# Patient Record
Sex: FEMALE | Race: WHITE | Employment: OTHER | URBAN - METROPOLITAN AREA
[De-identification: names, ages, dates, MRNs, and addresses within clinical notes are randomized per-mention and may not be internally consistent; named-entity substitution may affect disease eponyms.]

---

## 2017-01-13 ENCOUNTER — HOSPITAL ENCOUNTER (OUTPATIENT)
Dept: OTHER | Age: 57
Discharge: HOME | End: 2017-01-13
Attending: INTERNAL MEDICINE | Admitting: INTERNAL MEDICINE

## 2017-01-13 DIAGNOSIS — Z85.3 PERSONAL HISTORY OF MALIGNANT NEOPLASM OF BREAST: ICD-10-CM

## 2017-01-13 RX ORDER — FLUDEOXYGLUCOSE F 18 200 MCI/ML
14.85 INJECTION, SOLUTION INTRAVENOUS
Status: COMPLETED | OUTPATIENT
Start: 2017-01-13 | End: 2017-01-13

## 2017-01-13 RX ADMIN — FLUDEOXYGLUCOSE F 18 14.85 MILLICURIE: 200 INJECTION, SOLUTION INTRAVENOUS at 10:35

## 2017-01-16 DIAGNOSIS — E11.42 DIABETIC POLYNEUROPATHY ASSOCIATED WITH TYPE 2 DIABETES MELLITUS (HCC): ICD-10-CM

## 2017-01-16 RX ORDER — LISINOPRIL 20 MG/1
TABLET ORAL
Qty: 30 TABLET | Refills: 0 | Status: SHIPPED | OUTPATIENT
Start: 2017-01-16 | End: 2017-02-06

## 2017-01-16 RX ORDER — VENLAFAXINE HYDROCHLORIDE 75 MG/1
CAPSULE, EXTENDED RELEASE ORAL
Qty: 90 CAPSULE | Refills: 2 | Status: SHIPPED | OUTPATIENT
Start: 2017-01-16 | End: 2017-07-09 | Stop reason: SDUPTHER

## 2017-01-16 RX ORDER — GABAPENTIN 300 MG/1
CAPSULE ORAL
Qty: 90 CAPSULE | Refills: 1 | Status: SHIPPED | OUTPATIENT
Start: 2017-01-16 | End: 2017-02-13

## 2017-01-27 ENCOUNTER — TELEPHONE (OUTPATIENT)
Dept: ENDOCRINOLOGY | Age: 57
End: 2017-01-27

## 2017-02-06 RX ORDER — LISINOPRIL 20 MG/1
TABLET ORAL
Qty: 30 TABLET | Refills: 0 | Status: SHIPPED | OUTPATIENT
Start: 2017-02-06 | End: 2017-02-13 | Stop reason: SDUPTHER

## 2017-02-13 ENCOUNTER — OFFICE VISIT (OUTPATIENT)
Dept: FAMILY MEDICINE CLINIC | Age: 57
End: 2017-02-13

## 2017-02-13 VITALS
DIASTOLIC BLOOD PRESSURE: 90 MMHG | TEMPERATURE: 98.3 F | WEIGHT: 209 LBS | RESPIRATION RATE: 20 BRPM | HEART RATE: 90 BPM | BODY MASS INDEX: 38.46 KG/M2 | SYSTOLIC BLOOD PRESSURE: 142 MMHG | HEIGHT: 62 IN

## 2017-02-13 DIAGNOSIS — M48.061 LUMBAR SPINAL STENOSIS: Primary | ICD-10-CM

## 2017-02-13 DIAGNOSIS — M25.50 POLYARTHRALGIA: ICD-10-CM

## 2017-02-13 DIAGNOSIS — E11.42 DIABETIC POLYNEUROPATHY ASSOCIATED WITH TYPE 2 DIABETES MELLITUS (HCC): ICD-10-CM

## 2017-02-13 DIAGNOSIS — I10 ESSENTIAL HYPERTENSION: ICD-10-CM

## 2017-02-13 PROCEDURE — 99214 OFFICE O/P EST MOD 30 MIN: CPT | Performed by: FAMILY MEDICINE

## 2017-02-13 RX ORDER — LISINOPRIL 40 MG/1
40 TABLET ORAL DAILY
Qty: 90 TABLET | Refills: 0 | Status: SHIPPED | OUTPATIENT
Start: 2017-02-13 | End: 2017-05-27 | Stop reason: SDUPTHER

## 2017-02-13 RX ORDER — GABAPENTIN 600 MG/1
TABLET ORAL
Qty: 120 TABLET | Refills: 3 | Status: SHIPPED | OUTPATIENT
Start: 2017-02-13 | End: 2017-06-08 | Stop reason: SDUPTHER

## 2017-03-03 ENCOUNTER — TELEPHONE (OUTPATIENT)
Dept: BARIATRICS/WEIGHT MGMT | Age: 57
End: 2017-03-03

## 2017-03-15 ENCOUNTER — TELEPHONE (OUTPATIENT)
Dept: BARIATRICS/WEIGHT MGMT | Age: 57
End: 2017-03-15

## 2017-04-04 RX ORDER — NYSTATIN 100000 U/G
OINTMENT TOPICAL
Qty: 30 G | Refills: 2 | Status: SHIPPED | OUTPATIENT
Start: 2017-04-04 | End: 2017-04-06

## 2017-04-05 DIAGNOSIS — L02.92 FURUNCLE: ICD-10-CM

## 2017-04-06 RX ORDER — NYSTATIN 100000 U/G
OINTMENT TOPICAL
Qty: 30 G | Refills: 2 | Status: SHIPPED | OUTPATIENT
Start: 2017-04-06 | End: 2018-05-18 | Stop reason: SDUPTHER

## 2017-04-06 RX ORDER — FLUTICASONE PROPIONATE 50 MCG
SPRAY, SUSPENSION (ML) NASAL
Qty: 16 G | Refills: 11 | Status: SHIPPED | OUTPATIENT
Start: 2017-04-06 | End: 2018-07-16 | Stop reason: SDUPTHER

## 2017-04-10 RX ORDER — CYCLOBENZAPRINE HCL 10 MG
10 TABLET ORAL 3 TIMES DAILY PRN
Qty: 30 TABLET | Refills: 0 | Status: SHIPPED | OUTPATIENT
Start: 2017-04-10 | End: 2017-05-15 | Stop reason: SDUPTHER

## 2017-04-19 ENCOUNTER — TELEPHONE (OUTPATIENT)
Dept: BARIATRICS/WEIGHT MGMT | Age: 57
End: 2017-04-19

## 2017-05-01 DIAGNOSIS — E78.5 HYPERLIPIDEMIA LDL GOAL <100: ICD-10-CM

## 2017-05-01 RX ORDER — METOPROLOL SUCCINATE 50 MG/1
TABLET, EXTENDED RELEASE ORAL
Qty: 30 TABLET | Refills: 1 | Status: SHIPPED | OUTPATIENT
Start: 2017-05-01 | End: 2017-08-15 | Stop reason: SDUPTHER

## 2017-05-01 RX ORDER — PRAVASTATIN SODIUM 40 MG
TABLET ORAL
Qty: 90 TABLET | Refills: 2 | Status: SHIPPED | OUTPATIENT
Start: 2017-05-01 | End: 2018-03-10 | Stop reason: SDUPTHER

## 2017-05-15 ENCOUNTER — OFFICE VISIT (OUTPATIENT)
Dept: FAMILY MEDICINE CLINIC | Age: 57
End: 2017-05-15

## 2017-05-15 VITALS
TEMPERATURE: 98.4 F | WEIGHT: 206 LBS | BODY MASS INDEX: 36.5 KG/M2 | RESPIRATION RATE: 18 BRPM | DIASTOLIC BLOOD PRESSURE: 74 MMHG | HEIGHT: 63 IN | HEART RATE: 80 BPM | SYSTOLIC BLOOD PRESSURE: 122 MMHG | OXYGEN SATURATION: 97 %

## 2017-05-15 DIAGNOSIS — E11.43 DIABETIC GASTROPARESIS (HCC): Primary | ICD-10-CM

## 2017-05-15 DIAGNOSIS — K31.84 DIABETIC GASTROPARESIS (HCC): Primary | ICD-10-CM

## 2017-05-15 DIAGNOSIS — L71.9 ROSACEA, ACNE: ICD-10-CM

## 2017-05-15 DIAGNOSIS — M25.50 POLYARTHRALGIA: ICD-10-CM

## 2017-05-15 DIAGNOSIS — F51.01 PRIMARY INSOMNIA: ICD-10-CM

## 2017-05-15 DIAGNOSIS — M48.061 LUMBAR SPINAL STENOSIS: ICD-10-CM

## 2017-05-15 PROCEDURE — 99214 OFFICE O/P EST MOD 30 MIN: CPT | Performed by: FAMILY MEDICINE

## 2017-05-15 RX ORDER — METRONIDAZOLE 7.5 MG/G
GEL TOPICAL
Qty: 1 TUBE | Refills: 3 | Status: SHIPPED | OUTPATIENT
Start: 2017-05-15 | End: 2021-05-18

## 2017-05-15 RX ORDER — DICYCLOMINE HCL 20 MG
20 TABLET ORAL 3 TIMES DAILY PRN
Qty: 90 TABLET | Refills: 3 | Status: SHIPPED | OUTPATIENT
Start: 2017-05-15 | End: 2017-06-28

## 2017-05-17 RX ORDER — CYCLOBENZAPRINE HCL 10 MG
TABLET ORAL
Qty: 30 TABLET | Refills: 0 | Status: SHIPPED | OUTPATIENT
Start: 2017-05-17 | End: 2017-06-28

## 2017-05-18 ENCOUNTER — OFFICE VISIT (OUTPATIENT)
Dept: ENDOCRINOLOGY | Age: 57
End: 2017-05-18

## 2017-05-18 VITALS
RESPIRATION RATE: 16 BRPM | WEIGHT: 206.6 LBS | HEIGHT: 63 IN | OXYGEN SATURATION: 95 % | BODY MASS INDEX: 36.61 KG/M2 | HEART RATE: 80 BPM | DIASTOLIC BLOOD PRESSURE: 99 MMHG | SYSTOLIC BLOOD PRESSURE: 155 MMHG

## 2017-05-18 DIAGNOSIS — I10 ESSENTIAL HYPERTENSION: ICD-10-CM

## 2017-05-18 LAB — HBA1C MFR BLD: 11.3 %

## 2017-05-18 PROCEDURE — 99214 OFFICE O/P EST MOD 30 MIN: CPT | Performed by: INTERNAL MEDICINE

## 2017-05-18 PROCEDURE — 83036 HEMOGLOBIN GLYCOSYLATED A1C: CPT | Performed by: INTERNAL MEDICINE

## 2017-05-30 RX ORDER — LISINOPRIL 40 MG/1
TABLET ORAL
Qty: 90 TABLET | Refills: 0 | Status: SHIPPED | OUTPATIENT
Start: 2017-05-30 | End: 2017-08-25 | Stop reason: SDUPTHER

## 2017-06-08 DIAGNOSIS — M25.50 POLYARTHRALGIA: ICD-10-CM

## 2017-06-09 RX ORDER — GABAPENTIN 600 MG/1
TABLET ORAL
Qty: 120 TABLET | Refills: 2 | Status: SHIPPED | OUTPATIENT
Start: 2017-06-09 | End: 2017-09-22 | Stop reason: SDUPTHER

## 2017-06-15 RX ORDER — DIAPER,BRIEF,ADULT, DISPOSABLE
EACH MISCELLANEOUS
Qty: 50 STRIP | Refills: 4 | Status: SHIPPED | OUTPATIENT
Start: 2017-06-15 | End: 2017-11-14 | Stop reason: SDUPTHER

## 2017-06-21 ENCOUNTER — TELEPHONE (OUTPATIENT)
Dept: BARIATRICS/WEIGHT MGMT | Age: 57
End: 2017-06-21

## 2017-06-26 ENCOUNTER — TELEPHONE (OUTPATIENT)
Dept: FAMILY MEDICINE CLINIC | Age: 57
End: 2017-06-26

## 2017-06-26 DIAGNOSIS — M25.569 CHRONIC KNEE PAIN, UNSPECIFIED LATERALITY: Primary | ICD-10-CM

## 2017-06-26 DIAGNOSIS — G89.29 CHRONIC KNEE PAIN, UNSPECIFIED LATERALITY: Primary | ICD-10-CM

## 2017-06-28 ENCOUNTER — OFFICE VISIT (OUTPATIENT)
Dept: BARIATRICS/WEIGHT MGMT | Age: 57
End: 2017-06-28

## 2017-06-28 VITALS
HEART RATE: 86 BPM | BODY MASS INDEX: 38.16 KG/M2 | DIASTOLIC BLOOD PRESSURE: 82 MMHG | SYSTOLIC BLOOD PRESSURE: 125 MMHG | HEIGHT: 63 IN | WEIGHT: 215.4 LBS

## 2017-06-28 DIAGNOSIS — E66.01 SEVERE OBESITY (BMI 35.0-39.9) WITH COMORBIDITY (HCC): Primary | ICD-10-CM

## 2017-06-28 DIAGNOSIS — E78.5 HYPERLIPIDEMIA LDL GOAL <100: ICD-10-CM

## 2017-06-28 DIAGNOSIS — I10 ESSENTIAL HYPERTENSION: ICD-10-CM

## 2017-06-28 PROCEDURE — 99204 OFFICE O/P NEW MOD 45 MIN: CPT | Performed by: SURGERY

## 2017-06-28 RX ORDER — AMPICILLIN TRIHYDRATE 250 MG
1000 CAPSULE ORAL DAILY
Status: ON HOLD | COMMUNITY
End: 2017-08-22 | Stop reason: ALTCHOICE

## 2017-06-29 ENCOUNTER — OFFICE VISIT (OUTPATIENT)
Dept: ORTHOPEDIC SURGERY | Age: 57
End: 2017-06-29

## 2017-06-29 ENCOUNTER — TELEPHONE (OUTPATIENT)
Dept: FAMILY MEDICINE CLINIC | Age: 57
End: 2017-06-29

## 2017-06-29 VITALS
HEIGHT: 62 IN | SYSTOLIC BLOOD PRESSURE: 120 MMHG | BODY MASS INDEX: 39.56 KG/M2 | TEMPERATURE: 96.8 F | WEIGHT: 215 LBS | DIASTOLIC BLOOD PRESSURE: 73 MMHG | HEART RATE: 85 BPM

## 2017-06-29 DIAGNOSIS — M17.11 PRIMARY OSTEOARTHRITIS OF RIGHT KNEE: ICD-10-CM

## 2017-06-29 DIAGNOSIS — M25.561 RIGHT KNEE PAIN, UNSPECIFIED CHRONICITY: Primary | ICD-10-CM

## 2017-06-29 PROCEDURE — 20610 DRAIN/INJ JOINT/BURSA W/O US: CPT | Performed by: PHYSICIAN ASSISTANT

## 2017-06-29 PROCEDURE — 99214 OFFICE O/P EST MOD 30 MIN: CPT | Performed by: PHYSICIAN ASSISTANT

## 2017-07-03 DIAGNOSIS — K31.84 DIABETIC GASTROPARESIS (HCC): ICD-10-CM

## 2017-07-03 DIAGNOSIS — M17.11 PRIMARY OSTEOARTHRITIS OF RIGHT KNEE: ICD-10-CM

## 2017-07-03 DIAGNOSIS — M25.551 HIP PAIN, BILATERAL: ICD-10-CM

## 2017-07-03 DIAGNOSIS — I10 ESSENTIAL HYPERTENSION: ICD-10-CM

## 2017-07-03 DIAGNOSIS — E66.01 SEVERE OBESITY (BMI 35.0-39.9) WITH COMORBIDITY (HCC): ICD-10-CM

## 2017-07-03 DIAGNOSIS — E11.43 DIABETIC GASTROPARESIS (HCC): ICD-10-CM

## 2017-07-03 DIAGNOSIS — M25.552 HIP PAIN, BILATERAL: ICD-10-CM

## 2017-07-03 DIAGNOSIS — E11.21 DIABETIC NEPHROPATHY ASSOCIATED WITH TYPE 2 DIABETES MELLITUS (HCC): ICD-10-CM

## 2017-07-03 DIAGNOSIS — M54.31 RIGHT SIDED SCIATICA: ICD-10-CM

## 2017-07-03 DIAGNOSIS — E78.5 HYPERLIPIDEMIA LDL GOAL <100: ICD-10-CM

## 2017-07-10 RX ORDER — VENLAFAXINE HYDROCHLORIDE 75 MG/1
CAPSULE, EXTENDED RELEASE ORAL
Qty: 90 CAPSULE | Refills: 1 | Status: SHIPPED | OUTPATIENT
Start: 2017-07-10 | End: 2017-09-18 | Stop reason: SDUPTHER

## 2017-07-19 ENCOUNTER — HOSPITAL ENCOUNTER (OUTPATIENT)
Dept: WOMENS IMAGING | Age: 57
Discharge: HOME | End: 2017-07-19
Attending: INTERNAL MEDICINE | Admitting: INTERNAL MEDICINE

## 2017-07-19 DIAGNOSIS — Z12.31 VISIT FOR SCREENING MAMMOGRAM: ICD-10-CM

## 2017-08-03 ENCOUNTER — TELEPHONE (OUTPATIENT)
Dept: ENDOCRINOLOGY | Age: 57
End: 2017-08-03

## 2017-08-15 RX ORDER — METOPROLOL SUCCINATE 50 MG/1
TABLET, EXTENDED RELEASE ORAL
Qty: 30 TABLET | Refills: 0 | Status: SHIPPED | OUTPATIENT
Start: 2017-08-15 | End: 2017-10-08 | Stop reason: SDUPTHER

## 2017-08-22 ENCOUNTER — TELEPHONE (OUTPATIENT)
Dept: FAMILY MEDICINE CLINIC | Age: 57
End: 2017-08-22

## 2017-08-23 ENCOUNTER — TELEPHONE (OUTPATIENT)
Dept: BARIATRICS/WEIGHT MGMT | Age: 57
End: 2017-08-23

## 2017-08-25 RX ORDER — LISINOPRIL 40 MG/1
TABLET ORAL
Qty: 90 TABLET | Refills: 0 | Status: SHIPPED | OUTPATIENT
Start: 2017-08-25 | End: 2018-04-13 | Stop reason: SDUPTHER

## 2017-08-28 ENCOUNTER — OFFICE VISIT (OUTPATIENT)
Dept: FAMILY MEDICINE CLINIC | Age: 57
End: 2017-08-28

## 2017-08-28 VITALS
HEART RATE: 96 BPM | BODY MASS INDEX: 38.27 KG/M2 | HEIGHT: 63 IN | RESPIRATION RATE: 16 BRPM | WEIGHT: 216 LBS | SYSTOLIC BLOOD PRESSURE: 120 MMHG | TEMPERATURE: 99.3 F | DIASTOLIC BLOOD PRESSURE: 80 MMHG

## 2017-08-28 DIAGNOSIS — G93.41 METABOLIC ENCEPHALOPATHY: Primary | ICD-10-CM

## 2017-08-28 DIAGNOSIS — A41.9 SEPSIS, DUE TO UNSPECIFIED ORGANISM: ICD-10-CM

## 2017-08-28 PROCEDURE — 99496 TRANSJ CARE MGMT HIGH F2F 7D: CPT | Performed by: FAMILY MEDICINE

## 2017-08-29 ENCOUNTER — TELEPHONE (OUTPATIENT)
Dept: FAMILY MEDICINE CLINIC | Age: 57
End: 2017-08-29

## 2017-09-18 RX ORDER — VENLAFAXINE HYDROCHLORIDE 75 MG/1
CAPSULE, EXTENDED RELEASE ORAL
Qty: 90 CAPSULE | Refills: 0 | Status: SHIPPED | OUTPATIENT
Start: 2017-09-18 | End: 2017-10-25

## 2017-09-20 PROBLEM — M54.59 INTRACTABLE LOW BACK PAIN: Status: ACTIVE | Noted: 2017-09-20

## 2017-09-20 PROBLEM — G89.29 CHRONIC LOW BACK PAIN: Status: ACTIVE | Noted: 2017-09-20

## 2017-09-20 PROBLEM — M54.50 CHRONIC LOW BACK PAIN: Status: ACTIVE | Noted: 2017-09-20

## 2017-09-22 ENCOUNTER — TELEPHONE (OUTPATIENT)
Dept: FAMILY MEDICINE CLINIC | Age: 57
End: 2017-09-22

## 2017-09-22 DIAGNOSIS — M25.50 POLYARTHRALGIA: ICD-10-CM

## 2017-09-23 RX ORDER — GABAPENTIN 600 MG/1
TABLET ORAL
Qty: 120 TABLET | Refills: 1 | Status: SHIPPED | OUTPATIENT
Start: 2017-09-23 | End: 2017-11-14 | Stop reason: SDUPTHER

## 2017-09-27 ENCOUNTER — HOSPITAL ENCOUNTER (OUTPATIENT)
Dept: ENDOSCOPY | Age: 57
Discharge: HOME | End: 2017-10-16
Attending: INTERNAL MEDICINE | Admitting: INTERNAL MEDICINE

## 2017-09-28 ENCOUNTER — OFFICE VISIT (OUTPATIENT)
Dept: ORTHOPEDIC SURGERY | Age: 57
End: 2017-09-28

## 2017-09-28 VITALS
HEIGHT: 63 IN | HEART RATE: 98 BPM | SYSTOLIC BLOOD PRESSURE: 173 MMHG | BODY MASS INDEX: 39.16 KG/M2 | WEIGHT: 221 LBS | TEMPERATURE: 97.5 F | DIASTOLIC BLOOD PRESSURE: 94 MMHG

## 2017-09-28 DIAGNOSIS — M77.11 RIGHT TENNIS ELBOW: ICD-10-CM

## 2017-09-28 DIAGNOSIS — M79.671 RIGHT FOOT PAIN: Primary | ICD-10-CM

## 2017-09-28 DIAGNOSIS — M25.521 RIGHT ELBOW PAIN: ICD-10-CM

## 2017-09-28 DIAGNOSIS — M72.2 PLANTAR FASCIITIS OF RIGHT FOOT: ICD-10-CM

## 2017-09-28 PROCEDURE — 20605 DRAIN/INJ JOINT/BURSA W/O US: CPT | Performed by: PHYSICIAN ASSISTANT

## 2017-09-28 PROCEDURE — 99214 OFFICE O/P EST MOD 30 MIN: CPT | Performed by: PHYSICIAN ASSISTANT

## 2017-10-05 ENCOUNTER — OFFICE VISIT (OUTPATIENT)
Dept: FAMILY MEDICINE CLINIC | Age: 57
End: 2017-10-05

## 2017-10-05 VITALS
SYSTOLIC BLOOD PRESSURE: 136 MMHG | HEART RATE: 94 BPM | WEIGHT: 209 LBS | BODY MASS INDEX: 37.03 KG/M2 | HEIGHT: 63 IN | RESPIRATION RATE: 16 BRPM | TEMPERATURE: 98 F | DIASTOLIC BLOOD PRESSURE: 84 MMHG

## 2017-10-05 DIAGNOSIS — E11.21 DIABETIC NEPHROPATHY ASSOCIATED WITH TYPE 2 DIABETES MELLITUS (HCC): ICD-10-CM

## 2017-10-05 DIAGNOSIS — Z23 NEEDS FLU SHOT: ICD-10-CM

## 2017-10-05 DIAGNOSIS — I10 ESSENTIAL HYPERTENSION: ICD-10-CM

## 2017-10-05 DIAGNOSIS — F41.9 ANXIETY: ICD-10-CM

## 2017-10-05 DIAGNOSIS — S16.1XXA CERVICAL STRAIN, INITIAL ENCOUNTER: ICD-10-CM

## 2017-10-05 PROCEDURE — 90471 IMMUNIZATION ADMIN: CPT | Performed by: FAMILY MEDICINE

## 2017-10-05 PROCEDURE — 90630 INFLUENZA, QUADV, 18-64 YRS, ID, PF, MICRO INJ, 0.1ML (FLUZONE QUADV, PF): CPT | Performed by: FAMILY MEDICINE

## 2017-10-05 PROCEDURE — 99214 OFFICE O/P EST MOD 30 MIN: CPT | Performed by: FAMILY MEDICINE

## 2017-10-05 RX ORDER — BUSPIRONE HYDROCHLORIDE 15 MG/1
TABLET ORAL
Qty: 60 TABLET | Refills: 1 | Status: SHIPPED | OUTPATIENT
Start: 2017-10-05 | End: 2020-08-05

## 2017-10-05 NOTE — PATIENT INSTRUCTIONS
tilt your head toward the left. You will feel a gentle stretch on your right side. Hold for 15 to 30 seconds. Repeat 3 times on each side. Neck rotation   Right side: Rotate your neck by looking over your right shoulder. Lift your right hand and place your palm on the left side of your chin. Push your chin with your palm toward your right shoulder. Hold for a count of 10. Do this 3 times. Left side: Rotate your neck by looking over your left shoulder. Lift your left hand and place your palm on the right side of your chin. Push your chin with your palm toward your left shoulder. Hold for a count of 10. Do this 3 times. Scapular squeezes: While sitting or standing with your arms by your sides, squeeze your shoulder blades together and hold for 5 seconds. Do 3 sets of 10. Thoracic extension: While sitting in a chair, clasp both arms behind your head. Gently arch backward and look up toward the ceiling. Repeat 10 times. Do this several times per day.

## 2017-10-05 NOTE — MR AVS SNAPSHOT
After Visit Summary             Brandan Solano   10/5/2017 12:15 PM   Office Visit    Description:  Female : 1960   Provider:  Hood Reyes MD   Department:  [address] [place] [address] and Future Appointments         Below is a list of your follow-up and future appointments. This may not be a complete list as you may have made appointments directly with providers that we are not aware of or your providers may have made some for you. Please call your providers to confirm appointments. It is important to keep your appointments. Please bring your current insurance card, photo ID, co-pay, and all medication bottles to your appointment. If self-pay, payment is expected at the time of service. Your To-Do List     Future Appointments Provider Department Dept Phone    10/16/2017 11:00 AM Samir Wilcox MD; [facility] [phone]    2017 1:00 PM JONI Martinez [facility] [phone]    2017 11:50 AM Nacho Caruso, [facility] [phone]    Please arrive 15 minutes prior to appointment time, bring insurance card and photo ID.     2018 3:00 PM Paula Sosa MD; [facility] [address] On[facility] [phone]    Future Orders Complete By Expires    Comprehensive Metabolic Panel [PKC95 Custom]  10/5/2017 1/3/2018    Hemoglobin A1C [LAB90 Custom]  10/5/2017 1/3/2018    Lipid Panel [LAB18 Custom]  10/5/2017 1/3/2018    Follow-Up    Return in about 4 days (around 10/9/2017) for Diabetes.          Information from Your Visit        Department     Name Address Phone Fax    [address] [facility]) [place]  [address] [place] [address] [phone]      You Were Seen for:         Comments    Uncontrolled type 2 diabetes mellitus with complication, with long-term current use of insulin ([address]   [[id number]]         Vital Signs Blood Pressure Pulse Temperature Respirations Height Weight    136/84 (Site: Left Arm, Position: Sitting, Cuff Size: Large Adult) 94 98 °F (36.7 °C) (Oral) 16 5' 3\" (1.6 m) 209 lb (94.8 kg)    Body Mass Index Smoking Status                37.02 kg/m2 Never Smoker          Additional Information about your Body Mass Index (BMI)           Your BMI as listed above is considered obese (30 or more). BMI is an estimate of body fat, calculated from your height and weight. The higher your BMI, the greater your risk of heart disease, high blood pressure, type 2 diabetes, stroke, gallstones, arthritis, sleep apnea, and certain cancers. BMI is not perfect. It may overestimate body fat in athletes and people who are more muscular. Even a small weight loss (between 5 and 10 percent of your current weight) by decreasing your calorie intake and becoming more physically active will help lower your risk of developing or worsening diseases associated with obesity. Learn more at: [employer]          Instructions    INSTRUCTIONS  NEXT APPOINTMENT:   · PLEASE TAKE THIS FORM TO CHECK-OUT WINDOW TO SCHEDULE NEXT VISIT.   · REFILL POLICY:  If not getting refills today, then PLEASE make next appointment on way out today. Will need to see that future appointment scheudled when pharmacy contacts Dr. Leonardo Kuo for a refill. · PLEASE GET BLOODWORK DRAWN TODAY ON FIRST FLOOR in 170. Take orders with you. RESULTS- most blood tests back in couple days. We will call you if any problems. If bloodwork good, you will get letter in mail or notified thru [address] (if signed up) within 2 weeks. If you do not, please call office. · Please get flu vaccine when available in fall. Can get either at this office or at stores such as [employer]. · Check sugar before meals, before bed, 2 hour after meal.  · Take 70/30 mix 50 units before meals twice a day. NO other insulin. · Bring in log of sugars to see me early next week. · START buspar for anxiety  · Use heat 20 minutes on painful joint/muscle. Then do stretches. At least twice a day. OK to take advil. Neck Spasm Rehabilitation Exercises     You may do these exercises right away. Neck flexion   Forward: Clasp your hands behind your head and let the weight of your arms pull your chin to your chest. Relax. Hold for a count of 15. Do this 3 times. Right: Turn your head to the right and clasp your hands behind your head. Let the weight of your arms pull your chin to the right side of your chest. Relax. Hold for a count of 15. Do this 3 times. Left: Turn your head to the left and clasp your hands behind your head. Let the weight of your arms pull your chin to the left side of your chest. Relax. Hold for a count of 15. Do this 3 times. Upper trapezius stretch: The upper trapezius muscle connects your shoulder to your head. Sitting in an upright position, put your right arm behind your back and gently grasp the right side of your head with your left hand to help tilt your head toward the left. You will feel a gentle stretch on your right side. Hold for 15 to 30 seconds. Repeat 3 times on each side. Neck rotation   Right side: Rotate your neck by looking over your right shoulder. Lift your right hand and place your palm on the left side of your chin. Push your chin with your palm toward your right shoulder. Hold for a count of 10. Do this 3 times. Left side: Rotate your neck by looking over your left shoulder. Lift your left hand and place your palm on the right side of your chin. Push your chin with your palm toward your left shoulder. Hold for a count of 10. Do this 3 times. Scapular squeezes: While sitting or standing with your arms by your sides, squeeze your shoulder blades together and hold for 5 seconds. Do 3 sets of 10.    Thoracic extension: While sitting in a chair, clasp both arms behind your CAPSULES BY MOUTH DAILY    lisinopril (PRINIVIL;ZESTRIL) 40 MG tablet TAKE ONE TABLET BY MOUTH DAILY    amLODIPine (NORVASC) 10 MG tablet Take 1 tablet by mouth daily    metoprolol succinate (TOPROL XL) 50 MG extended release tablet TAKE ONE TABLET BY MOUTH DAILY    TRUETRACK TEST strip USE THREE TIMES A DAY    B-D UF III MINI PEN NEEDLES 31G X 5 MM MISC USE THREE TIMES A DAY WITH NOVOLOG    metroNIDAZOLE (METROGEL) 0.75 % gel Apply topically 2 times daily. pravastatin (PRAVACHOL) 40 MG tablet TAKE ONE TABLET BY MOUTH DAILY    anastrozole (ARIMIDEX) 1 MG tablet TAKE ONE TABLET BY MOUTH DAILY    mupirocin (BACTROBAN) 2 % ointment APPLY THREE TIMES A DAY AS NEEDED    nystatin (MYCOSTATIN) 918912 UNIT/GM ointment APPLY TO AFFECTED AREA(S) TWO TIMES A DAY AS NEEDED    fluticasone (FLONASE) 50 MCG/ACT nasal spray PLACE TWO SPRAYS IN EACH NOSTRIL ONCE DAILY    tolterodine (DETROL LA) 4 MG extended release capsule TAKE ONE CAPSULE BY MOUTH DAILY    calcium carbonate-vitamin D (CALTRATE) 600-400 MG-UNIT TABS per tab Take 1 tablet by mouth 2 times daily    B-D INS SYR HALF-UNIT .3CC/31G 31G X 5/16\" 0.3 ML MISC USE AS DIRECTED TWO TIMES A DAY    Blood Glucose Monitoring Suppl (TRUE METRIX METER) W/DEVICE KIT 1 each by Does not apply route three times daily. true track meter    MICROLET LANCETS MISC 1 each by Does not apply route 6 times daily. omeprazole (PRILOSEC) 20 MG capsule Take 20 mg by mouth every evening. lubiprostone (AMITIZA) 8 MCG CAPS capsule Take 8 mcg by mouth 2 times daily (with meals).       Allergies              Ambien [Zolpidem Tartrate] Other (See Comments)    Vivid dreams    Naproxen     Trazodone And Nefazodone Other (See Comments)    headache    Diamox [Acetazolamide] Rash    Lorazepam Other (See Comments)    confusion    Reglan [Metoclopramide] Anxiety    Sulfa Antibiotics Rash    Shaking      We Ordered/Performed the following           Ambulatory referral to Diabetic Education Scheduling Instructions:    SPECIFICALLY NEEDS INTENSE ED ON INSULIN  Classes and individual appointments available at various locations in the [facility], [place], [place], and [facility]. Scheduling Phone Number: [phone], Option 2 then option 5    One of the many advantages of the [address] is that we all work together closely to make healthcare easy for you. We have contacted the scheduling office to inform them we have referred you. For your convenience, their office should call you within a few days to schedule   an appointment, but if you would like to call them sooner their information is above. Oskar Lares, 18-64 YRS, ID, PF, MICRO INJ, 0.1ML (FLUZONE QUADV, PF)          Additional Information        Basic Information     Date Of Birth Sex Race Ethnicity Preferred Language    1960 Female White Non-/Non  English      Problem List as of 10/5/2017  Date Reviewed: 10/5/2017                Anxiety    Plantar fasciitis of right foot    Right tennis elbow    Acute midline low back pain without sciatica    Altered mental status, unspecified    Urinary tract infection, site not specified    Chronic low back pain    Intractable low back pain    Blood poisoning ([address]    Meningitis    Fever of unknown origin    SIRS (systemic inflammatory response syndrome) (HCC)    Neck pain    Severe obesity (BMI 35.0-39. 9) with comorbidity ([address]    Metallic taste    Polyarthralgia    Hyperlipidemia LDL goal <100    Personal history of breast cancer    Urge incontinence of urine    Lumbar spinal stenosis    TMJ (temporomandibular joint disorder)    Encounter for antineoplastic chemotherapy    Right knee DJD    Calcific Achilles tendonitis, right    Right sided sciatica    Migraine    Hip pain, bilateral    Depression, major    Insulin dependent type 2 diabetes mellitus, uncontrolled ([address]- per endocrinology    Nephropathy, diabetic ([address]    Allergic rhinitis    Insomnia

## 2017-10-06 ENCOUNTER — TELEPHONE (OUTPATIENT)
Dept: FAMILY MEDICINE CLINIC | Age: 57
End: 2017-10-06

## 2017-10-06 RX ORDER — METHOCARBAMOL 500 MG/1
500 TABLET, FILM COATED ORAL 4 TIMES DAILY
Qty: 40 TABLET | Refills: 0 | Status: SHIPPED | OUTPATIENT
Start: 2017-10-06 | End: 2017-10-16

## 2017-10-06 NOTE — TELEPHONE ENCOUNTER
Pt saw Dr. Kin Meckel yesterday for her neck pain  It was discussed in the appointment about a muscle relaxer  Pt would like to see if Dr. Kin Meckel would prescribe that for her today

## 2017-10-09 RX ORDER — METOPROLOL SUCCINATE 50 MG/1
TABLET, EXTENDED RELEASE ORAL
Qty: 30 TABLET | Refills: 5 | Status: SHIPPED | OUTPATIENT
Start: 2017-10-09 | End: 2018-07-01 | Stop reason: SDUPTHER

## 2017-10-15 NOTE — ANESTHESIA PRE-OP
100 UNIT/ML injection Inject 50 Units into the skin 2 times daily (with meals) 15 Pen 3    TRUETRACK TEST strip USE THREE TIMES A DAY 50 strip 4    B-D UF III MINI PEN NEEDLES 31G X 5 MM MISC USE THREE TIMES A DAY WITH NOVOLOG 100 each 5    metroNIDAZOLE (METROGEL) 0.75 % gel Apply topically 2 times daily. 1 Tube 3    pravastatin (PRAVACHOL) 40 MG tablet TAKE ONE TABLET BY MOUTH DAILY 90 tablet 2    anastrozole (ARIMIDEX) 1 MG tablet TAKE ONE TABLET BY MOUTH DAILY 30 tablet 5    mupirocin (BACTROBAN) 2 % ointment APPLY THREE TIMES A DAY AS NEEDED 22 g 2    nystatin (MYCOSTATIN) 996311 UNIT/GM ointment APPLY TO AFFECTED AREA(S) TWO TIMES A DAY AS NEEDED 30 g 2    tolterodine (DETROL LA) 4 MG extended release capsule TAKE ONE CAPSULE BY MOUTH DAILY 30 capsule 5    calcium carbonate-vitamin D (CALTRATE) 600-400 MG-UNIT TABS per tab Take 1 tablet by mouth 2 times daily (Patient taking differently: Take 1 tablet by mouth 2 times daily Indications:  states not taking ) 60 tablet 2    B-D INS SYR HALF-UNIT .3CC/31G 31G X 5/16\" 0.3 ML MISC USE AS DIRECTED TWO TIMES A  each 0    Blood Glucose Monitoring Suppl (TRUE METRIX METER) W/DEVICE KIT 1 each by Does not apply route three times daily. true track meter 1 kit 0    MICROLET LANCETS MISC 1 each by Does not apply route 6 times daily. 600 each 3    omeprazole (PRILOSEC) 20 MG capsule Take 20 mg by mouth every evening.  lubiprostone (AMITIZA) 8 MCG CAPS capsule Take 8 mcg by mouth 2 times daily (with meals). No current facility-administered medications on file prior to encounter. Current Outpatient Prescriptions   Medication Sig Dispense Refill    metoprolol succinate (TOPROL XL) 50 MG extended release tablet TAKE ONE TABLET BY MOUTH DAILY 30 tablet 5    busPIRone (BUSPAR) 15 MG tablet Take 1/3 tab BID for 5 days, then 1/2 tab BID for 5 days, then 2/3 tab BID for 5 days, then 1 po BID.  60 tablet 1    gabapentin (NEURONTIN) 600 MG kit 0    MICROLET LANCETS MISC 1 each by Does not apply route 6 times daily. 600 each 3    omeprazole (PRILOSEC) 20 MG capsule Take 20 mg by mouth every evening.  lubiprostone (AMITIZA) 8 MCG CAPS capsule Take 8 mcg by mouth 2 times daily (with meals). Current Facility-Administered Medications   Medication Dose Route Frequency Provider Last Rate Last Dose    0.9 % sodium chloride infusion   Intravenous Continuous Jayna Tobar MD        sodium chloride flush 0.9 % injection 10 mL  10 mL Intravenous 2 times per day Jayna Tobar MD        sodium chloride flush 0.9 % injection 10 mL  10 mL Intravenous PRN Jayna Tobar MD         Vital Signs (Current)   Vitals:    10/16/17 1036   BP: (!) 178/89   Pulse: 82   Resp: 20   Temp: 97 °F (36.1 °C)   SpO2: 99%     Vital Signs Statistics (for past 48 hrs)     Temp  Av °F (36.1 °C)  Min: 97 °F (36.1 °C)   Min taken time: 10/16/17 1036  Max: 97 °F (36.1 °C)   Max taken time: 10/16/17 1036  Pulse  Av  Min: 82   Min taken time: 10/16/17 1036  Max: 82   Max taken time: 10/16/17 1036  Resp  Av  Min: 20   Min taken time: 10/16/17 1036  Max: 20   Max taken time: 10/16/17 1036  BP  Min: 178/89   Min taken time: 10/16/17 1036  Max: 178/89   Max taken time: 10/16/17 1036  SpO2  Av %  Min: 99 %   Min taken time: 10/16/17 1036  Max: 99 %   Max taken time: 10/16/17 1036    BP Readings from Last 3 Encounters:   10/16/17 (!) 178/89   10/05/17 136/84   17 (!) 173/94     BMI  Body mass index is 36.67 kg/m². Estimated body mass index is 36.67 kg/m² as calculated from the following:    Height as of this encounter: 5' 3\" (1.6 m). Weight as of this encounter: 207 lb (93.9 kg).     CBC   Lab Results   Component Value Date    WBC 8.6 2017    RBC 4.42 2017    RBC 4.74 2015    HGB 13.0 2017    HCT 38.9 2017    MCV 87.9 2017    RDW 14.3 2017     2017     CMP    Lab Results   Component Value Date     09/21/2017    K 4.1 09/21/2017    CL 99 09/21/2017    CO2 24 09/21/2017    BUN 10 09/21/2017    CREATININE 0.6 09/21/2017    GFRAA >60 09/21/2017    GFRAA >60 04/10/2013    AGRATIO 0.7 09/19/2017    LABGLOM >60 09/21/2017    GLUCOSE 202 09/21/2017    GLUCOSE 380 12/04/2015    PROT 7.6 09/19/2017    PROT 6.9 12/04/2015    CALCIUM 9.4 09/21/2017    BILITOT 0.3 09/19/2017    ALKPHOS 121 09/19/2017    AST 15 09/19/2017    ALT 14 09/19/2017     BMP    Lab Results   Component Value Date     09/21/2017    K 4.1 09/21/2017    CL 99 09/21/2017    CO2 24 09/21/2017    BUN 10 09/21/2017    CREATININE 0.6 09/21/2017    CALCIUM 9.4 09/21/2017    GFRAA >60 09/21/2017    GFRAA >60 04/10/2013    LABGLOM >60 09/21/2017    GLUCOSE 202 09/21/2017    GLUCOSE 380 12/04/2015     POCGlucose  No results for input(s): GLUCOSE in the last 72 hours.    Coags    Lab Results   Component Value Date    PROTIME 13.0 08/20/2017    INR 1.15 08/20/2017    APTT 30.4 79/19/8166     HCG (If Applicable)   Lab Results   Component Value Date    PREGTESTUR Negative 04/06/2014      ABGs No results found for: PHART, PO2ART, AOM1SNS, RFV1LJS, BEART, W4KGNKEG   Type & Screen (If Applicable)  No results found for: LABABO, LABRH                         BMI: Wt Readings from Last 3 Encounters:       NPO Status: Finished prep at 0600 today                          Anesthesia Evaluation  Patient summary reviewed no history of anesthetic complications:   Airway: Mallampati: III  TM distance: >3 FB   Neck ROM: full  Mouth opening: > = 3 FB Dental:      Comment: Multiple missing, none loose    Pulmonary: breath sounds clear to auscultation      (-) COPD, asthma and sleep apnea                           Cardiovascular:    (+) hypertension:, hyperlipidemia    (-) valvular problems/murmurs, past MI, CABG/stent, dysrhythmias and  angina      Rhythm: regular  Rate: normal           Beta Blocker:  Dose within 24 Hrs         Neuro/Psych:   (+) headaches:, psychiatric history:   (-) seizures, TIA and CVA           GI/Hepatic/Renal:   (+) GERD:,      (-) liver disease       Endo/Other:    (+) Type II DM, , : arthritis:. (-) hypothyroidism               Abdominal:           Vascular:                                    Anesthesia Plan      MAC     ASA 3             Anesthetic plan and risks discussed with patient. Plan discussed with CRNA. This pre-anesthesia assessment may be used as a history and physical.    DOS STAFF ADDENDUM:    Pt seen and examined, chart reviewed (including anesthesia, drug and allergy history). No interval changes to history and physical examination. Anesthetic plan, risks, benefits, alternatives, and personnel involved discussed with patient. Patient verbalized an understanding and agrees to proceed.       Minnie Bence, MD  October 16, 2017  10:39 AM

## 2017-10-16 VITALS
TEMPERATURE: 98.8 F | SYSTOLIC BLOOD PRESSURE: 166 MMHG | BODY MASS INDEX: 36.68 KG/M2 | HEIGHT: 63 IN | DIASTOLIC BLOOD PRESSURE: 80 MMHG | WEIGHT: 207 LBS | RESPIRATION RATE: 16 BRPM | OXYGEN SATURATION: 98 % | HEART RATE: 75 BPM

## 2017-10-16 LAB
GLUCOSE BLD-MCNC: 168 MG/DL (ref 70–99)
PERFORMED ON: ABNORMAL

## 2017-10-16 RX ORDER — SODIUM CHLORIDE 9 MG/ML
INJECTION, SOLUTION INTRAVENOUS CONTINUOUS
Status: DISCONTINUED | OUTPATIENT
Start: 2017-10-16 | End: 2017-10-17 | Stop reason: HOSPADM

## 2017-10-16 RX ORDER — SODIUM CHLORIDE 0.9 % (FLUSH) 0.9 %
10 SYRINGE (ML) INJECTION EVERY 12 HOURS SCHEDULED
Status: DISCONTINUED | OUTPATIENT
Start: 2017-10-16 | End: 2017-10-17 | Stop reason: HOSPADM

## 2017-10-16 RX ORDER — SODIUM CHLORIDE 0.9 % (FLUSH) 0.9 %
10 SYRINGE (ML) INJECTION PRN
Status: DISCONTINUED | OUTPATIENT
Start: 2017-10-16 | End: 2017-10-17 | Stop reason: HOSPADM

## 2017-10-16 RX ADMIN — SODIUM CHLORIDE: 9 INJECTION, SOLUTION INTRAVENOUS at 10:40

## 2017-10-16 ASSESSMENT — PAIN SCALES - GENERAL
PAINLEVEL_OUTOF10: 0

## 2017-10-16 ASSESSMENT — PAIN - FUNCTIONAL ASSESSMENT: PAIN_FUNCTIONAL_ASSESSMENT: 0-10

## 2017-10-16 NOTE — PROCEDURES
[facility]  Endoscopy Note    Patient: Lisette Brush  : 1960  Acct#: [de-identified]    Procedure: Colonoscopy with polypectomy (snare cautery)    Date:  10/16/2017    Surgeon:  Olvin Trejo MD    Referring Physician:  Sujit Cueto    Previous Colonoscopy: Yes  Date: unknown  Greater than 3 years? Yes    Preoperative Diagnosis:  surveillance    Postoperative Diagnosis:  Colon polyps    Anesthesia:  See anesthesia note    Indications: This is a 61y.o. year old female who presents today with previous adenomatous polyp. Procedure: An informed consent was obtained from the patient after explanation of indications, benefits, possible risks and complications of the procedure. The patient was then taken to the endoscopy suite, placed in the left lateral decubitus position, and the above IV anesthesia was administered. A digital rectal examination was performed and revealed negative without mass, lesions or tenderness. The Olympus CFQ-180-AL video colonoscope was placed in the patient's rectum under digital direction and advanced to the cecum. The cecum was identified by characteristic anatomy and ballottment. The prep was good. The ileocecal valve was identified. The scope was then withdrawn back through the cecum, ascending, transverse, descending and sigmoid colons. Carefull circumferential examination of the mucosa in these areas demonstrated a 4 mm polyp in the cecum that was snared and removed. There was a 6 mm polyp in the ascending colon that was snared and removed. The scope was then withdrawn into the rectum and retroflexed. The retroflexed view of the anal verge and rectum demonstrates no abnormalities. The scope was straightened, the colon was decompressed and the scope was withdrawn from the patient. The patient tolerated the procedure well and was taken to the PACU in good condition.     Estimated Blood Loss:  none    Impression:  Colon polyps    Recommendations:  Await pathology. Repeat colonoscopy in 5 years.     Bob Aguilera MD   [facility]  10/16/2017

## 2017-10-16 NOTE — H&P
[metoclopramide]; and Sulfa antibiotics  Social History     Social History    Marital status:      Spouse name: N/A    Number of children: N/A    Years of education: N/A     Occupational History    Not on file. Social History Main Topics    Smoking status: Never Smoker    Smokeless tobacco: Never Used      Comment: congrats    Alcohol use No    Drug use: No    Sexual activity: Yes     Partners: Male     Other Topics Concern    Not on file     Social History Narrative    Self-breast exams: yes    Lives with spouse and son. Exercise:  rarely    Seatbelt use: Always    Living will: no,   additional information provided     Family History   Problem Relation Age of Onset    Cancer Mother      forehead soft tissue    Diabetes Mother     Arthritis Father     High Blood Pressure Father     High Cholesterol Father     Alzheimer's Disease Father     Diabetes Brother     Depression Sister          PHYSICAL EXAM:      There were no vitals taken for this visit. I        Heart:normal    Lungs: normal    Abdomen: normal      ASA Grade:  See anesthesia note      ASSESSMENT AND PLAN:    1. Procedure options, risks and benefits reviewed with patient and expresses understanding.

## 2017-10-20 ENCOUNTER — OFFICE VISIT (OUTPATIENT)
Dept: FAMILY MEDICINE CLINIC | Age: 57
End: 2017-10-20

## 2017-10-20 VITALS
SYSTOLIC BLOOD PRESSURE: 128 MMHG | RESPIRATION RATE: 18 BRPM | BODY MASS INDEX: 37.42 KG/M2 | HEIGHT: 63 IN | HEART RATE: 92 BPM | WEIGHT: 211.2 LBS | TEMPERATURE: 98 F | DIASTOLIC BLOOD PRESSURE: 80 MMHG

## 2017-10-20 DIAGNOSIS — K31.84 DIABETIC GASTROPARESIS (HCC): Primary | ICD-10-CM

## 2017-10-20 DIAGNOSIS — E11.43 DIABETIC GASTROPARESIS (HCC): Primary | ICD-10-CM

## 2017-10-20 PROCEDURE — G8484 FLU IMMUNIZE NO ADMIN: HCPCS | Performed by: FAMILY MEDICINE

## 2017-10-20 PROCEDURE — 3014F SCREEN MAMMO DOC REV: CPT | Performed by: FAMILY MEDICINE

## 2017-10-20 PROCEDURE — 1111F DSCHRG MED/CURRENT MED MERGE: CPT | Performed by: FAMILY MEDICINE

## 2017-10-20 PROCEDURE — 3046F HEMOGLOBIN A1C LEVEL >9.0%: CPT | Performed by: FAMILY MEDICINE

## 2017-10-20 PROCEDURE — 1036F TOBACCO NON-USER: CPT | Performed by: FAMILY MEDICINE

## 2017-10-20 PROCEDURE — 3017F COLORECTAL CA SCREEN DOC REV: CPT | Performed by: FAMILY MEDICINE

## 2017-10-20 PROCEDURE — G8417 CALC BMI ABV UP PARAM F/U: HCPCS | Performed by: FAMILY MEDICINE

## 2017-10-20 PROCEDURE — G8427 DOCREV CUR MEDS BY ELIG CLIN: HCPCS | Performed by: FAMILY MEDICINE

## 2017-10-20 PROCEDURE — 99213 OFFICE O/P EST LOW 20 MIN: CPT | Performed by: FAMILY MEDICINE

## 2017-10-20 RX ORDER — DIAZEPAM 10 MG/1
TABLET ORAL
Qty: 1 TABLET | Refills: 0 | Status: SHIPPED | OUTPATIENT
Start: 2017-10-20 | End: 2017-10-23

## 2017-10-20 NOTE — PROGRESS NOTES
CHART REVIEW  Health Maintenance   Topic Date Due    Hepatitis C screen  1960    HIV screen  01/10/1974    DTaP/Tdap/Td vaccine (1 - Tdap) 01/10/1978    Colon cancer screen colonoscopy  01/10/2009    Cervical cancer screen  05/01/2016    Lipid screen  01/21/2017    Diabetic retinal exam  02/04/2017    Diabetic hemoglobin A1C test  08/18/2017    Diabetic foot exam  05/18/2018    Breast cancer screen  07/19/2018    Flu vaccine  Completed    Pneumococcal med risk  Completed      Patient Active Problem List   Diagnosis    DM (diabetes mellitus), type 2, uncontrolled with complications ([address]    Diabetic gastroparesis ([address]    Allergic rhinitis    Insomnia    HTN (hypertension)    Chronic knee pain    Headaches due to old head injury    Nephropathy, diabetic ([address]    Insulin dependent type 2 diabetes mellitus, uncontrolled ([address]- per endocrinology    Migraine    Hip pain, bilateral    Depression, major    Right knee DJD    Calcific Achilles tendonitis, right    Right sided sciatica    Encounter for antineoplastic chemotherapy    Lumbar spinal stenosis    TMJ (temporomandibular joint disorder)    Urge incontinence of urine    Personal history of breast cancer    Hyperlipidemia LDL goal <663    Metallic taste    Polyarthralgia    Severe obesity (BMI 35.0-39. 9) with comorbidity ([address]    Blood poisoning ([address]    Meningitis    Fever of unknown origin    SIRS (systemic inflammatory response syndrome) (HCC)    Neck pain    Chronic low back pain    Intractable low back pain    Acute midline low back pain without sciatica    Altered mental status, unspecified    Urinary tract infection    Plantar fasciitis of right foot    Right tennis elbow    Anxiety     Cholesterol, Total   Date Value Ref Range Status   01/21/2016 245 (H) 0 - 199 mg/dL Final     LDL Calculated   Date Value Ref Range Status   01/21/2016 see below <100 mg/dL Final     Comment:     When the triglyceride is <30 - blood clots or weight loss  Endocrine ROS: negative for - temperature intolerance or unexpected weight changes  Respiratory ROS: no cough, shortness of breath, or wheezing  Cardiovascular ROS: no chest pain or dyspnea on exertion  Gastrointestinal ROS: no abdominal pain, change in bowel habits, or black or bloody stools  Genito-Urinary ROS: no dysuria, trouble voiding, or hematuria  Neurological ROS: no TIA or stroke symptoms    HISTORY:  Patient's medications, allergies, past medical, surgical, social and family histories were reviewed and updated as appropriate (See above). Objective:   PHYSICAL EXAM  /80 (Site: Left Arm, Position: Sitting, Cuff Size: Large Adult)   Pulse 92   Temp 98 °F (36.7 °C) (Oral)   Resp 18   Ht 5' 3\" (1.6 m)   Wt 211 lb 3.2 oz (95.8 kg)   BMI 37.41 kg/m²   Blood pressure is Good. BP Readings from Last 5 Encounters:   10/20/17 128/80   10/16/17 (!) 166/80   10/05/17 136/84   09/28/17 (!) 173/94   09/21/17 (!) 165/92     Weight is increased. Wt Readings from Last 5 Encounters:   10/20/17 211 lb 3.2 oz (95.8 kg)   10/16/17 207 lb (93.9 kg)   10/09/17 207 lb (93.9 kg)   10/05/17 209 lb (94.8 kg)   09/28/17 221 lb (100.2 kg)      GENERAL:   · well-developed, well-nourished, alert, no distress. LUNGS:    · Breathing unlabored  · clear to auscultation bilaterally and good air movement  · Palpation: Normal  CARDIOVASC:   · Regular rate and rhythm, S1, S2 normal. No murmur, click, rub or gallop  · Apical impulse normal  · LEGS:  Lower extremity edema: none    SKIN: warm and dry  PSYCH:    · Alert and oriented  · Normal reasoning, insight good  · Facial expressions full, mood appropriate      Assessment and Plan:     1. Uncontrolled type 2 diabetes mellitus with complication, with long-term current use of insulin ([facility]       INSTRUCTIONS  NEXT APPOINTMENT: Please schedule check-up in 3 months. · PLEASE TAKE THIS FORM TO CHECK-OUT WINDOW TO SCHEDULE NEXT VISIT. · CHANGE insulin to 60 in AM and 40 in PM  · REFILL POLICY:  If not getting refills today, then PLEASE make next appointment on way out today. Will need to see that future appointment scheudled when pharmacy contacts Dr. Dana Garces for a refill. · PLEASE GET BLOODWORK DRAWN TODAY ON FIRST FLOOR in 170. Take orders with you. RESULTS- most blood tests back in couple days. We will call you if any problems. If bloodwork good, you will get letter in mail or notified thru [address] (if signed up) within 2 weeks. If you do not, please call office.

## 2017-10-23 ENCOUNTER — TELEPHONE (OUTPATIENT)
Dept: FAMILY MEDICINE CLINIC | Age: 57
End: 2017-10-23

## 2017-10-23 DIAGNOSIS — B37.31 YEAST VAGINITIS: ICD-10-CM

## 2017-10-23 NOTE — TELEPHONE ENCOUNTER
Pt was given diazepam (VALIUM) 10 MG tablet to get her thru her MRI today. this medication is in the same category as Ativan. Which she is allergic to. Can you call something else in.

## 2017-10-25 RX ORDER — VENLAFAXINE HYDROCHLORIDE 75 MG/1
CAPSULE, EXTENDED RELEASE ORAL
Qty: 90 CAPSULE | Refills: 0 | Status: SHIPPED | OUTPATIENT
Start: 2017-10-25 | End: 2017-11-06 | Stop reason: SDUPTHER

## 2017-10-25 RX ORDER — FLUCONAZOLE 150 MG/1
TABLET ORAL
Qty: 2 TABLET | Refills: 2 | Status: SHIPPED | OUTPATIENT
Start: 2017-10-25 | End: 2018-09-12 | Stop reason: SDUPTHER

## 2017-11-06 RX ORDER — VENLAFAXINE HYDROCHLORIDE 75 MG/1
CAPSULE, EXTENDED RELEASE ORAL
Qty: 90 CAPSULE | Refills: 0 | Status: SHIPPED | OUTPATIENT
Start: 2017-11-06 | End: 2018-01-19 | Stop reason: SDUPTHER

## 2017-11-14 DIAGNOSIS — M25.50 POLYARTHRALGIA: ICD-10-CM

## 2017-11-15 RX ORDER — DIAPER,BRIEF,ADULT, DISPOSABLE
EACH MISCELLANEOUS
Qty: 50 STRIP | Refills: 11 | Status: SHIPPED | OUTPATIENT
Start: 2017-11-15 | End: 2018-03-06 | Stop reason: SDUPTHER

## 2017-11-15 RX ORDER — GABAPENTIN 600 MG/1
TABLET ORAL
Qty: 120 TABLET | Refills: 3 | Status: SHIPPED | OUTPATIENT
Start: 2017-11-15 | End: 2018-03-01 | Stop reason: SDUPTHER

## 2017-11-20 PROBLEM — Z86.010 HISTORY OF COLON POLYPS: Status: ACTIVE | Noted: 2017-11-20

## 2017-11-20 PROBLEM — Z86.0100 HISTORY OF COLON POLYPS: Status: ACTIVE | Noted: 2017-11-20

## 2017-12-11 RX ORDER — TOLTERODINE 4 MG/1
CAPSULE, EXTENDED RELEASE ORAL
Qty: 30 CAPSULE | Refills: 11 | Status: SHIPPED | OUTPATIENT
Start: 2017-12-11 | End: 2018-12-24 | Stop reason: SDUPTHER

## 2017-12-13 NOTE — PROGRESS NOTES
Overdue lab reminder with orders mailed out to pt on 12/13/17
Vaccine Information Sheet, \"Influenza - Inactivated\"  given to Kassidy Bran, or parent/legal guardian of  Kassidy Bran and verbalized understanding. Patient responses:    Have you ever had a reaction to a flu vaccine? No  Are you able to eat eggs without adverse effects? No  Do you have any current illness? No  Have you ever had Guillian Melrose Syndrome? No    Flu vaccine given per order. Please see immunization tab.
the triglyceride is <30 mg/dL or >300 mg/dL the  calculated LDL and  VLDL are not valid and a measured  LDL is performed. HDL   Date Value Ref Range Status   01/21/2016 27 (L) 40 - 60 mg/dL Final     Triglycerides   Date Value Ref Range Status   01/21/2016 429 (H) 0 - 150 mg/dL Final     Lab Results   Component Value Date    GLUCOSE 202 (H) 09/21/2017     Lab Results   Component Value Date     09/21/2017    K 4.1 09/21/2017    CREATININE 0.6 09/21/2017     Lab Results   Component Value Date    WBC 8.6 09/21/2017    HGB 13.0 09/21/2017    HCT 38.9 09/21/2017    MCV 87.9 09/21/2017     09/21/2017     Lab Results   Component Value Date    ALT 14 09/19/2017    AST 15 09/19/2017    ALKPHOS 121 09/19/2017    BILITOT 0.3 09/19/2017     TSH (uIU/mL)   Date Value   05/15/2014 1.81     Lab Results   Component Value Date    LABA1C 11.3 05/18/2017     The 10-year ASCVD risk score (Vito Whyte et al., 2013) is: 15.6%    Values used to calculate the score:      Age: 61 years      Sex: Female      Is Non- : No      Diabetic: Yes      Tobacco smoker: No      Systolic Blood Pressure: 829 mmHg      Is BP treated: Yes      HDL Cholesterol: 27 mg/dL      Total Cholesterol: 245 mg/dL  VISIT NOTE   Subjective:   HPI CHRONIC:   Chief Complaint   Patient presents with    Follow-Up from [facility] Admitted 9/20/2017 D/C 9/21/2017 Dx: Low back pain DOC: 9/22/2017      Patient here for follow-up of multiple chronic conditions including:  Uncontrolled type 2 diabetes mellitus with complication, with long-term current use of insulin ([address]    Essential hypertension    Diabetic nephropathy associated with type 2 diabetes mellitus ([address]    Anxiety       BS running 200. Taking 50-60 units before twice a day meals  Sugar to 40 about every other week.     Pain post neck into trap bilat, fairly constant, ache, x 2 weeks  advil and ice no help    Review of Systems  General ROS: negative for -

## 2018-01-22 RX ORDER — VENLAFAXINE HYDROCHLORIDE 75 MG/1
CAPSULE, EXTENDED RELEASE ORAL
Qty: 90 CAPSULE | Refills: 0 | Status: SHIPPED | OUTPATIENT
Start: 2018-01-22 | End: 2018-03-20 | Stop reason: SDUPTHER

## 2018-01-31 ENCOUNTER — TELEPHONE (OUTPATIENT)
Dept: FAMILY MEDICINE CLINIC | Age: 58
End: 2018-01-31

## 2018-02-02 ENCOUNTER — TELEPHONE (OUTPATIENT)
Dept: FAMILY MEDICINE CLINIC | Age: 58
End: 2018-02-02

## 2018-02-02 PROBLEM — A41.9 SEPSIS (HCC): Status: ACTIVE | Noted: 2018-02-02

## 2018-02-03 PROBLEM — N17.9 AKI (ACUTE KIDNEY INJURY) (HCC): Status: ACTIVE | Noted: 2018-02-03

## 2018-02-06 ENCOUNTER — TELEPHONE (OUTPATIENT)
Dept: FAMILY MEDICINE CLINIC | Age: 58
End: 2018-02-06

## 2018-02-26 RX ORDER — AMLODIPINE BESYLATE 10 MG/1
10 TABLET ORAL DAILY
Qty: 30 TABLET | Refills: 3 | Status: SHIPPED | OUTPATIENT
Start: 2018-02-26 | End: 2018-07-29 | Stop reason: SDUPTHER

## 2018-03-01 ENCOUNTER — OFFICE VISIT (OUTPATIENT)
Dept: FAMILY MEDICINE CLINIC | Age: 58
End: 2018-03-01

## 2018-03-01 VITALS
TEMPERATURE: 98.3 F | HEIGHT: 63 IN | SYSTOLIC BLOOD PRESSURE: 128 MMHG | WEIGHT: 211 LBS | HEART RATE: 92 BPM | DIASTOLIC BLOOD PRESSURE: 80 MMHG | BODY MASS INDEX: 37.39 KG/M2 | RESPIRATION RATE: 16 BRPM

## 2018-03-01 DIAGNOSIS — N30.00 ACUTE CYSTITIS WITHOUT HEMATURIA: Primary | ICD-10-CM

## 2018-03-01 DIAGNOSIS — F33.1 MODERATE EPISODE OF RECURRENT MAJOR DEPRESSIVE DISORDER (HCC): ICD-10-CM

## 2018-03-01 DIAGNOSIS — M25.50 POLYARTHRALGIA: ICD-10-CM

## 2018-03-01 DIAGNOSIS — F43.21 GRIEVING: ICD-10-CM

## 2018-03-01 LAB
BILIRUBIN, POC: ABNORMAL
BLOOD URINE, POC: ABNORMAL
CLARITY, POC: ABNORMAL
COLOR, POC: ABNORMAL
GLUCOSE URINE, POC: NEGATIVE
KETONES, POC: ABNORMAL
LEUKOCYTE EST, POC: NEGATIVE
NITRITE, POC: NEGATIVE
PH, POC: 5.5
PROTEIN, POC: ABNORMAL
SPECIFIC GRAVITY, POC: >=1.03
UROBILINOGEN, POC: ABNORMAL

## 2018-03-01 PROCEDURE — 99213 OFFICE O/P EST LOW 20 MIN: CPT | Performed by: FAMILY MEDICINE

## 2018-03-01 PROCEDURE — 81002 URINALYSIS NONAUTO W/O SCOPE: CPT | Performed by: FAMILY MEDICINE

## 2018-03-01 PROCEDURE — G0444 DEPRESSION SCREEN ANNUAL: HCPCS | Performed by: FAMILY MEDICINE

## 2018-03-01 RX ORDER — QUETIAPINE FUMARATE 50 MG/1
50 TABLET, EXTENDED RELEASE ORAL NIGHTLY
Qty: 30 TABLET | Refills: 0 | Status: SHIPPED | OUTPATIENT
Start: 2018-03-01 | End: 2018-04-03 | Stop reason: SDUPTHER

## 2018-03-01 RX ORDER — GABAPENTIN 600 MG/1
TABLET ORAL
Qty: 120 TABLET | Refills: 5 | Status: SHIPPED | OUTPATIENT
Start: 2018-03-01 | End: 2018-03-05

## 2018-03-01 ASSESSMENT — PATIENT HEALTH QUESTIONNAIRE - PHQ9
2. FEELING DOWN, DEPRESSED OR HOPELESS: 3
4. FEELING TIRED OR HAVING LITTLE ENERGY: 3
7. TROUBLE CONCENTRATING ON THINGS, SUCH AS READING THE NEWSPAPER OR WATCHING TELEVISION: 3
SUM OF ALL RESPONSES TO PHQ9 QUESTIONS 1 & 2: 6
6. FEELING BAD ABOUT YOURSELF - OR THAT YOU ARE A FAILURE OR HAVE LET YOURSELF OR YOUR FAMILY DOWN: 1
1. LITTLE INTEREST OR PLEASURE IN DOING THINGS: 3
8. MOVING OR SPEAKING SO SLOWLY THAT OTHER PEOPLE COULD HAVE NOTICED. OR THE OPPOSITE, BEING SO FIGETY OR RESTLESS THAT YOU HAVE BEEN MOVING AROUND A LOT MORE THAN USUAL: 3
SUM OF ALL RESPONSES TO PHQ QUESTIONS 1-9: 22
9. THOUGHTS THAT YOU WOULD BE BETTER OFF DEAD, OR OF HURTING YOURSELF: 0
5. POOR APPETITE OR OVEREATING: 3
3. TROUBLE FALLING OR STAYING ASLEEP: 3
10. IF YOU CHECKED OFF ANY PROBLEMS, HOW DIFFICULT HAVE THESE PROBLEMS MADE IT FOR YOU TO DO YOUR WORK, TAKE CARE OF THINGS AT HOME, OR GET ALONG WITH OTHER PEOPLE: 1

## 2018-03-01 NOTE — PROGRESS NOTES
CHART REVIEW  Health Maintenance   Topic Date Due    Hepatitis C screen  1960    HIV screen  01/10/1974    DTaP/Tdap/Td vaccine (1 - Tdap) 01/10/1978    Shingles Vaccine (1 of 2 - 2 Dose Series) 01/10/2009    Cervical cancer screen  05/01/2016    Lipid screen  01/21/2017    Diabetic foot exam  05/18/2018    Breast cancer screen  07/19/2018    Diabetic retinal exam  01/23/2019    A1C test (Diabetic or Prediabetic)  02/03/2019    Potassium monitoring  02/05/2019    Creatinine monitoring  02/05/2019    Colon cancer screen colonoscopy  10/16/2022    Flu vaccine  Completed    Pneumococcal med risk  Completed     Social History     Social History    Marital status:      Spouse name: N/A    Number of children: N/A    Years of education: N/A     Social History Main Topics    Smoking status: Never Smoker    Smokeless tobacco: Never Used      Comment: congrats    Alcohol use No    Drug use: No    Sexual activity: Yes     Partners: Male     Other Topics Concern    None     Social History Narrative    Self-breast exams: yes    Lives with spouse and son. Exercise:  rarely    Seatbelt use: Always    Living will: no,   additional information provided     Prior to Visit Medications    Medication Sig Taking? Authorizing Provider   gabapentin (NEURONTIN) 600 MG tablet TAKE ONE TABLET BY MOUTH EVERY MORNING AND TAKE ONE TABLET BY MOUTH EVERY EVENING AND TAKE TWO TABLETS BY MOUTH EVERY NIGHT AT BEDTIME.  Yes Krystle Negron MD   QUEtiapine (SEROQUEL XR) 50 MG extended release tablet Take 1 tablet by mouth nightly Yes Krystle Negron MD   amLODIPine (NORVASC) 10 MG tablet Take 1 tablet by mouth daily Yes Krystle Negron MD   venlafaxine (EFFEXOR XR) 75 MG extended release capsule TAKE THREE CAPSULES BY MOUTH DAILY Yes Krystle Negron MD   tolterodine (DETROL LA) 4 MG extended release capsule TAKE ONE CAPSULE BY MOUTH DAILY Yes Krystle Negron MD   TRUETRACK TEST strip USE ONE STRIP TO TEST THREE TIMES A DAY Yes Heather Townsend MD   fluconazole (DIFLUCAN) 150 MG tablet TAKE ONE TABLET BY MOUTH IN A SINGLE DOSE, THEN REPEAT IN 3 DAYS Yes Heather Townsend MD   insulin aspart protamine-insulin aspart (NOVOLOG MIX 70/30 FLEXPEN) (70-30) 100 UNIT/ML injection 60 units in AM and 40 units in PM Yes Heather Townsend MD   metoprolol succinate (TOPROL XL) 50 MG extended release tablet TAKE ONE TABLET BY MOUTH DAILY Yes Heather Townsend MD   busPIRone (BUSPAR) 15 MG tablet Take 1/3 tab BID for 5 days, then 1/2 tab BID for 5 days, then 2/3 tab BID for 5 days, then 1 po BID. Yes Heather Townsend MD   lisinopril (PRINIVIL;ZESTRIL) 40 MG tablet TAKE ONE TABLET BY MOUTH DAILY Yes MD MANNY Lopez UF III MINI PEN NEEDLES 31G X 5 MM MISC USE THREE TIMES A DAY WITH Cherelle Cabrera Yes To Neal MD   metroNIDAZOLE (METROGEL) 0.75 % gel Apply topically 2 times daily. Yes Heather Townsend MD   pravastatin (PRAVACHOL) 40 MG tablet TAKE ONE TABLET BY MOUTH DAILY Yes Heather Townsend MD   anastrozole (ARIMIDEX) 1 MG tablet TAKE ONE TABLET BY MOUTH DAILY Yes Racquel Messina MD   mupirocin (BACTROBAN) 2 % ointment APPLY THREE TIMES A DAY AS NEEDED Yes Heather Townsend MD   nystatin (MYCOSTATIN) 358539 UNIT/GM ointment APPLY TO AFFECTED AREA(S) TWO TIMES A DAY AS NEEDED Yes Heather Townsend MD   fluticasone (FLONASE) 50 MCG/ACT nasal spray PLACE TWO SPRAYS IN EACH NOSTRIL ONCE DAILY Yes Heather Townsend MD   calcium carbonate-vitamin D (CALTRATE) 600-400 MG-UNIT TABS per tab Take 1 tablet by mouth 2 times daily  Patient taking differently: Take 1 tablet by mouth 2 times daily Indications:  states not taking  Yes GLADIS Garcia INS SYR HALF-UNIT .3CC/31G 31G X 5/16\" 0.3 ML MISC USE AS DIRECTED TWO Payton Ashford Yes Joe Beltran MD   Blood Glucose Monitoring Suppl (TRUE METRIX METER) W/DEVICE KIT 1 each by Does not apply route three times daily.  true track meter Yes Heather Townsend MD   [address] 1 each by Does not apply

## 2018-03-01 NOTE — PATIENT INSTRUCTIONS
INSTRUCTIONS  · If dad goes into hospice, can get counselling thru them. · See psych NP here for further evaluation. Schedule at  today. · Add seroquel 50 at bedtime for now.

## 2018-03-03 LAB — URINE CULTURE, ROUTINE: NORMAL

## 2018-03-04 DIAGNOSIS — M25.50 POLYARTHRALGIA: ICD-10-CM

## 2018-03-05 RX ORDER — GABAPENTIN 600 MG/1
TABLET ORAL
Qty: 120 TABLET | Refills: 2 | Status: SHIPPED | OUTPATIENT
Start: 2018-03-05 | End: 2018-06-12 | Stop reason: SDUPTHER

## 2018-03-06 ENCOUNTER — OFFICE VISIT (OUTPATIENT)
Dept: FAMILY MEDICINE CLINIC | Age: 58
End: 2018-03-06

## 2018-03-06 ENCOUNTER — TELEPHONE (OUTPATIENT)
Dept: FAMILY MEDICINE CLINIC | Age: 58
End: 2018-03-06

## 2018-03-06 VITALS
HEART RATE: 78 BPM | RESPIRATION RATE: 16 BRPM | HEIGHT: 63 IN | TEMPERATURE: 98.3 F | SYSTOLIC BLOOD PRESSURE: 128 MMHG | BODY MASS INDEX: 37.74 KG/M2 | DIASTOLIC BLOOD PRESSURE: 70 MMHG | WEIGHT: 213 LBS

## 2018-03-06 DIAGNOSIS — Z23 NEED FOR PROPHYLACTIC VACCINATION AGAINST DIPHTHERIA-TETANUS-PERTUSSIS (DTP): ICD-10-CM

## 2018-03-06 DIAGNOSIS — Z11.59 NEED FOR HEPATITIS C SCREENING TEST: ICD-10-CM

## 2018-03-06 DIAGNOSIS — Z11.4 SCREENING FOR HIV (HUMAN IMMUNODEFICIENCY VIRUS): ICD-10-CM

## 2018-03-06 DIAGNOSIS — I10 ESSENTIAL HYPERTENSION: ICD-10-CM

## 2018-03-06 DIAGNOSIS — Z13.220 SCREENING FOR HYPERLIPIDEMIA: ICD-10-CM

## 2018-03-06 LAB
A/G RATIO: 1.3 (ref 1.1–2.2)
ALBUMIN SERPL-MCNC: 4.2 G/DL (ref 3.4–5)
ALP BLD-CCNC: 114 U/L (ref 40–129)
ALT SERPL-CCNC: 21 U/L (ref 10–40)
ANION GAP SERPL CALCULATED.3IONS-SCNC: 14 MMOL/L (ref 3–16)
AST SERPL-CCNC: 27 U/L (ref 15–37)
BILIRUB SERPL-MCNC: <0.2 MG/DL (ref 0–1)
BUN BLDV-MCNC: 15 MG/DL (ref 7–20)
CALCIUM SERPL-MCNC: 9.4 MG/DL (ref 8.3–10.6)
CHLORIDE BLD-SCNC: 101 MMOL/L (ref 99–110)
CHOLESTEROL, FASTING: 270 MG/DL (ref 0–199)
CO2: 27 MMOL/L (ref 21–32)
CREAT SERPL-MCNC: 0.9 MG/DL (ref 0.6–1.1)
ESTIMATED AVERAGE GLUCOSE: 203 MG/DL
GFR AFRICAN AMERICAN: >60
GFR NON-AFRICAN AMERICAN: >60
GLOBULIN: 3.3 G/DL
GLUCOSE BLD-MCNC: 83 MG/DL (ref 70–99)
HBA1C MFR BLD: 8.7 %
HDLC SERPL-MCNC: 43 MG/DL (ref 40–60)
HEPATITIS C ANTIBODY INTERPRETATION: NORMAL
LDL CHOLESTEROL CALCULATED: 182 MG/DL
POTASSIUM SERPL-SCNC: 4.9 MMOL/L (ref 3.5–5.1)
SODIUM BLD-SCNC: 142 MMOL/L (ref 136–145)
TOTAL PROTEIN: 7.5 G/DL (ref 6.4–8.2)
TRIGLYCERIDE, FASTING: 225 MG/DL (ref 0–150)
VLDLC SERPL CALC-MCNC: 45 MG/DL

## 2018-03-06 PROCEDURE — 99214 OFFICE O/P EST MOD 30 MIN: CPT | Performed by: FAMILY MEDICINE

## 2018-03-06 NOTE — PROGRESS NOTES
[facility]  Scribe documentation   Sendy MEREDITH am scribing for Kit Ojeda MD. Electronically signed by Sendy Thapa  on 3/6/2018 at 9:39 AM  I, Srikanth Rosado,  personally performed the services described in this documentation, as scribed by the user listed above, and it is both accurate and complete. I agree with the Chief Complaint, ROS, and Past Histories independently gathered by the clinical support staff. CHART REVIEW  Health Maintenance   Topic Date Due    Hepatitis C screen  1960    HIV screen  01/10/1974    DTaP/Tdap/Td vaccine (1 - Tdap) 01/10/1978    Shingles Vaccine (1 of 2 - 2 Dose Series) 01/10/2009    Cervical cancer screen  05/01/2016    Lipid screen  01/21/2017    Diabetic foot exam  05/18/2018    Breast cancer screen  07/19/2018    Diabetic retinal exam  01/23/2019    A1C test (Diabetic or Prediabetic)  02/03/2019    Potassium monitoring  02/05/2019    Creatinine monitoring  02/05/2019    Colon cancer screen colonoscopy  10/16/2022    Flu vaccine  Completed    Pneumococcal med risk  Completed      Patient Active Problem List   Diagnosis    DM (diabetes mellitus), type 2, uncontrolled with complications ([address]    Diabetic gastroparesis ([address]    Allergic rhinitis    Insomnia    HTN (hypertension)    Chronic knee pain    Headaches due to old head injury    Nephropathy, diabetic ([address]    Migraine    Hip pain, bilateral    Depression, major    Right knee DJD    Calcific Achilles tendonitis, right    Right sided sciatica    Encounter for antineoplastic chemotherapy    Lumbar spinal stenosis    TMJ (temporomandibular joint disorder)    Urge incontinence of urine    Personal history of breast cancer    Hyperlipidemia LDL goal <186    Metallic taste    Polyarthralgia    Severe obesity (BMI 35.0-39. 9) with comorbidity ([address]    Blood poisoning ([address]    Meningitis    Fever of unknown origin    SIRS (systemic inflammatory response BID. 60 tablet 1    lisinopril (PRINIVIL;ZESTRIL) 40 MG tablet TAKE ONE TABLET BY MOUTH DAILY 90 tablet 0    B-D UF III MINI PEN NEEDLES 31G X 5 MM MISC USE THREE TIMES A DAY WITH NOVOLOG 100 each 5    metroNIDAZOLE (METROGEL) 0.75 % gel Apply topically 2 times daily. 1 Tube 3    pravastatin (PRAVACHOL) 40 MG tablet TAKE ONE TABLET BY MOUTH DAILY 90 tablet 2    anastrozole (ARIMIDEX) 1 MG tablet TAKE ONE TABLET BY MOUTH DAILY 30 tablet 5    mupirocin (BACTROBAN) 2 % ointment APPLY THREE TIMES A DAY AS NEEDED 22 g 2    nystatin (MYCOSTATIN) 483356 UNIT/GM ointment APPLY TO AFFECTED AREA(S) TWO TIMES A DAY AS NEEDED 30 g 2    fluticasone (FLONASE) 50 MCG/ACT nasal spray PLACE TWO SPRAYS IN EACH NOSTRIL ONCE DAILY 16 g 11    calcium carbonate-vitamin D (CALTRATE) 600-400 MG-UNIT TABS per tab Take 1 tablet by mouth 2 times daily (Patient taking differently: Take 1 tablet by mouth 2 times daily Indications:  states not taking ) 60 tablet 2    B-D INS SYR HALF-UNIT .3CC/31G 31G X 5/16\" 0.3 ML MISC USE AS DIRECTED TWO TIMES A  each 0    Blood Glucose Monitoring Suppl (TRUE METRIX METER) W/DEVICE KIT 1 each by Does not apply route three times daily. true track meter 1 kit 0    MICROLET LANCETS MISC 1 each by Does not apply route 6 times daily. 600 each 3    omeprazole (PRILOSEC) 20 MG capsule Take 20 mg by mouth every evening.  lubiprostone (AMITIZA) 8 MCG CAPS capsule Take 8 mcg by mouth 2 times daily (with meals). No current facility-administered medications for this visit. LAB REVIEW  Cholesterol, Total   Date Value Ref Range Status   01/21/2016 245 (H) 0 - 199 mg/dL Final     LDL Calculated   Date Value Ref Range Status   01/21/2016 see below <100 mg/dL Final     Comment:     When the triglyceride is <30 mg/dL or >300 mg/dL the  calculated LDL and  VLDL are not valid and a measured  LDL is performed.        HDL   Date Value Ref Range Status   01/21/2016 27 (L) 40 - 60 mg/dL Final     Triglycerides   Date Value Ref Range Status   01/21/2016 429 (H) 0 - 150 mg/dL Final     Lab Results   Component Value Date    GLUCOSE 203 (H) 02/05/2018     Lab Results   Component Value Date     02/05/2018    K 3.7 02/05/2018    CREATININE 0.7 02/05/2018     Lab Results   Component Value Date    WBC 12.1 (H) 02/03/2018    HGB 12.1 02/03/2018    HCT 37.0 02/03/2018    MCV 87.5 02/03/2018     02/03/2018     Lab Results   Component Value Date    ALT 24 02/02/2018    AST 22 02/02/2018    ALKPHOS 142 (H) 02/02/2018    BILITOT 0.6 02/02/2018     TSH (uIU/mL)   Date Value   05/15/2014 1.81     Lab Results   Component Value Date    LABA1C 8.1 02/03/2018    LABA1C 11.3 05/18/2017    LABA1C 9.8 09/12/2016       Subjective:    No chief complaint on file. Phil Leary is an 60 y.o. female who presents for follow up of following chronic problems:  1. Uncontrolled type 2 diabetes mellitus with complication, with long-term current use of insulin ([facility]    2. Essential hypertension    3. Screening for hyperlipidemia    4. Need for prophylactic vaccination against diphtheria-tetanus-pertussis (DTP)      · Patient checks sugars 1  time(s) daily. Average: 200. Range: 100-300. · Patent follows diabetic diet? No  · Exercise: walking intermittently  · Taking medicines daily as directed? Yes  · Is the patient reporting any side effects of medications? No  · Patient checks feet daily? Yes  · Is aspirin on med list? No  · Tobacco history updated:  reports that she has never smoked. She has never used smokeless tobacco.  · Blood pressure taken correctly and will be repeated if > 130/80  · See Health maintenance list above for last retinal exam and microalbumin. ROS:  General ROS: fever? No   night sweats? No  Ophthalmic ROS:blurry vision or decreased vision? Yes  Endocrine ROS:malaise/lethargy? Yes   unexpected weight changes? No  Respiratory ROS: cough? No   shortness of breath?  No  Cardiovascular ROS:chest pain? No   shortness of breath with exertion? Yes  Gastrointestinal ROS: abdominal pain? Yes   change in stools? No  Genito-Urinary ROS: painful urination? No   trouble voiding? No  Neurological ROS: TIA or stroke symptoms? No   numbness/tingling in feet? No  Dermatological ROS: rash or sores on feet? No     HISTORY:  Patient's medications, allergies, past medical, surgical, social and family histories were reviewed and updated as appropriate (See above). Objective:   PHYSICAL EXAM   Resp 16   Ht 5' 3\" (1.6 m)   Wt 213 lb (96.6 kg)   BMI 37.73 kg/m²   Blood pressure is good. BP Readings from Last 5 Encounters:   03/01/18 128/80   02/05/18 (!) 150/69   11/03/17 138/84   10/20/17 128/80   10/16/17 (!) 166/80     Weight is increased. Wt Readings from Last 5 Encounters:   03/06/18 213 lb (96.6 kg)   03/01/18 211 lb (95.7 kg)   02/05/18 225 lb 8.5 oz (102.3 kg)   10/20/17 211 lb 3.2 oz (95.8 kg)   10/16/17 207 lb (93.9 kg)      GENERAL:   · well-developed, well-nourished, alert, no distress. EYES:   · External findings: lids and lashes normal and conjunctivae and sclerae normal  LUNGS:    · Breathing unlabored  · clear to auscultation bilaterally and good air movement  · Palpation: Normal  CARDIOVASC:   · regular rate and rhythm, S1, S2 normal. No murmur, click, rub or gallop  · Apical impulse normal  · LEGS:  Lower extremity edema: none    SKIN: warm and dry  PSYCH:    · Alert and oriented  · Normal reasoning, insight good  · Facial expressions full, mood appropriate  · No memory disturbance noted  MUSCULOSKEL:    · No significant finger or nail findings  NEURO:   · CN 2-12 intact  Diabetic Foot Exam  · Foot exam reveals normal cap refill  · Feet normal skin color, no callouses, no ulcers, no venous stasis  · Feet- no deformities  · sensation grossly normal to light touch in feet. FEET:Monofilament Sensation:  normal      Assessment and Plan:     1.  Uncontrolled type 2 diabetes mellitus with

## 2018-03-06 NOTE — TELEPHONE ENCOUNTER
glucose blood VI test strips (TRUETRACK TEST) strip     They need a dx code.   Please call them back     Stevenosn Feliz showed me where this is and I called them back and gave them E11.8

## 2018-03-07 LAB
HIV AG/AB: NORMAL
HIV ANTIGEN: NORMAL
HIV-1 ANTIBODY: NORMAL
HIV-2 AB: NORMAL

## 2018-03-10 DIAGNOSIS — E78.5 HYPERLIPIDEMIA LDL GOAL <100: ICD-10-CM

## 2018-03-10 RX ORDER — PRAVASTATIN SODIUM 80 MG/1
80 TABLET ORAL DAILY
Qty: 30 TABLET | Refills: 5 | Status: SHIPPED | OUTPATIENT
Start: 2018-03-10 | End: 2018-04-13 | Stop reason: SDUPTHER

## 2018-03-12 ENCOUNTER — TELEPHONE (OUTPATIENT)
Dept: FAMILY MEDICINE CLINIC | Age: 58
End: 2018-03-12

## 2018-03-12 DIAGNOSIS — M72.2 PLANTAR FASCIITIS OF RIGHT FOOT: Primary | ICD-10-CM

## 2018-03-12 NOTE — TELEPHONE ENCOUNTER
Order in EMR.    Dr. Camargo Mention  [address]  [facility]    Sonya Garcia     [address]      [phone]      Fax  [phone]    [facility]   [address], [address] [place] [address], [phone]      Fax:  [address]       [address], [address]  [address], [phone]    Dr. Norma Cedeno [address] [address] [facility]  [address], [phone]

## 2018-03-20 RX ORDER — VENLAFAXINE HYDROCHLORIDE 75 MG/1
CAPSULE, EXTENDED RELEASE ORAL
Qty: 90 CAPSULE | Refills: 2 | Status: SHIPPED | OUTPATIENT
Start: 2018-03-20 | End: 2018-07-01 | Stop reason: SDUPTHER

## 2018-04-03 DIAGNOSIS — F33.1 MODERATE EPISODE OF RECURRENT MAJOR DEPRESSIVE DISORDER (HCC): ICD-10-CM

## 2018-04-04 RX ORDER — QUETIAPINE FUMARATE 50 MG/1
TABLET, EXTENDED RELEASE ORAL
Qty: 30 TABLET | Refills: 2 | Status: SHIPPED | OUTPATIENT
Start: 2018-04-04 | End: 2018-07-29 | Stop reason: SDUPTHER

## 2018-04-05 ENCOUNTER — TELEPHONE (OUTPATIENT)
Dept: FAMILY MEDICINE CLINIC | Age: 58
End: 2018-04-05

## 2018-04-07 ENCOUNTER — OFFICE VISIT (OUTPATIENT)
Dept: FAMILY MEDICINE CLINIC | Age: 58
End: 2018-04-07

## 2018-04-07 VITALS
RESPIRATION RATE: 18 BRPM | HEIGHT: 63 IN | SYSTOLIC BLOOD PRESSURE: 116 MMHG | WEIGHT: 226 LBS | DIASTOLIC BLOOD PRESSURE: 70 MMHG | TEMPERATURE: 98.2 F | HEART RATE: 108 BPM | OXYGEN SATURATION: 95 % | BODY MASS INDEX: 40.04 KG/M2

## 2018-04-07 DIAGNOSIS — R41.82 ALTERED MENTAL STATUS, UNSPECIFIED ALTERED MENTAL STATUS TYPE: Primary | ICD-10-CM

## 2018-04-07 DIAGNOSIS — R82.90 MALODOROUS URINE: ICD-10-CM

## 2018-04-07 DIAGNOSIS — M17.0 PRIMARY OSTEOARTHRITIS OF BOTH KNEES: ICD-10-CM

## 2018-04-07 DIAGNOSIS — M54.50 CHRONIC LOW BACK PAIN WITHOUT SCIATICA, UNSPECIFIED BACK PAIN LATERALITY: ICD-10-CM

## 2018-04-07 DIAGNOSIS — R50.9 FEVER, UNSPECIFIED FEVER CAUSE: ICD-10-CM

## 2018-04-07 DIAGNOSIS — G89.29 CHRONIC LOW BACK PAIN WITHOUT SCIATICA, UNSPECIFIED BACK PAIN LATERALITY: ICD-10-CM

## 2018-04-07 PROBLEM — A41.9 SEPSIS (HCC): Status: RESOLVED | Noted: 2018-02-02 | Resolved: 2018-04-07

## 2018-04-07 PROBLEM — N17.9 AKI (ACUTE KIDNEY INJURY) (HCC): Status: RESOLVED | Noted: 2018-02-03 | Resolved: 2018-04-07

## 2018-04-07 LAB
A/G RATIO: 1.2 (ref 1.1–2.2)
ALBUMIN SERPL-MCNC: 3.5 G/DL (ref 3.4–5)
ALP BLD-CCNC: 129 U/L (ref 40–129)
ALT SERPL-CCNC: 27 U/L (ref 10–40)
ANION GAP SERPL CALCULATED.3IONS-SCNC: 16 MMOL/L (ref 3–16)
AST SERPL-CCNC: 27 U/L (ref 15–37)
BASOPHILS ABSOLUTE: 0.1 K/UL (ref 0–0.2)
BASOPHILS RELATIVE PERCENT: 0.7 %
BILIRUB SERPL-MCNC: 0.3 MG/DL (ref 0–1)
BILIRUBIN, POC: ABNORMAL
BLOOD URINE, POC: ABNORMAL
BUN BLDV-MCNC: 28 MG/DL (ref 7–20)
CALCIUM SERPL-MCNC: 9.2 MG/DL (ref 8.3–10.6)
CHLORIDE BLD-SCNC: 100 MMOL/L (ref 99–110)
CLARITY, POC: ABNORMAL
CO2: 25 MMOL/L (ref 21–32)
COLOR, POC: ABNORMAL
CREAT SERPL-MCNC: 1.6 MG/DL (ref 0.6–1.1)
EOSINOPHILS ABSOLUTE: 0.2 K/UL (ref 0–0.6)
EOSINOPHILS RELATIVE PERCENT: 1.6 %
GFR AFRICAN AMERICAN: 40
GFR NON-AFRICAN AMERICAN: 33
GLOBULIN: 3 G/DL
GLUCOSE BLD-MCNC: 91 MG/DL (ref 70–99)
GLUCOSE URINE, POC: NEGATIVE
HCT VFR BLD CALC: 37.7 % (ref 36–48)
HEMOGLOBIN: 12.5 G/DL (ref 12–16)
KETONES, POC: ABNORMAL
LEUKOCYTE EST, POC: ABNORMAL
LYMPHOCYTES ABSOLUTE: 2.5 K/UL (ref 1–5.1)
LYMPHOCYTES RELATIVE PERCENT: 17.4 %
MCH RBC QN AUTO: 29 PG (ref 26–34)
MCHC RBC AUTO-ENTMCNC: 33.2 G/DL (ref 31–36)
MCV RBC AUTO: 87.3 FL (ref 80–100)
MONOCYTES ABSOLUTE: 1.3 K/UL (ref 0–1.3)
MONOCYTES RELATIVE PERCENT: 9.4 %
NEUTROPHILS ABSOLUTE: 10.1 K/UL (ref 1.7–7.7)
NEUTROPHILS RELATIVE PERCENT: 70.9 %
NITRITE, POC: NEGATIVE
PDW BLD-RTO: 15.1 % (ref 12.4–15.4)
PH, POC: 5.5
PLATELET # BLD: 355 K/UL (ref 135–450)
PMV BLD AUTO: 9.4 FL (ref 5–10.5)
POTASSIUM SERPL-SCNC: 4.4 MMOL/L (ref 3.5–5.1)
PROTEIN, POC: ABNORMAL
RBC # BLD: 4.32 M/UL (ref 4–5.2)
SODIUM BLD-SCNC: 141 MMOL/L (ref 136–145)
SPECIFIC GRAVITY, POC: >=1.03
TOTAL PROTEIN: 6.5 G/DL (ref 6.4–8.2)
TSH SERPL DL<=0.05 MIU/L-ACNC: 1.3 UIU/ML (ref 0.27–4.2)
UROBILINOGEN, POC: ABNORMAL
WBC # BLD: 14.3 K/UL (ref 4–11)

## 2018-04-07 PROCEDURE — 81002 URINALYSIS NONAUTO W/O SCOPE: CPT | Performed by: FAMILY MEDICINE

## 2018-04-07 PROCEDURE — 99214 OFFICE O/P EST MOD 30 MIN: CPT | Performed by: FAMILY MEDICINE

## 2018-04-07 PROCEDURE — 36415 COLL VENOUS BLD VENIPUNCTURE: CPT | Performed by: FAMILY MEDICINE

## 2018-04-07 RX ORDER — LEVOFLOXACIN 500 MG/1
500 TABLET, FILM COATED ORAL DAILY
Qty: 10 TABLET | Refills: 0 | Status: SHIPPED | OUTPATIENT
Start: 2018-04-07 | End: 2018-04-17

## 2018-04-09 LAB
ORGANISM: ABNORMAL
URINE CULTURE, ROUTINE: ABNORMAL
URINE CULTURE, ROUTINE: ABNORMAL

## 2018-04-10 ENCOUNTER — TELEPHONE (OUTPATIENT)
Dept: FAMILY MEDICINE CLINIC | Age: 58
End: 2018-04-10

## 2018-04-11 ENCOUNTER — TELEPHONE (OUTPATIENT)
Dept: FAMILY MEDICINE CLINIC | Age: 58
End: 2018-04-11

## 2018-04-12 ENCOUNTER — TELEPHONE (OUTPATIENT)
Dept: FAMILY MEDICINE CLINIC | Age: 58
End: 2018-04-12

## 2018-04-13 DIAGNOSIS — E78.5 HYPERLIPIDEMIA LDL GOAL <100: ICD-10-CM

## 2018-04-13 RX ORDER — PRAVASTATIN SODIUM 40 MG
TABLET ORAL
Qty: 90 TABLET | Refills: 1 | OUTPATIENT
Start: 2018-04-13

## 2018-04-13 RX ORDER — PRAVASTATIN SODIUM 80 MG/1
80 TABLET ORAL DAILY
Qty: 30 TABLET | Refills: 5 | Status: SHIPPED | OUTPATIENT
Start: 2018-04-13 | End: 2018-10-29 | Stop reason: SDUPTHER

## 2018-04-13 RX ORDER — LISINOPRIL 40 MG/1
TABLET ORAL
Qty: 90 TABLET | Refills: 0 | Status: SHIPPED | OUTPATIENT
Start: 2018-04-13 | End: 2018-07-12 | Stop reason: SDUPTHER

## 2018-04-23 ENCOUNTER — TELEPHONE (OUTPATIENT)
Dept: BARIATRICS/WEIGHT MGMT | Age: 58
End: 2018-04-23

## 2018-04-27 ENCOUNTER — TELEPHONE (OUTPATIENT)
Dept: FAMILY MEDICINE CLINIC | Age: 58
End: 2018-04-27

## 2018-05-18 ENCOUNTER — OFFICE VISIT (OUTPATIENT)
Dept: FAMILY MEDICINE CLINIC | Age: 58
End: 2018-05-18

## 2018-05-18 VITALS
SYSTOLIC BLOOD PRESSURE: 124 MMHG | BODY MASS INDEX: 38.8 KG/M2 | TEMPERATURE: 98.1 F | HEIGHT: 63 IN | WEIGHT: 219 LBS | HEART RATE: 78 BPM | RESPIRATION RATE: 16 BRPM | DIASTOLIC BLOOD PRESSURE: 80 MMHG

## 2018-05-18 DIAGNOSIS — F51.01 PRIMARY INSOMNIA: ICD-10-CM

## 2018-05-18 DIAGNOSIS — E78.5 HYPERLIPIDEMIA LDL GOAL <100: ICD-10-CM

## 2018-05-18 DIAGNOSIS — I10 ESSENTIAL HYPERTENSION: ICD-10-CM

## 2018-05-18 DIAGNOSIS — N39.41 URGE INCONTINENCE OF URINE: ICD-10-CM

## 2018-05-18 DIAGNOSIS — B37.2 INTERTRIGINOUS CANDIDIASIS: ICD-10-CM

## 2018-05-18 LAB
BILIRUBIN, POC: ABNORMAL
BLOOD URINE, POC: ABNORMAL
CLARITY, POC: CLEAR
COLOR, POC: ABNORMAL
GLUCOSE URINE, POC: NEGATIVE
HBA1C MFR BLD: 7.7 %
KETONES, POC: ABNORMAL
LEUKOCYTE EST, POC: NEGATIVE
NITRITE, POC: NEGATIVE
PH, POC: 6
PROTEIN, POC: ABNORMAL
SPECIFIC GRAVITY, POC: >=1.03
UROBILINOGEN, POC: ABNORMAL

## 2018-05-18 PROCEDURE — 81002 URINALYSIS NONAUTO W/O SCOPE: CPT | Performed by: FAMILY MEDICINE

## 2018-05-18 PROCEDURE — 83036 HEMOGLOBIN GLYCOSYLATED A1C: CPT | Performed by: FAMILY MEDICINE

## 2018-05-18 PROCEDURE — 99214 OFFICE O/P EST MOD 30 MIN: CPT | Performed by: FAMILY MEDICINE

## 2018-05-18 RX ORDER — NYSTATIN 100000 U/G
OINTMENT TOPICAL 2 TIMES DAILY
Qty: 30 G | Refills: 2 | Status: SHIPPED | OUTPATIENT
Start: 2018-05-18 | End: 2019-06-30 | Stop reason: SDUPTHER

## 2018-06-12 ENCOUNTER — TELEPHONE (OUTPATIENT)
Dept: FAMILY MEDICINE CLINIC | Age: 58
End: 2018-06-12

## 2018-06-12 DIAGNOSIS — F51.01 PRIMARY INSOMNIA: ICD-10-CM

## 2018-06-12 DIAGNOSIS — M25.50 POLYARTHRALGIA: ICD-10-CM

## 2018-06-13 RX ORDER — GABAPENTIN 600 MG/1
TABLET ORAL
Qty: 120 TABLET | Refills: 1 | Status: SHIPPED | OUTPATIENT
Start: 2018-06-13 | End: 2018-07-10 | Stop reason: SDUPTHER

## 2018-07-02 RX ORDER — METOPROLOL SUCCINATE 50 MG/1
TABLET, EXTENDED RELEASE ORAL
Qty: 30 TABLET | Refills: 4 | Status: SHIPPED | OUTPATIENT
Start: 2018-07-02 | End: 2018-09-27 | Stop reason: SDUPTHER

## 2018-07-02 RX ORDER — VENLAFAXINE HYDROCHLORIDE 75 MG/1
CAPSULE, EXTENDED RELEASE ORAL
Qty: 90 CAPSULE | Refills: 1 | Status: SHIPPED | OUTPATIENT
Start: 2018-07-02 | End: 2018-08-29 | Stop reason: SDUPTHER

## 2018-07-06 ENCOUNTER — OFFICE VISIT (OUTPATIENT)
Dept: FAMILY MEDICINE CLINIC | Age: 58
End: 2018-07-06

## 2018-07-06 ENCOUNTER — TELEPHONE (OUTPATIENT)
Dept: FAMILY MEDICINE CLINIC | Age: 58
End: 2018-07-06

## 2018-07-06 VITALS
HEART RATE: 110 BPM | OXYGEN SATURATION: 95 % | WEIGHT: 229 LBS | SYSTOLIC BLOOD PRESSURE: 118 MMHG | HEIGHT: 63 IN | RESPIRATION RATE: 18 BRPM | DIASTOLIC BLOOD PRESSURE: 70 MMHG | TEMPERATURE: 98.9 F | BODY MASS INDEX: 40.57 KG/M2

## 2018-07-06 DIAGNOSIS — R30.0 DYSURIA: Primary | ICD-10-CM

## 2018-07-06 DIAGNOSIS — N30.00 ACUTE CYSTITIS WITHOUT HEMATURIA: ICD-10-CM

## 2018-07-06 LAB
BILIRUBIN, POC: ABNORMAL
BLOOD URINE, POC: ABNORMAL
CLARITY, POC: CLEAR
COLOR, POC: YELLOW
GLUCOSE URINE, POC: ABNORMAL
KETONES, POC: ABNORMAL
LEUKOCYTE EST, POC: ABNORMAL
NITRITE, POC: ABNORMAL
PH, POC: 5.5
PROTEIN, POC: >=300
SPECIFIC GRAVITY, POC: >=1.03
UROBILINOGEN, POC: ABNORMAL

## 2018-07-06 PROCEDURE — 81002 URINALYSIS NONAUTO W/O SCOPE: CPT | Performed by: FAMILY MEDICINE

## 2018-07-06 PROCEDURE — 99213 OFFICE O/P EST LOW 20 MIN: CPT | Performed by: FAMILY MEDICINE

## 2018-07-06 RX ORDER — CIPROFLOXACIN 500 MG/1
500 TABLET, FILM COATED ORAL 2 TIMES DAILY
Qty: 20 TABLET | Refills: 0 | Status: SHIPPED | OUTPATIENT
Start: 2018-07-06 | End: 2018-07-16

## 2018-07-06 RX ORDER — PHENAZOPYRIDINE HYDROCHLORIDE 200 MG/1
200 TABLET, FILM COATED ORAL 3 TIMES DAILY PRN
Qty: 6 TABLET | Refills: 0 | Status: SHIPPED | OUTPATIENT
Start: 2018-07-06 | End: 2018-07-09

## 2018-07-06 NOTE — PATIENT INSTRUCTIONS
Please finish taking ALL of the antibiotics. Take pyridium Rx or Azo OTC for discomfort. Our office will call with urine culture results. Call If not better in  3 days. May take ibuprofen (Motrin, Advil) 200 mg tabs up to 4 tabs every 8 hours. May also take acetaminophen (Tylenol) as instructed on packaging.

## 2018-07-06 NOTE — TELEPHONE ENCOUNTER
ciprofloxacin (CIPRO) 500 MG tablet       Will interact with Suvorexant 20 MG TABS       QUEtiapine (SEROQUEL XR) 50 MG extended release tablet         Please advise, thank you

## 2018-07-06 NOTE — PROGRESS NOTES
Scribe documentation   Demetrius MEREDITH , am scribing for Eli Koo MD. Electronically signed by Demetrius Calabrese  on 7/6/2018 at 3:46 PM  I, Becky Chavira,  personally performed the services described in this documentation, as scribed by the user listed above, and it is both accurate and complete. I agree with the Chief Complaint, ROS, and Past Histories independently gathered by the clinical support staff. CHART REVIEW  Health Maintenance   Topic Date Due    DTaP/Tdap/Td vaccine (1 - Tdap) 01/10/1978    Cervical cancer screen  05/01/2016    Shingles Vaccine (1 of 2 - 2 Dose Series) 11/07/2016    Breast cancer screen  07/19/2018    Flu vaccine (1) 09/01/2018    Diabetic retinal exam  01/23/2019    Lipid screen  03/06/2019    Potassium monitoring  04/07/2019    Creatinine monitoring  04/07/2019    Diabetic foot exam  05/18/2019    A1C test (Diabetic or Prediabetic)  05/18/2019    Colon cancer screen colonoscopy  10/16/2022    Pneumococcal med risk  Completed    Hepatitis C screen  Completed    HIV screen  Completed     Social History     Social History    Marital status:      Spouse name: N/A    Number of children: N/A    Years of education: N/A     Social History Main Topics    Smoking status: Never Smoker    Smokeless tobacco: Never Used      Comment: congrats    Alcohol use No    Drug use: No    Sexual activity: Yes     Partners: Male     Other Topics Concern    None     Social History Narrative    Self-breast exams: yes    Lives with spouse and son. Exercise:  rarely    Seatbelt use: Always    Living will: no,   additional information provided     Prior to Visit Medications    Medication Sig Taking?  Authorizing Provider   venlafaxine (EFFEXOR XR) 75 MG extended release capsule TAKE THREE CAPSULES BY MOUTH DAILY Yes Sharon Schreiber MD   metoprolol succinate (TOPROL XL) 50 MG extended release tablet TAKE ONE TABLET BY MOUTH DAILY Yes Sharon Schreiber MD   gabapentin (NEURONTIN) 600 MG tablet TAKE ONE TABLET BY MOUTH EVERY MORNING AND TAKE ONE TABLET BY MOUTH EVERY EVENING AND TAKE TWO TABLETS BY MOUTH AT BEDTIME Yes Sam Oakley MD   Suvorexant 20 MG TABS Take 1 tablet by mouth nightly for 90 days. Rockwell Krabbe, MD   insulin aspart protamine-insulin aspart (NOVOLOG MIX 70/30 FLEXPEN) (70-30) 100 UNIT/ML injection 70 units in AM and 45 units in PM Yes Sam Oakley MD   nystatin (MYCOSTATIN) 272015 UNIT/GM ointment Apply topically 2 times daily Yes Sam Oakley MD   lisinopril (PRINIVIL;ZESTRIL) 40 MG tablet TAKE ONE TABLET BY MOUTH DAILY Yes aSm Oakley MD   pravastatin (PRAVACHOL) 80 MG tablet Take 1 tablet by mouth daily Yes Sam Oakley MD   QUEtiapine (SEROQUEL XR) 50 MG extended release tablet TAKE ONE TABLET BY MOUTH ONCE NIGHTLY Yes Sam Oakley MD   Insulin Pen Needle (B-D UF III MINI PEN NEEDLES) 31G X 5 MM MISC Apply 1 each topically 2 times daily Yes Sam Oakley MD   glucose blood VI test strips (TRUETRACK TEST) strip Apply 1 each topically 3 times daily As needed. Yes Sam Oakley MD   amLODIPine (NORVASC) 10 MG tablet Take 1 tablet by mouth daily Yes Sam Oakley MD   tolterodine (DETROL LA) 4 MG extended release capsule TAKE ONE CAPSULE BY MOUTH DAILY Yes Sam Oakley MD   fluconazole (DIFLUCAN) 150 MG tablet TAKE ONE TABLET BY MOUTH IN A SINGLE DOSE, THEN REPEAT IN 3 DAYS Yes Sam Oakley MD   busPIRone (BUSPAR) 15 MG tablet Take 1/3 tab BID for 5 days, then 1/2 tab BID for 5 days, then 2/3 tab BID for 5 days, then 1 po BID. Yes Sam Oakley MD   metroNIDAZOLE (METROGEL) 0.75 % gel Apply topically 2 times daily.  Yes Sam Oakley MD   anastrozole (ARIMIDEX) 1 MG tablet TAKE ONE TABLET BY MOUTH DAILY Yes Michelle Ji MD   mupirocin (BACTROBAN) 2 % ointment APPLY THREE TIMES A DAY AS NEEDED Yes Sam Oakley MD   fluticasone (FLONASE) 50 MCG/ACT nasal spray PLACE TWO SPRAYS IN EACH NOSTRIL ONCE DAILY Yes Sam Oakley MD   calcium carbonate-vitamin D kg)   04/07/18 226 lb (102.5 kg)     BP Readings from Last 3 Encounters:   07/06/18 118/70   05/18/18 124/80   04/07/18 116/70     GENERAL: alert, appears stated age and no distress  BACK: symmetric, no CVA tenderness. ABDOMEN: soft, non-tender     Assessment/Plan:      Diagnosis Orders   1. Dysuria  POCT Urinalysis no Micro     RISK: moderate- possible pyelonephritis and sepsis untreated    Please finish taking ALL of the antibiotics. Take pyridium Rx or Azo OTC for discomfort. Our office will call with urine culture results. Call If not better in  3 days. May take ibuprofen (Motrin, Advil) 200 mg tabs up to 4 tabs every 8 hours. May also take acetaminophen (Tylenol) as instructed on packaging.

## 2018-07-09 LAB
ORGANISM: ABNORMAL
URINE CULTURE, ROUTINE: ABNORMAL
URINE CULTURE, ROUTINE: ABNORMAL

## 2018-07-10 ENCOUNTER — TELEPHONE (OUTPATIENT)
Dept: FAMILY MEDICINE CLINIC | Age: 58
End: 2018-07-10

## 2018-07-10 DIAGNOSIS — M25.50 POLYARTHRALGIA: ICD-10-CM

## 2018-07-10 RX ORDER — GABAPENTIN 600 MG/1
TABLET ORAL
Qty: 120 TABLET | Refills: 1 | Status: SHIPPED | OUTPATIENT
Start: 2018-07-10 | End: 2018-08-23 | Stop reason: SDUPTHER

## 2018-07-12 RX ORDER — LISINOPRIL 40 MG/1
TABLET ORAL
Qty: 90 TABLET | Refills: 0 | Status: SHIPPED | OUTPATIENT
Start: 2018-07-12 | End: 2018-10-06 | Stop reason: SDUPTHER

## 2018-07-17 RX ORDER — FLUTICASONE PROPIONATE 50 MCG
SPRAY, SUSPENSION (ML) NASAL
Qty: 16 G | Refills: 10 | Status: SHIPPED | OUTPATIENT
Start: 2018-07-17 | End: 2019-10-16 | Stop reason: SDUPTHER

## 2018-07-24 RX ORDER — INSULIN ASPART 100 [IU]/ML
INJECTION, SUSPENSION SUBCUTANEOUS
Qty: 30 PEN | Refills: 2 | Status: SHIPPED | OUTPATIENT
Start: 2018-07-24 | End: 2018-10-08 | Stop reason: SDUPTHER

## 2018-07-25 ENCOUNTER — TELEPHONE (OUTPATIENT)
Dept: INTERNAL MEDICINE CLINIC | Age: 58
End: 2018-07-25

## 2018-07-29 DIAGNOSIS — F33.1 MODERATE EPISODE OF RECURRENT MAJOR DEPRESSIVE DISORDER (HCC): ICD-10-CM

## 2018-07-30 RX ORDER — QUETIAPINE FUMARATE 50 MG/1
TABLET, EXTENDED RELEASE ORAL
Qty: 30 TABLET | Refills: 1 | Status: SHIPPED | OUTPATIENT
Start: 2018-07-30 | End: 2018-09-28 | Stop reason: SDUPTHER

## 2018-07-30 RX ORDER — AMLODIPINE BESYLATE 10 MG/1
TABLET ORAL
Qty: 30 TABLET | Refills: 2 | Status: SHIPPED | OUTPATIENT
Start: 2018-07-30 | End: 2018-10-28 | Stop reason: SDUPTHER

## 2018-08-06 ENCOUNTER — HOSPITAL ENCOUNTER (OUTPATIENT)
Dept: WOMENS IMAGING | Age: 58
Discharge: HOME | End: 2018-08-06
Attending: FAMILY MEDICINE | Admitting: FAMILY MEDICINE

## 2018-08-06 DIAGNOSIS — Z12.31 VISIT FOR SCREENING MAMMOGRAM: ICD-10-CM

## 2018-08-21 ENCOUNTER — TELEPHONE (OUTPATIENT)
Dept: SLEEP MEDICINE | Age: 58
End: 2018-08-21

## 2018-08-23 ENCOUNTER — OFFICE VISIT (OUTPATIENT)
Dept: FAMILY MEDICINE CLINIC | Age: 58
End: 2018-08-23

## 2018-08-23 VITALS
DIASTOLIC BLOOD PRESSURE: 78 MMHG | RESPIRATION RATE: 18 BRPM | TEMPERATURE: 99.7 F | HEIGHT: 63 IN | SYSTOLIC BLOOD PRESSURE: 124 MMHG | OXYGEN SATURATION: 97 % | BODY MASS INDEX: 39.51 KG/M2 | HEART RATE: 98 BPM | WEIGHT: 223 LBS

## 2018-08-23 DIAGNOSIS — I10 ESSENTIAL HYPERTENSION: ICD-10-CM

## 2018-08-23 DIAGNOSIS — K31.84 DIABETIC GASTROPARESIS (HCC): ICD-10-CM

## 2018-08-23 DIAGNOSIS — M54.50 ACUTE BILATERAL LOW BACK PAIN WITHOUT SCIATICA: ICD-10-CM

## 2018-08-23 DIAGNOSIS — R10.13 EPIGASTRIC PAIN: ICD-10-CM

## 2018-08-23 DIAGNOSIS — E11.43 DIABETIC GASTROPARESIS (HCC): ICD-10-CM

## 2018-08-23 PROBLEM — M77.11 RIGHT TENNIS ELBOW: Status: RESOLVED | Noted: 2017-09-28 | Resolved: 2018-08-23

## 2018-08-23 LAB
A/G RATIO: 1.1 (ref 1.1–2.2)
ALBUMIN SERPL-MCNC: 4 G/DL (ref 3.4–5)
ALP BLD-CCNC: 117 U/L (ref 40–129)
ALT SERPL-CCNC: 11 U/L (ref 10–40)
ANION GAP SERPL CALCULATED.3IONS-SCNC: 22 MMOL/L (ref 3–16)
AST SERPL-CCNC: 17 U/L (ref 15–37)
BASOPHILS ABSOLUTE: 0.1 K/UL (ref 0–0.2)
BASOPHILS RELATIVE PERCENT: 0.8 %
BILIRUB SERPL-MCNC: <0.2 MG/DL (ref 0–1)
BILIRUBIN, POC: NEGATIVE
BLOOD URINE, POC: ABNORMAL
BUN BLDV-MCNC: 14 MG/DL (ref 7–20)
CALCIUM SERPL-MCNC: 10 MG/DL (ref 8.3–10.6)
CHLORIDE BLD-SCNC: 96 MMOL/L (ref 99–110)
CLARITY, POC: CLEAR
CO2: 24 MMOL/L (ref 21–32)
COLOR, POC: ABNORMAL
CREAT SERPL-MCNC: 1 MG/DL (ref 0.6–1.1)
EOSINOPHILS ABSOLUTE: 0.2 K/UL (ref 0–0.6)
EOSINOPHILS RELATIVE PERCENT: 0.8 %
GFR AFRICAN AMERICAN: >60
GFR NON-AFRICAN AMERICAN: 57
GLOBULIN: 3.5 G/DL
GLUCOSE BLD-MCNC: 103 MG/DL (ref 70–99)
GLUCOSE URINE, POC: NEGATIVE
HBA1C MFR BLD: 7.2 %
HCT VFR BLD CALC: 46.1 % (ref 36–48)
HEMOGLOBIN: 14.9 G/DL (ref 12–16)
KETONES, POC: NEGATIVE
LEUKOCYTE EST, POC: NEGATIVE
LIPASE: 31 U/L (ref 13–60)
LYMPHOCYTES ABSOLUTE: 2.2 K/UL (ref 1–5.1)
LYMPHOCYTES RELATIVE PERCENT: 11.1 %
MCH RBC QN AUTO: 28.1 PG (ref 26–34)
MCHC RBC AUTO-ENTMCNC: 32.3 G/DL (ref 31–36)
MCV RBC AUTO: 87.1 FL (ref 80–100)
MONOCYTES ABSOLUTE: 0.9 K/UL (ref 0–1.3)
MONOCYTES RELATIVE PERCENT: 4.3 %
NEUTROPHILS ABSOLUTE: 16.4 K/UL (ref 1.7–7.7)
NEUTROPHILS RELATIVE PERCENT: 83 %
NITRITE, POC: NEGATIVE
PDW BLD-RTO: 15.4 % (ref 12.4–15.4)
PH, POC: 5.5
PLATELET # BLD: 313 K/UL (ref 135–450)
PMV BLD AUTO: 9.4 FL (ref 5–10.5)
POTASSIUM SERPL-SCNC: 4 MMOL/L (ref 3.5–5.1)
PROTEIN, POC: ABNORMAL
RBC # BLD: 5.29 M/UL (ref 4–5.2)
SODIUM BLD-SCNC: 142 MMOL/L (ref 136–145)
SPECIFIC GRAVITY, POC: >=1.03
TOTAL PROTEIN: 7.5 G/DL (ref 6.4–8.2)
UROBILINOGEN, POC: ABNORMAL
WBC # BLD: 19.8 K/UL (ref 4–11)

## 2018-08-23 PROCEDURE — 99214 OFFICE O/P EST MOD 30 MIN: CPT | Performed by: FAMILY MEDICINE

## 2018-08-23 PROCEDURE — 83036 HEMOGLOBIN GLYCOSYLATED A1C: CPT | Performed by: FAMILY MEDICINE

## 2018-08-23 PROCEDURE — 81002 URINALYSIS NONAUTO W/O SCOPE: CPT | Performed by: FAMILY MEDICINE

## 2018-08-23 RX ORDER — GABAPENTIN 600 MG/1
TABLET ORAL
Qty: 120 TABLET | Refills: 1 | Status: SHIPPED | OUTPATIENT
Start: 2018-08-23 | End: 2018-09-11 | Stop reason: SDUPTHER

## 2018-08-23 ASSESSMENT — PATIENT HEALTH QUESTIONNAIRE - PHQ9
1. LITTLE INTEREST OR PLEASURE IN DOING THINGS: 1
2. FEELING DOWN, DEPRESSED OR HOPELESS: 1
SUM OF ALL RESPONSES TO PHQ9 QUESTIONS 1 & 2: 2
SUM OF ALL RESPONSES TO PHQ QUESTIONS 1-9: 2
SUM OF ALL RESPONSES TO PHQ QUESTIONS 1-9: 2

## 2018-08-23 NOTE — PATIENT INSTRUCTIONS
INSTRUCTIONS  NEXT APPOINTMENT: Please schedule fasting annual physical (30 minutes) in 1 months. OK to have water and medications (except for diabetes medicines). · PLEASE TAKE THIS FORM TO CHECK-OUT WINDOW TO SCHEDULE NEXT VISIT. · PLEASE GET BLOODWORK DRAWN TODAY ON FIRST FLOOR in 170. Take orders with you. RESULTS- most blood tests back in couple days. We will call you if any problems. If bloodwork good, you will get letter in mail or notified thru [address] (if signed up) within 2 weeks. If you do not, please call office. · Please get flu vaccine when available in fall. Can get either at this office or at stores such as [employer] and [employer]. · OK to take tylenol for headache. Stop Advil for now. · Continue prilisec 40 mg daily. · Avoid fatty fpoods for now. · Keep well hydrated and eat bananas, rice, applesauce and toast (BRAT diet).

## 2018-08-23 NOTE — PROGRESS NOTES
05/18/2019    A1C test (Diabetic or Prediabetic)  05/18/2019    Potassium monitoring  07/12/2019    Creatinine monitoring  07/12/2019    Breast cancer screen  08/06/2019    Colon cancer screen colonoscopy  10/16/2022    Pneumococcal med risk  Completed    Hepatitis C screen  Completed    HIV screen  Completed      Patient Active Problem List   Diagnosis    DM (diabetes mellitus), type 2, uncontrolled with complications ([address]    Diabetic gastroparesis ([address]    Allergic rhinitis    Insomnia    HTN (hypertension)    Chronic knee pain    Headaches due to old head injury    Nephropathy, diabetic ([address]    Migraine    Hip pain, bilateral    Depression, major    Right knee DJD    Calcific Achilles tendonitis, right    Right sided sciatica    Well adult health check    Lumbar spinal stenosis    TMJ (temporomandibular joint disorder)    Urge incontinence of urine    Personal history of breast cancer    Hyperlipidemia LDL goal <215    Metallic taste    Polyarthralgia    Severe obesity (BMI 35.0-39. 9) with comorbidity ([address]    Blood poisoning    Meningitis    Neck pain    Chronic low back pain    Intractable low back pain    Plantar fasciitis of right foot    Anxiety    History of colon polyps     Cholesterol, Total   Date Value Ref Range Status   01/21/2016 245 (H) 0 - 199 mg/dL Final     LDL Calculated   Date Value Ref Range Status   03/06/2018 182 (H) <100 mg/dL Final     HDL   Date Value Ref Range Status   03/06/2018 43 40 - 60 mg/dL Final     Triglycerides   Date Value Ref Range Status   01/21/2016 429 (H) 0 - 150 mg/dL Final     Lab Results   Component Value Date    GLUCOSE 192 (H) 07/12/2018     Lab Results   Component Value Date     07/12/2018    K 3.9 07/12/2018    CREATININE 0.9 07/12/2018     Lab Results   Component Value Date    WBC 12.2 (H) 07/12/2018    HGB 12.4 07/12/2018    HCT 37.7 07/12/2018    MCV 85.6 07/12/2018     07/12/2018     Lab Results   Component Value Date    ALT 17 07/12/2018    AST 25 07/12/2018    ALKPHOS 114 07/12/2018    BILITOT 0.3 07/12/2018     TSH (uIU/mL)   Date Value   04/07/2018 1.30     Lab Results   Component Value Date    LABA1C 7.7 05/18/2018     Current Outpatient Prescriptions   Medication Sig Dispense Refill    amLODIPine (NORVASC) 10 MG tablet TAKE ONE TABLET BY MOUTH DAILY 30 tablet 2    QUEtiapine (SEROQUEL XR) 50 MG extended release tablet TAKE ONE TABLET BY MOUTH ONCE NIGHTLY 30 tablet 1    NOVOLOG MIX 70/30 FLEXPEN (70-30) 100 UNIT/ML injection INJECT 70 UNITS UNDER THE SKIN IN THE MORNING AND THEN INJECT 45 UNITS UNDER THE SKIN IN THE EVENING 30 pen 2    fluticasone (FLONASE) 50 MCG/ACT nasal spray PLACE 2 SPRAYS IN EACH NOSTRIL DAILY 16 g 10    lisinopril (PRINIVIL;ZESTRIL) 40 MG tablet TAKE ONE TABLET BY MOUTH DAILY 90 tablet 0    gabapentin (NEURONTIN) 600 MG tablet TAKE ONE TABLET BY MOUTH EVERY MORNING AND TAKE ONE TABLET BY MOUTH EVERY EVENING AND TAKE TWO TABLETS BY MOUTH AT BEDTIME. 120 tablet 1    venlafaxine (EFFEXOR XR) 75 MG extended release capsule TAKE THREE CAPSULES BY MOUTH DAILY 90 capsule 1    metoprolol succinate (TOPROL XL) 50 MG extended release tablet TAKE ONE TABLET BY MOUTH DAILY 30 tablet 4    Suvorexant 20 MG TABS Take 1 tablet by mouth nightly for 90 days. . 30 tablet 2    nystatin (MYCOSTATIN) 146067 UNIT/GM ointment Apply topically 2 times daily 30 g 2    pravastatin (PRAVACHOL) 80 MG tablet Take 1 tablet by mouth daily 30 tablet 5    Insulin Pen Needle (B-D UF III MINI PEN NEEDLES) 31G X 5 MM MISC Apply 1 each topically 2 times daily 200 each 3    glucose blood VI test strips (TRUETRACK TEST) strip Apply 1 each topically 3 times daily As needed.  300 strip 3    tolterodine (DETROL LA) 4 MG extended release capsule TAKE ONE CAPSULE BY MOUTH DAILY 30 capsule 11    fluconazole (DIFLUCAN) 150 MG tablet TAKE ONE TABLET BY MOUTH IN A SINGLE DOSE, THEN REPEAT IN 3 DAYS 2 tablet 2    busPIRone (BUSPAR) 15 MG tablet Take 1/3 tab BID for 5 days, then 1/2 tab BID for 5 days, then 2/3 tab BID for 5 days, then 1 po BID. 60 tablet 1    metroNIDAZOLE (METROGEL) 0.75 % gel Apply topically 2 times daily. 1 Tube 3    anastrozole (ARIMIDEX) 1 MG tablet TAKE ONE TABLET BY MOUTH DAILY 30 tablet 5    mupirocin (BACTROBAN) 2 % ointment APPLY THREE TIMES A DAY AS NEEDED 22 g 2    calcium carbonate-vitamin D (CALTRATE) 600-400 MG-UNIT TABS per tab Take 1 tablet by mouth 2 times daily (Patient taking differently: Take 1 tablet by mouth 2 times daily Indications:  states not taking ) 60 tablet 2    B-D INS SYR HALF-UNIT .3CC/31G 31G X 5/16\" 0.3 ML MISC USE AS DIRECTED TWO TIMES A  each 0    Blood Glucose Monitoring Suppl (TRUE METRIX METER) W/DEVICE KIT 1 each by Does not apply route three times daily. true track meter 1 kit 0    MICROLET LANCETS MISC 1 each by Does not apply route 6 times daily. 600 each 3    omeprazole (PRILOSEC) 20 MG capsule Take 20 mg by mouth every evening.  lubiprostone (AMITIZA) 8 MCG CAPS capsule Take 8 mcg by mouth 2 times daily (with meals). No current facility-administered medications for this visit. Objective:    PHYSICAL EXAM   /78 (Site: Right Arm, Position: Sitting, Cuff Size: Large Adult)   Pulse 98   Temp 99.7 °F (37.6 °C) (Oral)   Resp 18   Ht 5' 3\" (1.6 m)   Wt 223 lb (101.2 kg)   SpO2 97%   BMI 39.50 kg/m²   Blood pressure is Excellent. BP Readings from Last 5 Encounters:   08/23/18 124/78   07/12/18 120/67   07/06/18 118/70   05/18/18 124/80   04/07/18 116/70     Weight is decreased. Wt Readings from Last 5 Encounters:   08/23/18 223 lb (101.2 kg)   07/12/18 238 lb 5.1 oz (108.1 kg)   07/06/18 229 lb (103.9 kg)   05/18/18 219 lb (99.3 kg)   04/07/18 226 lb (102.5 kg)      GENERAL:   · well-developed, well-nourished, mild distress, alert.      ENT:   · External nose and ears appear normal  · normal TM's and external ear canals both

## 2018-08-24 ENCOUNTER — HOSPITAL ENCOUNTER (OUTPATIENT)
Dept: CT IMAGING | Age: 58
Discharge: HOME | End: 2018-08-24
Attending: FAMILY MEDICINE | Admitting: FAMILY MEDICINE

## 2018-08-24 DIAGNOSIS — D72.9 NEUTROPHILIC LEUKOCYTOSIS: ICD-10-CM

## 2018-08-24 DIAGNOSIS — E87.29 METABOLIC ACIDOSIS, INCREASED ANION GAP (IAG): ICD-10-CM

## 2018-08-24 DIAGNOSIS — R10.13 ACUTE EPIGASTRIC PAIN: Primary | ICD-10-CM

## 2018-08-24 DIAGNOSIS — R10.13 ACUTE EPIGASTRIC PAIN: ICD-10-CM

## 2018-08-24 LAB
ACETAMINOPHEN LEVEL: <5 UG/ML (ref 10–30)
AMYLASE: 43 U/L (ref 25–115)
BASOPHILS ABSOLUTE: 0.1 K/UL (ref 0–0.2)
BASOPHILS RELATIVE PERCENT: 0.8 %
D DIMER: 827 NG/ML DDU (ref 0–229)
EOSINOPHILS ABSOLUTE: 0.2 K/UL (ref 0–0.6)
EOSINOPHILS RELATIVE PERCENT: 1.6 %
HCT VFR BLD CALC: 41.2 % (ref 36–48)
HEMOGLOBIN: 13.6 G/DL (ref 12–16)
LACTIC ACID: 3.8 MMOL/L (ref 0.4–2)
LYMPHOCYTES ABSOLUTE: 2.3 K/UL (ref 1–5.1)
LYMPHOCYTES RELATIVE PERCENT: 22.1 %
MCH RBC QN AUTO: 28.1 PG (ref 26–34)
MCHC RBC AUTO-ENTMCNC: 33 G/DL (ref 31–36)
MCV RBC AUTO: 85.1 FL (ref 80–100)
MONOCYTES ABSOLUTE: 0.5 K/UL (ref 0–1.3)
MONOCYTES RELATIVE PERCENT: 5.2 %
NEUTROPHILS ABSOLUTE: 7.3 K/UL (ref 1.7–7.7)
NEUTROPHILS RELATIVE PERCENT: 70.3 %
PDW BLD-RTO: 15 % (ref 12.4–15.4)
PLATELET # BLD: 260 K/UL (ref 135–450)
PMV BLD AUTO: 8.7 FL (ref 5–10.5)
RBC # BLD: 4.83 M/UL (ref 4–5.2)
SALICYLATE, SERUM: <0.3 MG/DL (ref 15–30)
WBC # BLD: 10.3 K/UL (ref 4–11)

## 2018-08-25 LAB — URINE CULTURE, ROUTINE: NORMAL

## 2018-08-27 ENCOUNTER — TELEPHONE (OUTPATIENT)
Dept: SLEEP MEDICINE | Age: 58
End: 2018-08-27

## 2018-08-29 RX ORDER — VENLAFAXINE HYDROCHLORIDE 75 MG/1
CAPSULE, EXTENDED RELEASE ORAL
Qty: 90 CAPSULE | Refills: 0 | Status: SHIPPED | OUTPATIENT
Start: 2018-08-29 | End: 2018-09-25 | Stop reason: SDUPTHER

## 2018-09-08 DIAGNOSIS — F51.01 PRIMARY INSOMNIA: ICD-10-CM

## 2018-09-10 RX ORDER — SUVOREXANT 20 MG/1
TABLET, FILM COATED ORAL
Qty: 30 TABLET | Refills: 1 | Status: SHIPPED | OUTPATIENT
Start: 2018-09-10 | End: 2018-09-11 | Stop reason: SDUPTHER

## 2018-09-11 ENCOUNTER — OFFICE VISIT (OUTPATIENT)
Dept: FAMILY MEDICINE CLINIC | Age: 58
End: 2018-09-11

## 2018-09-11 VITALS
HEIGHT: 63 IN | TEMPERATURE: 98.3 F | SYSTOLIC BLOOD PRESSURE: 122 MMHG | HEART RATE: 102 BPM | DIASTOLIC BLOOD PRESSURE: 60 MMHG | RESPIRATION RATE: 18 BRPM | BODY MASS INDEX: 40.4 KG/M2 | WEIGHT: 228 LBS

## 2018-09-11 DIAGNOSIS — R68.83 CHILLS: ICD-10-CM

## 2018-09-11 DIAGNOSIS — R11.2 NON-INTRACTABLE VOMITING WITH NAUSEA, UNSPECIFIED VOMITING TYPE: ICD-10-CM

## 2018-09-11 DIAGNOSIS — R50.9 HIGH FEVER: ICD-10-CM

## 2018-09-11 DIAGNOSIS — R10.13 EPIGASTRIC PAIN: ICD-10-CM

## 2018-09-11 DIAGNOSIS — F51.01 PRIMARY INSOMNIA: ICD-10-CM

## 2018-09-11 DIAGNOSIS — R50.9 HIGH FEVER: Primary | ICD-10-CM

## 2018-09-11 LAB
A/G RATIO: 1.1 (ref 1.1–2.2)
ALBUMIN SERPL-MCNC: 3.6 G/DL (ref 3.4–5)
ALP BLD-CCNC: 120 U/L (ref 40–129)
ALT SERPL-CCNC: 19 U/L (ref 10–40)
ANION GAP SERPL CALCULATED.3IONS-SCNC: 16 MMOL/L (ref 3–16)
AST SERPL-CCNC: 17 U/L (ref 15–37)
BASOPHILS ABSOLUTE: 0.1 K/UL (ref 0–0.2)
BASOPHILS RELATIVE PERCENT: 0.5 %
BILIRUB SERPL-MCNC: 0.4 MG/DL (ref 0–1)
BUN BLDV-MCNC: 27 MG/DL (ref 7–20)
CALCIUM SERPL-MCNC: 9.6 MG/DL (ref 8.3–10.6)
CHLORIDE BLD-SCNC: 95 MMOL/L (ref 99–110)
CO2: 26 MMOL/L (ref 21–32)
CREAT SERPL-MCNC: 1.3 MG/DL (ref 0.6–1.1)
EOSINOPHILS ABSOLUTE: 0.2 K/UL (ref 0–0.6)
EOSINOPHILS RELATIVE PERCENT: 1.5 %
GFR AFRICAN AMERICAN: 51
GFR NON-AFRICAN AMERICAN: 42
GLOBULIN: 3.4 G/DL
GLUCOSE BLD-MCNC: 230 MG/DL (ref 70–99)
HBA1C MFR BLD: 8.3 %
HCT VFR BLD CALC: 40.5 % (ref 36–48)
HEMOGLOBIN: 13.3 G/DL (ref 12–16)
INFLUENZA A ANTIGEN, POC: NEGATIVE
INFLUENZA B ANTIGEN, POC: NEGATIVE
LYMPHOCYTES ABSOLUTE: 2.4 K/UL (ref 1–5.1)
LYMPHOCYTES RELATIVE PERCENT: 17 %
MCH RBC QN AUTO: 28.6 PG (ref 26–34)
MCHC RBC AUTO-ENTMCNC: 32.8 G/DL (ref 31–36)
MCV RBC AUTO: 87.3 FL (ref 80–100)
MONOCYTES ABSOLUTE: 1.1 K/UL (ref 0–1.3)
MONOCYTES RELATIVE PERCENT: 7.9 %
NEUTROPHILS ABSOLUTE: 10.3 K/UL (ref 1.7–7.7)
NEUTROPHILS RELATIVE PERCENT: 73.1 %
PDW BLD-RTO: 15.3 % (ref 12.4–15.4)
PLATELET # BLD: 238 K/UL (ref 135–450)
PMV BLD AUTO: 9.7 FL (ref 5–10.5)
POTASSIUM SERPL-SCNC: 4.6 MMOL/L (ref 3.5–5.1)
RBC # BLD: 4.64 M/UL (ref 4–5.2)
SODIUM BLD-SCNC: 137 MMOL/L (ref 136–145)
TOTAL PROTEIN: 7 G/DL (ref 6.4–8.2)
WBC # BLD: 14.2 K/UL (ref 4–11)

## 2018-09-11 PROCEDURE — 83036 HEMOGLOBIN GLYCOSYLATED A1C: CPT | Performed by: FAMILY MEDICINE

## 2018-09-11 PROCEDURE — 87804 INFLUENZA ASSAY W/OPTIC: CPT | Performed by: FAMILY MEDICINE

## 2018-09-11 PROCEDURE — 99214 OFFICE O/P EST MOD 30 MIN: CPT | Performed by: FAMILY MEDICINE

## 2018-09-11 RX ORDER — GABAPENTIN 600 MG/1
TABLET ORAL
Qty: 120 TABLET | Refills: 1 | Status: SHIPPED | OUTPATIENT
Start: 2018-09-11 | End: 2018-09-12 | Stop reason: SDUPTHER

## 2018-09-11 RX ORDER — OXYCODONE HYDROCHLORIDE 15 MG/1
TABLET ORAL
COMMUNITY
Start: 2018-09-05 | End: 2019-05-22

## 2018-09-11 NOTE — PROGRESS NOTES
DIABETES MELLITUS FOLOW-UP  Scribe documentation   I, Ann Marie Malave , am scribing for Michelle Gunter MD. Electronically signed by Ann Marie Malave  on 9/11/2018 at 10:15 AM  MD Attestation: Sendy Jarvis,  personally performed the services described in this documentation, as scribed by the user listed above, and it is both accurate and complete. I agree with the Chief Complaint, ROS, and Past Histories independently gathered by the clinical support staff. CHART REVIEW  Health Maintenance   Topic Date Due    DTaP/Tdap/Td vaccine (1 - Tdap) 01/10/1978    Pneumococcal highest risk (2 of 3 - PCV13) 11/01/2014    Cervical cancer screen  05/01/2016    Shingles Vaccine (1 of 2 - 2 Dose Series) 11/07/2016    Flu vaccine (1) 09/01/2018    Lipid screen  03/06/2019    Diabetic foot exam  05/18/2019    Breast cancer screen  08/06/2019    A1C test (Diabetic or Prediabetic)  08/23/2019    Potassium monitoring  08/23/2019    Creatinine monitoring  08/23/2019    Diabetic retinal exam  01/23/2020    Colon cancer screen colonoscopy  10/16/2022    Hepatitis C screen  Completed    HIV screen  Completed      Patient Active Problem List   Diagnosis    DM (diabetes mellitus), type 2, uncontrolled with complications ([address]    Diabetic gastroparesis ([address]    Allergic rhinitis    Insomnia    HTN (hypertension)    Chronic knee pain    Headaches due to old head injury    Nephropathy, diabetic ([address]    Migraine    Hip pain, bilateral    Depression, major    Right knee DJD    Calcific Achilles tendonitis, right    Right sided sciatica    Well adult health check    Lumbar spinal stenosis    TMJ (temporomandibular joint disorder)    Urge incontinence of urine    Personal history of breast cancer    Hyperlipidemia LDL goal <739    Metallic taste    Polyarthralgia    Severe obesity (BMI 35.0-39. 9) with comorbidity ([address]    Blood poisoning    Meningitis    Neck pain    Chronic low back pain    Intractable significant finger or nail findings  NEURO:   · CN 2-12 intact  ABD- soft, non-tender     Assessment and Plan:      Diagnosis Orders   1. High fever  Comprehensive Metabolic Panel    CBC Auto Differential    Urine Culture   2. Uncontrolled type 2 diabetes mellitus with complication, with long-term current use of insulin ([facility])  POCT glycosylated hemoglobin (Hb A1C)    insulin aspart (NOVOLOG FLEXPEN) 100 UNIT/ML injection pen    Amb External Referral To Ophthalmology   3. Non-intractable vomiting with nausea, unspecified vomiting type  Comprehensive Metabolic Panel    CBC Auto Differential    Urine Culture    POCT Influenza A/B Antigen   4. Chills  Comprehensive Metabolic Panel    CBC Auto Differential    Urine Culture    POCT Influenza A/B Antigen   5. Epigastric pain  gabapentin (NEURONTIN) 600 MG tablet   6. Primary insomnia  Suvorexant (BELSOMRA) 20 MG TABS   Previous labs reviewed as above.     INSTRUCTIONS  NEXT APPOINTMENT: Please schedule check-up in 3 weeks. Bring sugar and insulin log with you. · PLEASE TAKE THIS FORM TO CHECK-OUT WINDOW TO SCHEDULE NEXT VISIT. · PLEASE GET BLOODWORK DRAWN TODAY ON FIRST FLOOR in 170. Take orders with you. RESULTS- most blood tests back in couple days. We will call you if any problems. If bloodwork good, you will get letter in mail or notified thru [address] (if signed up) within 2 weeks. If you do not, please call office. · Continue 70/30 unless sugars under 100. · Check sugars at least three times per day. · Take 70/30 twice a day scheduled. Short-acting insulin sliding scale for plain Novolog: Glucose < 200 no coverage; 201-250 take 10 units; 251-300 take 15 units; > 300 take 20 units. · Glucose < 160 no coverage;  161-200 take 3 units, 201-250 take 6 units, 251-300 take 9 units, > 300 take 12 units. · Suspect virus. Call if not better in 48 hours.

## 2018-09-11 NOTE — PATIENT INSTRUCTIONS
NSTRUCTIONS  NEXT APPOINTMENT: Please schedule check-up in 3 weeks. Bring sugar and insulin log with you. · PLEASE TAKE THIS FORM TO CHECK-OUT WINDOW TO SCHEDULE NEXT VISIT. · PLEASE GET BLOODWORK DRAWN TODAY ON FIRST FLOOR in 170. Take orders with you. RESULTS- most blood tests back in couple days. We will call you if any problems. If bloodwork good, you will get letter in mail or notified thru [address] (if signed up) within 2 weeks. If you do not, please call office. · Get lots of rest and fluids. · Continue 70/30 unless sugars under 100. · Check sugars at least three times per day. · Take 70/30 twice a day scheduled. Short-acting insulin sliding scale for plain Novolog: Glucose < 200 no coverage; 201-250 take 10 units; 251-300 take 15 units; > 300 take 20 units. · Glucose < 160 no coverage;  161-200 take 3 units, 201-250 take 6 units, 251-300 take 9 units, > 300 take 12 units. · Suspect virus. Call if not better in 48 hours.

## 2018-09-12 DIAGNOSIS — F51.01 PRIMARY INSOMNIA: ICD-10-CM

## 2018-09-12 DIAGNOSIS — B37.31 YEAST VAGINITIS: ICD-10-CM

## 2018-09-12 DIAGNOSIS — R10.13 EPIGASTRIC PAIN: ICD-10-CM

## 2018-09-12 RX ORDER — GABAPENTIN 600 MG/1
TABLET ORAL
Qty: 120 TABLET | Refills: 1 | Status: CANCELLED | OUTPATIENT
Start: 2018-09-12 | End: 2027-10-26

## 2018-09-12 RX ORDER — GABAPENTIN 600 MG/1
TABLET ORAL
Qty: 120 TABLET | Refills: 1 | Status: SHIPPED | OUTPATIENT
Start: 2018-09-12 | End: 2018-12-06

## 2018-09-12 RX ORDER — AMOXICILLIN 500 MG/1
500 CAPSULE ORAL 3 TIMES DAILY
Qty: 30 CAPSULE | Refills: 0 | Status: SHIPPED | OUTPATIENT
Start: 2018-09-12 | End: 2018-09-22

## 2018-09-13 RX ORDER — FLUCONAZOLE 150 MG/1
TABLET ORAL
Qty: 2 TABLET | Refills: 1 | Status: SHIPPED | OUTPATIENT
Start: 2018-09-13 | End: 2018-10-06 | Stop reason: ALTCHOICE

## 2018-09-14 LAB — URINE CULTURE, ROUTINE: NORMAL

## 2018-09-25 RX ORDER — VENLAFAXINE HYDROCHLORIDE 75 MG/1
CAPSULE, EXTENDED RELEASE ORAL
Qty: 90 CAPSULE | Refills: 0 | Status: SHIPPED | OUTPATIENT
Start: 2018-09-25 | End: 2018-10-27 | Stop reason: SDUPTHER

## 2018-09-27 RX ORDER — METOPROLOL SUCCINATE 50 MG/1
TABLET, EXTENDED RELEASE ORAL
Qty: 90 TABLET | Refills: 3 | Status: SHIPPED | OUTPATIENT
Start: 2018-09-27 | End: 2019-12-18

## 2018-09-28 DIAGNOSIS — F33.1 MODERATE EPISODE OF RECURRENT MAJOR DEPRESSIVE DISORDER (HCC): ICD-10-CM

## 2018-09-28 RX ORDER — QUETIAPINE FUMARATE 50 MG/1
TABLET, EXTENDED RELEASE ORAL
Qty: 30 TABLET | Refills: 0 | Status: SHIPPED | OUTPATIENT
Start: 2018-09-28 | End: 2018-10-27 | Stop reason: SDUPTHER

## 2018-10-06 ENCOUNTER — HOSPITAL ENCOUNTER (EMERGENCY)
Age: 58
Discharge: HOME | End: 2018-10-06
Attending: EMERGENCY MEDICINE
Payer: COMMERCIAL

## 2018-10-06 ENCOUNTER — APPOINTMENT (OUTPATIENT)
Dept: GENERAL RADIOLOGY | Age: 58
End: 2018-10-06
Payer: COMMERCIAL

## 2018-10-06 VITALS
SYSTOLIC BLOOD PRESSURE: 119 MMHG | BODY MASS INDEX: 40.98 KG/M2 | HEART RATE: 93 BPM | WEIGHT: 231.26 LBS | OXYGEN SATURATION: 94 % | RESPIRATION RATE: 18 BRPM | DIASTOLIC BLOOD PRESSURE: 66 MMHG | TEMPERATURE: 99.2 F | HEIGHT: 63 IN

## 2018-10-06 DIAGNOSIS — N30.00 ACUTE CYSTITIS WITHOUT HEMATURIA: Primary | ICD-10-CM

## 2018-10-06 DIAGNOSIS — M25.521 RIGHT ELBOW PAIN: ICD-10-CM

## 2018-10-06 LAB
BACTERIA: ABNORMAL /HPF
BILIRUBIN URINE: NEGATIVE
BLOOD, URINE: ABNORMAL
CASTS: ABNORMAL /LPF
CLARITY: ABNORMAL
COLOR: YELLOW
EPITHELIAL CELLS, UA: ABNORMAL /HPF
GLUCOSE URINE: NEGATIVE MG/DL
KETONES, URINE: NEGATIVE MG/DL
LEUKOCYTE ESTERASE, URINE: NEGATIVE
MICROSCOPIC EXAMINATION: YES
NITRITE, URINE: NEGATIVE
PH UA: 5.5
PROTEIN UA: >=300 MG/DL
RBC UA: ABNORMAL /HPF (ref 0–2)
SPECIFIC GRAVITY UA: >=1.03
URINE REFLEX TO CULTURE: YES
URINE TYPE: ABNORMAL
UROBILINOGEN, URINE: 0.2 E.U./DL
WBC UA: ABNORMAL /HPF (ref 0–5)

## 2018-10-06 PROCEDURE — 87077 CULTURE AEROBIC IDENTIFY: CPT

## 2018-10-06 PROCEDURE — 73080 X-RAY EXAM OF ELBOW: CPT

## 2018-10-06 PROCEDURE — 99283 EMERGENCY DEPT VISIT LOW MDM: CPT

## 2018-10-06 PROCEDURE — 96372 THER/PROPH/DIAG INJ SC/IM: CPT

## 2018-10-06 PROCEDURE — 81001 URINALYSIS AUTO W/SCOPE: CPT

## 2018-10-06 PROCEDURE — 87186 SC STD MICRODIL/AGAR DIL: CPT

## 2018-10-06 PROCEDURE — 6360000002 HC RX W HCPCS: Performed by: EMERGENCY MEDICINE

## 2018-10-06 PROCEDURE — 87086 URINE CULTURE/COLONY COUNT: CPT

## 2018-10-06 RX ORDER — IBUPROFEN 400 MG/1
400 TABLET ORAL EVERY 8 HOURS PRN
Qty: 15 TABLET | Refills: 0 | Status: SHIPPED | OUTPATIENT
Start: 2018-10-06 | End: 2020-08-05

## 2018-10-06 RX ORDER — PHENAZOPYRIDINE HYDROCHLORIDE 200 MG/1
200 TABLET, FILM COATED ORAL 3 TIMES DAILY PRN
Qty: 6 TABLET | Refills: 0 | Status: SHIPPED | OUTPATIENT
Start: 2018-10-06 | End: 2018-10-09

## 2018-10-06 RX ORDER — NITROFURANTOIN 25; 75 MG/1; MG/1
100 CAPSULE ORAL ONCE
Status: COMPLETED | OUTPATIENT
Start: 2018-10-06 | End: 2018-10-06

## 2018-10-06 RX ORDER — KETOROLAC TROMETHAMINE 30 MG/ML
30 INJECTION, SOLUTION INTRAMUSCULAR; INTRAVENOUS ONCE
Status: COMPLETED | OUTPATIENT
Start: 2018-10-06 | End: 2018-10-06

## 2018-10-06 RX ORDER — NITROFURANTOIN 25; 75 MG/1; MG/1
100 CAPSULE ORAL 2 TIMES DAILY
Qty: 14 CAPSULE | Refills: 0 | Status: SHIPPED | OUTPATIENT
Start: 2018-10-06 | End: 2018-10-13

## 2018-10-06 RX ADMIN — NITROFURANTOIN 100 MG: 25; 75 CAPSULE ORAL at 19:16

## 2018-10-06 RX ADMIN — KETOROLAC TROMETHAMINE 30 MG: 30 INJECTION, SOLUTION INTRAMUSCULAR at 18:20

## 2018-10-06 ASSESSMENT — PAIN SCALES - GENERAL
PAINLEVEL_OUTOF10: 8
PAINLEVEL_OUTOF10: 5
PAINLEVEL_OUTOF10: 8
PAINLEVEL_OUTOF10: 5

## 2018-10-06 ASSESSMENT — PAIN DESCRIPTION - ORIENTATION: ORIENTATION: RIGHT

## 2018-10-06 ASSESSMENT — PAIN DESCRIPTION - PROGRESSION: CLINICAL_PROGRESSION: GRADUALLY IMPROVING

## 2018-10-06 ASSESSMENT — PAIN DESCRIPTION - LOCATION: LOCATION: ELBOW

## 2018-10-06 ASSESSMENT — PAIN - FUNCTIONAL ASSESSMENT: PAIN_FUNCTIONAL_ASSESSMENT: 0-10

## 2018-10-07 ASSESSMENT — ENCOUNTER SYMPTOMS
VOMITING: 0
CHEST TIGHTNESS: 0
DIARRHEA: 0
ABDOMINAL PAIN: 0
NAUSEA: 0
SHORTNESS OF BREATH: 0
EYE REDNESS: 0
COLOR CHANGE: 0
VOICE CHANGE: 0
TROUBLE SWALLOWING: 0

## 2018-10-08 ENCOUNTER — TELEPHONE (OUTPATIENT)
Dept: FAMILY MEDICINE CLINIC | Age: 58
End: 2018-10-08

## 2018-10-08 RX ORDER — INSULIN ASPART 100 [IU]/ML
INJECTION, SUSPENSION SUBCUTANEOUS
Qty: 30 PEN | Refills: 1 | Status: SHIPPED | OUTPATIENT
Start: 2018-10-08 | End: 2018-11-26 | Stop reason: SDUPTHER

## 2018-10-08 RX ORDER — LISINOPRIL 40 MG/1
TABLET ORAL
Qty: 90 TABLET | Refills: 0 | Status: SHIPPED | OUTPATIENT
Start: 2018-10-08 | End: 2019-01-03 | Stop reason: SDUPTHER

## 2018-10-08 NOTE — TELEPHONE ENCOUNTER
[address] [facility] Trans[facility] Initial Follow Up Call    Outreach made within 2 business days of discharge: Yes    Patient: Ashley Friend Patient : 1960   MRN: [id number]  Reason for Admission: There are no discharge diagnoses documented for the most recent discharge. Discharge Date: 10/6/18       Spoke with: [facility]        Discharge department/facility: [facility] Interactive Patient Contact:  Was patient able to fill all prescriptions: Yes  Was patient instructed to bring all medications to the follow-up visit: Yes  Is patient taking all medications as directed in the discharge summary?  Yes  Does patient understand their discharge instructions: Yes  Does patient have questions or concerns that need addressed prior to 7-14 day follow up office visit: no    Scheduled appointment with PCP within 7-14 days    Follow Up  Future Appointments  Date Time Provider Randa Prater   10/11/2018 11:40 AM Demetrius Kirby MD [facility]   10/11/2018 2:45 PM Bella Rojo MD [facility] 761   10/19/2018 2:15 PM [facility]  [facility]

## 2018-10-09 LAB
ORGANISM: ABNORMAL
URINE CULTURE, ROUTINE: ABNORMAL
URINE CULTURE, ROUTINE: ABNORMAL

## 2018-10-11 ENCOUNTER — OFFICE VISIT (OUTPATIENT)
Dept: FAMILY MEDICINE CLINIC | Age: 58
End: 2018-10-11
Payer: COMMERCIAL

## 2018-10-11 VITALS
RESPIRATION RATE: 16 BRPM | BODY MASS INDEX: 40.93 KG/M2 | WEIGHT: 231 LBS | HEART RATE: 84 BPM | TEMPERATURE: 98.2 F | SYSTOLIC BLOOD PRESSURE: 134 MMHG | HEIGHT: 63 IN | DIASTOLIC BLOOD PRESSURE: 80 MMHG

## 2018-10-11 DIAGNOSIS — N30.00 ACUTE CYSTITIS WITHOUT HEMATURIA: ICD-10-CM

## 2018-10-11 DIAGNOSIS — M77.8 RIGHT ELBOW TENDONITIS: Primary | ICD-10-CM

## 2018-10-11 LAB — HBA1C MFR BLD: 8.2 %

## 2018-10-11 PROCEDURE — 83036 HEMOGLOBIN GLYCOSYLATED A1C: CPT | Performed by: FAMILY MEDICINE

## 2018-10-11 PROCEDURE — 99213 OFFICE O/P EST LOW 20 MIN: CPT | Performed by: FAMILY MEDICINE

## 2018-10-11 RX ORDER — OMEPRAZOLE 40 MG/1
40 CAPSULE, DELAYED RELEASE ORAL DAILY
COMMUNITY
Start: 2018-09-28

## 2018-10-11 RX ORDER — INFLUENZA A VIRUS A/MICHIGAN/45/2015 (H1N1) RECOMBINANT HEMAGGLUTININ ANTIGEN, INFLUENZA A VIRUS A/SINGAPORE/INFIMH-16-0019/2016 (H3N2) RECOMBINANT HEMAGGLUTININ ANTIGEN, INFLUENZA B VIRUS B/MARYLAND/15/2016 RECOMBINANT HEMAGGLUTININ ANTIGEN, AND INFLUENZA B VIRUS B/PHUKET/3073/2013 RECOMBINANT HEMAGGLUTININ ANTIGEN 45; 45; 45; 45 UG/.5ML; UG/.5ML; UG/.5ML; UG/.5ML
INJECTION INTRAMUSCULAR
COMMUNITY
Start: 2018-10-01 | End: 2018-10-11

## 2018-10-11 NOTE — PROGRESS NOTES
Patient's positive result has been appropriately evaluated by the provider pool. Patient was contacted and notified of the change in treatment plan.     Medication was phoned to the patient's pharmacy per protocol:    Pharmacy:   [facility] Ste[facility], [facility] [phone] - [address] [place] [id number]  Phone: [phone] Fax: [phone] [address] & [facility], [address] Maikol Hollingsworth [phone] Mahin Hart [phone]  [address] [place] [address]  Phone: [phone] Fax: [phone]    Phone number: [phone]  Pharmacist receiving the prescription:left message

## 2018-10-27 DIAGNOSIS — F33.1 MODERATE EPISODE OF RECURRENT MAJOR DEPRESSIVE DISORDER (HCC): ICD-10-CM

## 2018-10-29 DIAGNOSIS — E78.5 HYPERLIPIDEMIA LDL GOAL <100: ICD-10-CM

## 2018-10-29 RX ORDER — QUETIAPINE FUMARATE 50 MG/1
TABLET, EXTENDED RELEASE ORAL
Qty: 30 TABLET | Refills: 0 | Status: SHIPPED | OUTPATIENT
Start: 2018-10-29 | End: 2018-11-26 | Stop reason: SDUPTHER

## 2018-10-29 RX ORDER — AMLODIPINE BESYLATE 10 MG/1
TABLET ORAL
Qty: 30 TABLET | Refills: 1 | Status: SHIPPED | OUTPATIENT
Start: 2018-10-29 | End: 2018-12-26 | Stop reason: SDUPTHER

## 2018-10-29 RX ORDER — VENLAFAXINE HYDROCHLORIDE 75 MG/1
CAPSULE, EXTENDED RELEASE ORAL
Qty: 90 CAPSULE | Refills: 0 | Status: SHIPPED | OUTPATIENT
Start: 2018-10-29 | End: 2018-11-25 | Stop reason: SDUPTHER

## 2018-10-30 RX ORDER — PRAVASTATIN SODIUM 80 MG/1
80 TABLET ORAL DAILY
Qty: 30 TABLET | Refills: 5 | Status: SHIPPED | OUTPATIENT
Start: 2018-10-30 | End: 2018-11-07 | Stop reason: SDUPTHER

## 2018-11-01 ENCOUNTER — TELEPHONE (OUTPATIENT)
Dept: FAMILY MEDICINE CLINIC | Age: 58
End: 2018-11-01

## 2018-11-02 ENCOUNTER — HOSPITAL ENCOUNTER (OUTPATIENT)
Dept: WOMENS IMAGING | Age: 58
Discharge: HOME | End: 2018-11-02
Payer: COMMERCIAL

## 2018-11-02 DIAGNOSIS — Z13.820 OSTEOPOROSIS SCREENING: ICD-10-CM

## 2018-11-02 PROCEDURE — 77080 DXA BONE DENSITY AXIAL: CPT

## 2018-11-05 ENCOUNTER — OFFICE VISIT (OUTPATIENT)
Dept: SLEEP MEDICINE | Age: 58
End: 2018-11-05
Payer: COMMERCIAL

## 2018-11-05 VITALS
DIASTOLIC BLOOD PRESSURE: 70 MMHG | RESPIRATION RATE: 20 BRPM | OXYGEN SATURATION: 96 % | WEIGHT: 233.8 LBS | SYSTOLIC BLOOD PRESSURE: 100 MMHG | BODY MASS INDEX: 41.43 KG/M2 | HEIGHT: 63 IN | TEMPERATURE: 98.7 F | HEART RATE: 92 BPM

## 2018-11-05 DIAGNOSIS — G47.33 OSA (OBSTRUCTIVE SLEEP APNEA): Primary | ICD-10-CM

## 2018-11-05 DIAGNOSIS — F99 INSOMNIA DUE TO OTHER MENTAL DISORDER: ICD-10-CM

## 2018-11-05 DIAGNOSIS — F51.05 INSOMNIA DUE TO OTHER MENTAL DISORDER: ICD-10-CM

## 2018-11-05 DIAGNOSIS — G47.21 DELAYED SLEEP PHASE SYNDROME: ICD-10-CM

## 2018-11-05 PROCEDURE — 99204 OFFICE O/P NEW MOD 45 MIN: CPT | Performed by: PSYCHIATRY & NEUROLOGY

## 2018-11-05 ASSESSMENT — SLEEP AND FATIGUE QUESTIONNAIRES
NECK CIRCUMFERENCE (INCHES): 17.25
HOW LIKELY ARE YOU TO NOD OFF OR FALL ASLEEP WHILE WATCHING TV: 2
HOW LIKELY ARE YOU TO NOD OFF OR FALL ASLEEP WHEN YOU ARE A PASSENGER IN A CAR FOR AN HOUR WITHOUT A BREAK: 2
HOW LIKELY ARE YOU TO NOD OFF OR FALL ASLEEP WHILE SITTING QUIETLY AFTER LUNCH WITHOUT ALCOHOL: 0
HOW LIKELY ARE YOU TO NOD OFF OR FALL ASLEEP WHILE SITTING AND TALKING TO SOMEONE: 0
HOW LIKELY ARE YOU TO NOD OFF OR FALL ASLEEP WHILE SITTING AND READING: 2
HOW LIKELY ARE YOU TO NOD OFF OR FALL ASLEEP IN A CAR, WHILE STOPPED FOR A FEW MINUTES IN TRAFFIC: 0
HOW LIKELY ARE YOU TO NOD OFF OR FALL ASLEEP WHILE LYING DOWN TO REST IN THE AFTERNOON WHEN CIRCUMSTANCES PERMIT: 2
HOW LIKELY ARE YOU TO NOD OFF OR FALL ASLEEP WHILE SITTING INACTIVE IN A PUBLIC PLACE: 0
ESS TOTAL SCORE: 8

## 2018-11-05 ASSESSMENT — ENCOUNTER SYMPTOMS
COUGH: 1
EYES NEGATIVE: 1
ALLERGIC/IMMUNOLOGIC NEGATIVE: 1
GASTROINTESTINAL NEGATIVE: 1

## 2018-11-05 NOTE — PROGRESS NOTES
is indicativeof daytime fatigue). The patient takes no naps. Previous evaluation and treatment has included- none. Insomnia with sleep initiation and maintaining, usually takesthe patient 3-4 hours to fall in sleep, wakes up few times from few minutes eachtime to fall back in sleep, usually stays in bed trying to fall in sleep, this might happened 7 nights a week. patient can fall in sleep easily at 4 AM    DOT/CDL - N/A  FAA/'sirisha - N/A  Previous Report(s) Reviewed: historical medical records         Social History     Social History    Marital status:      Spouse name: N/A    Number of children: N/A    Years of education: N/A     Occupational History    Not on file. Social History Main Topics    Smoking status: Never Smoker    Smokeless tobacco: Never Used      Comment: congrats    Alcohol use No    Drug use: No    Sexual activity: Yes     Partners: Male     Other Topics Concern    Not on file     Social History Narrative    Self-breast exams: yes    Lives with spouse and son. Exercise:  rarely    Seatbelt use: Always    Living will: no,   additional information provided       Prior to Admission medications    Medication Sig Start Date End Date Taking?  Authorizing Provider   pravastatin (PRAVACHOL) 80 MG tablet Take 1 tablet by mouth daily 10/30/18  Yes Gracie Vogel MD   venlafaxine (EFFEXOR XR) 75 MG extended release capsule TAKE THREE CAPSULES BY MOUTH DAILY 10/29/18  Yes Gracie Vogel MD   QUEtiapine (SEROQUEL XR) 50 MG extended release tablet TAKE ONE TABLET BY MOUTH ONCE NIGHTLY 10/29/18  Yes Gracie Vogel MD   amLODIPine (NORVASC) 10 MG tablet TAKE ONE TABLET BY MOUTH DAILY 10/29/18  Yes Gracie Vogel MD   omeprazole ([address] [place] [address] Avenue) 40 MG delayed release capsule  9/28/18  Yes Historical Provider, MD   lisinopril (PRINIVIL;ZESTRIL) 40 MG tablet TAKE ONE TABLET BY MOUTH DAILY 10/8/18  Yes Gracie Vogel MD   NOVOLOG MIX 70/30 FLEXPEN (70-30) 100 UNIT/ML injection INJECT 70 anion gap (IAG)       Past Medical History:   Diagnosis Date    Allergic rhinitis 5/15/2014    Anxiety     Arthritis     [facility]     [facility]     breast    Chronic back pain     Depression     Diabetes mellitus ([facility]     DM (diabetes mellitus), type 2, uncontrolled with complications ([facility] 4458    ESBL (extended spectrum beta-lactamase) producing bacteria infection 10/06/2018    urine    GERD (gastroesophageal reflux disease)     GERD (gastroesophageal reflux disease)     History of migraine headaches     HTN (hypertension) 5/15/2014    Hyperlipidemia     Irritable bowel syndrome (IBS)     Obesity        Past Surgical History:   Procedure Laterality Date    ACHILLES TENDON SURGERY Right     BACK SURGERY  2013    lumbar    BREAST LUMPECTOMY Left 2010    CARPAL TUNNEL RELEASE Right      SECTION      x 1    CHOLECYSTECTOMY      COLONOSCOPY  10/16/2017    polypectomy- Dr Georgia Vidales         Family History   Problem Relation Age of Onset    Cancer Mother         forehead soft tissue    Diabetes Mother     Arthritis Father     High Blood Pressure Father     High Cholesterol Father     Alzheimer's Disease Father     Diabetes Brother     Depression Sister        Review of Systems   Constitutional: Positive for fatigue. HENT: Positive for congestion. Eyes: Negative. Respiratory: Positive for cough. Cardiovascular: Negative. Negative for leg swelling. Gastrointestinal: Negative. Endocrine: Positive for heat intolerance. Genitourinary: Positive for frequency (nighttime). Musculoskeletal: Positive for arthralgias. Skin: Negative. Allergic/Immunologic: Negative. Neurological: Positive for headaches (AM). Psychiatric/Behavioral: Positive for agitation, decreased concentration and dysphoric mood. The patient is nervous/anxious. All other systems reviewed and are negative.       Objective:     Vitals:  Weight BMI Neck observed apnea, daytime sleepiness, large neck circumference, Mallampati class of Patient was counseled about the pathophysiology of obstructive sleep apnea syndrome and the methods for evaluating its presence and severity. Patient was counseled to avoid driving and other potentially hazardous circumstances if the patient is experiencing excessive sleepiness. Treatment considerations include the use of nasal CPAP, oral dental appliance or a surgical intervention, which should be based on otolarygologic findings, In the meantime, the patient should be cautioned to avoid the use of alcohol or other depressant medications because of potential for increasing the duration and severity of apnea and cautioned regarding driving or operating and dangerous equipment if the patient is experiencing daytime sleepiness. .    Orders Placed This Encounter   Procedures    Baseline Diagnostic Sleep Study    Sleep Study with PAP Titration       Return in about 3 months (around 2/5/2019) for insomnia, to review the PSG and CPAP usage, Reveiwing CPAP usage and compliance report and tro.     Kristin Huynh MD  Medical Director - [facility]

## 2018-11-07 DIAGNOSIS — E78.5 HYPERLIPIDEMIA LDL GOAL <100: ICD-10-CM

## 2018-11-07 DIAGNOSIS — F51.01 PRIMARY INSOMNIA: ICD-10-CM

## 2018-11-08 RX ORDER — PRAVASTATIN SODIUM 80 MG/1
TABLET ORAL
Qty: 30 TABLET | Refills: 4 | Status: SHIPPED | OUTPATIENT
Start: 2018-11-08 | End: 2019-04-22

## 2018-11-08 RX ORDER — SUVOREXANT 20 MG/1
TABLET, FILM COATED ORAL
Qty: 30 TABLET | Refills: 0 | Status: SHIPPED | OUTPATIENT
Start: 2018-11-08 | End: 2018-12-06 | Stop reason: SDUPTHER

## 2018-11-12 ENCOUNTER — TELEPHONE (OUTPATIENT)
Dept: FAMILY MEDICINE CLINIC | Age: 58
End: 2018-11-12

## 2018-11-26 DIAGNOSIS — F33.1 MODERATE EPISODE OF RECURRENT MAJOR DEPRESSIVE DISORDER (HCC): ICD-10-CM

## 2018-11-26 RX ORDER — QUETIAPINE FUMARATE 50 MG/1
TABLET, EXTENDED RELEASE ORAL
Qty: 30 TABLET | Refills: 0 | Status: SHIPPED | OUTPATIENT
Start: 2018-11-26 | End: 2018-12-07 | Stop reason: SDUPTHER

## 2018-11-26 RX ORDER — INSULIN ASPART 100 [IU]/ML
INJECTION, SUSPENSION SUBCUTANEOUS
Qty: 30 PEN | Refills: 1 | Status: SHIPPED | OUTPATIENT
Start: 2018-11-26 | End: 2019-02-18 | Stop reason: SDUPTHER

## 2018-11-26 RX ORDER — VENLAFAXINE HYDROCHLORIDE 75 MG/1
CAPSULE, EXTENDED RELEASE ORAL
Qty: 90 CAPSULE | Refills: 0 | Status: SHIPPED | OUTPATIENT
Start: 2018-11-26 | End: 2018-12-24 | Stop reason: SDUPTHER

## 2018-12-06 DIAGNOSIS — M25.50 POLYARTHRALGIA: ICD-10-CM

## 2018-12-06 DIAGNOSIS — F51.01 PRIMARY INSOMNIA: ICD-10-CM

## 2018-12-06 RX ORDER — GABAPENTIN 600 MG/1
TABLET ORAL
Qty: 120 TABLET | Refills: 0 | Status: SHIPPED | OUTPATIENT
Start: 2018-12-06 | End: 2019-01-03 | Stop reason: SDUPTHER

## 2018-12-06 RX ORDER — SUVOREXANT 20 MG/1
TABLET, FILM COATED ORAL
Qty: 30 TABLET | Refills: 0 | Status: SHIPPED | OUTPATIENT
Start: 2018-12-06 | End: 2019-01-07

## 2018-12-07 DIAGNOSIS — F33.1 MODERATE EPISODE OF RECURRENT MAJOR DEPRESSIVE DISORDER (HCC): ICD-10-CM

## 2018-12-07 RX ORDER — QUETIAPINE FUMARATE 50 MG/1
TABLET, EXTENDED RELEASE ORAL
Qty: 30 TABLET | Refills: 0 | Status: SHIPPED | OUTPATIENT
Start: 2018-12-07 | End: 2019-01-02 | Stop reason: SDUPTHER

## 2018-12-24 RX ORDER — VENLAFAXINE HYDROCHLORIDE 75 MG/1
CAPSULE, EXTENDED RELEASE ORAL
Qty: 90 CAPSULE | Refills: 0 | Status: SHIPPED | OUTPATIENT
Start: 2018-12-24 | End: 2019-01-21 | Stop reason: SDUPTHER

## 2018-12-24 RX ORDER — TOLTERODINE 4 MG/1
CAPSULE, EXTENDED RELEASE ORAL
Qty: 30 CAPSULE | Refills: 10 | Status: SHIPPED | OUTPATIENT
Start: 2018-12-24 | End: 2020-02-03

## 2018-12-26 RX ORDER — AMLODIPINE BESYLATE 10 MG/1
TABLET ORAL
Qty: 30 TABLET | Refills: 0 | Status: SHIPPED | OUTPATIENT
Start: 2018-12-26 | End: 2019-01-22 | Stop reason: SDUPTHER

## 2019-01-02 DIAGNOSIS — F33.1 MODERATE EPISODE OF RECURRENT MAJOR DEPRESSIVE DISORDER (HCC): ICD-10-CM

## 2019-01-02 RX ORDER — QUETIAPINE FUMARATE 50 MG/1
TABLET, EXTENDED RELEASE ORAL
Qty: 30 TABLET | Refills: 0 | Status: SHIPPED | OUTPATIENT
Start: 2019-01-02 | End: 2019-07-05 | Stop reason: SDUPTHER

## 2019-01-03 DIAGNOSIS — M25.50 POLYARTHRALGIA: ICD-10-CM

## 2019-01-03 DIAGNOSIS — I10 ESSENTIAL HYPERTENSION: Primary | ICD-10-CM

## 2019-01-03 RX ORDER — GABAPENTIN 600 MG/1
TABLET ORAL
Qty: 120 TABLET | Refills: 0 | Status: SHIPPED | OUTPATIENT
Start: 2019-01-03 | End: 2019-01-14 | Stop reason: SDUPTHER

## 2019-01-03 RX ORDER — LISINOPRIL 40 MG/1
TABLET ORAL
Qty: 90 TABLET | Refills: 0 | Status: SHIPPED | OUTPATIENT
Start: 2019-01-03 | End: 2019-07-05 | Stop reason: SDUPTHER

## 2019-01-07 ENCOUNTER — TELEPHONE (OUTPATIENT)
Dept: FAMILY MEDICINE CLINIC | Age: 59
End: 2019-01-07

## 2019-01-07 DIAGNOSIS — F51.01 PRIMARY INSOMNIA: ICD-10-CM

## 2019-01-14 ENCOUNTER — OFFICE VISIT (OUTPATIENT)
Dept: FAMILY MEDICINE CLINIC | Age: 59
End: 2019-01-14
Payer: COMMERCIAL

## 2019-01-14 VITALS
BODY MASS INDEX: 40.75 KG/M2 | TEMPERATURE: 97.9 F | HEART RATE: 82 BPM | WEIGHT: 230 LBS | DIASTOLIC BLOOD PRESSURE: 70 MMHG | SYSTOLIC BLOOD PRESSURE: 132 MMHG | HEIGHT: 63 IN | RESPIRATION RATE: 16 BRPM

## 2019-01-14 DIAGNOSIS — M25.50 POLYARTHRALGIA: ICD-10-CM

## 2019-01-14 DIAGNOSIS — R35.0 URINE FREQUENCY: ICD-10-CM

## 2019-01-14 DIAGNOSIS — I10 ESSENTIAL HYPERTENSION: ICD-10-CM

## 2019-01-14 DIAGNOSIS — E78.5 HYPERLIPIDEMIA LDL GOAL <100: ICD-10-CM

## 2019-01-14 DIAGNOSIS — Z23 NEED FOR PNEUMOCOCCAL VACCINATION: ICD-10-CM

## 2019-01-14 LAB
BILIRUBIN, POC: ABNORMAL
BLOOD URINE, POC: ABNORMAL
CLARITY, POC: ABNORMAL
COLOR, POC: ABNORMAL
GLUCOSE URINE, POC: NEGATIVE
HBA1C MFR BLD: 8.9 %
KETONES, POC: ABNORMAL
LEUKOCYTE EST, POC: NEGATIVE
NITRITE, POC: NEGATIVE
PH, POC: 5
PROTEIN, POC: ABNORMAL
SPECIFIC GRAVITY, POC: >=1.03
UROBILINOGEN, POC: ABNORMAL

## 2019-01-14 PROCEDURE — 83036 HEMOGLOBIN GLYCOSYLATED A1C: CPT | Performed by: FAMILY MEDICINE

## 2019-01-14 PROCEDURE — 81002 URINALYSIS NONAUTO W/O SCOPE: CPT | Performed by: FAMILY MEDICINE

## 2019-01-14 PROCEDURE — 99214 OFFICE O/P EST MOD 30 MIN: CPT | Performed by: FAMILY MEDICINE

## 2019-01-14 PROCEDURE — G0009 ADMIN PNEUMOCOCCAL VACCINE: HCPCS | Performed by: FAMILY MEDICINE

## 2019-01-14 PROCEDURE — 90732 PPSV23 VACC 2 YRS+ SUBQ/IM: CPT | Performed by: FAMILY MEDICINE

## 2019-01-14 RX ORDER — GABAPENTIN 600 MG/1
TABLET ORAL
Qty: 120 TABLET | Refills: 2 | Status: SHIPPED | OUTPATIENT
Start: 2019-01-14 | End: 2019-04-18 | Stop reason: SDUPTHER

## 2019-01-16 ENCOUNTER — TELEPHONE (OUTPATIENT)
Dept: FAMILY MEDICINE CLINIC | Age: 59
End: 2019-01-16

## 2019-01-17 ENCOUNTER — TELEPHONE (OUTPATIENT)
Dept: FAMILY MEDICINE CLINIC | Age: 59
End: 2019-01-17

## 2019-01-17 DIAGNOSIS — N30.00 ACUTE CYSTITIS WITHOUT HEMATURIA: Primary | ICD-10-CM

## 2019-01-17 LAB
ORGANISM: ABNORMAL
URINE CULTURE, ROUTINE: ABNORMAL
URINE CULTURE, ROUTINE: ABNORMAL

## 2019-01-17 RX ORDER — CIPROFLOXACIN 500 MG/1
500 TABLET, FILM COATED ORAL 2 TIMES DAILY
Qty: 20 TABLET | Refills: 0 | Status: SHIPPED | OUTPATIENT
Start: 2019-01-17 | End: 2019-01-22 | Stop reason: SDUPTHER

## 2019-01-21 RX ORDER — VENLAFAXINE HYDROCHLORIDE 75 MG/1
CAPSULE, EXTENDED RELEASE ORAL
Qty: 90 CAPSULE | Refills: 0 | Status: SHIPPED | OUTPATIENT
Start: 2019-01-21 | End: 2019-04-25

## 2019-01-22 ENCOUNTER — TELEPHONE (OUTPATIENT)
Dept: FAMILY MEDICINE CLINIC | Age: 59
End: 2019-01-22

## 2019-01-22 DIAGNOSIS — N30.00 ACUTE CYSTITIS WITHOUT HEMATURIA: ICD-10-CM

## 2019-01-22 RX ORDER — AMLODIPINE BESYLATE 10 MG/1
TABLET ORAL
Qty: 30 TABLET | Refills: 0 | Status: SHIPPED | OUTPATIENT
Start: 2019-01-22 | End: 2019-03-24 | Stop reason: SDUPTHER

## 2019-01-22 RX ORDER — CIPROFLOXACIN 500 MG/1
500 TABLET, FILM COATED ORAL 2 TIMES DAILY
Qty: 20 TABLET | Refills: 0 | Status: SHIPPED | OUTPATIENT
Start: 2019-01-22 | End: 2019-02-01

## 2019-01-24 ENCOUNTER — TELEPHONE (OUTPATIENT)
Dept: FAMILY MEDICINE CLINIC | Age: 59
End: 2019-01-24

## 2019-02-01 DIAGNOSIS — F33.1 MODERATE EPISODE OF RECURRENT MAJOR DEPRESSIVE DISORDER (HCC): ICD-10-CM

## 2019-02-01 RX ORDER — QUETIAPINE FUMARATE 50 MG/1
TABLET, EXTENDED RELEASE ORAL
Qty: 30 TABLET | Refills: 0 | Status: SHIPPED | OUTPATIENT
Start: 2019-02-01 | End: 2019-04-16

## 2019-02-18 ENCOUNTER — OFFICE VISIT (OUTPATIENT)
Dept: FAMILY MEDICINE CLINIC | Age: 59
End: 2019-02-18
Payer: COMMERCIAL

## 2019-02-18 VITALS
BODY MASS INDEX: 40.4 KG/M2 | RESPIRATION RATE: 18 BRPM | WEIGHT: 228 LBS | TEMPERATURE: 98 F | DIASTOLIC BLOOD PRESSURE: 72 MMHG | HEART RATE: 70 BPM | HEIGHT: 63 IN | SYSTOLIC BLOOD PRESSURE: 116 MMHG

## 2019-02-18 DIAGNOSIS — Z12.4 CERVICAL CANCER SCREENING: ICD-10-CM

## 2019-02-18 DIAGNOSIS — F41.9 ANXIETY: ICD-10-CM

## 2019-02-18 DIAGNOSIS — Z01.419 WELL WOMAN EXAM WITH ROUTINE GYNECOLOGICAL EXAM: Primary | ICD-10-CM

## 2019-02-18 DIAGNOSIS — N90.89 LABIAL IRRITATION: ICD-10-CM

## 2019-02-18 PROCEDURE — 99396 PREV VISIT EST AGE 40-64: CPT | Performed by: FAMILY MEDICINE

## 2019-02-18 RX ORDER — FLUOXETINE HYDROCHLORIDE 20 MG/1
20 CAPSULE ORAL DAILY
Qty: 30 CAPSULE | Refills: 1 | Status: SHIPPED | OUTPATIENT
Start: 2019-02-18 | End: 2019-03-24 | Stop reason: SDUPTHER

## 2019-02-18 ASSESSMENT — PATIENT HEALTH QUESTIONNAIRE - PHQ9
1. LITTLE INTEREST OR PLEASURE IN DOING THINGS: 1
SUM OF ALL RESPONSES TO PHQ QUESTIONS 1-9: 2
SUM OF ALL RESPONSES TO PHQ QUESTIONS 1-9: 2
SUM OF ALL RESPONSES TO PHQ9 QUESTIONS 1 & 2: 2
2. FEELING DOWN, DEPRESSED OR HOPELESS: 1

## 2019-02-19 RX ORDER — INSULIN ASPART 100 [IU]/ML
INJECTION, SUSPENSION SUBCUTANEOUS
Qty: 30 PEN | Refills: 1 | Status: SHIPPED | OUTPATIENT
Start: 2019-02-19 | End: 2019-04-18 | Stop reason: SDUPTHER

## 2019-03-11 RX ORDER — DIAPER,BRIEF,ADULT, DISPOSABLE
EACH MISCELLANEOUS
Qty: 200 STRIP | Refills: 5 | Status: SHIPPED | OUTPATIENT
Start: 2019-03-11 | End: 2019-03-22

## 2019-03-18 ENCOUNTER — TELEPHONE (OUTPATIENT)
Dept: FAMILY MEDICINE CLINIC | Age: 59
End: 2019-03-18

## 2019-03-19 ENCOUNTER — TELEPHONE (OUTPATIENT)
Dept: FAMILY MEDICINE CLINIC | Age: 59
End: 2019-03-19

## 2019-03-22 RX ORDER — GLUCOSAM/CHON-MSM1/C/MANG/BOSW 500-416.6
TABLET ORAL
Qty: 300 EACH | Refills: 3 | Status: SHIPPED | OUTPATIENT
Start: 2019-03-22 | End: 2021-02-09

## 2019-03-24 DIAGNOSIS — F41.9 ANXIETY: ICD-10-CM

## 2019-03-26 RX ORDER — AMLODIPINE BESYLATE 10 MG/1
TABLET ORAL
Qty: 30 TABLET | Refills: 0 | Status: SHIPPED | OUTPATIENT
Start: 2019-03-26 | End: 2019-04-24 | Stop reason: SDUPTHER

## 2019-03-26 RX ORDER — FLUOXETINE HYDROCHLORIDE 20 MG/1
CAPSULE ORAL
Qty: 30 CAPSULE | Refills: 0 | Status: SHIPPED | OUTPATIENT
Start: 2019-03-26 | End: 2019-05-20 | Stop reason: SDUPTHER

## 2019-04-01 ENCOUNTER — TELEPHONE (OUTPATIENT)
Dept: PAIN MANAGEMENT | Age: 59
End: 2019-04-01

## 2019-04-01 NOTE — TELEPHONE ENCOUNTER
This patients referral has been approved for scheduling with [facility].  First message was left for the patient to call [facility] regarding their referral.

## 2019-04-08 DIAGNOSIS — F51.01 PRIMARY INSOMNIA: ICD-10-CM

## 2019-04-09 NOTE — TELEPHONE ENCOUNTER
Final message was left for the patient to call [facility] regarding their referral. If we do not receive a call back within a week, referral will be cancelled per office protocol.

## 2019-04-16 NOTE — PROGRESS NOTES
[facility] [facility] time___0900_________        Surgery time__1030__________    Take the following medications with a sip of water:    Do not eat or drink anything after 12:00 midnight prior to your surgery. This includes water chewing gum, mints and ice chips. You may brush your teeth and gargle the morning of your surgery, but do not swallow the water      You may be asked to stop blood thinners such as Coumadin, Plavix, Fragmin, Lovenox, etc., or any anti-inflammatories such as:  Aspirin, Ibuprofen, Advil, Naproxen prior to your surgery. We also ask that you stop any OTC medications such as fish oil, vitamin E, glucosamine, garlic, Multivitamins, COQ 10, etc.    We ask that you do not smoke 24 hours prior to surgery  We ask that you do not  drink any alcoholic beverages 24 hours prior to surgery     You must make arrangements for a responsible adult to take you home after your surgery. For your safety you will not be allowed to leave alone or drive yourself home. Your surgery will be cancelled if you do not have a ride home. Also for your safety, it is strongly suggested that someone stay with you the first 24 hours after your surgery. A parent or legal guardian must accompany a child scheduled for surgery and plan to stay at the hospital until the child is discharged. Please do not bring other children with you. For your comfort, please wear simple loose fitting clothing to the hospital.  Please do not bring valuables. Do not wear any make-up or nail polish on your fingers or toes      For your safety, please do not wear any jewelry or body piercing's on the day of surgery. All jewelry must be removed. If you have dentures, they will be removed before going to operating room. For your convenience, we will provide you with a container. If you wear contact lenses or glasses, they will be removed, please bring a case for them.      If you have a living will and a durable power of  for healthcare, please bring in a copy. As part of our patient safety program to minimize surgical site infections, we ask you to do the following:    · Please notify your surgeon if you develop any illness between         now and the  day of your surgery. · This includes a cough, cold, fever, sore throat, nausea,         or vomiting, and diarrhea, etc.  ·  Please notify your surgeon if you experience dizziness, shortness         of breath or blurred vision between now and the time of your surgery. You may shower the night before surgery or the morning of   your surgery with an antibacterial soap. You will need to bring a photo ID and insurance card    [facility] has an onsite pharmacy, would you like to utilize our pharmacy     If you will be staying overnight and use a C-pap machine, please bring   your C-pap to hospital     Our goal is to provide you with excellent care, therefore, visitors will be limited to two(2) in the room at a time so that we may focus on providing this care for you. Please contact pre-admission testing if you have any further questions. [facility] phone number:  [phone]  Please note these are generalized instructions for all surgical cases, you may be provided with more specific instructions according to your surgery.

## 2019-04-18 ENCOUNTER — OFFICE VISIT (OUTPATIENT)
Dept: FAMILY MEDICINE CLINIC | Age: 59
End: 2019-04-18
Payer: COMMERCIAL

## 2019-04-18 VITALS
DIASTOLIC BLOOD PRESSURE: 70 MMHG | WEIGHT: 226 LBS | HEART RATE: 71 BPM | HEIGHT: 63 IN | RESPIRATION RATE: 16 BRPM | SYSTOLIC BLOOD PRESSURE: 118 MMHG | TEMPERATURE: 98.6 F | BODY MASS INDEX: 40.04 KG/M2

## 2019-04-18 DIAGNOSIS — E78.5 HYPERLIPIDEMIA LDL GOAL <100: ICD-10-CM

## 2019-04-18 DIAGNOSIS — I10 ESSENTIAL HYPERTENSION: Primary | ICD-10-CM

## 2019-04-18 DIAGNOSIS — F32.4 MAJOR DEPRESSIVE DISORDER IN PARTIAL REMISSION, UNSPECIFIED WHETHER RECURRENT (HCC): ICD-10-CM

## 2019-04-18 DIAGNOSIS — M25.50 POLYARTHRALGIA: ICD-10-CM

## 2019-04-18 DIAGNOSIS — I10 ESSENTIAL HYPERTENSION: ICD-10-CM

## 2019-04-18 LAB
A/G RATIO: 1.1 (ref 1.1–2.2)
ALBUMIN SERPL-MCNC: 3.9 G/DL (ref 3.4–5)
ALP BLD-CCNC: 118 U/L (ref 40–129)
ALT SERPL-CCNC: 24 U/L (ref 10–40)
ANION GAP SERPL CALCULATED.3IONS-SCNC: 13 MMOL/L (ref 3–16)
AST SERPL-CCNC: 27 U/L (ref 15–37)
BILIRUB SERPL-MCNC: <0.2 MG/DL (ref 0–1)
BUN BLDV-MCNC: 14 MG/DL (ref 7–20)
CALCIUM SERPL-MCNC: 9.4 MG/DL (ref 8.3–10.6)
CHLORIDE BLD-SCNC: 102 MMOL/L (ref 99–110)
CHOLESTEROL, TOTAL: 236 MG/DL (ref 0–199)
CO2: 26 MMOL/L (ref 21–32)
CREAT SERPL-MCNC: 0.9 MG/DL (ref 0.6–1.2)
GFR AFRICAN AMERICAN: >60
GFR NON-AFRICAN AMERICAN: >60
GLOBULIN: 3.4 G/DL
GLUCOSE BLD-MCNC: 105 MG/DL (ref 70–99)
HBA1C MFR BLD: 9.5 %
HDLC SERPL-MCNC: 42 MG/DL (ref 40–60)
LDL CHOLESTEROL CALCULATED: 152 MG/DL
POTASSIUM SERPL-SCNC: 3.8 MMOL/L (ref 3.5–5.1)
SODIUM BLD-SCNC: 141 MMOL/L (ref 136–145)
TOTAL PROTEIN: 7.3 G/DL (ref 6.4–8.2)
TRIGL SERPL-MCNC: 212 MG/DL (ref 0–150)
VLDLC SERPL CALC-MCNC: 42 MG/DL

## 2019-04-18 PROCEDURE — 99214 OFFICE O/P EST MOD 30 MIN: CPT | Performed by: FAMILY MEDICINE

## 2019-04-18 PROCEDURE — 83036 HEMOGLOBIN GLYCOSYLATED A1C: CPT | Performed by: FAMILY MEDICINE

## 2019-04-18 RX ORDER — GABAPENTIN 600 MG/1
TABLET ORAL
Qty: 120 TABLET | Refills: 2 | Status: SHIPPED | OUTPATIENT
Start: 2019-04-18 | End: 2019-07-05 | Stop reason: SDUPTHER

## 2019-04-18 NOTE — PATIENT INSTRUCTIONS
INSTRUCTIONS  NEXT APPOINTMENT: Please schedule check-up in 6 weeks  · A1c higher at 9.8. · INCREASE 70/30 to 90 Am and 50 PM.  · Send me 2 h after breakfast and dinner sugars in a week for further adjustment. · PLEASE TAKE THIS FORM TO CHECK-OUT WINDOW TO SCHEDULE NEXT VISIT. · PLEASE GET BLOODWORK DRAWN TODAY ON FIRST FLOOR in 170. Take orders with you. RESULTS- most blood tests back in couple days. We will call you if any problems. If bloodwork good, you will get letter in mail or notified thru [address] (if signed up) within 2 weeks. If you do not, please call office. · STOP melatonin and belsomra for at least 1 week. May take OTC sleep aides doxylamine/Unisom 25-50 mg at bedtime. In future, alternate sleep medicines so they continue to work.

## 2019-04-18 NOTE — PROGRESS NOTES
DIABETES MELLITUS FOLOW-UP  Subjective:      Chief Complaint   Patient presents with    Diabetes     Ludmila Sandoval is an 60 y.o. female who presents for follow up of following chronic problems:  1. Essential hypertension    2. Polyarthralgia    3. Hyperlipidemia LDL goal <100    4. Major depressive disorder in partial remission, unspecified whether recurrent ([facility] [address]    5. DM (diabetes mellitus), type 2, uncontrolled with complications ([facility] [address]    6. Uncontrolled type 2 diabetes mellitus with complication, with long-term current use of insulin ([facility]CC)      Complaints: was curious if you could increase sleeping pill mg she is still having issues sleeping   Still feels down but a little better with the     Getting EGD soon    · Patient checks sugars 1  time(s) daily. Average: 150 or 160. Range: over 200 about 2x/wk; had low sugar twice when did not eat  · Patent follows diabetic diet? No  · Exercise: no never  · Taking medicines daily as directed? Yes  · Is the patient reporting any side effects of medications? No  · Patient checks bottom of feet daily? No  · Tobacco history updated:  reports that she has never smoked. She has never used smokeless tobacco.    Review of Systems   General ROS: fever? No,    Night sweats? No  Ophthalmic ROS: vision change? No  Endocrine ROS: fatigue? No   Unexpected weight changes? No  Respiratory ROS: shortness of breath? No  Cardiovascular ROS: chest pain? No  Gastrointestinal ROS: abdominal pain? Yes  Genito-Urinary ROS: painful urination? Yes   Trouble urinating? No  Neurological ROS: TIA or stroke symptoms? No   Numbness/tingling? No  Dermatological ROS: rash or sores on feet? No     * Documentation provided by medical assistant reviewed and updated by provider. HISTORY:  Patient's medications, allergies, past medical, and social histories were reviewed and updated as appropriate.      CHART REVIEW  Health Maintenance   Topic Date Due    Lipid screen  03/06/2019    DTaP/Tdap/Td vaccine (1 - Tdap) 04/18/2020 (Originally 1/10/1979)    Shingles Vaccine (2 of 3) 04/18/2020 (Originally 11/2/2016)    Diabetic foot exam  05/18/2019    Breast cancer screen  08/06/2019    Potassium monitoring  09/11/2019    Creatinine monitoring  09/11/2019    A1C test (Diabetic or Prediabetic)  01/14/2020    Pneumococcal 0-64 years Vaccine (3 of 3 - PCV13) 01/14/2020    Diabetic retinal exam  01/23/2020    Cervical cancer screen  02/18/2022    Colon cancer screen colonoscopy  10/16/2022    Flu vaccine  Completed    Hepatitis C screen  Completed    HIV screen  Completed     The 10-year ASCVD risk score (Rachna Center., et al., 2013) is: 10.6%    Values used to calculate the score:      Age: 60 years      Sex: Female      Is Non- : No      Diabetic: Yes      Tobacco smoker: No      Systolic Blood Pressure: 378 mmHg      Is BP treated: Yes      HDL Cholesterol: 43 mg/dL      Total Cholesterol: 270 mg/dL  Prior to Visit Medications    Medication Sig Taking? Authorizing Provider   gabapentin (NEURONTIN) 600 MG tablet TAKE ONE TABLET BY MOUTH EVERY MORNING AND TAKE ONE TABLET BY MOUTH EVERY EVENING AND TAKE TWO TABLETS BY MOUTH EVERY NIGHT AT BEDTIME Yes Luis Manuel Riggins MD   insulin aspart protamine-insulin aspart (NOVOLOG MIX 70/30 FLEXPEN) (70-30) 100 UNIT/ML injection 90 units in AM, 45 units in PM Yes Luis Manuel Riggins MD   Suvorexant (BELSOMRA) 20 MG TABS Take 1 tablet by mouth nightly as needed (sleep) for up to 180 days. Yes Luis Manuel Riggins MD   amLODIPine (NORVASC) 10 MG tablet TAKE ONE TABLET BY MOUTH DAILY Yes Luis Manuel Riggins MD   FLUoxetine (PROZAC) 20 MG capsule TAKE ONE CAPSULE BY MOUTH DAILY Yes Luis Manuel Riggins MD   blood glucose test strips (ASCENSIA AUTODISC VI;ONE TOUCH ULTRA TEST VI) strip True Metrix.  three times per day Yes Luis Manuel Riggins MD   Blood Glucose Monitoring Suppl (TRUE METRIX METER) w/Device KIT three times per day Yes Luis Manuel Riggins MD   TRUEPLUS LANCETS 33G MIS three times per day Yes Ellie Garcia MD   venlafaxine (EFFEXOR XR) 75 MG extended release capsule TAKE THREE CAPSULES BY MOUTH DAILY Yes Ellie Garcia MD   lisinopril (PRINIVIL;ZESTRIL) 40 MG tablet TAKE ONE TABLET BY MOUTH DAILY Yes Nicole Harper MD   QUEtiapine (SEROQUEL XR) 50 MG extended release tablet TAKE ONE TABLET BY MOUTH ONCE NIGHTLY Yes Nicole Harper MD   tolterodine (DETROL LA) 4 MG extended release capsule TAKE ONE CAPSULE BY MOUTH DAILY Yes Nicole Harper MD   pravastatin (PRAVACHOL) 80 MG tablet TAKE ONE TABLET BY MOUTH DAILY Yes Ellie Garcia MD   omeprazole (PRILOSEC) 40 MG delayed release capsule  Yes Historical Provider, MD   ibuprofen (ADVIL;MOTRIN) 400 MG tablet Take 1 tablet by mouth every 8 hours as needed for Pain Yes Erika Velasquez MD   metoprolol succinate (TOPROL XL) 50 MG extended release tablet TAKE ONE TABLET BY MOUTH DAILY Yes Ellie Garcia MD   Insulin Pen Needle (MARC-JEREL UF III MINI PEN NEEDLES) 31G X 5 MM MISC Apply 1 each topically 2 times daily Yes Ellie Garcia MD   insulin aspart (NOVOLOG FLEXPEN) 100 UNIT/ML injection pen Glucose < 200 no coverage; 201-250 take 10 units; 251-300 take 15 units; > 300 take 20 units. Yes Ellie Garcia MD   oxyCODONE (OXY-IR) 15 MG immediate release tablet  Yes Historical Provider, MD   fluticasone (FLONASE) 50 MCG/ACT nasal spray PLACE 2 SPRAYS IN EACH NOSTRIL DAILY Yes Ellie Garcia MD   nystatin (MYCOSTATIN) 468672 UNIT/GM ointment Apply topically 2 times daily Yes Ellie Garcia MD   busPIRone (BUSPAR) 15 MG tablet Take 1/3 tab BID for 5 days, then 1/2 tab BID for 5 days, then 2/3 tab BID for 5 days, then 1 po BID. Yes Ellie Garcia MD   metroNIDAZOLE (METROGEL) 0.75 % gel Apply topically 2 times daily.  Yes Ellie Garcia MD   anastrozole (ARIMIDEX) 1 MG tablet TAKE ONE TABLET BY MOUTH DAILY Yes Brayan Jean Baptiste MD   mupirocin (BACTROBAN) 2 % ointment APPLY THREE TIMES A DAY AS NEEDED Yes MD MANNY Dunlap INS SYR HALF-UNIT .3CC/31G 31G X 5/16\" with you. RESULTS- most blood tests back in couple days. We will call you if any problems. If bloodwork good, you will get letter in mail or notified thru [address] (if signed up) within 2 weeks. If you do not, please call office. · STOP melatonin and belsomra for at least 1 week. May take OTC sleep aides doxylamine/Unisom 25-50 mg at bedtime. In future, alternate sleep medicines so they continue to work.

## 2019-04-22 ENCOUNTER — ANESTHESIA EVENT (OUTPATIENT)
Dept: ENDOSCOPY | Age: 59
End: 2019-04-22
Payer: COMMERCIAL

## 2019-04-22 RX ORDER — ATORVASTATIN CALCIUM 80 MG/1
80 TABLET, FILM COATED ORAL DAILY
Qty: 90 TABLET | Refills: 1 | Status: SHIPPED | OUTPATIENT
Start: 2019-04-22 | End: 2019-07-05 | Stop reason: SDUPTHER

## 2019-04-23 ENCOUNTER — HOSPITAL ENCOUNTER (OUTPATIENT)
Age: 59
Setting detail: OUTPATIENT SURGERY
Discharge: HOME | End: 2019-04-23
Attending: INTERNAL MEDICINE | Admitting: INTERNAL MEDICINE
Payer: COMMERCIAL

## 2019-04-23 ENCOUNTER — ANESTHESIA (OUTPATIENT)
Dept: ENDOSCOPY | Age: 59
End: 2019-04-23
Payer: COMMERCIAL

## 2019-04-23 VITALS
RESPIRATION RATE: 16 BRPM | SYSTOLIC BLOOD PRESSURE: 108 MMHG | TEMPERATURE: 97.2 F | HEART RATE: 69 BPM | OXYGEN SATURATION: 95 % | WEIGHT: 223.2 LBS | HEIGHT: 63 IN | DIASTOLIC BLOOD PRESSURE: 89 MMHG | BODY MASS INDEX: 39.55 KG/M2

## 2019-04-23 VITALS — DIASTOLIC BLOOD PRESSURE: 87 MMHG | OXYGEN SATURATION: 96 % | SYSTOLIC BLOOD PRESSURE: 160 MMHG

## 2019-04-23 DIAGNOSIS — R13.10 DYSPHAGIA, UNSPECIFIED TYPE: ICD-10-CM

## 2019-04-23 DIAGNOSIS — K21.9 GASTROESOPHAGEAL REFLUX DISEASE, ESOPHAGITIS PRESENCE NOT SPECIFIED: ICD-10-CM

## 2019-04-23 LAB
GLUCOSE BLD-MCNC: 171 MG/DL (ref 70–99)
PERFORMED ON: ABNORMAL

## 2019-04-23 PROCEDURE — 2580000003 HC RX 258: Performed by: ANESTHESIOLOGY

## 2019-04-23 PROCEDURE — 2709999900 HC NON-CHARGEABLE SUPPLY: Performed by: INTERNAL MEDICINE

## 2019-04-23 PROCEDURE — 7100000010 HC PHASE II RECOVERY - FIRST 15 MIN: Performed by: INTERNAL MEDICINE

## 2019-04-23 PROCEDURE — 7100000011 HC PHASE II RECOVERY - ADDTL 15 MIN: Performed by: INTERNAL MEDICINE

## 2019-04-23 PROCEDURE — 6360000002 HC RX W HCPCS

## 2019-04-23 PROCEDURE — 88305 TISSUE EXAM BY PATHOLOGIST: CPT

## 2019-04-23 PROCEDURE — 3700000000 HC ANESTHESIA ATTENDED CARE: Performed by: INTERNAL MEDICINE

## 2019-04-23 PROCEDURE — 3609012400 HC EGD TRANSORAL BIOPSY SINGLE/MULTIPLE: Performed by: INTERNAL MEDICINE

## 2019-04-23 PROCEDURE — 2500000003 HC RX 250 WO HCPCS

## 2019-04-23 RX ORDER — SODIUM CHLORIDE 0.9 % (FLUSH) 0.9 %
10 SYRINGE (ML) INJECTION EVERY 12 HOURS SCHEDULED
Status: DISCONTINUED | OUTPATIENT
Start: 2019-04-23 | End: 2019-04-23 | Stop reason: HOSPADM

## 2019-04-23 RX ORDER — PROPOFOL 10 MG/ML
INJECTION, EMULSION INTRAVENOUS PRN
Status: DISCONTINUED | OUTPATIENT
Start: 2019-04-23 | End: 2019-04-23 | Stop reason: SDUPTHER

## 2019-04-23 RX ORDER — SODIUM CHLORIDE 9 MG/ML
INJECTION, SOLUTION INTRAVENOUS CONTINUOUS
Status: DISCONTINUED | OUTPATIENT
Start: 2019-04-23 | End: 2019-04-23 | Stop reason: HOSPADM

## 2019-04-23 RX ORDER — SODIUM CHLORIDE 0.9 % (FLUSH) 0.9 %
10 SYRINGE (ML) INJECTION PRN
Status: DISCONTINUED | OUTPATIENT
Start: 2019-04-23 | End: 2019-04-23 | Stop reason: HOSPADM

## 2019-04-23 RX ORDER — LIDOCAINE HYDROCHLORIDE 20 MG/ML
INJECTION, SOLUTION EPIDURAL; INFILTRATION; INTRACAUDAL; PERINEURAL PRN
Status: DISCONTINUED | OUTPATIENT
Start: 2019-04-23 | End: 2019-04-23 | Stop reason: SDUPTHER

## 2019-04-23 RX ORDER — ONDANSETRON 2 MG/ML
4 INJECTION INTRAMUSCULAR; INTRAVENOUS
Status: DISCONTINUED | OUTPATIENT
Start: 2019-04-23 | End: 2019-04-23 | Stop reason: HOSPADM

## 2019-04-23 RX ADMIN — SODIUM CHLORIDE: 0.9 INJECTION, SOLUTION INTRAVENOUS at 10:06

## 2019-04-23 RX ADMIN — PROPOFOL 100 MG: 10 INJECTION, EMULSION INTRAVENOUS at 10:14

## 2019-04-23 RX ADMIN — LIDOCAINE HYDROCHLORIDE 50 MG: 20 INJECTION, SOLUTION EPIDURAL; INFILTRATION; INTRACAUDAL; PERINEURAL at 10:14

## 2019-04-23 ASSESSMENT — PAIN SCALES - GENERAL
PAINLEVEL_OUTOF10: 0

## 2019-04-23 ASSESSMENT — LIFESTYLE VARIABLES: SMOKING_STATUS: 0

## 2019-04-23 ASSESSMENT — ENCOUNTER SYMPTOMS: SHORTNESS OF BREATH: 0

## 2019-04-23 ASSESSMENT — PAIN - FUNCTIONAL ASSESSMENT: PAIN_FUNCTIONAL_ASSESSMENT: 0-10

## 2019-04-23 NOTE — PROCEDURES
[facility]  Endoscopy Note    Patient: Leah Irvin  : 1960  Acct#: [de-identified]    Procedure: Esophagogastroduodenoscopy with biopsy    Date:  2019     Surgeon:  Kvng Yoon MD    Referring Physician:  Nitza Claire    Preoperative Diagnosis:  GERD    Postoperative Diagnosis:  Possible Mckeon's. Gastritis. Anesthesia: see anesthesia note. Indications: This is a 60y.o. year old female who presents today with GERD. Description of Procedure:  Informed consent was obtained from the patient after explanation of indications, benefits and possible risks and complications of the procedure. The patient was then taken to the endoscopy suite, placed in the left lateral decubitus position and the above IV sedation was administrered. The Olympus videoendoscope was placed in the patient's mouth and under direct visualization passed into the esophagus. Visualization of the esophagus demonstrated a tongue of erythema in the distal esophagus. This was biopsied. .     The scope was then advanced into the stomach. Visualization of the gastric body and antrum demonstrated gastritis. The antrum was biopsied. .  A retroflexed exam of the gastric cardia and fundus demonstrated normal..  The pylorus was patent and the scope was advanced into the duodenum. Visualization of the duodenal bulb demonstrated normal..  The second portion of the duodenum demonstrated normal..    The scope was then withdrawn back into the stomach, it was decompressed, and the scope was completely withdrawn. The patient tolerated the procedure well and was taken to the post anesthesia care unit in good condition. Estimated Blood loss:  Minimal.    Impression: Possible Mckeon's and gastritis. Recommendations:Await pathology. Continue Omeprazole.     Kvng Yoon MD  [facility]

## 2019-04-23 NOTE — ANESTHESIA PRE PROCEDURE
Department of Anesthesiology  Preprocedure Note       Name:  Felizardo Closs   Age:  60 y.o.  :  1960                                          MRN:  [id number]         Date:  2019      Surgeon: Noris Singletary):  Jamie Shin MD    Procedure: EGD ESOPHAGOGASTRODUODENOSCOPY (N/A )    Medications prior to admission:   Prior to Admission medications    Medication Sig Start Date End Date Taking? Authorizing Provider   Suvorexant (BELSOMRA) 20 MG TABS Take 1 tablet by mouth nightly as needed (sleep) for up to 180 days. 4/8/19 10/5/19 Yes Hung Black MD   amLODIPine (NORVASC) 10 MG tablet TAKE ONE TABLET BY MOUTH DAILY 3/26/19  Yes Hung Black MD   FLUoxetine (PROZAC) 20 MG capsule TAKE ONE CAPSULE BY MOUTH DAILY 3/26/19  Yes Hung Black MD   venlafaxine (EFFEXOR XR) 75 MG extended release capsule TAKE THREE CAPSULES BY MOUTH DAILY 19  Yes Hung Black MD   lisinopril (PRINIVIL;ZESTRIL) 40 MG tablet TAKE ONE TABLET BY MOUTH DAILY 1/3/19  Yes Ezra Harper MD   QUEtiapine (SEROQUEL XR) 50 MG extended release tablet TAKE ONE TABLET BY MOUTH ONCE NIGHTLY 19  Yes Ezra Harper MD   tolterodine (DETROL LA) 4 MG extended release capsule TAKE ONE CAPSULE BY MOUTH DAILY 18  Yes Ezra Harper MD   omeprazole (PRILOSEC) 40 MG delayed release capsule  18  Yes Historical Provider, MD   metoprolol succinate (TOPROL XL) 50 MG extended release tablet TAKE ONE TABLET BY MOUTH DAILY 18  Yes Hung Black MD   insulin aspart (NOVOLOG FLEXPEN) 100 UNIT/ML injection pen Glucose < 200 no coverage; 201-250 take 10 units; 251-300 take 15 units; > 300 take 20 units.  18  Yes Hung Black MD   oxyCODONE (OXY-IR) 15 MG immediate release tablet  18  Yes Historical Provider, MD   fluticasone (FLONASE) 50 MCG/ACT nasal spray PLACE 2 SPRAYS IN EACH NOSTRIL DAILY 18  Yes Hung Black MD   nystatin (MYCOSTATIN) 726946 UNIT/GM ointment Apply topically 2 times daily 18  Yes Hung Black MD   busPIRone bacteria infection 10/06/2018    urine    GERD (gastroesophageal reflux disease)     GERD (gastroesophageal reflux disease)     History of migraine headaches     HTN (hypertension) 5/15/2014    Hyperlipidemia     Irritable bowel syndrome (IBS)     Obesity        Past Surgical History:        Procedure Laterality Date    ACHILLES TENDON SURGERY Right     BACK SURGERY  2013    lumbar    BREAST LUMPECTOMY Left 2010    CARPAL TUNNEL RELEASE Right      SECTION      x 1    CHOLECYSTECTOMY      COLONOSCOPY  10/16/2017    polypectomy- Dr Lorenza Tavares History:    Social History     Tobacco Use    Smoking status: Never Smoker    Smokeless tobacco: Never Used    Tobacco comment: congrats   Substance Use Topics    Alcohol use: No                                Counseling given: Not Answered  Comment: christiano      Vital Signs (Current):   Vitals:    19 0856   Weight: 230 lb (104.3 kg)   Height: 5' 3\" (1.6 m)                                              BP Readings from Last 3 Encounters:   19 118/70   19 116/72   19 132/70       NPO Status:   >8hrs                                                                                BMI:   Wt Readings from Last 3 Encounters:   19 230 lb (104.3 kg)   19 226 lb (102.5 kg)   19 228 lb (103.4 kg)     Body mass index is 40.74 kg/m².     CBC:   Lab Results   Component Value Date    WBC 14.2 2018    RBC 4.64 2018    RBC 4.74 2015    HGB 13.3 2018    HCT 40.5 2018    MCV 87.3 2018    RDW 15.3 2018     2018       CMP:   Lab Results   Component Value Date     2019    K 3.8 2019    K 4.3 2018     2019    CO2 26 2019    BUN 14 2019    CREATININE 0.9 2019    GFRAA >60 2019    GFRAA >60 04/10/2013    AGRATIO 1.1 2019    LABGLOM >60 2019    GLUCOSE 105 2019    GLUCOSE 380 12/04/2015    PROT 7.3 04/18/2019    PROT 6.9 12/04/2015    CALCIUM 9.4 04/18/2019    BILITOT <0.2 04/18/2019    ALKPHOS 118 04/18/2019    AST 27 04/18/2019    ALT 24 04/18/2019       POC Tests: No results for input(s): POCGLU, POCNA, POCK, POCCL, POCBUN, POCHEMO, POCHCT in the last 72 hours. Coags:   Lab Results   Component Value Date    PROTIME 13.0 08/20/2017    INR 1.15 08/20/2017    APTT 30.4 04/06/2014       HCG (If Applicable):   Lab Results   Component Value Date    PREGTESTUR Negative 04/06/2014        ABGs: No results found for: PHART, PO2ART, DQS5IIS, KVM2NRI, BEART, G0NAWRGL     Type & Screen (If Applicable):  No results found for: LABABO, [address]    Anesthesia Evaluation  Patient summary reviewed no history of anesthetic complications:   Airway: Mallampati: III  TM distance: >3 FB   Neck ROM: full  Mouth opening: > = 3 FB Dental:      Comment: Missing teeth    Pulmonary: breath sounds clear to auscultation  (+) sleep apnea (likely per history, no formal testing):      (-) COPD, asthma, shortness of breath, recent URI and not a current smoker                           Cardiovascular:    (+) hypertension:, hyperlipidemia    (-) valvular problems/murmurs, past MI, CAD, CABG/stent,  angina and murmur      Rhythm: regular  Rate: normal                    Neuro/Psych:   (+) neuromuscular disease:, headaches: migraine headaches, psychiatric history:depression/anxiety    (-) TIA and CVA           GI/Hepatic/Renal:   (+) GERD:, morbid obesity     (-) PUD, hepatitis, liver disease and no renal disease       Endo/Other:    (+) DiabetesType II DM, , malignancy/cancer. (-) hypothyroidism, hyperthyroidism               Abdominal:           Vascular:                                      Anesthesia Plan      TIVA     ASA 3       Induction: intravenous. Anesthetic plan and risks discussed with patient. Plan discussed with CRNA.               This pre-anesthesia assessment may be used as a history and physical.    DOS STAFF ADDENDUM:    Pt seen and examined, chart reviewed (including anesthesia, drug and allergy history). No interval changes to history and physical examination. Anesthetic plan, risks, benefits, alternatives, and personnel involved discussed with patient. Patient verbalized an understanding and agrees to proceed.       Jono Alfred MD  April 23, 2019  9:47 AM      Jono Alfred MD   4/23/2019

## 2019-04-23 NOTE — H&P
[facility]   Pre-operative History and Physical    Patient: Kristy Gracia  : 1960  Acct#: [de-identified]    History Obtained From: electronic medical record    HISTORY OF PRESENT ILLNESS  Procedure:EGD  Indications:GERD and dysphagia  Past Medical History:        Diagnosis Date    Allergic rhinitis 5/15/2014    Anxiety     Arthritis     [facility]     Cancer ([facility]     breast    Chronic back pain     Depression     Diabetes mellitus ([facility]     DM (diabetes mellitus), type 2, uncontrolled with complications ([facility] 3/5/2701    ESBL (extended spectrum beta-lactamase) producing bacteria infection 10/06/2018    urine    GERD (gastroesophageal reflux disease)     GERD (gastroesophageal reflux disease)     History of migraine headaches     HTN (hypertension) 5/15/2014    Hyperlipidemia     Irritable bowel syndrome (IBS)     Obesity      Past Surgical History:        Procedure Laterality Date    ACHILLES TENDON SURGERY Right     BACK SURGERY  2013    lumbar    BREAST LUMPECTOMY Left 2010    CARPAL TUNNEL RELEASE Right      SECTION      x 1    CHOLECYSTECTOMY      COLONOSCOPY  10/16/2017    polypectomy- Dr Noel Doyle       Medications prior to admission:   Prior to Admission medications    Medication Sig Start Date End Date Taking? Authorizing Provider   atorvastatin (LIPITOR) 80 MG tablet Take 1 tablet by mouth daily 19  Yes Bandar Paul MD   gabapentin (NEURONTIN) 600 MG tablet TAKE ONE TABLET BY MOUTH EVERY MORNING AND TAKE ONE TABLET BY MOUTH EVERY EVENING AND TAKE TWO TABLETS BY MOUTH EVERY NIGHT AT BEDTIME 19 Yes Bandar Paul MD   insulin aspart protamine-insulin aspart (NOVOLOG MIX 70/30 FLEXPEN) (70-30) 100 UNIT/ML injection 90 units in AM, 45 units in PM 19  Yes Bandar Paul MD   Suvorexant (BELSOMRA) 20 MG TABS Take 1 tablet by mouth nightly as needed (sleep) for up to 180 days.  4/8/19 10/5/19 Yes Bandar Paul MD   amLODIPine (NORVASC) 10 MG tablet TAKE ONE TABLET BY MOUTH DAILY 3/26/19  Yes oRss Rodriguez MD   FLUoxetine (PROZAC) 20 MG capsule TAKE ONE CAPSULE BY MOUTH DAILY 3/26/19  Yes Ross Rodriguez MD   blood glucose test strips (ASCENSIA AUTODISC VI;ONE TOUCH ULTRA TEST VI) strip True Metrix. three times per day 3/22/19  Yes Ross Rodriguez MD   Blood Glucose Monitoring Suppl (TRUE METRIX METER) w/Device KIT three times per day 3/22/19  Yes Ross Rodriguez MD   TRUEPLUS LANCETS 33G MISC three times per day 3/22/19  Yes Ross Rodriguez MD   venlafaxine (EFFEXOR XR) 75 MG extended release capsule TAKE THREE CAPSULES BY MOUTH DAILY 1/21/19  Yes Ross Rodriguez MD   lisinopril (PRINIVIL;ZESTRIL) 40 MG tablet TAKE ONE TABLET BY MOUTH DAILY 1/3/19  Yes Janay Lunch Day, MD   QUEtiapine (SEROQUEL XR) 50 MG extended release tablet TAKE ONE TABLET BY MOUTH ONCE NIGHTLY 1/2/19  Yes Janay Lunch Day, MD   tolterodine (DETROL LA) 4 MG extended release capsule TAKE ONE CAPSULE BY MOUTH DAILY 12/24/18  Yes Janay Lunch Day, MD   omeprazole (PRILOSEC) 40 MG delayed release capsule  9/28/18  Yes Historical Provider, MD   ibuprofen (ADVIL;MOTRIN) 400 MG tablet Take 1 tablet by mouth every 8 hours as needed for Pain 10/6/18  Yes Wilmer Darby MD   metoprolol succinate (TOPROL XL) 50 MG extended release tablet TAKE ONE TABLET BY MOUTH DAILY 9/27/18  Yes Ross Rodriguez MD   Insulin Pen Needle (B-D UF III MINI PEN NEEDLES) 31G X 5 MM MISC Apply 1 each topically 2 times daily 9/13/18  Yes Ross Rodriguez MD   insulin aspart (NOVOLOG FLEXPEN) 100 UNIT/ML injection pen Glucose < 200 no coverage; 201-250 take 10 units; 251-300 take 15 units; > 300 take 20 units.  9/12/18  Yes Ross Rodriguez MD   oxyCODONE (OXY-IR) 15 MG immediate release tablet  9/5/18  Yes Historical Provider, MD   fluticasone (FLONASE) 50 MCG/ACT nasal spray PLACE 2 SPRAYS IN EACH NOSTRIL DAILY 7/17/18  Yes Ross Rodriguez MD   nystatin (MYCOSTATIN) 897500 UNIT/GM ointment Apply topically 2 times daily 5/18/18  Yes Enzo Everett MD   busPIRone (BUSPAR) 15 MG tablet Take 1/3 tab BID for 5 days, then 1/2 tab BID for 5 days, then 2/3 tab BID for 5 days, then 1 po BID. 10/5/17  Yes Enzo Everett MD   metroNIDAZOLE (METROGEL) 0.75 % gel Apply topically 2 times daily. 5/15/17  Yes Enzo Everett MD   anastrozole (ARIMIDEX) 1 MG tablet TAKE ONE TABLET BY MOUTH DAILY 4/24/17  Yes Kinsey Garnica MD   pirocin [facility]) 2 % ointment APPLY THREE TIMES A DAY AS NEEDED 4/6/17  Yes Enzo Everett MD   B-D INS SYR HALF-UNIT .3CC/31G 31G X 5/16\" 0.3 ML MISC USE AS DIRECTED TWO TIMES A DAY 11/30/15  Yes Ginette Hand MD   MICROLET LANCETS MISC 1 each by Does not apply route 6 times daily. 5/20/14  Yes Enzo Everett MD   lubiprostone (AMITIZA) 8 MCG CAPS capsule Take 8 mcg by mouth 2 times daily (with meals). Yes Historical Provider, MD     Allergies:   Ambien [zolpidem tartrate]; Naproxen; Trazodone and nefazodone; Diamox [acetazolamide];  Lorazepam; Reglan [metoclopramide]; and Sulfa antibiotics    Social History     Socioeconomic History    Marital status:      Spouse name: Not on file    Number of children: Not on file    Years of education: Not on file    Highest education level: Not on file   Occupational History    Not on file   Social Needs    Financial resource strain: Not on file    Food insecurity:     Worry: Not on file     Inability: Not on file    Transportation needs:     Medical: Not on file     Non-medical: Not on file   Tobacco Use    Smoking status: Never Smoker    Smokeless tobacco: Never Used    Tobacco comment: congrats   Substance and Sexual Activity    Alcohol use: No    Drug use: No    Sexual activity: Yes     Partners: Male   Lifestyle    Physical activity:     Days per week: Not on file     Minutes per session: Not on file    Stress: Not on file   Relationships    Social connections:     Talks on phone: Not on file     Gets together: Not on file     Attends [employer] service:

## 2019-04-25 RX ORDER — VENLAFAXINE HYDROCHLORIDE 75 MG/1
CAPSULE, EXTENDED RELEASE ORAL
Qty: 90 CAPSULE | Refills: 0 | Status: SHIPPED | OUTPATIENT
Start: 2019-04-25 | End: 2019-06-08 | Stop reason: SDUPTHER

## 2019-04-25 RX ORDER — AMLODIPINE BESYLATE 10 MG/1
TABLET ORAL
Qty: 30 TABLET | Refills: 0 | Status: SHIPPED | OUTPATIENT
Start: 2019-04-25 | End: 2019-06-08 | Stop reason: SDUPTHER

## 2019-05-13 NOTE — PROGRESS NOTES
[facility] time___0800_________        Surgery time___0900_________    Take the following medications with a sip of water:    Do not eat or drink anything after 12:00 midnight prior to your surgery. This includes water chewing gum, mints and ice chips. You may brush your teeth and gargle the morning of your surgery, but do not swallow the water      You may be asked to stop blood thinners such as Coumadin, Plavix, Fragmin, Lovenox, etc., or any anti-inflammatories such as:  Aspirin, Ibuprofen, Advil, Naproxen prior to your surgery. We also ask that you stop any OTC medications such as fish oil, vitamin E, glucosamine, garlic, Multivitamins, COQ 10, etc.    We ask that you do not smoke 24 hours prior to surgery  We ask that you do not  drink any alcoholic beverages 24 hours prior to surgery     You must make arrangements for a responsible adult to take you home after your surgery. For your safety you will not be allowed to leave alone or drive yourself home. Your surgery will be cancelled if you do not have a ride home. Also for your safety, it is strongly suggested that someone stay with you the first 24 hours after your surgery. A parent or legal guardian must accompany a child scheduled for surgery and plan to stay at the hospital until the child is discharged. Please do not bring other children with you. For your comfort, please wear simple loose fitting clothing to the hospital.  Please do not bring valuables. Do not wear any make-up or nail polish on your fingers or toes      For your safety, please do not wear any jewelry or body piercing's on the day of surgery. All jewelry must be removed. If you have dentures, they will be removed before going to operating room. For your convenience, we will provide you with a container. If you wear contact lenses or glasses, they will be removed, please bring a case for them.      If

## 2019-05-14 ENCOUNTER — ANESTHESIA EVENT (OUTPATIENT)
Dept: ENDOSCOPY | Age: 59
End: 2019-05-14
Payer: COMMERCIAL

## 2019-05-15 ENCOUNTER — TELEPHONE (OUTPATIENT)
Dept: FAMILY MEDICINE CLINIC | Age: 59
End: 2019-05-15

## 2019-05-15 ENCOUNTER — ANESTHESIA (OUTPATIENT)
Dept: ENDOSCOPY | Age: 59
End: 2019-05-15
Payer: COMMERCIAL

## 2019-05-15 ENCOUNTER — HOSPITAL ENCOUNTER (OUTPATIENT)
Age: 59
Setting detail: OUTPATIENT SURGERY
Discharge: HOME | End: 2019-05-15
Attending: INTERNAL MEDICINE | Admitting: INTERNAL MEDICINE
Payer: COMMERCIAL

## 2019-05-15 VITALS — DIASTOLIC BLOOD PRESSURE: 74 MMHG | OXYGEN SATURATION: 92 % | SYSTOLIC BLOOD PRESSURE: 124 MMHG

## 2019-05-15 VITALS
DIASTOLIC BLOOD PRESSURE: 66 MMHG | BODY MASS INDEX: 39.55 KG/M2 | WEIGHT: 223.25 LBS | SYSTOLIC BLOOD PRESSURE: 125 MMHG | OXYGEN SATURATION: 95 % | TEMPERATURE: 97 F | HEART RATE: 73 BPM | HEIGHT: 63 IN | RESPIRATION RATE: 18 BRPM

## 2019-05-15 DIAGNOSIS — R13.10 DYSPHAGIA, UNSPECIFIED TYPE: ICD-10-CM

## 2019-05-15 LAB
GLUCOSE BLD-MCNC: 157 MG/DL (ref 70–99)
PERFORMED ON: ABNORMAL

## 2019-05-15 PROCEDURE — 2580000003 HC RX 258: Performed by: NURSE ANESTHETIST, CERTIFIED REGISTERED

## 2019-05-15 PROCEDURE — 3700000000 HC ANESTHESIA ATTENDED CARE: Performed by: INTERNAL MEDICINE

## 2019-05-15 PROCEDURE — 3609015300 HC ESOPHAGEAL DILATION MALONEY: Performed by: INTERNAL MEDICINE

## 2019-05-15 PROCEDURE — 3609012400 HC EGD TRANSORAL BIOPSY SINGLE/MULTIPLE: Performed by: INTERNAL MEDICINE

## 2019-05-15 PROCEDURE — 2500000003 HC RX 250 WO HCPCS: Performed by: NURSE ANESTHETIST, CERTIFIED REGISTERED

## 2019-05-15 PROCEDURE — 7100000011 HC PHASE II RECOVERY - ADDTL 15 MIN: Performed by: INTERNAL MEDICINE

## 2019-05-15 PROCEDURE — 2709999900 HC NON-CHARGEABLE SUPPLY: Performed by: INTERNAL MEDICINE

## 2019-05-15 PROCEDURE — 7100000010 HC PHASE II RECOVERY - FIRST 15 MIN: Performed by: INTERNAL MEDICINE

## 2019-05-15 PROCEDURE — 6360000002 HC RX W HCPCS: Performed by: NURSE ANESTHETIST, CERTIFIED REGISTERED

## 2019-05-15 PROCEDURE — 88305 TISSUE EXAM BY PATHOLOGIST: CPT

## 2019-05-15 PROCEDURE — 2580000003 HC RX 258: Performed by: ANESTHESIOLOGY

## 2019-05-15 RX ORDER — SODIUM CHLORIDE 0.9 % (FLUSH) 0.9 %
10 SYRINGE (ML) INJECTION PRN
Status: DISCONTINUED | OUTPATIENT
Start: 2019-05-15 | End: 2019-05-15 | Stop reason: HOSPADM

## 2019-05-15 RX ORDER — SODIUM CHLORIDE 9 MG/ML
INJECTION, SOLUTION INTRAVENOUS CONTINUOUS PRN
Status: DISCONTINUED | OUTPATIENT
Start: 2019-05-15 | End: 2019-05-15 | Stop reason: SDUPTHER

## 2019-05-15 RX ORDER — PROPOFOL 10 MG/ML
INJECTION, EMULSION INTRAVENOUS PRN
Status: DISCONTINUED | OUTPATIENT
Start: 2019-05-15 | End: 2019-05-15 | Stop reason: SDUPTHER

## 2019-05-15 RX ORDER — SODIUM CHLORIDE 0.9 % (FLUSH) 0.9 %
10 SYRINGE (ML) INJECTION EVERY 12 HOURS SCHEDULED
Status: DISCONTINUED | OUTPATIENT
Start: 2019-05-15 | End: 2019-05-15 | Stop reason: HOSPADM

## 2019-05-15 RX ORDER — LIDOCAINE HYDROCHLORIDE 20 MG/ML
INJECTION, SOLUTION EPIDURAL; INFILTRATION; INTRACAUDAL; PERINEURAL PRN
Status: DISCONTINUED | OUTPATIENT
Start: 2019-05-15 | End: 2019-05-15 | Stop reason: SDUPTHER

## 2019-05-15 RX ORDER — SODIUM CHLORIDE 9 MG/ML
INJECTION, SOLUTION INTRAVENOUS CONTINUOUS
Status: DISCONTINUED | OUTPATIENT
Start: 2019-05-15 | End: 2019-05-15 | Stop reason: HOSPADM

## 2019-05-15 RX ADMIN — SODIUM CHLORIDE: 9 INJECTION, SOLUTION INTRAVENOUS at 12:59

## 2019-05-15 RX ADMIN — SODIUM CHLORIDE: 0.9 INJECTION, SOLUTION INTRAVENOUS at 12:42

## 2019-05-15 RX ADMIN — LIDOCAINE HYDROCHLORIDE 70 MG: 20 INJECTION, SOLUTION EPIDURAL; INFILTRATION; INTRACAUDAL; PERINEURAL at 13:03

## 2019-05-15 RX ADMIN — PROPOFOL 140 MG: 10 INJECTION, EMULSION INTRAVENOUS at 13:02

## 2019-05-15 ASSESSMENT — PAIN - FUNCTIONAL ASSESSMENT: PAIN_FUNCTIONAL_ASSESSMENT: 0-10

## 2019-05-15 ASSESSMENT — PAIN SCALES - GENERAL
PAINLEVEL_OUTOF10: 0

## 2019-05-15 ASSESSMENT — LIFESTYLE VARIABLES: SMOKING_STATUS: 0

## 2019-05-15 ASSESSMENT — ENCOUNTER SYMPTOMS: SHORTNESS OF BREATH: 0

## 2019-05-15 NOTE — ANESTHESIA PRE PROCEDURE
Department of Anesthesiology  Preprocedure Note       Name:  Porsche Merrill   Age:  60 y.o.  :  1960                                          MRN:  [id number]         Date:  5/15/2019      Surgeon: Dari Villeda):  Luís José MD    Procedure: EGD ESOPHAGOGASTRODUODENOSCOPY (N/A )    Medications prior to admission:   Prior to Admission medications    Medication Sig Start Date End Date Taking? Authorizing Provider   insulin aspart protamine-insulin aspart (NOVOLOG MIX 70/30 FLEXPEN) (70-30) 100 UNIT/ML injection 90 units in AM, 45 units in PM 5/15/19   Norma Saldana MD   amLODIPine (NORVASC) 10 MG tablet TAKE ONE TABLET BY MOUTH DAILY 19   Norma Saldana MD   venlafaxine (EFFEXOR XR) 75 MG extended release capsule TAKE THREE CAPSULES BY MOUTH DAILY 19   Norma Saldana MD   atorvastatin (LIPITOR) 80 MG tablet Take 1 tablet by mouth daily 19   Norma Saldana MD   gabapentin (NEURONTIN) 600 MG tablet TAKE ONE TABLET BY MOUTH EVERY MORNING AND TAKE ONE TABLET BY MOUTH EVERY EVENING AND TAKE TWO TABLETS BY MOUTH EVERY NIGHT AT BEDTIME 19  Norma Saldana MD   Suvorexant (BELSOMRA) 20 MG TABS Take 1 tablet by mouth nightly as needed (sleep) for up to 180 days. 4/8/19 10/5/19  Norma Saldana MD   FLUoxetine (PROZAC) 20 MG capsule TAKE ONE CAPSULE BY MOUTH DAILY 3/26/19   Norma Saldana MD   blood glucose test strips (ASCENSIA AUTODISC VI;ONE TOUCH ULTRA TEST VI) strip True Metrix.  three times per day 3/22/19   Norma Saldana MD   Blood Glucose Monitoring Suppl (TRUE METRIX METER) w/Device KIT three times per day 3/22/19   Norma Saldana MD   TRUEPLUS LANCETS 33G MISC three times per day 3/22/19   Noram Saldana MD   lisinopril (PRINIVIL;ZESTRIL) 40 MG tablet TAKE ONE TABLET BY MOUTH DAILY 1/3/19   Greg Harper MD   QUEtiapine (SEROQUEL XR) 50 MG extended release tablet TAKE ONE TABLET BY MOUTH ONCE NIGHTLY 19   Greg Harper MD   tolterodine (DETROL LA) 4 MG extended release capsule TAKE ONE CAPSULE BY MOUTH DAILY 12/24/18   Tamra Harper MD   omeprazole (PRILOSEC) 40 MG delayed release capsule  9/28/18   Historical Provider, MD   ibuprofen (ADVIL;MOTRIN) 400 MG tablet Take 1 tablet by mouth every 8 hours as needed for Pain 10/6/18   Dorie Brizuela MD   metoprolol succinate (TOPROL XL) 50 MG extended release tablet TAKE ONE TABLET BY MOUTH DAILY 9/27/18   Vanna Lane MD   Insulin Pen Needle (B-D UF III MINI PEN NEEDLES) 31G X 5 MM MISC Apply 1 each topically 2 times daily 9/13/18   Vanna Lane MD   insulin aspart (NOVOLOG FLEXPEN) 100 UNIT/ML injection pen Glucose < 200 no coverage; 201-250 take 10 units; 251-300 take 15 units; > 300 take 20 units. 9/12/18   Vanna Lane MD   oxyCODONE (OXY-IR) 15 MG immediate release tablet  9/5/18   Historical Provider, MD   fluticasone (FLONASE) 50 MCG/ACT nasal spray PLACE 2 SPRAYS IN EACH NOSTRIL DAILY 7/17/18   Vanna Lane MD   nystatin (MYCOSTATIN) 574657 UNIT/GM ointment Apply topically 2 times daily 5/18/18   Vanna Lane MD   busPIRone (BUSPAR) 15 MG tablet Take 1/3 tab BID for 5 days, then 1/2 tab BID for 5 days, then 2/3 tab BID for 5 days, then 1 po BID. 10/5/17   Vanna Lane MD   metroNIDAZOLE (METROGEL) 0.75 % gel Apply topically 2 times daily. 5/15/17   Vanna Lane MD   anastrozole (ARIMIDEX) 1 MG tablet TAKE ONE TABLET BY MOUTH DAILY 4/24/17   Beverly Daniels., MD   mupirocin [facility]) 2 % ointment APPLY THREE TIMES A DAY AS NEEDED 4/6/17   Vanna Lane MD   B-JEREL INS SYR HALF-UNIT .3CC/31G 31G X 5/16\" 0.3 ML MISC USE AS DIRECTED TWO TIMES A DAY 11/30/15   Doris Morelos MD   MICROLET LANCETS MISC 1 each by Does not apply route 6 times daily. 5/20/14   Vanna Lane MD   lubiprostone (AMITIZA) 8 MCG CAPS capsule Take 8 mcg by mouth 2 times daily (with meals). Historical Provider, MD       Current medications:    No current facility-administered medications for this visit. No current outpatient medications on file. Facility-Administered Medications Ordered in Other Visits   Medication Dose Route Frequency Provider Last Rate Last Dose    0.9 % sodium chloride infusion   Intravenous Continuous Lorena Cormier MD        sodium chloride flush 0.9 % injection 10 mL  10 mL Intravenous 2 times per day Lorena Cormier MD        sodium chloride flush 0.9 % injection 10 mL  10 mL Intravenous PRN Lorena Cormier MD        famotidine (PEPCID) injection 20 mg  20 mg Intravenous Once Lorena Cormier MD           Allergies: Allergies   Allergen Reactions    Ambien [Zolpidem Tartrate] Other (See Comments)     Vivid dreams    Naproxen     Trazodone And Nefazodone Other (See Comments)     headache    Diamox [Acetazolamide] Rash    Lorazepam Other (See Comments)     confusion    Reglan [Metoclopramide] Anxiety    Sulfa Antibiotics Rash     Shaking       Problem List:    Patient Active Problem List   Diagnosis Code    DM (diabetes mellitus), type 2, uncontrolled with complications ([facility] [address] M99.9, E11.65    Diabetic gastroparesis ([facility] [address] E11.43, K31.84    Allergic rhinitis J30.9    Insomnia G47.00    HTN (hypertension) I10    Chronic knee pain M25.569, G89.29    Headaches due to old head injury G44.309, S09.90XS    Nephropathy, diabetic (HCC) E11.21    Migraine G43.909    Hip pain, bilateral M25.551, M25.552    Depression, major F32.9    Right knee DJD M17.11    Calcific Achilles tendonitis, right M65.28    Right sided sciatica M54.31    Lumbar spinal stenosis M48.061    TMJ (temporomandibular joint disorder) M26.609    Urge incontinence of urine N39.41    Personal history of breast cancer Z85.3    Hyperlipidemia LDL goal <032 E44.5    Metallic taste H07.1    Polyarthralgia M25.50    Severe obesity (BMI 35.0-39. 9) with comorbidity ([facility] [address] E66.01    Blood poisoning [id number]    Meningitis G03.9    Neck pain M54.2    Chronic low back pain M54.5, G89.29    Intractable low back pain M54.5    Plantar fasciitis of right foot M72.2    Anxiety F41.9    History of colon polyps I33.355    Metabolic acidosis, increased anion gap (IAG) E87.2       Past Medical History:        Diagnosis Date    Allergic rhinitis 5/15/2014    Anxiety     Arthritis     [facility] cancer ([facility]     Cancer ([facility]     breast    Chronic back pain     Depression     Diabetes mellitus ([facility]     DM (diabetes mellitus), type 2, uncontrolled with complications ([facility] 4911    ESBL (extended spectrum beta-lactamase) producing bacteria infection 10/06/2018    urine    GERD (gastroesophageal reflux disease)     GERD (gastroesophageal reflux disease)     History of migraine headaches     HTN (hypertension) 5/15/2014    Hyperlipidemia     Irritable bowel syndrome (IBS)     Obesity        Past Surgical History:        Procedure Laterality Date    ACHILLES TENDON SURGERY Right     BACK SURGERY  2013    lumbar    BREAST LUMPECTOMY Left 2010    CARPAL TUNNEL RELEASE Right      SECTION      x 1    CHOLECYSTECTOMY      COLONOSCOPY  10/16/2017    polypectomy- Dr Aisha Baumann N/A 2019    EGD BIOPSY performed by Ewelina Rai MD at [address] [place] [address] History:    Social History     Tobacco Use    Smoking status: Never Smoker    Smokeless tobacco: Never Used    Tobacco comment: [facility]   Substance Use Topics    Alcohol use: No                                Counseling given: Not Answered  Comment: [facility]      Vital Signs (Current): There were no vitals filed for this visit.                                            BP Readings from Last 3 Encounters:   05/15/19 (!) 162/74   19 (!) 160/87   19 108/89       NPO Status:   >8hrs                                                                                BMI:   Wt Readings from Last 3 Encounters:   05/15/19 223 lb 4 oz (101.3 kg)   19 223 lb 3.2 oz (101.2 kg)   19 neuromuscular disease:, headaches: migraine headaches, psychiatric history:depression/anxiety    (-) TIA and CVA           GI/Hepatic/Renal:   (+) GERD:, morbid obesity     (-) PUD, hepatitis, liver disease and no renal disease       Endo/Other:    (+) DiabetesType II DM, , malignancy/cancer. (-) hypothyroidism, hyperthyroidism               Abdominal:           Vascular:                                          Anesthesia Plan      TIVA and MAC     ASA 3       Induction: intravenous. Anesthetic plan and risks discussed with patient. Plan discussed with CRNA. This pre-anesthesia assessment may be used as a history and physical.    DOS STAFF ADDENDUM:    Pt seen and examined, chart reviewed (including anesthesia, drug and allergy history). No interval changes to history and physical examination. Anesthetic plan, risks, benefits, alternatives, and personnel involved discussed with patient. Patient verbalized an understanding and agrees to proceed.       Carmen James MD  May 15, 2019  12:31 PM      Carmen James MD   5/15/2019

## 2019-05-15 NOTE — TELEPHONE ENCOUNTER
Called pt all she needed was to cancel and r/s appt for later and get a refill on her novalog.   All done and sent to dr Crhis Wylie

## 2019-05-15 NOTE — TELEPHONE ENCOUNTER
Pt is calling to make an apt with dr Luciana Buerger. She is going out of arelis and needs to be seen before the 20 th of this month.   She is worried because she is on insulin she will run out if she cannot be seen in time before she leaves  Please send to the [facility] on [address]    Please advise    Thanks

## 2019-05-15 NOTE — ANESTHESIA POSTPROCEDURE EVALUATION
Department of Anesthesiology  Postprocedure Note    Patient: Emmett Almazan  MRN: [id number]  YOB: 1960  Date of evaluation: 5/15/2019  Time:  1:34 PM     Procedure Summary     Date:  05/15/19 Room / Location:  [facility] ENDO 01 / [facility] ENDOSCOPY    Anesthesia Start:  1259 Anesthesia Stop:      Procedures:       EGD BIOPSY (N/A )      ESOPHAGEAL DILATION Ashley Montalvo (N/A ) Diagnosis:       Dysphagia, unspecified type      (DYSPHAGIA)    Surgeon:  Sravani Bingham MD Responsible Provider:      Anesthesia Type:  TIVA, MAC ASA Status:  3          Anesthesia Type: TIVA, MAC    Eva Phase I: Eva Score: 10    Eav Phase II: Eva Score: 10    Last vitals: Reviewed and per EMR flowsheets.        Anesthesia Post Evaluation    Patient location during evaluation: PACU  Patient participation: complete - patient participated  Level of consciousness: awake and alert  Pain score: 0  Airway patency: patent  Nausea & Vomiting: no nausea and no vomiting  Complications: no  Cardiovascular status: blood pressure returned to baseline  Respiratory status: acceptable  Hydration status: euvolemic

## 2019-05-15 NOTE — H&P
injection 90 units in AM, 45 units in PM 5/15/19   Luis Manuel Riggins MD   venlafaxine (EFFEXOR XR) 75 MG extended release capsule TAKE THREE CAPSULES BY MOUTH DAILY 4/25/19   Luis Manuel Riggins MD   atorvastatin (LIPITOR) 80 MG tablet Take 1 tablet by mouth daily 4/22/19   Luis Manuel Riggins MD   gabapentin (NEURONTIN) 600 MG tablet TAKE ONE TABLET BY MOUTH EVERY MORNING AND TAKE ONE TABLET BY MOUTH EVERY EVENING AND TAKE TWO TABLETS BY MOUTH EVERY NIGHT AT BEDTIME 4/18/19 7/27/19  Luis Manuel Riggins MD   Suvorexant (BELSOMRA) 20 MG TABS Take 1 tablet by mouth nightly as needed (sleep) for up to 180 days. 4/8/19 10/5/19  Luis Manuel Riggins MD   FLUoxetine (PROZAC) 20 MG capsule TAKE ONE CAPSULE BY MOUTH DAILY 3/26/19   Luis Manuel Riggins MD   blood glucose test strips (ASCENSIA AUTODISC VI;ONE TOUCH ULTRA TEST VI) strip True Metrix. three times per day 3/22/19   Luis Manuel Riggins MD   Blood Glucose Monitoring Suppl (TRUE METRIX METER) w/Device KIT three times per day 3/22/19   Luis Manuel Riggins MD   TRUEPLUS LANCETS 33G MISC three times per day 3/22/19   Luis Manuel Riggins MD   QUEtiapine (SEROQUEL XR) 50 MG extended release tablet TAKE ONE TABLET BY MOUTH ONCE NIGHTLY 1/2/19   Oneida Harper MD   tolterodine (DETROL LA) 4 MG extended release capsule TAKE ONE CAPSULE BY MOUTH DAILY 12/24/18   Oneida Harper MD   ibuprofen (ADVIL;MOTRIN) 400 MG tablet Take 1 tablet by mouth every 8 hours as needed for Pain 10/6/18   Soraida Johnston MD   Insulin Pen Needle (B-D UF III MINI PEN NEEDLES) 31G X 5 MM MISC Apply 1 each topically 2 times daily 9/13/18   Luis Manuel Riggins MD   insulin aspart (NOVOLOG FLEXPEN) 100 UNIT/ML injection pen Glucose < 200 no coverage; 201-250 take 10 units; 251-300 take 15 units; > 300 take 20 units.  9/12/18   Luis Manuel Riggins MD   oxyCODONE (OXY-IR) 15 MG immediate release tablet  9/5/18   Historical Provider, MD   fluticasone (FLONASE) 50 MCG/ACT nasal spray PLACE 2 SPRAYS IN EACH NOSTRIL DAILY 7/17/18   Luis Manuel Riggins MD   nystatin (MYCOSTATIN) 132620 UNIT/GM ointment Apply topically 2 times daily 5/18/18   Christina Murry MD   busPIRone (BUSPAR) 15 MG tablet Take 1/3 tab BID for 5 days, then 1/2 tab BID for 5 days, then 2/3 tab BID for 5 days, then 1 po BID. 10/5/17   Christina Murry MD   metroNIDAZOLE (METROGEL) 0.75 % gel Apply topically 2 times daily. 5/15/17   Christina Murry MD   anastrozole (ARIMIDEX) 1 MG tablet TAKE ONE TABLET BY MOUTH DAILY 4/24/17   MD demetrio Montillarocin [facility]) 2 % ointment APPLY THREE TIMES A DAY AS NEEDED 4/6/17   Christina Murry MD   B-D INS SYR HALF-UNIT .3CC/31G 31G X 5/16\" 0.3 ML MISC USE AS DIRECTED TWO TIMES A DAY 11/30/15   Josiane Figueroa MD   MICROLET LANCETS MISC 1 each by Does not apply route 6 times daily. 5/20/14   Christina Murry MD   lubiprostone (AMITIZA) 8 MCG CAPS capsule Take 8 mcg by mouth 2 times daily (with meals). Historical Provider, MD     Allergies:   Ambien [zolpidem tartrate]; Naproxen; Trazodone and nefazodone; Diamox [acetazolamide];  Lorazepam; Reglan [metoclopramide]; and Sulfa antibiotics    Social History     Socioeconomic History    Marital status:      Spouse name: Not on file    Number of children: Not on file    Years of education: Not on file    Highest education level: Not on file   Occupational History    Not on file   Social Needs    Financial resource strain: Not on file    Food insecurity:     Worry: Not on file     Inability: Not on file    Transportation needs:     Medical: Not on file     Non-medical: Not on file   Tobacco Use    Smoking status: Never Smoker    Smokeless tobacco: Never Used    Tobacco comment: congrats   Substance and Sexual Activity    Alcohol use: No    Drug use: No    Sexual activity: Yes     Partners: Male   Lifestyle    Physical activity:     Days per week: Not on file     Minutes per session: Not on file    Stress: Not on file   Relationships    Social connections:     Talks on phone: Not on file     Gets together:

## 2019-05-20 DIAGNOSIS — F41.9 ANXIETY: ICD-10-CM

## 2019-05-20 RX ORDER — FLUOXETINE HYDROCHLORIDE 20 MG/1
CAPSULE ORAL
Qty: 30 CAPSULE | Refills: 0 | Status: SHIPPED | OUTPATIENT
Start: 2019-05-20 | End: 2019-06-19 | Stop reason: SDUPTHER

## 2019-05-22 ENCOUNTER — OFFICE VISIT (OUTPATIENT)
Dept: PAIN MANAGEMENT | Age: 59
End: 2019-05-22
Payer: COMMERCIAL

## 2019-05-22 VITALS
OXYGEN SATURATION: 93 % | SYSTOLIC BLOOD PRESSURE: 123 MMHG | HEART RATE: 78 BPM | WEIGHT: 224 LBS | HEIGHT: 63 IN | DIASTOLIC BLOOD PRESSURE: 82 MMHG | BODY MASS INDEX: 39.69 KG/M2

## 2019-05-22 DIAGNOSIS — G89.29 CHRONIC BILATERAL LOW BACK PAIN WITH BILATERAL SCIATICA: ICD-10-CM

## 2019-05-22 DIAGNOSIS — M51.36 DDD (DEGENERATIVE DISC DISEASE), LUMBAR: ICD-10-CM

## 2019-05-22 DIAGNOSIS — M54.42 CHRONIC BILATERAL LOW BACK PAIN WITH BILATERAL SCIATICA: ICD-10-CM

## 2019-05-22 DIAGNOSIS — G89.4 CHRONIC PAIN SYNDROME: ICD-10-CM

## 2019-05-22 DIAGNOSIS — M79.7 FIBROMYALGIA: ICD-10-CM

## 2019-05-22 DIAGNOSIS — F51.05 INSOMNIA DUE TO OTHER MENTAL DISORDER: ICD-10-CM

## 2019-05-22 DIAGNOSIS — Z85.3 H/O MALIGNANT NEOPLASM OF BREAST: ICD-10-CM

## 2019-05-22 DIAGNOSIS — F33.1 MODERATE EPISODE OF RECURRENT MAJOR DEPRESSIVE DISORDER (HCC): ICD-10-CM

## 2019-05-22 DIAGNOSIS — G43.909 MIGRAINE WITHOUT STATUS MIGRAINOSUS, NOT INTRACTABLE, UNSPECIFIED MIGRAINE TYPE: ICD-10-CM

## 2019-05-22 DIAGNOSIS — M25.50 POLYARTHRALGIA: ICD-10-CM

## 2019-05-22 DIAGNOSIS — M48.061 SPINAL STENOSIS OF LUMBAR REGION WITHOUT NEUROGENIC CLAUDICATION: ICD-10-CM

## 2019-05-22 DIAGNOSIS — F99 INSOMNIA DUE TO OTHER MENTAL DISORDER: ICD-10-CM

## 2019-05-22 DIAGNOSIS — M48.061 LUMBAR FORAMINAL STENOSIS: ICD-10-CM

## 2019-05-22 DIAGNOSIS — E66.01 SEVERE OBESITY (BMI 35.0-39.9) WITH COMORBIDITY (HCC): ICD-10-CM

## 2019-05-22 DIAGNOSIS — M85.88 OSTEOPENIA OF LUMBAR SPINE: ICD-10-CM

## 2019-05-22 DIAGNOSIS — F41.9 ANXIETY: ICD-10-CM

## 2019-05-22 DIAGNOSIS — M47.22 OSTEOARTHRITIS OF SPINE WITH RADICULOPATHY, CERVICAL REGION: ICD-10-CM

## 2019-05-22 DIAGNOSIS — M54.41 CHRONIC BILATERAL LOW BACK PAIN WITH BILATERAL SCIATICA: ICD-10-CM

## 2019-05-22 PROCEDURE — 99204 OFFICE O/P NEW MOD 45 MIN: CPT | Performed by: NURSE PRACTITIONER

## 2019-05-22 RX ORDER — OXYCODONE HCL 10 MG/1
10 TABLET, FILM COATED, EXTENDED RELEASE ORAL EVERY 12 HOURS
Qty: 60 EACH | Refills: 0 | Status: SHIPPED | OUTPATIENT
Start: 2019-05-22 | End: 2019-06-19 | Stop reason: SDUPTHER

## 2019-05-22 RX ORDER — OXYCODONE HYDROCHLORIDE 5 MG/1
5 TABLET ORAL EVERY 12 HOURS PRN
Qty: 60 TABLET | Refills: 0 | Status: SHIPPED | OUTPATIENT
Start: 2019-05-22 | End: 2019-06-19 | Stop reason: SDUPTHER

## 2019-05-22 RX ORDER — LIDOCAINE 50 MG/G
1 PATCH TOPICAL DAILY
Qty: 30 PATCH | Refills: 0 | Status: SHIPPED | OUTPATIENT
Start: 2019-05-22 | End: 2019-05-24 | Stop reason: SDUPTHER

## 2019-05-22 NOTE — PROGRESS NOTES
arms, 8/10 in the neck. She describes it as aching, burning, numbness, tingling, pins and needles. Pain is greaterin her neck than lower back. Pain is made worse by: cooking, house chores, yardwork. Activities that have been limited by pain that she otherwise tolerated well are daily life. Alternative therapies she has previously attempted are pain medicine, muscle relaxants, heating pad. Current treatment regimen has helped relieve about 50% of the pain. Relieving factors of pain include pain medicine, heating pad. Shedenies side effects from the current pain regimen. In the last week she reports having very bothersome symptoms to the neck, most of the time. She has moderate bothersome symptoms to the lumbar spine the time with numbness tingling to the legs. Pain prevents her from dressing without discomfort, lifting heavy objects, but can lift light to medium objects off the table, prevents her from walking more than 20 minutes or sitting more than one hour, standing more than 30 minutes. Her social/recreational life is severely restricted by pain. Her sexual life is absent due to pain. Patient reports mood is depressed and that she has no suicidal/homicidal inclination. Depression is mainly due to the loss of her mother, and other family members. Reports her father also has Alzheimer's which increases stress. She was recently started on Effexor excised 25 mg by mouth daily, mood is currently stable. Sleep patterns are fair with an average of 4 hours, reports Unisom OTC helps improve sleep to 6-7 hours, Belsomra was discontinued as it was not improving her sleep. Patient denies neurological bladder issues, admitted to IBS/constipation managed by [facility]. Patient denies misusing/abusing her narcotic pain medications or using any illegal drugs. she admits to morning stiffness, fatigue, and headaches. Currently on SSI, lives with her  and son. Denies smoking cigarettes.        ROS:  The patient's social history, past medical history, family history, medications, allergies and review of systems have all been reviewed and verified from  the patient questionnaire form which has been filled by the patient. The  allergies, medication list  and past medical and surgical history and other information recorded by the MA was again reviewed on May 25, 2019. Please check page 6 of the patient questionnaire for for family history  These forms have been scanned into the \"media\" tab of patients electronic medical record.     Family History   Problem Relation Age of Onset   [facility] Cancer Mother         forehead soft tissue    Diabetes Mother     Arthritis Father     High Blood Pressure Father     High Cholesterol Father     Alzheimer's Disease Father     Diabetes Brother     Depression Sister        Past Medical History:   Diagnosis Date    Allergic rhinitis 5/15/2014    Anxiety     Arthritis     Breast cancer ([address]     Cancer ([address]     breast    Chronic back pain     Depression     Diabetes mellitus ([address]     DM (diabetes mellitus), type 2, uncontrolled with complications ([address] 8250    ESBL (extended spectrum beta-lactamase) producing bacteria infection 10/06/2018    urine    GERD (gastroesophageal reflux disease)     GERD (gastroesophageal reflux disease)     History of migraine headaches     HTN (hypertension) 5/15/2014    Hyperlipidemia     Irritable bowel syndrome (IBS)     Obesity           Past Surgical History:   Procedure Laterality Date    ACHILLES TENDON SURGERY Right     BACK SURGERY  2013    lumbar    BREAST LUMPECTOMY Left 2010    CARPAL TUNNEL RELEASE Right      SECTION      x 1    CHOLECYSTECTOMY      COLONOSCOPY  10/16/2017    polypectomy- Dr Teresa Rendon 5/15/2019    ESOPHAGEAL Elester Craw performed by Benjamin Fung MD at [place] [address] ENDOSCOPY N/A 2019    EGD BIOPSY performed by Smith Abernathy Allan Villeda MD at [address] N/A 5/15/2019    EGD BIOPSY performed by Jamie Shin MD at [facility]       Physical Exam   Constitutional: She is oriented to person, place, and time. She appears well-developed and well-nourished. No distress. HENT:   Head: Normocephalic and atraumatic. Nose: Nose normal.   Mouth/Throat: Oropharynx is clear and moist.   Eyes: Pupils are equal, round, and reactive to light. Conjunctivae and EOM are normal.   Neck: Normal range of motion. Neck supple. No thyromegaly present. Cardiovascular: Normal rate, regular rhythm, normal heart sounds and intact distal pulses. Pulmonary/Chest: Effort normal and breath sounds normal. No respiratory distress. She exhibits no tenderness. Abdominal: Soft. Bowel sounds are normal. She exhibits distension. There is tenderness (Tenderness noted with palpation of the left upper quadrant). There is no rebound. No hernia. Musculoskeletal: She exhibits edema (+1 pitting edema ble) and tenderness. Right shoulder: She exhibits tenderness (Tender spots noted with palpation of bilateral shoulders). Left shoulder: She exhibits tenderness (Tender spots noted with palpation of bilateral shoulders). Right hip: She exhibits decreased range of motion, decreased strength and tenderness (Guarded hip/lumbar pain with 30° flexion). Left hip: She exhibits decreased range of motion and tenderness (50° flexion guarded lumber pain). Cervical back: She exhibits decreased range of motion, tenderness (Guarded pain with 40° extension, 45° rotation to the L/R) and bony tenderness. Lumbar back: She exhibits decreased range of motion, tenderness (Guarded pain with palpation, 40° flexion, 5° extension) and bony tenderness. Right upper leg: She exhibits tenderness. Left upper leg: She exhibits tenderness. Right lower leg: She exhibits tenderness.         Left lower leg: She exhibits tenderness. Right foot: There is tenderness. Left foot: There is tenderness. Lymphadenopathy:     She has no cervical adenopathy. She has no axillary adenopathy. Neurological: She is alert and oriented to person, place, and time. She has normal strength and normal reflexes. No cranial nerve deficit or sensory deficit. She displays a negative Romberg sign. Reflex Scores:       Patellar reflexes are 2+ on the right side and 2+ on the left side. Achilles reflexes are 2+ on the right side and 2+ on the left side. Skin: Skin is warm, dry and intact. No rash noted. Scattered scabs to the face, patient admits to picking her skin   Psychiatric: Her speech is normal and behavior is normal. Her mood appears anxious. Her affect is blunt. She exhibits a depressed mood (tearful). MRI of the Lumbar spine 9/20/17:  1. Paravertebral enhancement at L3-L4 and L4-L5.  Advanced facet joint   degenerative changes are seen at these levels.  Findings may represent facet   joint synovitis. 2. Fluid collection in the right lower back subcutaneous tissues measuring 3   x 2.4 cm.  Findings may represent seroma if the patient had recent surgical   intervention. Gila Pac, superimposed infection is not excluded, and clinical   correlation is recommended. 3. No evidence of discitis/osteomyelitis in the lumbar spine. MRI of the Cervical spine 8/23/17:  Allowing for absence of intravenous contrast, no convincing evidence of   epidural collection/abscess.       Mild multilevel cervical spondylosis with scattered neural foraminal stenosis   described above.  No evidence of significant central spinal canal stenosis.       Nonspecific sternoclavicular joint periarticular soft tissue edema, possibly   degenerative or posttraumatic.  However, infection cannot be excluded.      /82   Pulse 78   Ht 5' 3\" (1.6 m)   Wt 224 lb (101.6 kg)   SpO2 93%   BMI 39.68 kg/m² ASSESSMENT:    1. Chronic pain syndrome    2. Fibromyalgia    3. DDD (degenerative disc disease), lumbar    4. Lumbar foraminal stenosis, severe    5. Chronic bilateral low back pain with bilateral sciatica    6. Spinal stenosis of lumbar region without neurogenic claudication    7. Osteopenia of lumbar spine    8. Polyarthralgia    9. Osteoarthritis of spine with radiculopathy, cervical region    10. Migraine without status migrainosus, not intractable, unspecified migraine type    11. H/O malignant neoplasm of breast    12. Anxiety    13. Moderate episode of recurrent major depressive disorder ([facility]    14. Insomnia due to other mental disorder    15. Severe obesity (BMI 35.0-39. 9) with comorbidity ([facility]         PLAN:   -Chronic opiate treatment protocol was discussed withthe patient, informed consent was obtained. -Treatment guidelines were discussed and established  -Risks and benefits of narcotics were addressedwith the patient  - Obtainable long term and short term goals of opioid therapy were reviewed, including pain relief, sleep, psychosocial and physical functioning   -Obtain urine Toxicology today  -Start OxyContin 10 mg ER twice a day, Roxicodone 5 mg by mouth twice a day when necessary, lidocaine patch 5% topical 12 hours off/on  -aquatic PT  -Reviewed imaging studies/blood work with the patient  -Recommended psychological counseling for the patient to help cope with the loss of her family members, mainly her mother. Reports having supportive sister who is moving to [place].  We will reevaluate on follow-up visit, she was otherwise agreeable  -TSH on f/u visit  -She was advised weight reduction, diet changes- 800-1200 marlena diet, diet diary, exercising, nutritional  consult increased physical activity as tolerated  -CBT techniques- relaxation therapies such as biofeedback, mindfulness based stress reduction, imagery, cognitive restructuring, problem solving discussed with patient  -she was advised proper sleep hygiene-told to avoid:use of caffeine or other stimulants after noon, alcohol use near bedtime, long or frequent naps during the day, erratic sleep schedule, heavy meals near bedtime, vigorous exercise near bedtime and use of electronic devices near bedtime  -SOAPP score 0  -ORT score 2 low risk  -PHQ-9 score mild-moderate depression  -Return in about 1 month (around 6/19/2019). Controlled Substances Monitoring: Attestation: The Prescription Monitoring Report for this patient was reviewed today.  GLADIS Perry - CNP)

## 2019-05-22 NOTE — PATIENT INSTRUCTIONS
Patient Education        Back Stretches: Exercises  Your Care Instructions  Here are some examples of exercises for stretching your back. Start each exercise slowly. Ease off the exercise if you start to have pain. Your doctor or physical therapist will tell you when you can start these exercises and which ones will work best for you. How to do the exercises  Overhead stretch    1. Stand comfortably with your feet shoulder-width apart. 2. Looking straight ahead, raise both arms over your head and reach toward the ceiling. Do not allow your head to tilt back. 3. Hold for 15 to 30 seconds, then lower your arms to your sides. 4. Repeat 2 to 4 times. Side stretch    1. Stand comfortably with your feet shoulder-width apart. 2. Raise one arm over your head, and then lean to the other side. 3. Slide your hand down your leg as you let the weight of your arm gently stretch your side muscles. Hold for 15 to 30 seconds. 4. Repeat 2 to 4 times on each side. Press-up    1. Lie on your stomach, supporting your body with your forearms. 2. Press your elbows down into the floor to raise your upper back. As you do this, relax your stomach muscles and allow your back to arch without using your back muscles. As your press up, do not let your hips or pelvis come off the floor. 3. Hold for 15 to 30 seconds, then relax. 4. Repeat 2 to 4 times. Relax and rest    1. Lie on your back with a rolled towel under your neck and a pillow under your knees. Extend your arms comfortably to your sides. 2. Relax and breathe normally. 3. Remain in this position for about 10 minutes. 4. If you can, do this 2 or 3 times each day. Follow-up care is a key part of your treatment and safety. Be sure to make and go to all appointments, and call your doctor if you are having problems. It's also a good idea to know your test results and keep a list of the medicines you take. Where can you learn more?   Go to [email or web address] and sign in to your [employer] account. Enter N882 in the [employer] box to learn more about \"Back Stretches: Exercises. \"     If you do not have an account, please click on the \"Sign Up Now\" link. Current as of: September 20, 2018  Content Version: 12.0  © 0232-2017 [employer]. Care instructions adapted under license by [facility] ([facility]). If you have questions about a medical condition or this instruction, always ask your healthcare professional. [address] any warranty or liability for your use of this information. Patient Education        Neck Arthritis: Exercises  Your Care Instructions  Here are some examples of typical rehabilitation exercises for your condition. Start each exercise slowly. Ease off the exercise if you start to have pain. Your doctor or physical therapist will tell you when you can start these exercises and which ones will work best for you. How to do the exercises  Neck stretches to the side    5. This stretch works best if you keep your shoulder down as you lean away from it. To help you remember to do this, start by relaxing your shoulders and lightly holding on to your thighs or your chair. 6. Tilt your head toward your shoulder and hold for 15 to 30 seconds. Let the weight of your head stretch your muscles. 7. Repeat 2 to 4 times toward each shoulder. Chin tuck    5. Lie on the floor with a rolled-up towel under your neck. Your head should be touching the floor. 6. Slowly bring your chin toward your chest.  7. Hold for a count of 6, and then relax for up to 10 seconds. 8. Repeat 8 to 12 times. Active cervical rotation    5. Sit in a firm chair, or stand up straight. 6. Keeping your chin level, turn your head to the right, and hold for 15 to 30 seconds. 7. Turn your head to the left and hold for 15 to 30 seconds. 8. Repeat 2 to 4 times to each side.     Shoulder blade squeeze    5. While standing, squeeze your shoulder blades together. 6. Do not raise your shoulders up as you are squeezing. 7. Hold for 6 seconds. 8. Repeat 8 to 12 times. Shoulder rolls    1. Sit comfortably with your feet shoulder-width apart. You can also do this exercise standing up. 2. Roll your shoulders up, then back, and then down in a smooth, circular motion. 3. Repeat 2 to 4 times. Follow-up care is a key part of your treatment and safety. Be sure to make and go to all appointments, and call your doctor if you are having problems. It's also a good idea to know your test results and keep a list of the medicines you take. Where can you learn more? Go to [email or web address] org and sign in to your [employer] account. Enter F395 in the InSpa box to learn more about \"Neck Arthritis: Exercises. \"     If you do not have an account, please click on the \"Sign Up Now\" link. Current as of: September 20, 2018  Content Version: 12.0  © 6991-4594 [employer]. Care instructions adapted under license by [facility] ([facility]). If you have questions about a medical condition or this instruction, always ask your healthcare professional. [address] any warranty or liability for your use of this information.

## 2019-05-23 ENCOUNTER — TELEPHONE (OUTPATIENT)
Dept: PAIN MANAGEMENT | Age: 59
End: 2019-05-23

## 2019-05-23 DIAGNOSIS — M54.42 CHRONIC BILATERAL LOW BACK PAIN WITH BILATERAL SCIATICA: ICD-10-CM

## 2019-05-23 DIAGNOSIS — M51.36 DDD (DEGENERATIVE DISC DISEASE), LUMBAR: ICD-10-CM

## 2019-05-23 DIAGNOSIS — M54.41 CHRONIC BILATERAL LOW BACK PAIN WITH BILATERAL SCIATICA: ICD-10-CM

## 2019-05-23 DIAGNOSIS — M48.061 SPINAL STENOSIS OF LUMBAR REGION WITHOUT NEUROGENIC CLAUDICATION: ICD-10-CM

## 2019-05-23 DIAGNOSIS — M85.88 OSTEOPENIA OF LUMBAR SPINE: ICD-10-CM

## 2019-05-23 DIAGNOSIS — G89.4 CHRONIC PAIN SYNDROME: ICD-10-CM

## 2019-05-23 DIAGNOSIS — M48.061 LUMBAR FORAMINAL STENOSIS: ICD-10-CM

## 2019-05-23 DIAGNOSIS — G89.29 CHRONIC BILATERAL LOW BACK PAIN WITH BILATERAL SCIATICA: ICD-10-CM

## 2019-05-23 NOTE — TELEPHONE ENCOUNTER
Received PA for Lidocaine 5% patches . Medication is DENIED, Eliana [address] 139 letter attached. Please advise. Thank you.

## 2019-05-24 RX ORDER — LIDOCAINE 50 MG/G
1 PATCH TOPICAL DAILY
Qty: 30 PATCH | Refills: 0 | Status: SHIPPED | OUTPATIENT
Start: 2019-05-24 | End: 2019-06-19 | Stop reason: SDUPTHER

## 2019-05-28 ENCOUNTER — TELEPHONE (OUTPATIENT)
Dept: PAIN MANAGEMENT | Age: 59
End: 2019-05-28

## 2019-05-28 NOTE — TELEPHONE ENCOUNTER
Pt calling, states the OxyContin 10 mg with Roxicodone 5 mg don't do anything for her pain. She states she was taking Oxy IR 15 mg TID previously and it helped.  She is asking if we can give something different or give a higher dose of one? pls advise

## 2019-05-29 NOTE — TELEPHONE ENCOUNTER
Per PKA ok for patient to take an extra Roxicodone 5 mg during the day( TID per day). Called patient and advised, she states she will try it for now.

## 2019-06-10 RX ORDER — AMLODIPINE BESYLATE 10 MG/1
TABLET ORAL
Qty: 30 TABLET | Refills: 0 | Status: SHIPPED | OUTPATIENT
Start: 2019-06-10 | End: 2019-07-05 | Stop reason: SDUPTHER

## 2019-06-10 RX ORDER — VENLAFAXINE HYDROCHLORIDE 75 MG/1
CAPSULE, EXTENDED RELEASE ORAL
Qty: 90 CAPSULE | Refills: 0 | Status: SHIPPED | OUTPATIENT
Start: 2019-06-10 | End: 2019-07-05 | Stop reason: SDUPTHER

## 2019-06-19 ENCOUNTER — OFFICE VISIT (OUTPATIENT)
Dept: PAIN MANAGEMENT | Age: 59
End: 2019-06-19
Payer: COMMERCIAL

## 2019-06-19 VITALS
OXYGEN SATURATION: 94 % | BODY MASS INDEX: 39.47 KG/M2 | WEIGHT: 222.8 LBS | DIASTOLIC BLOOD PRESSURE: 69 MMHG | SYSTOLIC BLOOD PRESSURE: 126 MMHG | HEART RATE: 89 BPM

## 2019-06-19 DIAGNOSIS — M48.061 SPINAL STENOSIS OF LUMBAR REGION WITHOUT NEUROGENIC CLAUDICATION: ICD-10-CM

## 2019-06-19 DIAGNOSIS — M79.7 FIBROMYALGIA: ICD-10-CM

## 2019-06-19 DIAGNOSIS — G89.29 CHRONIC BILATERAL LOW BACK PAIN WITH BILATERAL SCIATICA: ICD-10-CM

## 2019-06-19 DIAGNOSIS — M47.22 OSTEOARTHRITIS OF SPINE WITH RADICULOPATHY, CERVICAL REGION: ICD-10-CM

## 2019-06-19 DIAGNOSIS — Z85.3 H/O MALIGNANT NEOPLASM OF BREAST: ICD-10-CM

## 2019-06-19 DIAGNOSIS — M51.36 DDD (DEGENERATIVE DISC DISEASE), LUMBAR: ICD-10-CM

## 2019-06-19 DIAGNOSIS — M48.061 LUMBAR FORAMINAL STENOSIS: ICD-10-CM

## 2019-06-19 DIAGNOSIS — F41.9 ANXIETY: ICD-10-CM

## 2019-06-19 DIAGNOSIS — G89.4 CHRONIC PAIN SYNDROME: ICD-10-CM

## 2019-06-19 DIAGNOSIS — G43.909 MIGRAINE WITHOUT STATUS MIGRAINOSUS, NOT INTRACTABLE, UNSPECIFIED MIGRAINE TYPE: ICD-10-CM

## 2019-06-19 DIAGNOSIS — F99 INSOMNIA DUE TO OTHER MENTAL DISORDER: ICD-10-CM

## 2019-06-19 DIAGNOSIS — M25.50 POLYARTHRALGIA: ICD-10-CM

## 2019-06-19 DIAGNOSIS — E66.01 SEVERE OBESITY (BMI 35.0-39.9) WITH COMORBIDITY (HCC): ICD-10-CM

## 2019-06-19 DIAGNOSIS — M62.830 MUSCLE SPASM OF BACK: ICD-10-CM

## 2019-06-19 DIAGNOSIS — F51.05 INSOMNIA DUE TO OTHER MENTAL DISORDER: ICD-10-CM

## 2019-06-19 DIAGNOSIS — M54.41 CHRONIC BILATERAL LOW BACK PAIN WITH BILATERAL SCIATICA: ICD-10-CM

## 2019-06-19 DIAGNOSIS — M54.42 CHRONIC BILATERAL LOW BACK PAIN WITH BILATERAL SCIATICA: ICD-10-CM

## 2019-06-19 DIAGNOSIS — M85.88 OSTEOPENIA OF LUMBAR SPINE: ICD-10-CM

## 2019-06-19 DIAGNOSIS — F33.1 MODERATE EPISODE OF RECURRENT MAJOR DEPRESSIVE DISORDER (HCC): ICD-10-CM

## 2019-06-19 PROCEDURE — 99213 OFFICE O/P EST LOW 20 MIN: CPT | Performed by: NURSE PRACTITIONER

## 2019-06-19 RX ORDER — TIZANIDINE 4 MG/1
4 TABLET ORAL DAILY
Qty: 30 TABLET | Refills: 0 | Status: SHIPPED | OUTPATIENT
Start: 2019-06-19 | End: 2019-07-15 | Stop reason: SDUPTHER

## 2019-06-19 RX ORDER — LIDOCAINE 50 MG/G
1 PATCH TOPICAL DAILY
Qty: 30 PATCH | Refills: 0 | Status: SHIPPED | OUTPATIENT
Start: 2019-06-19 | End: 2019-07-19

## 2019-06-19 RX ORDER — FLUOXETINE HYDROCHLORIDE 20 MG/1
CAPSULE ORAL
Qty: 30 CAPSULE | Refills: 0 | Status: SHIPPED | OUTPATIENT
Start: 2019-06-19 | End: 2019-07-05 | Stop reason: SDUPTHER

## 2019-06-19 RX ORDER — OXYCODONE HCL 10 MG/1
10 TABLET, FILM COATED, EXTENDED RELEASE ORAL EVERY 12 HOURS
Qty: 60 EACH | Refills: 0 | Status: SHIPPED | OUTPATIENT
Start: 2019-06-19 | End: 2019-07-15 | Stop reason: SDUPTHER

## 2019-06-19 RX ORDER — OXYCODONE HYDROCHLORIDE 5 MG/1
5 TABLET ORAL EVERY 12 HOURS PRN
Qty: 60 TABLET | Refills: 0 | Status: SHIPPED | OUTPATIENT
Start: 2019-06-19 | End: 2019-07-15 | Stop reason: SDUPTHER

## 2019-06-19 NOTE — PROGRESS NOTES
Gustavo Flynn  1960  [id number]      HISTORY OF PRESENT ILLNESS:  Ms. Keira Connelly is a 60 y.o. female returns for a follow up visit for pain management  She has a diagnosis of   1. Chronic pain syndrome    2. Fibromyalgia    3. DDD (degenerative disc disease), lumbar    4. Lumbar foraminal stenosis, severe    5. Chronic bilateral low back pain with bilateral sciatica    6. Spinal stenosis of lumbar region without neurogenic claudication    7. Osteopenia of lumbar spine    8. Muscle spasm of back    9. Polyarthralgia    10. Osteoarthritis of spine with radiculopathy, cervical region    11. Migraine without status migrainosus, not intractable, unspecified migraine type    12. H/O malignant neoplasm of breast    13. Anxiety    14. Moderate episode of recurrent major depressive disorder ([address]    15. Insomnia due to other mental disorder    16. Severe obesity (BMI 35.0-39. 9) with comorbidity ([address]      On the Patients Pain Assessment form:  She complains of pain in the both ankle(s): entire area, both buttocks, both foot/feet: entire area, both hand(s): entire area, head, both knee(s), bilateral lower back, neck, both shoulder(s): entire area and bilateral upper back She rates the pain 9/10 and describes it as sharp, aching. Current treatment regimen has helped relieve about 50% of the pain. She denies any side effects from the current pain regimen. Patient reports that since the last follow up visit the physical functioning is worse, family/social relationships are unchanged, mood is worse sleep patterns are worse, and that the overall functioning is worse. Patient denies misusing/abusing her narcotic pain medications or using any illegal drugs. There are No indicators for possible drug abuse, addiction or diversion problems. Upon obtaining medical history from Ms. Keira Connelly states that pain is not manageable on current pain therapy.  Believes she faired better on the previous therapy, current therapy does not seem to constipation, blood in stool and abdominal distention. PHYSICAL EXAM:   Nursing note and vitals reviewed. /69   Pulse 89   Wt 222 lb 12.8 oz (101.1 kg)   SpO2 94%   BMI 39.47 kg/m²   Constitutional: She appears well-developed and well-nourished. No acute distress. Skin: Skin is warm and dry, good turgor. No rash noted. She is not diaphoretic. Cardiovascular: Normal rate, regular rhythm, normal heart sounds, and does not have murmur. Pulmonary/Chest: Effort normal. No respiratory distress. She does not have wheezes in the lung fields. She has no rales. Neurological/Psychiatric:She is alert and oriented to person, place, and time. Coordination is  normal.  Her mood isAppropriate and affect is Neutral/Euthymic(normal) . IMPRESSION:   1. Chronic pain syndrome    2. Fibromyalgia    3. DDD (degenerative disc disease), lumbar    4. Lumbar foraminal stenosis, severe    5. Chronic bilateral low back pain with bilateral sciatica    6. Spinal stenosis of lumbar region without neurogenic claudication    7. Osteopenia of lumbar spine    8. Muscle spasm of back    9. Polyarthralgia    10. Osteoarthritis of spine with radiculopathy, cervical region    11. Migraine without status migrainosus, not intractable, unspecified migraine type    12. H/O malignant neoplasm of breast    13. Anxiety    14. Moderate episode of recurrent major depressive disorder ([address]    15. Insomnia due to other mental disorder    16. Severe obesity (BMI 35.0-39. 9) with comorbidity ([address]        PLAN:  Informed verbal consent was obtained  -Continue with Oxycontin, Roxicodone  -No changes were made to her opioids today  -Zanaflex 4 mg po daily  -Dinah exercises/ recommended patient f/u with PT  -TSH pending  -CBT techniques- relaxation therapies such as biofeedback, mindfulness based stress reduction, imagery, cognitive restructuring, problem solving discussed with patient  -She was advised weight reduction, diet changes- lumbar spine, Muscle spasm of back, Polyarthralgia, Osteoarthritis of spine with radiculopathy, cervical region, Migraine without status migrainosus, not intractable, unspecified migraine type, H/O malignant neoplasm of breast, Anxiety, Moderate episode of recurrent major depressive disorder ([address] Insomnia due to other mental disorder, and Severe obesity (BMI 35.0-39. 9) with comorbidity ([address] were pertinent to this visit. Risks and benefits of the medications and other alternative treatments  including no treatment were discussed with the patient. The common side effects of these medications were also explained to the patient. Informed verbal consent was obtained. Goals of current treatment regimen include improvement in pain, restoration of functioning- with focus on improvement in physical performance, general activity, work or disability,emotional distress, health care utilization and  decreased medication consumption. Will continue to monitor progress towards achieving/maintaining therapeutic goals with special emphasis on  1. Improvement in perceived interfernce  of pain with ADL's. Ability to do home exercises independently. Ability to do household chores indoor and/or outdoor work and social and leisure activities. Improve psychosocial and physical functioning. - she is showing progression towards this treatment goal with the current regimen. She was advised against drinking alcohol with the narcotic pain medicines, advised against driving or handling machinery while adjusting the dose of medicines or if having cognitive  issues related to the current medications. Risk of overdose and death, if medicines not taken as prescribed, were also discussed. If the patient develops new symptoms or if the symptoms worsen, the patient should call the office.     While transcribing every attempt was made to maintain the accuracy of the note in terms of it's contents,there may have been some errors made inadvertently. Thank you for allowing me to participate in the care of this patient.     Mike Dutton CNP    Cc: Lima Sands MD

## 2019-06-30 DIAGNOSIS — B37.2 INTERTRIGINOUS CANDIDIASIS: ICD-10-CM

## 2019-07-01 RX ORDER — NYSTATIN 100000 U/G
OINTMENT TOPICAL
Qty: 30 G | Refills: 1 | Status: SHIPPED | OUTPATIENT
Start: 2019-07-01 | End: 2019-07-19

## 2019-07-02 ENCOUNTER — TELEPHONE (OUTPATIENT)
Dept: PAIN MANAGEMENT | Age: 59
End: 2019-07-02

## 2019-07-05 ENCOUNTER — TELEPHONE (OUTPATIENT)
Dept: FAMILY MEDICINE CLINIC | Age: 59
End: 2019-07-05

## 2019-07-05 ENCOUNTER — OFFICE VISIT (OUTPATIENT)
Dept: FAMILY MEDICINE CLINIC | Age: 59
End: 2019-07-05
Payer: COMMERCIAL

## 2019-07-05 VITALS
DIASTOLIC BLOOD PRESSURE: 74 MMHG | BODY MASS INDEX: 39.87 KG/M2 | WEIGHT: 225 LBS | HEART RATE: 76 BPM | SYSTOLIC BLOOD PRESSURE: 114 MMHG | RESPIRATION RATE: 16 BRPM | HEIGHT: 63 IN | TEMPERATURE: 98.5 F

## 2019-07-05 DIAGNOSIS — E66.01 SEVERE OBESITY (BMI 35.0-39.9) WITH COMORBIDITY (HCC): ICD-10-CM

## 2019-07-05 DIAGNOSIS — M25.50 POLYARTHRALGIA: ICD-10-CM

## 2019-07-05 DIAGNOSIS — I10 ESSENTIAL HYPERTENSION: ICD-10-CM

## 2019-07-05 DIAGNOSIS — F33.1 MODERATE EPISODE OF RECURRENT MAJOR DEPRESSIVE DISORDER (HCC): ICD-10-CM

## 2019-07-05 DIAGNOSIS — K58.0 IRRITABLE BOWEL SYNDROME WITH DIARRHEA: ICD-10-CM

## 2019-07-05 DIAGNOSIS — K31.84 DIABETIC GASTROPARESIS (HCC): ICD-10-CM

## 2019-07-05 DIAGNOSIS — E78.5 HYPERLIPIDEMIA LDL GOAL <100: ICD-10-CM

## 2019-07-05 DIAGNOSIS — F41.9 ANXIETY: ICD-10-CM

## 2019-07-05 DIAGNOSIS — E11.43 DIABETIC GASTROPARESIS (HCC): ICD-10-CM

## 2019-07-05 PROBLEM — E87.29 METABOLIC ACIDOSIS, INCREASED ANION GAP (IAG): Status: RESOLVED | Noted: 2018-08-24 | Resolved: 2019-07-05

## 2019-07-05 LAB — HBA1C MFR BLD: 8.5 %

## 2019-07-05 PROCEDURE — 83036 HEMOGLOBIN GLYCOSYLATED A1C: CPT | Performed by: FAMILY MEDICINE

## 2019-07-05 PROCEDURE — 99214 OFFICE O/P EST MOD 30 MIN: CPT | Performed by: FAMILY MEDICINE

## 2019-07-05 RX ORDER — LISINOPRIL 40 MG/1
TABLET ORAL
Qty: 90 TABLET | Refills: 1 | Status: SHIPPED | OUTPATIENT
Start: 2019-07-05 | End: 2020-02-03

## 2019-07-05 RX ORDER — FLUOXETINE HYDROCHLORIDE 20 MG/1
CAPSULE ORAL
Qty: 90 CAPSULE | Refills: 1 | Status: SHIPPED | OUTPATIENT
Start: 2019-07-05 | End: 2019-07-16 | Stop reason: SDUPTHER

## 2019-07-05 RX ORDER — DICYCLOMINE HCL 20 MG
20 TABLET ORAL EVERY 6 HOURS PRN
Qty: 60 TABLET | Refills: 3 | Status: SHIPPED | OUTPATIENT
Start: 2019-07-05 | End: 2022-04-07

## 2019-07-05 RX ORDER — PRAVASTATIN SODIUM 40 MG
1 TABLET ORAL
COMMUNITY
End: 2020-08-05

## 2019-07-05 RX ORDER — GABAPENTIN 600 MG/1
TABLET ORAL
Qty: 120 TABLET | Refills: 2 | Status: SHIPPED | OUTPATIENT
Start: 2019-07-05 | End: 2019-10-22 | Stop reason: SDUPTHER

## 2019-07-05 RX ORDER — AMLODIPINE BESYLATE 10 MG/1
TABLET ORAL
Qty: 90 TABLET | Refills: 1 | Status: SHIPPED | OUTPATIENT
Start: 2019-07-05 | End: 2019-12-26 | Stop reason: SDUPTHER

## 2019-07-05 RX ORDER — QUETIAPINE FUMARATE 50 MG/1
TABLET, EXTENDED RELEASE ORAL
Qty: 45 TABLET | Refills: 1 | Status: SHIPPED | OUTPATIENT
Start: 2019-07-05 | End: 2019-07-08

## 2019-07-05 RX ORDER — VENLAFAXINE HYDROCHLORIDE 75 MG/1
CAPSULE, EXTENDED RELEASE ORAL
Qty: 270 CAPSULE | Refills: 1 | Status: SHIPPED | OUTPATIENT
Start: 2019-07-05 | End: 2019-11-09 | Stop reason: SDUPTHER

## 2019-07-05 RX ORDER — ATORVASTATIN CALCIUM 80 MG/1
80 TABLET, FILM COATED ORAL DAILY
Qty: 90 TABLET | Refills: 1 | Status: SHIPPED | OUTPATIENT
Start: 2019-07-05 | End: 2020-06-10

## 2019-07-05 NOTE — PROGRESS NOTES
DIABETES MELLITUS FOLOW-UP  Subjective:      Chief Complaint   Patient presents with    Diabetes     Wava Dancer is an 60 y.o. female who presents for follow up of following chronic problems:  1. DM (diabetes mellitus), type 2, uncontrolled with complications ([address]    2. Essential hypertension    3. Diabetic gastroparesis ([address]    4. Severe obesity (BMI 35.0-39. 9) with comorbidity ([address]    5. Hyperlipidemia LDL goal <100    6. Irritable bowel syndrome with diarrhea    7. Anxiety    8. Uncontrolled type 2 diabetes mellitus with complication, with long-term current use of insulin ([address]    9. Moderate episode of recurrent major depressive disorder ([facility]    10. Polyarthralgia      Complaints: once in a while her stomach will hurt other than that she is doing ok. Seeing pain specialist now. · Patient checks sugars 3  time(s) daily. Average: 148. Range: 160. · Patent follows diabetic diet? Sometimes  · Exercise: walking intermittently  · Taking medicines daily as directed? Yes  · Is the patient reporting any side effects of medications? Yes dry mouth almost like thrust   · Patient checks bottom of feet daily? No  · Tobacco history updated:  reports that she has never smoked. She has never used smokeless tobacco.    A1c is 8.5 today. (improved)    Review of Systems   General ROS: fever? No,   Night sweats? No  Ophthalmic ROS: change in vision? No  Endocrine ROS: fatigue? No  Unexpected weight changes? No  Respiratory ROS: Shortness of breath? No  Cardiovascular ROS: chest pain? No  Gastrointestinal ROS: abdominal pain? Yes, LUQ cramping  Change in stools? Yes, loose stools x 3 day  Genito-Urinary ROS: painful urination? No  Trouble urinating? No  Neurological ROS: TIA or stroke symptoms? No  Numbness/tingling? No  Dermatological ROS: rash or sores on feet? No  Changes in skin spots?     No     * Documentation provided by medical assistant reviewed and updated by Yes Venkatesh Esteban MD   TRUEPLUS LANCETS 33G MISC three times per day Yes Venkatesh Esteban MD   tolterodine (DETROL LA) 4 MG extended release capsule TAKE ONE CAPSULE BY MOUTH DAILY Yes Lynette Harper MD   omeprazole (PRILOSEC) 40 MG delayed release capsule  Yes Historical Provider, MD   ibuprofen (ADVIL;MOTRIN) 400 MG tablet Take 1 tablet by mouth every 8 hours as needed for Pain Yes Silviano Yang MD   metoprolol succinate (TOPROL XL) 50 MG extended release tablet TAKE ONE TABLET BY MOUTH DAILY Yes Venkatesh Esteban MD   Insulin Pen Needle (B-D UF III MINI PEN NEEDLES) 31G X 5 MM MISC Apply 1 each topically 2 times daily Yes Venkatesh Esteban MD   fluticasone (FLONASE) 50 MCG/ACT nasal spray PLACE 2 SPRAYS IN EACH NOSTRIL DAILY Yes Venkatesh Esteban MD   busPIRone (BUSPAR) 15 MG tablet Take 1/3 tab BID for 5 days, then 1/2 tab BID for 5 days, then 2/3 tab BID for 5 days, then 1 po BID. Yes Venkatesh Esteban MD   metroNIDAZOLE (METROGEL) 0.75 % gel Apply topically 2 times daily. Yes Venkatesh Esteban MD   anastrozole (ARIMIDEX) 1 MG tablet TAKE ONE TABLET BY MOUTH DAILY Yes Magdiel Griggs MD   mupirocin (BACTROBAN) 2 % ointment APPLY THREE TIMES A DAY AS NEEDED Yes Venkatesh Esteban MD   B-D INS SYR HALF-UNIT .3CC/31G 31G X 5/16\" 0.3 ML MISC USE AS DIRECTED TWO TIMES A DAY Yes Gi Benitez MD   MICROLET LANCETS MISC 1 each by Does not apply route 6 times daily. Yes Venkatesh Esteban MD   lubiprostone (AMITIZA) 8 MCG CAPS capsule Take 8 mcg by mouth 2 times daily (with meals).  Yes Historical Provider, MD      Family History   Problem Relation Age of Onset    Cancer Mother         forehead soft tissue    Diabetes Mother     Arthritis Father     High Blood Pressure Father     High Cholesterol Father     Alzheimer's Disease Father     Diabetes Brother     Depression Sister      Objective:   PHYSICAL EXAM   /74 (Site: Right Upper Arm, Position: Sitting, Cuff Size: Large Adult)   Pulse 76   Temp 98.5 °F (36.9 °C) (Oral)

## 2019-07-08 RX ORDER — QUETIAPINE FUMARATE 25 MG/1
25 TABLET, FILM COATED ORAL NIGHTLY
Qty: 90 TABLET | Refills: 1 | Status: SHIPPED | OUTPATIENT
Start: 2019-07-08 | End: 2019-12-20

## 2019-07-15 ENCOUNTER — TELEPHONE (OUTPATIENT)
Dept: PAIN MANAGEMENT | Age: 59
End: 2019-07-15

## 2019-07-15 DIAGNOSIS — M25.50 POLYARTHRALGIA: ICD-10-CM

## 2019-07-15 DIAGNOSIS — M51.36 DDD (DEGENERATIVE DISC DISEASE), LUMBAR: ICD-10-CM

## 2019-07-15 DIAGNOSIS — M48.061 SPINAL STENOSIS OF LUMBAR REGION WITHOUT NEUROGENIC CLAUDICATION: ICD-10-CM

## 2019-07-15 DIAGNOSIS — M79.7 FIBROMYALGIA: ICD-10-CM

## 2019-07-15 DIAGNOSIS — M54.41 CHRONIC BILATERAL LOW BACK PAIN WITH BILATERAL SCIATICA: ICD-10-CM

## 2019-07-15 DIAGNOSIS — M85.88 OSTEOPENIA OF LUMBAR SPINE: ICD-10-CM

## 2019-07-15 DIAGNOSIS — M54.42 CHRONIC BILATERAL LOW BACK PAIN WITH BILATERAL SCIATICA: ICD-10-CM

## 2019-07-15 DIAGNOSIS — G89.29 CHRONIC BILATERAL LOW BACK PAIN WITH BILATERAL SCIATICA: ICD-10-CM

## 2019-07-15 DIAGNOSIS — M47.22 OSTEOARTHRITIS OF SPINE WITH RADICULOPATHY, CERVICAL REGION: ICD-10-CM

## 2019-07-15 DIAGNOSIS — M48.061 LUMBAR FORAMINAL STENOSIS: ICD-10-CM

## 2019-07-15 DIAGNOSIS — G89.4 CHRONIC PAIN SYNDROME: ICD-10-CM

## 2019-07-15 DIAGNOSIS — G43.909 MIGRAINE WITHOUT STATUS MIGRAINOSUS, NOT INTRACTABLE, UNSPECIFIED MIGRAINE TYPE: ICD-10-CM

## 2019-07-15 DIAGNOSIS — M62.830 MUSCLE SPASM OF BACK: ICD-10-CM

## 2019-07-15 RX ORDER — TIZANIDINE 4 MG/1
4 TABLET ORAL DAILY
Qty: 30 TABLET | Refills: 0 | Status: SHIPPED | OUTPATIENT
Start: 2019-07-15 | End: 2019-07-24 | Stop reason: SDUPTHER

## 2019-07-15 NOTE — TELEPHONE ENCOUNTER
Pt is calling asking for a refill on the pended meds. Pt states that she was told to call for refills she has a f/u on 7/24/19.

## 2019-07-16 DIAGNOSIS — F41.9 ANXIETY: ICD-10-CM

## 2019-07-16 RX ORDER — OXYCODONE HYDROCHLORIDE 5 MG/1
5 TABLET ORAL EVERY 12 HOURS PRN
Qty: 10 TABLET | Refills: 0 | Status: SHIPPED | OUTPATIENT
Start: 2019-07-16 | End: 2019-07-24 | Stop reason: SDUPTHER

## 2019-07-16 RX ORDER — OXYCODONE HCL 10 MG/1
10 TABLET, FILM COATED, EXTENDED RELEASE ORAL EVERY 12 HOURS
Qty: 10 EACH | Refills: 0 | Status: SHIPPED | OUTPATIENT
Start: 2019-07-16 | End: 2019-07-24 | Stop reason: SDUPTHER

## 2019-07-18 RX ORDER — FLUOXETINE HYDROCHLORIDE 20 MG/1
CAPSULE ORAL
Qty: 30 CAPSULE | Refills: 0 | Status: SHIPPED | OUTPATIENT
Start: 2019-07-18 | End: 2020-03-17

## 2019-07-19 RX ORDER — NYSTATIN 100000 U/G
OINTMENT TOPICAL
Qty: 30 G | Refills: 1 | Status: SHIPPED | OUTPATIENT
Start: 2019-07-19 | End: 2020-05-04

## 2019-07-24 ENCOUNTER — OFFICE VISIT (OUTPATIENT)
Dept: PAIN MANAGEMENT | Age: 59
End: 2019-07-24
Payer: COMMERCIAL

## 2019-07-24 VITALS
DIASTOLIC BLOOD PRESSURE: 88 MMHG | HEART RATE: 86 BPM | WEIGHT: 221.4 LBS | BODY MASS INDEX: 39.22 KG/M2 | SYSTOLIC BLOOD PRESSURE: 147 MMHG | OXYGEN SATURATION: 96 %

## 2019-07-24 DIAGNOSIS — F51.05 INSOMNIA DUE TO OTHER MENTAL DISORDER: ICD-10-CM

## 2019-07-24 DIAGNOSIS — M79.7 FIBROMYALGIA: ICD-10-CM

## 2019-07-24 DIAGNOSIS — G43.909 MIGRAINE WITHOUT STATUS MIGRAINOSUS, NOT INTRACTABLE, UNSPECIFIED MIGRAINE TYPE: ICD-10-CM

## 2019-07-24 DIAGNOSIS — F99 INSOMNIA DUE TO OTHER MENTAL DISORDER: ICD-10-CM

## 2019-07-24 DIAGNOSIS — M48.061 LUMBAR FORAMINAL STENOSIS: ICD-10-CM

## 2019-07-24 DIAGNOSIS — Z85.3 H/O MALIGNANT NEOPLASM OF BREAST: ICD-10-CM

## 2019-07-24 DIAGNOSIS — F33.1 MODERATE EPISODE OF RECURRENT MAJOR DEPRESSIVE DISORDER (HCC): ICD-10-CM

## 2019-07-24 DIAGNOSIS — M51.36 DDD (DEGENERATIVE DISC DISEASE), LUMBAR: ICD-10-CM

## 2019-07-24 DIAGNOSIS — M85.88 OSTEOPENIA OF LUMBAR SPINE: ICD-10-CM

## 2019-07-24 DIAGNOSIS — E66.01 SEVERE OBESITY (BMI 35.0-39.9) WITH COMORBIDITY (HCC): ICD-10-CM

## 2019-07-24 DIAGNOSIS — M62.830 MUSCLE SPASM OF BACK: ICD-10-CM

## 2019-07-24 DIAGNOSIS — M48.061 SPINAL STENOSIS OF LUMBAR REGION WITHOUT NEUROGENIC CLAUDICATION: ICD-10-CM

## 2019-07-24 DIAGNOSIS — M25.50 POLYARTHRALGIA: ICD-10-CM

## 2019-07-24 DIAGNOSIS — M54.41 CHRONIC BILATERAL LOW BACK PAIN WITH BILATERAL SCIATICA: ICD-10-CM

## 2019-07-24 DIAGNOSIS — M47.22 OSTEOARTHRITIS OF SPINE WITH RADICULOPATHY, CERVICAL REGION: ICD-10-CM

## 2019-07-24 DIAGNOSIS — G89.4 CHRONIC PAIN SYNDROME: ICD-10-CM

## 2019-07-24 DIAGNOSIS — M54.42 CHRONIC BILATERAL LOW BACK PAIN WITH BILATERAL SCIATICA: ICD-10-CM

## 2019-07-24 DIAGNOSIS — G89.29 CHRONIC BILATERAL LOW BACK PAIN WITH BILATERAL SCIATICA: ICD-10-CM

## 2019-07-24 DIAGNOSIS — F41.9 ANXIETY: ICD-10-CM

## 2019-07-24 PROCEDURE — 99213 OFFICE O/P EST LOW 20 MIN: CPT | Performed by: NURSE PRACTITIONER

## 2019-07-24 RX ORDER — OXYCODONE HCL 10 MG/1
10 TABLET, FILM COATED, EXTENDED RELEASE ORAL EVERY 12 HOURS
Qty: 60 EACH | Refills: 0 | Status: SHIPPED | OUTPATIENT
Start: 2019-07-24 | End: 2019-08-21 | Stop reason: SDUPTHER

## 2019-07-24 RX ORDER — OXYCODONE HYDROCHLORIDE 5 MG/1
5 TABLET ORAL EVERY 12 HOURS PRN
Qty: 60 TABLET | Refills: 0 | Status: SHIPPED | OUTPATIENT
Start: 2019-07-24 | End: 2019-08-21 | Stop reason: SDUPTHER

## 2019-07-24 RX ORDER — TIZANIDINE 4 MG/1
4 TABLET ORAL DAILY
Qty: 30 TABLET | Refills: 0 | Status: SHIPPED | OUTPATIENT
Start: 2019-07-24 | End: 2019-08-21 | Stop reason: SDUPTHER

## 2019-07-24 NOTE — PATIENT INSTRUCTIONS
[email or web address] and sign in to your [employer] account. Enter M274 in the Shmoophire box to learn more about \"Back Stretches: Exercises. \"     If you do not have an account, please click on the \"Sign Up Now\" link. Current as of: September 20, 2018  Content Version: 12.0  © 7186-7988 Healthwise, Incorporated. Care instructions adapted under license by [facility] ([facility]). If you have questions about a medical condition or this instruction, always ask your healthcare professional. [address] any warranty or liability for your use of this information.

## 2019-07-24 NOTE — PROGRESS NOTES
Jasmin Holly Hill  1960  [id number]      HISTORY OF PRESENT ILLNESS:  Ms. Anupama Wheeler is a 60 y.o. female returns for a follow up visit for pain management  She has a diagnosis of   1. Chronic pain syndrome    2. Fibromyalgia    3. DDD (degenerative disc disease), lumbar    4. Lumbar foraminal stenosis, severe    5. Chronic bilateral low back pain with bilateral sciatica    6. Spinal stenosis of lumbar region without neurogenic claudication    7. Osteopenia of lumbar spine    8. Polyarthralgia    9. Osteoarthritis of spine with radiculopathy, cervical region    10. Migraine without status migrainosus, not intractable, unspecified migraine type    11. Muscle spasm of back    12. H/O malignant neoplasm of breast    13. Anxiety    14. Moderate episode of recurrent major depressive disorder ([address]    15. Insomnia due to other mental disorder    16. Severe obesity (BMI 35.0-39. 9) with comorbidity ([address]      On the Patients Pain Assessment form:  She complains of pain in the neck and hips She rates the pain 8/10 and describes it as sharp, aching. Current treatment regimen has helped relieve about 50% of the pain. She denies any side effects from the current pain regimen. Patient reports that since the last follow up visit the physical functioning is unchanged, family/social relationships are better, mood is unchanged sleep patterns are better, and that the overall functioning is unchanged. Patient denies misusing/abusing her narcotic pain medications or using any illegal drugs. There are No indicators for possible drug abuse, addiction or diversion problems. Upon obtaining medical history from Ms. Anupama Wheeler states that pain is manageable on current pain therapy. Takes medications as prescribed. Mood is stable without anxiety. Sleep is fair with an average of 5-6 hours. Denies to having issues of constipation. Tolerating activities/house chores with moderate tenderness to the lower back.     ALLERGIES: Patients list of allergies

## 2019-08-21 ENCOUNTER — OFFICE VISIT (OUTPATIENT)
Dept: PAIN MANAGEMENT | Age: 59
End: 2019-08-21
Payer: COMMERCIAL

## 2019-08-21 VITALS
BODY MASS INDEX: 38.23 KG/M2 | SYSTOLIC BLOOD PRESSURE: 161 MMHG | WEIGHT: 215.8 LBS | OXYGEN SATURATION: 97 % | DIASTOLIC BLOOD PRESSURE: 79 MMHG | HEART RATE: 91 BPM

## 2019-08-21 DIAGNOSIS — G89.4 CHRONIC PAIN SYNDROME: ICD-10-CM

## 2019-08-21 DIAGNOSIS — F41.9 ANXIETY: ICD-10-CM

## 2019-08-21 DIAGNOSIS — M48.061 LUMBAR FORAMINAL STENOSIS: ICD-10-CM

## 2019-08-21 DIAGNOSIS — M47.22 OSTEOARTHRITIS OF SPINE WITH RADICULOPATHY, CERVICAL REGION: ICD-10-CM

## 2019-08-21 DIAGNOSIS — G89.29 CHRONIC BILATERAL LOW BACK PAIN WITH BILATERAL SCIATICA: ICD-10-CM

## 2019-08-21 DIAGNOSIS — F99 INSOMNIA DUE TO OTHER MENTAL DISORDER: ICD-10-CM

## 2019-08-21 DIAGNOSIS — M51.36 DDD (DEGENERATIVE DISC DISEASE), LUMBAR: ICD-10-CM

## 2019-08-21 DIAGNOSIS — M62.830 MUSCLE SPASM OF BACK: ICD-10-CM

## 2019-08-21 DIAGNOSIS — M25.50 POLYARTHRALGIA: ICD-10-CM

## 2019-08-21 DIAGNOSIS — Z85.3 H/O MALIGNANT NEOPLASM OF BREAST: ICD-10-CM

## 2019-08-21 DIAGNOSIS — M48.061 SPINAL STENOSIS OF LUMBAR REGION WITHOUT NEUROGENIC CLAUDICATION: ICD-10-CM

## 2019-08-21 DIAGNOSIS — M54.41 CHRONIC BILATERAL LOW BACK PAIN WITH BILATERAL SCIATICA: ICD-10-CM

## 2019-08-21 DIAGNOSIS — M79.7 FIBROMYALGIA: ICD-10-CM

## 2019-08-21 DIAGNOSIS — G43.909 MIGRAINE WITHOUT STATUS MIGRAINOSUS, NOT INTRACTABLE, UNSPECIFIED MIGRAINE TYPE: ICD-10-CM

## 2019-08-21 DIAGNOSIS — M85.88 OSTEOPENIA OF LUMBAR SPINE: ICD-10-CM

## 2019-08-21 DIAGNOSIS — F33.1 MODERATE EPISODE OF RECURRENT MAJOR DEPRESSIVE DISORDER (HCC): ICD-10-CM

## 2019-08-21 DIAGNOSIS — F51.05 INSOMNIA DUE TO OTHER MENTAL DISORDER: ICD-10-CM

## 2019-08-21 DIAGNOSIS — E66.01 SEVERE OBESITY (BMI 35.0-39.9) WITH COMORBIDITY (HCC): ICD-10-CM

## 2019-08-21 DIAGNOSIS — M54.42 CHRONIC BILATERAL LOW BACK PAIN WITH BILATERAL SCIATICA: ICD-10-CM

## 2019-08-21 PROCEDURE — 99213 OFFICE O/P EST LOW 20 MIN: CPT | Performed by: NURSE PRACTITIONER

## 2019-08-21 RX ORDER — TIZANIDINE 4 MG/1
4 TABLET ORAL DAILY
Qty: 30 TABLET | Refills: 1 | Status: SHIPPED | OUTPATIENT
Start: 2019-08-21 | End: 2019-09-25 | Stop reason: SDUPTHER

## 2019-08-21 RX ORDER — OXYCODONE HCL 10 MG/1
10 TABLET, FILM COATED, EXTENDED RELEASE ORAL EVERY 12 HOURS
Qty: 10 EACH | Refills: 0 | Status: SHIPPED | OUTPATIENT
Start: 2019-08-21 | End: 2019-09-25 | Stop reason: SDUPTHER

## 2019-08-21 RX ORDER — TIZANIDINE 4 MG/1
4 TABLET ORAL DAILY
Qty: 30 TABLET | Refills: 0 | Status: SHIPPED | OUTPATIENT
Start: 2019-08-21 | End: 2019-08-21 | Stop reason: SDUPTHER

## 2019-08-21 RX ORDER — OXYCODONE HYDROCHLORIDE 5 MG/1
5 TABLET ORAL EVERY 12 HOURS PRN
Qty: 10 TABLET | Refills: 0 | Status: SHIPPED | OUTPATIENT
Start: 2019-08-21 | End: 2019-09-25 | Stop reason: SDUPTHER

## 2019-08-21 RX ORDER — OXYCODONE HYDROCHLORIDE 5 MG/1
5 TABLET ORAL EVERY 12 HOURS PRN
Qty: 60 TABLET | Refills: 0 | Status: SHIPPED | OUTPATIENT
Start: 2019-08-21 | End: 2019-08-21 | Stop reason: SDUPTHER

## 2019-08-21 RX ORDER — OXYCODONE HCL 10 MG/1
10 TABLET, FILM COATED, EXTENDED RELEASE ORAL EVERY 12 HOURS
Qty: 60 EACH | Refills: 0 | Status: SHIPPED | OUTPATIENT
Start: 2019-08-21 | End: 2019-08-21 | Stop reason: SDUPTHER

## 2019-08-21 NOTE — PATIENT INSTRUCTIONS
Patient Education        Back Stretches: Exercises  Introduction  Here are some examples of exercises for stretching your back. Start each exercise slowly. Ease off the exercise if you start to have pain. Your doctor or physical therapist will tell you when you can start these exercises and which ones will work best for you. How to do the exercises  Overhead stretch    1. Stand comfortably with your feet shoulder-width apart. 2. Looking straight ahead, raise both arms over your head and reach toward the ceiling. Do not allow your head to tilt back. 3. Hold for 15 to 30 seconds, then lower your arms to your sides. 4. Repeat 2 to 4 times. Side stretch    1. Stand comfortably with your feet shoulder-width apart. 2. Raise one arm over your head, and then lean to the other side. 3. Slide your hand down your leg as you let the weight of your arm gently stretch your side muscles. Hold for 15 to 30 seconds. 4. Repeat 2 to 4 times on each side. Press-up    1. Lie on your stomach, supporting your body with your forearms. 2. Press your elbows down into the floor to raise your upper back. As you do this, relax your stomach muscles and allow your back to arch without using your back muscles. As your press up, do not let your hips or pelvis come off the floor. 3. Hold for 15 to 30 seconds, then relax. 4. Repeat 2 to 4 times. Relax and rest    1. Lie on your back with a rolled towel under your neck and a pillow under your knees. Extend your arms comfortably to your sides. 2. Relax and breathe normally. 3. Remain in this position for about 10 minutes. 4. If you can, do this 2 or 3 times each day. Follow-up care is a key part of your treatment and safety. Be sure to make and go to all appointments, and call your doctor if you are having problems. It's also a good idea to know your test results and keep a list of the medicines you take. Where can you learn more?   Go to [email or web address] and sign in to your [employer] account. Enter G319 in the [employer] box to learn more about \"Back Stretches: Exercises. \"     If you do not have an account, please click on the \"Sign Up Now\" link. Current as of: September 20, 2018  Content Version: 12.1  © 6455-2056 Healthwise, Incorporated. Care instructions adapted under license by [facility] ([facility]). If you have questions about a medical condition or this instruction, always ask your healthcare professional. [address] any warranty or liability for your use of this information.

## 2019-08-21 NOTE — PROGRESS NOTES
the lower back. ALLERGIES: Patients list of allergies were reviewed     MEDICATIONS: Ms. Eleanor Velez list of medications were reviewed. Her current medications are   Outpatient Medications Prior to Visit   Medication Sig Dispense Refill    nystatin (MYCOSTATIN) 077734 UNIT/GM ointment APPLY TO AFFECTED AREA(S) TWO TIMES A DAY AS NEEDED 30 g 1    FLUoxetine (PROZAC) 20 MG capsule TAKE ONE CAPSULE BY MOUTH DAILY 30 capsule 0    QUEtiapine (SEROQUEL) 25 MG tablet Take 1 tablet by mouth nightly 90 tablet 1    pravastatin (PRAVACHOL) 40 MG tablet Take 1 tablet by mouth      dicyclomine (BENTYL) 20 MG tablet Take 1 tablet by mouth every 6 hours as needed (cramping) 60 tablet 3    amLODIPine (NORVASC) 10 MG tablet TAKE ONE TABLET BY MOUTH DAILY 90 tablet 1    venlafaxine (EFFEXOR XR) 75 MG extended release capsule TAKE THREE CAPSULES BY MOUTH DAILY 270 capsule 1    insulin aspart protamine-insulin aspart (NOVOLOG MIX 70/30 FLEXPEN) (70-30) 100 UNIT/ML injection 100 units in AM, 45 units in PM 30 pen 1    atorvastatin (LIPITOR) 80 MG tablet Take 1 tablet by mouth daily 90 tablet 1    lisinopril (PRINIVIL;ZESTRIL) 40 MG tablet TAKE ONE TABLET BY MOUTH DAILY 90 tablet 1    gabapentin (NEURONTIN) 600 MG tablet TAKE ONE TABLET BY MOUTH EVERY MORNING AND TAKE ONE TABLET BY MOUTH EVERY EVENING AND TAKE TWO TABLETS BY MOUTH EVERY NIGHT AT BEDTIME 120 tablet 2    insulin aspart (NOVOLOG FLEXPEN) 100 UNIT/ML injection pen With dinner; Glucose < 180 no coverage; 181-230 take 5 units; 231-280 take 10 units; > 281 take 15 units. 5 pen 3    blood glucose test strips (ASCENSIA AUTODISC VI;ONE TOUCH ULTRA TEST VI) strip True Metrix.  three times per day 300 strip 3    Blood Glucose Monitoring Suppl (TRUE METRIX METER) w/Device KIT three times per day 1 kit 0    TRUEPLUS LANCETS 33G MISC three times per day 300 each 3    tolterodine (DETROL LA) 4 MG extended release capsule TAKE ONE CAPSULE BY MOUTH DAILY 30 capsule 10    omeprazole (PRILOSEC) 40 MG delayed release capsule       ibuprofen (ADVIL;MOTRIN) 400 MG tablet Take 1 tablet by mouth every 8 hours as needed for Pain 15 tablet 0    metoprolol succinate (TOPROL XL) 50 MG extended release tablet TAKE ONE TABLET BY MOUTH DAILY 90 tablet 3    Insulin Pen Needle (B-D UF III MINI PEN NEEDLES) 31G X 5 MM MISC Apply 1 each topically 2 times daily 200 each 3    fluticasone (FLONASE) 50 MCG/ACT nasal spray PLACE 2 SPRAYS IN EACH NOSTRIL DAILY 16 g 10    busPIRone (BUSPAR) 15 MG tablet Take 1/3 tab BID for 5 days, then 1/2 tab BID for 5 days, then 2/3 tab BID for 5 days, then 1 po BID. 60 tablet 1    metroNIDAZOLE (METROGEL) 0.75 % gel Apply topically 2 times daily. 1 Tube 3    anastrozole (ARIMIDEX) 1 MG tablet TAKE ONE TABLET BY MOUTH DAILY 30 tablet 5    mupirocin (BACTROBAN) 2 % ointment APPLY THREE TIMES A DAY AS NEEDED 22 g 2    B-D INS SYR HALF-UNIT .3CC/31G 31G X 5/16\" 0.3 ML MISC USE AS DIRECTED TWO TIMES A  each 0    MICROLET LANCETS MISC 1 each by Does not apply route 6 times daily. 600 each 3    lubiprostone (AMITIZA) 8 MCG CAPS capsule Take 8 mcg by mouth 2 times daily (with meals).  oxyCODONE (ROXICODONE) 5 MG immediate release tablet Take 1 tablet by mouth every 12 hours as needed for Pain for up to 30 days. 60 tablet 0    oxyCODONE (OXYCONTIN) 10 MG extended release tablet Take 1 tablet by mouth every 12 hours for 30 days. 60 each 0    tiZANidine (ZANAFLEX) 4 MG tablet Take 1 tablet by mouth daily 30 tablet 0     No facility-administered medications prior to visit. SOCIAL/FAMILY/PAST MEDICAL HISTORY: Ms. Richardson Rater, family and past medical history was reviewed. REVIEW OF SYSTEMS:    Respiratory: Negative for apnea, chest tightness and shortness of breath or change in baseline breathing. Gastrointestinal: Negative for nausea, vomiting, abdominal pain, diarrhea, constipation, blood in stool and abdominal distention.        PHYSICAL EXAM:

## 2019-08-26 ENCOUNTER — OFFICE VISIT (OUTPATIENT)
Dept: FAMILY MEDICINE CLINIC | Age: 59
End: 2019-08-26
Payer: COMMERCIAL

## 2019-08-26 VITALS
TEMPERATURE: 98.6 F | HEART RATE: 77 BPM | WEIGHT: 219 LBS | DIASTOLIC BLOOD PRESSURE: 70 MMHG | BODY MASS INDEX: 38.79 KG/M2 | RESPIRATION RATE: 18 BRPM | SYSTOLIC BLOOD PRESSURE: 118 MMHG

## 2019-08-26 DIAGNOSIS — J02.9 SORE THROAT: Primary | ICD-10-CM

## 2019-08-26 DIAGNOSIS — L73.9 FOLLICULITIS: ICD-10-CM

## 2019-08-26 LAB — S PYO AG THROAT QL: NORMAL

## 2019-08-26 PROCEDURE — 87880 STREP A ASSAY W/OPTIC: CPT | Performed by: NURSE PRACTITIONER

## 2019-08-26 PROCEDURE — 99214 OFFICE O/P EST MOD 30 MIN: CPT | Performed by: NURSE PRACTITIONER

## 2019-08-26 ASSESSMENT — ENCOUNTER SYMPTOMS
CONSTIPATION: 0
SINUS PAIN: 0
VOMITING: 0
SINUS PRESSURE: 0
RHINORRHEA: 0
DIARRHEA: 0
ABDOMINAL PAIN: 0
SHORTNESS OF BREATH: 0
SORE THROAT: 1
COUGH: 1
NAUSEA: 0
CHEST TIGHTNESS: 0

## 2019-08-26 NOTE — PROGRESS NOTES
supple. Cardiovascular: Normal rate, regular rhythm, normal heart sounds and normal pulses. Pulmonary/Chest: Effort normal and breath sounds normal. No stridor. She has no decreased breath sounds. She has no wheezes. She has no rhonchi. She has no rales. Abdominal: Soft. Normal appearance. Musculoskeletal: Normal range of motion. Neurological: She is alert and oriented to person, place, and time. No cranial nerve deficit or sensory deficit. Skin: Skin is warm and dry. Scattered scabbed lesions noted over patient's entire face with evidence of excoriation. Psychiatric: She has a normal mood and affect. Her speech is normal and behavior is normal. Judgment and thought content normal. Cognition and memory are normal.   Nursing note and vitals reviewed. Dedra Villaotro was seen today for pharyngitis and rash noted over her face. History/physical exam were performed showing scabbed lesions on the patient's face, indicative of folliculitis. HEENT/Pulmonary exam were within normal limits. A rapid strep test was ran in office and came back negative. She will be given bactroban ointment for her face to be used twice daily for seven days and should follow back if symptoms do not improve. Diagnoses and all orders for this visit:  Sore throat  -     POCT rapid strep A: Negative   -     THROAT CULTURE: Awaiting results, will notify the patient. - Advised to stay hydrated with water.   -     Can take OTC Biotene for dry mouth PRN        -     Lozenges/warm salt water gargles for sore throat PRN        -     Patient advised to return to office if sore throat does not improve    Folliculitis: will treat with topical antibiotic. Exam today did not warrant oral antibiotics. -     mupirocin (BACTROBAN) 2 % ointment;  Apply 2 times daily for 7 days  -     Avoid hot showers/picking the rash which may worsen her symptoms         -     Patient advised to return to the office if rash does not

## 2019-08-29 LAB — THROAT CULTURE: NORMAL

## 2019-09-25 ENCOUNTER — OFFICE VISIT (OUTPATIENT)
Dept: PAIN MANAGEMENT | Age: 59
End: 2019-09-25
Payer: COMMERCIAL

## 2019-09-25 VITALS
WEIGHT: 216.8 LBS | HEART RATE: 79 BPM | OXYGEN SATURATION: 95 % | SYSTOLIC BLOOD PRESSURE: 120 MMHG | DIASTOLIC BLOOD PRESSURE: 73 MMHG | BODY MASS INDEX: 38.4 KG/M2

## 2019-09-25 DIAGNOSIS — F41.9 ANXIETY: ICD-10-CM

## 2019-09-25 DIAGNOSIS — G89.29 CHRONIC BILATERAL LOW BACK PAIN WITH BILATERAL SCIATICA: ICD-10-CM

## 2019-09-25 DIAGNOSIS — M79.7 FIBROMYALGIA: ICD-10-CM

## 2019-09-25 DIAGNOSIS — Z85.3 H/O MALIGNANT NEOPLASM OF BREAST: ICD-10-CM

## 2019-09-25 DIAGNOSIS — E66.01 SEVERE OBESITY (BMI 35.0-39.9) WITH COMORBIDITY (HCC): ICD-10-CM

## 2019-09-25 DIAGNOSIS — M54.41 CHRONIC BILATERAL LOW BACK PAIN WITH BILATERAL SCIATICA: ICD-10-CM

## 2019-09-25 DIAGNOSIS — F51.05 INSOMNIA DUE TO OTHER MENTAL DISORDER: ICD-10-CM

## 2019-09-25 DIAGNOSIS — M48.061 LUMBAR FORAMINAL STENOSIS: ICD-10-CM

## 2019-09-25 DIAGNOSIS — M25.50 POLYARTHRALGIA: ICD-10-CM

## 2019-09-25 DIAGNOSIS — G43.909 MIGRAINE WITHOUT STATUS MIGRAINOSUS, NOT INTRACTABLE, UNSPECIFIED MIGRAINE TYPE: ICD-10-CM

## 2019-09-25 DIAGNOSIS — M62.830 MUSCLE SPASM OF BACK: ICD-10-CM

## 2019-09-25 DIAGNOSIS — M47.22 OSTEOARTHRITIS OF SPINE WITH RADICULOPATHY, CERVICAL REGION: ICD-10-CM

## 2019-09-25 DIAGNOSIS — F99 INSOMNIA DUE TO OTHER MENTAL DISORDER: ICD-10-CM

## 2019-09-25 DIAGNOSIS — F33.1 MODERATE EPISODE OF RECURRENT MAJOR DEPRESSIVE DISORDER (HCC): ICD-10-CM

## 2019-09-25 DIAGNOSIS — G89.4 CHRONIC PAIN SYNDROME: ICD-10-CM

## 2019-09-25 DIAGNOSIS — M54.42 CHRONIC BILATERAL LOW BACK PAIN WITH BILATERAL SCIATICA: ICD-10-CM

## 2019-09-25 DIAGNOSIS — M48.061 SPINAL STENOSIS OF LUMBAR REGION WITHOUT NEUROGENIC CLAUDICATION: ICD-10-CM

## 2019-09-25 DIAGNOSIS — M51.36 DDD (DEGENERATIVE DISC DISEASE), LUMBAR: ICD-10-CM

## 2019-09-25 DIAGNOSIS — M85.88 OSTEOPENIA OF LUMBAR SPINE: ICD-10-CM

## 2019-09-25 PROCEDURE — 99213 OFFICE O/P EST LOW 20 MIN: CPT | Performed by: NURSE PRACTITIONER

## 2019-09-25 RX ORDER — OXYCODONE HYDROCHLORIDE 5 MG/1
5 TABLET ORAL EVERY 12 HOURS PRN
Qty: 56 TABLET | Refills: 0 | Status: SHIPPED | OUTPATIENT
Start: 2019-09-25 | End: 2019-11-06 | Stop reason: SDUPTHER

## 2019-09-25 RX ORDER — TIZANIDINE 4 MG/1
4 TABLET ORAL DAILY
Qty: 30 TABLET | Refills: 1 | Status: SHIPPED | OUTPATIENT
Start: 2019-09-25 | End: 2019-09-25

## 2019-09-25 RX ORDER — OXYCODONE HCL 10 MG/1
10 TABLET, FILM COATED, EXTENDED RELEASE ORAL EVERY 12 HOURS
Qty: 56 EACH | Refills: 0 | Status: SHIPPED | OUTPATIENT
Start: 2019-09-25 | End: 2019-11-06 | Stop reason: SDUPTHER

## 2019-09-25 NOTE — PATIENT INSTRUCTIONS
[email or web address] and sign in to your [employer]art account. Enter W854 in the YourPlacehire box to learn more about \"Back Stretches: Exercises. \"     If you do not have an account, please click on the \"Sign Up Now\" link. Current as of: September 20, 2018  Content Version: 12.1  © 8384-3178 Healthwise, Incorporated. Care instructions adapted under license by [facility] ([facility]). If you have questions about a medical condition or this instruction, always ask your healthcare professional. [address] any warranty or liability for your use of this information.

## 2019-09-25 NOTE — PROGRESS NOTES
Renny Post  1960  [id number]      HISTORY OF PRESENT ILLNESS:  Ms. Padilla Carter is a 60 y.o. female returns for a follow up visit for pain management  She has a diagnosis of   1. Chronic pain syndrome    2. Fibromyalgia    3. DDD (degenerative disc disease), lumbar    4. Lumbar foraminal stenosis, severe    5. Chronic bilateral low back pain with bilateral sciatica    6. Spinal stenosis of lumbar region without neurogenic claudication    7. Osteopenia of lumbar spine    8. Polyarthralgia    9. Osteoarthritis of spine with radiculopathy, cervical region    10. Migraine without status migrainosus, not intractable, unspecified migraine type    11. Muscle spasm of back    12. H/O malignant neoplasm of breast    13. Anxiety    14. Moderate episode of recurrent major depressive disorder ([address]    15. Insomnia due to other mental disorder    16. Severe obesity (BMI 35.0-39. 9) with comorbidity ([address]      On the Patients Pain Assessment form:  She complains of pain in the both buttocks, both leg(s): entire limb and bilateral lower back She rates the pain 8/10 and describes it as sharp, aching. Current treatment regimen has helped relieve about 40% of the pain. She denies any side effects from the current pain regimen. Patient reports that since the last follow up visit the physical functioning is unchanged, family/social relationships are unchanged, mood is unchanged sleep patterns are unchanged, and that the overall functioning is unchanged. Patient denies misusing/abusing her narcotic pain medications or using any illegal drugs. There are No indicators for possible drug abuse, addiction or diversion problems. Upon obtaining medical history from Ms. Padilla Carter states that pain is manageable on current pain therapy. Takes medications as prescribed. Experiencing side effects of hallucinations from the muscle relaxer. Grieving the loss of her father who recently passed away after experiencing poor health.  Mood is stable without LANCETS 33G MISC three times per day 300 each 3    tolterodine (DETROL LA) 4 MG extended release capsule TAKE ONE CAPSULE BY MOUTH DAILY 30 capsule 10    omeprazole (PRILOSEC) 40 MG delayed release capsule       ibuprofen (ADVIL;MOTRIN) 400 MG tablet Take 1 tablet by mouth every 8 hours as needed for Pain 15 tablet 0    metoprolol succinate (TOPROL XL) 50 MG extended release tablet TAKE ONE TABLET BY MOUTH DAILY 90 tablet 3    Insulin Pen Needle (B-D UF III MINI PEN NEEDLES) 31G X 5 MM MISC Apply 1 each topically 2 times daily 200 each 3    fluticasone (FLONASE) 50 MCG/ACT nasal spray PLACE 2 SPRAYS IN EACH NOSTRIL DAILY 16 g 10    busPIRone (BUSPAR) 15 MG tablet Take 1/3 tab BID for 5 days, then 1/2 tab BID for 5 days, then 2/3 tab BID for 5 days, then 1 po BID. 60 tablet 1    metroNIDAZOLE (METROGEL) 0.75 % gel Apply topically 2 times daily. 1 Tube 3    anastrozole (ARIMIDEX) 1 MG tablet TAKE ONE TABLET BY MOUTH DAILY 30 tablet 5    B-D INS SYR HALF-UNIT .3CC/31G 31G X 5/16\" 0.3 ML MISC USE AS DIRECTED TWO TIMES A  each 0    MICROLET LANCETS MISC 1 each by Does not apply route 6 times daily. 600 each 3    lubiprostone (AMITIZA) 8 MCG CAPS capsule Take 8 mcg by mouth 2 times daily (with meals).  tiZANidine (ZANAFLEX) 4 MG tablet Take 1 tablet by mouth daily 30 tablet 1     No facility-administered medications prior to visit. SOCIAL/FAMILY/PAST MEDICAL HISTORY: Ms. Richardson Rater, family and past medical history was reviewed. REVIEW OF SYSTEMS:    Respiratory: Negative for apnea, chest tightness and shortness of breath or change in baseline breathing. Gastrointestinal: Negative for nausea, vomiting, abdominal pain, diarrhea, constipation, blood in stool and abdominal distention. PHYSICAL EXAM:   Nursing note and vitals reviewed. /73   Pulse 79   Wt 216 lb 12.8 oz (98.3 kg)   SpO2 95%   BMI 38.40 kg/m²   Constitutional: She appears well-developed and well-nourished. No acute distress. Skin: Skin is warm and dry, good turgor. No rash noted. She is not diaphoretic. Cardiovascular: Normal rate, regular rhythm, normal heart sounds, and does not have murmur. Pulmonary/Chest: Effort normal. No respiratory distress. She does not have wheezes in the lung fields. She has no rales. Neurological/Psychiatric:She is alert and oriented to person, place, and time. Coordination is  normal.  Her mood isAppropriate and affect is Neutral/Euthymic(normal) . IMPRESSION:   1. Chronic pain syndrome    2. Fibromyalgia    3. DDD (degenerative disc disease), lumbar    4. Lumbar foraminal stenosis, severe    5. Chronic bilateral low back pain with bilateral sciatica    6. Spinal stenosis of lumbar region without neurogenic claudication    7. Osteopenia of lumbar spine    8. Polyarthralgia    9. Osteoarthritis of spine with radiculopathy, cervical region    10. Migraine without status migrainosus, not intractable, unspecified migraine type    11. Muscle spasm of back    12. H/O malignant neoplasm of breast    13. Anxiety    14. Moderate episode of recurrent major depressive disorder ([address]    15. Insomnia due to other mental disorder    16. Severe obesity (BMI 35.0-39. 9) with comorbidity ([address]        PLAN:  Informed verbal consent was obtained  -Continue with Oxycodone, Roxicodone, narcan  -D/c Zanaflex to monitor side effects  -Dinah exercises/PT  -Maintain f/u with dermatology for rash to the face  -Counseling completed on positive copying skills related to the loss of her father  -CBT techniques- relaxation therapies such as biofeedback, mindfulness based stress reduction, imagery, cognitive restructuring, problem solving discussed with patient  -She was advised weight reduction, diet changes- 800-1200 marlena diet, diet diary, exercising, nutritional  consult increased physical activity as tolerated  -Last UDS 5/22/19 Consistent/UDS today  -Return in about 4 weeks (around 10/23/2019). (DETROL LA) 4 MG extended release capsule TAKE ONE CAPSULE BY MOUTH DAILY 30 capsule 10    omeprazole (PRILOSEC) 40 MG delayed release capsule       ibuprofen (ADVIL;MOTRIN) 400 MG tablet Take 1 tablet by mouth every 8 hours as needed for Pain 15 tablet 0    metoprolol succinate (TOPROL XL) 50 MG extended release tablet TAKE ONE TABLET BY MOUTH DAILY 90 tablet 3    Insulin Pen Needle (B-D UF III MINI PEN NEEDLES) 31G X 5 MM MISC Apply 1 each topically 2 times daily 200 each 3    fluticasone (FLONASE) 50 MCG/ACT nasal spray PLACE 2 SPRAYS IN EACH NOSTRIL DAILY 16 g 10    busPIRone (BUSPAR) 15 MG tablet Take 1/3 tab BID for 5 days, then 1/2 tab BID for 5 days, then 2/3 tab BID for 5 days, then 1 po BID. 60 tablet 1    metroNIDAZOLE (METROGEL) 0.75 % gel Apply topically 2 times daily. 1 Tube 3    anastrozole (ARIMIDEX) 1 MG tablet TAKE ONE TABLET BY MOUTH DAILY 30 tablet 5    B-D INS SYR HALF-UNIT .3CC/31G 31G X 5/16\" 0.3 ML MISC USE AS DIRECTED TWO TIMES A  each 0    MICROLET LANCETS MISC 1 each by Does not apply route 6 times daily. 600 each 3    lubiprostone (AMITIZA) 8 MCG CAPS capsule Take 8 mcg by mouth 2 times daily (with meals). No current facility-administered medications for this visit. I will continue her current medication regimen  which is part of the above treatment schedule. It has been helping with Ms. Castro's chronic  medical problems which for this visit include:   Diagnoses of Chronic pain syndrome, Fibromyalgia, DDD (degenerative disc disease), lumbar, Lumbar foraminal stenosis, severe, Chronic bilateral low back pain with bilateral sciatica, Spinal stenosis of lumbar region without neurogenic claudication, Osteopenia of lumbar spine, Polyarthralgia, Osteoarthritis of spine with radiculopathy, cervical region, Migraine without status migrainosus, not intractable, unspecified migraine type, Muscle spasm of back, H/O malignant neoplasm of breast, Anxiety, Moderate episode of recurrent major depressive disorder ([address] Insomnia due to other mental disorder, and Severe obesity (BMI 35.0-39. 9) with comorbidity ([address] were pertinent to this visit. Risks and benefits of the medications and other alternative treatments  including no treatment were discussed with the patient. The common side effects of these medications were also explained to the patient. Informed verbal consent was obtained. Goals of current treatment regimen include improvement in pain, restoration of functioning- with focus on improvement in physical performance, general activity, work or disability,emotional distress, health care utilization and  decreased medication consumption. Will continue to monitor progress towards achieving/maintaining therapeutic goals with special emphasis on  1. Improvement in perceived interfernce  of pain with ADL's. Ability to do home exercises independently. Ability to do household chores indoor and/or outdoor work and social and leisure activities. Improve psychosocial and physical functioning. - she is showing progression towards this treatment goal with the current regimen. She was advised against drinking alcohol with the narcotic pain medicines, advised against driving or handling machinery while adjusting the dose of medicines or if having cognitive  issues related to the current medications. Risk of overdose and death, if medicines not taken as prescribed, were also discussed. If the patient develops new symptoms or if the symptoms worsen, the patient should call the office. While transcribing every attempt was made to maintain the accuracy of the note in terms of it's contents,there may have been some errors made inadvertently. Thank you for allowing me to participate in the care of this patient.     Tenzin Kowalski CNP    Cc: Vahid Bazzi MD

## 2019-10-18 RX ORDER — FLUTICASONE PROPIONATE 50 MCG
SPRAY, SUSPENSION (ML) NASAL
Qty: 16 G | Refills: 9 | Status: SHIPPED | OUTPATIENT
Start: 2019-10-18 | End: 2020-08-05

## 2019-10-22 DIAGNOSIS — M25.50 POLYARTHRALGIA: ICD-10-CM

## 2019-10-24 RX ORDER — GABAPENTIN 600 MG/1
TABLET ORAL
Qty: 120 TABLET | Refills: 1 | Status: SHIPPED | OUTPATIENT
Start: 2019-10-24 | End: 2019-12-26 | Stop reason: SDUPTHER

## 2019-10-29 ENCOUNTER — TELEPHONE (OUTPATIENT)
Dept: FAMILY MEDICINE CLINIC | Age: 59
End: 2019-10-29

## 2019-11-06 ENCOUNTER — OFFICE VISIT (OUTPATIENT)
Dept: PAIN MANAGEMENT | Age: 59
End: 2019-11-06
Payer: COMMERCIAL

## 2019-11-06 VITALS — SYSTOLIC BLOOD PRESSURE: 122 MMHG | OXYGEN SATURATION: 96 % | DIASTOLIC BLOOD PRESSURE: 80 MMHG | HEART RATE: 80 BPM

## 2019-11-06 DIAGNOSIS — M47.22 OSTEOARTHRITIS OF SPINE WITH RADICULOPATHY, CERVICAL REGION: ICD-10-CM

## 2019-11-06 DIAGNOSIS — G89.29 CHRONIC BILATERAL LOW BACK PAIN WITH BILATERAL SCIATICA: ICD-10-CM

## 2019-11-06 DIAGNOSIS — E66.01 SEVERE OBESITY (BMI 35.0-39.9) WITH COMORBIDITY (HCC): ICD-10-CM

## 2019-11-06 DIAGNOSIS — F51.05 INSOMNIA DUE TO OTHER MENTAL DISORDER: ICD-10-CM

## 2019-11-06 DIAGNOSIS — M48.061 SPINAL STENOSIS OF LUMBAR REGION WITHOUT NEUROGENIC CLAUDICATION: ICD-10-CM

## 2019-11-06 DIAGNOSIS — F99 INSOMNIA DUE TO OTHER MENTAL DISORDER: ICD-10-CM

## 2019-11-06 DIAGNOSIS — M79.7 FIBROMYALGIA: ICD-10-CM

## 2019-11-06 DIAGNOSIS — G89.4 CHRONIC PAIN SYNDROME: ICD-10-CM

## 2019-11-06 DIAGNOSIS — L30.9 DERMATITIS: ICD-10-CM

## 2019-11-06 DIAGNOSIS — Z85.3 H/O MALIGNANT NEOPLASM OF BREAST: ICD-10-CM

## 2019-11-06 DIAGNOSIS — M48.061 LUMBAR FORAMINAL STENOSIS: ICD-10-CM

## 2019-11-06 DIAGNOSIS — M85.88 OSTEOPENIA OF LUMBAR SPINE: ICD-10-CM

## 2019-11-06 DIAGNOSIS — M62.830 MUSCLE SPASM OF BACK: ICD-10-CM

## 2019-11-06 DIAGNOSIS — F33.1 MODERATE EPISODE OF RECURRENT MAJOR DEPRESSIVE DISORDER (HCC): ICD-10-CM

## 2019-11-06 DIAGNOSIS — M25.50 POLYARTHRALGIA: ICD-10-CM

## 2019-11-06 DIAGNOSIS — G43.909 MIGRAINE WITHOUT STATUS MIGRAINOSUS, NOT INTRACTABLE, UNSPECIFIED MIGRAINE TYPE: ICD-10-CM

## 2019-11-06 DIAGNOSIS — M51.36 DDD (DEGENERATIVE DISC DISEASE), LUMBAR: ICD-10-CM

## 2019-11-06 DIAGNOSIS — F41.9 ANXIETY: ICD-10-CM

## 2019-11-06 DIAGNOSIS — M54.41 CHRONIC BILATERAL LOW BACK PAIN WITH BILATERAL SCIATICA: ICD-10-CM

## 2019-11-06 DIAGNOSIS — M54.42 CHRONIC BILATERAL LOW BACK PAIN WITH BILATERAL SCIATICA: ICD-10-CM

## 2019-11-06 PROCEDURE — 99213 OFFICE O/P EST LOW 20 MIN: CPT | Performed by: NURSE PRACTITIONER

## 2019-11-06 RX ORDER — OXYCODONE HYDROCHLORIDE 5 MG/1
5 TABLET ORAL DAILY
Qty: 28 TABLET | Refills: 0 | Status: SHIPPED | OUTPATIENT
Start: 2019-11-06 | End: 2019-12-03 | Stop reason: SDUPTHER

## 2019-11-06 RX ORDER — OXYCODONE HCL 10 MG/1
10 TABLET, FILM COATED, EXTENDED RELEASE ORAL EVERY 12 HOURS
Qty: 56 EACH | Refills: 0 | Status: SHIPPED | OUTPATIENT
Start: 2019-11-06 | End: 2019-12-03 | Stop reason: SDUPTHER

## 2019-11-09 DIAGNOSIS — F33.1 MODERATE EPISODE OF RECURRENT MAJOR DEPRESSIVE DISORDER (HCC): ICD-10-CM

## 2019-11-11 RX ORDER — VENLAFAXINE HYDROCHLORIDE 75 MG/1
CAPSULE, EXTENDED RELEASE ORAL
Qty: 270 CAPSULE | Refills: 0 | Status: SHIPPED | OUTPATIENT
Start: 2019-11-11 | End: 2020-04-20 | Stop reason: SDUPTHER

## 2019-11-11 RX ORDER — PRAVASTATIN SODIUM 40 MG
40 TABLET ORAL DAILY
Qty: 90 TABLET | Refills: 1 | Status: CANCELLED | OUTPATIENT
Start: 2019-11-11 | End: 2020-02-09

## 2019-12-03 ENCOUNTER — OFFICE VISIT (OUTPATIENT)
Dept: PAIN MANAGEMENT | Age: 59
End: 2019-12-03
Payer: COMMERCIAL

## 2019-12-03 VITALS — HEART RATE: 73 BPM | SYSTOLIC BLOOD PRESSURE: 137 MMHG | DIASTOLIC BLOOD PRESSURE: 79 MMHG | OXYGEN SATURATION: 98 %

## 2019-12-03 DIAGNOSIS — L30.9 DERMATITIS: ICD-10-CM

## 2019-12-03 DIAGNOSIS — F99 INSOMNIA DUE TO OTHER MENTAL DISORDER: ICD-10-CM

## 2019-12-03 DIAGNOSIS — F33.1 MODERATE EPISODE OF RECURRENT MAJOR DEPRESSIVE DISORDER (HCC): ICD-10-CM

## 2019-12-03 DIAGNOSIS — M51.36 DDD (DEGENERATIVE DISC DISEASE), LUMBAR: ICD-10-CM

## 2019-12-03 DIAGNOSIS — M54.42 CHRONIC BILATERAL LOW BACK PAIN WITH BILATERAL SCIATICA: ICD-10-CM

## 2019-12-03 DIAGNOSIS — Z85.3 H/O MALIGNANT NEOPLASM OF BREAST: ICD-10-CM

## 2019-12-03 DIAGNOSIS — G89.29 CHRONIC BILATERAL LOW BACK PAIN WITH BILATERAL SCIATICA: ICD-10-CM

## 2019-12-03 DIAGNOSIS — M85.88 OSTEOPENIA OF LUMBAR SPINE: ICD-10-CM

## 2019-12-03 DIAGNOSIS — M48.061 LUMBAR FORAMINAL STENOSIS: ICD-10-CM

## 2019-12-03 DIAGNOSIS — M54.41 CHRONIC BILATERAL LOW BACK PAIN WITH BILATERAL SCIATICA: ICD-10-CM

## 2019-12-03 DIAGNOSIS — G89.4 CHRONIC PAIN SYNDROME: ICD-10-CM

## 2019-12-03 DIAGNOSIS — M79.7 FIBROMYALGIA: ICD-10-CM

## 2019-12-03 DIAGNOSIS — F41.9 ANXIETY: ICD-10-CM

## 2019-12-03 DIAGNOSIS — M48.061 SPINAL STENOSIS OF LUMBAR REGION WITHOUT NEUROGENIC CLAUDICATION: ICD-10-CM

## 2019-12-03 DIAGNOSIS — M47.22 OSTEOARTHRITIS OF SPINE WITH RADICULOPATHY, CERVICAL REGION: ICD-10-CM

## 2019-12-03 DIAGNOSIS — F51.05 INSOMNIA DUE TO OTHER MENTAL DISORDER: ICD-10-CM

## 2019-12-03 DIAGNOSIS — E66.01 SEVERE OBESITY (BMI 35.0-39.9) WITH COMORBIDITY (HCC): ICD-10-CM

## 2019-12-03 DIAGNOSIS — M25.50 POLYARTHRALGIA: ICD-10-CM

## 2019-12-03 DIAGNOSIS — M62.830 MUSCLE SPASM OF BACK: ICD-10-CM

## 2019-12-03 DIAGNOSIS — G43.909 MIGRAINE WITHOUT STATUS MIGRAINOSUS, NOT INTRACTABLE, UNSPECIFIED MIGRAINE TYPE: ICD-10-CM

## 2019-12-03 PROCEDURE — 99213 OFFICE O/P EST LOW 20 MIN: CPT | Performed by: NURSE PRACTITIONER

## 2019-12-03 RX ORDER — OXYCODONE HCL 10 MG/1
10 TABLET, FILM COATED, EXTENDED RELEASE ORAL EVERY 12 HOURS
Qty: 60 EACH | Refills: 0 | Status: SHIPPED | OUTPATIENT
Start: 2019-12-03 | End: 2019-12-03 | Stop reason: SDUPTHER

## 2019-12-03 RX ORDER — OXYCODONE HCL 10 MG/1
10 TABLET, FILM COATED, EXTENDED RELEASE ORAL EVERY 12 HOURS
Qty: 10 EACH | Refills: 0 | Status: SHIPPED | OUTPATIENT
Start: 2019-12-03 | End: 2019-12-08

## 2019-12-03 RX ORDER — OXYCODONE HYDROCHLORIDE 5 MG/1
5 TABLET ORAL DAILY
Qty: 30 TABLET | Refills: 0 | Status: SHIPPED | OUTPATIENT
Start: 2019-12-03 | End: 2019-12-03 | Stop reason: SDUPTHER

## 2019-12-03 RX ORDER — OXYCODONE HYDROCHLORIDE 5 MG/1
5 TABLET ORAL DAILY
Qty: 5 TABLET | Refills: 0 | Status: SHIPPED | OUTPATIENT
Start: 2019-12-03 | End: 2019-12-08

## 2019-12-11 ENCOUNTER — TELEPHONE (OUTPATIENT)
Dept: PAIN MANAGEMENT | Age: 59
End: 2019-12-11

## 2019-12-17 ENCOUNTER — TELEPHONE (OUTPATIENT)
Dept: PAIN MANAGEMENT | Age: 59
End: 2019-12-17

## 2019-12-17 NOTE — TELEPHONE ENCOUNTER
Advised patient to come in today between 8:00-4:30 and bring ALL of PKA medications including all pain medications for review. If She is unable to come today to the [address] [place] [address] location She is to come tomorrow to the [address] [place] [address] location or [place] location on Thursday or friday. Called patient at home #, left detailed voice message, advised to call the office back.

## 2019-12-18 RX ORDER — METOPROLOL SUCCINATE 50 MG/1
TABLET, EXTENDED RELEASE ORAL
Qty: 30 TABLET | Refills: 0 | Status: SHIPPED | OUTPATIENT
Start: 2019-12-18 | End: 2020-02-03

## 2019-12-18 NOTE — TELEPHONE ENCOUNTER
Called patient a second time for a pill review, no answer, left detailed message to call the office back.

## 2019-12-19 ENCOUNTER — TELEPHONE (OUTPATIENT)
Dept: FAMILY MEDICINE CLINIC | Age: 59
End: 2019-12-19

## 2019-12-20 RX ORDER — QUETIAPINE FUMARATE 25 MG/1
TABLET, FILM COATED ORAL
Qty: 90 TABLET | Refills: 0 | Status: SHIPPED | OUTPATIENT
Start: 2019-12-20 | End: 2020-01-02

## 2019-12-23 NOTE — TELEPHONE ENCOUNTER
Called patients cell number again and left detailed message stating she needs to call our office back. Called Patients  cell number, gave my name and someone hung up. Still trying to get in touch with patient to come in for an pill count.

## 2019-12-26 ENCOUNTER — OFFICE VISIT (OUTPATIENT)
Dept: FAMILY MEDICINE CLINIC | Age: 59
End: 2019-12-26
Payer: COMMERCIAL

## 2019-12-26 VITALS
WEIGHT: 220 LBS | SYSTOLIC BLOOD PRESSURE: 100 MMHG | DIASTOLIC BLOOD PRESSURE: 60 MMHG | HEART RATE: 79 BPM | BODY MASS INDEX: 38.97 KG/M2

## 2019-12-26 DIAGNOSIS — Z23 NEEDS FLU SHOT: ICD-10-CM

## 2019-12-26 DIAGNOSIS — I10 ESSENTIAL HYPERTENSION: ICD-10-CM

## 2019-12-26 DIAGNOSIS — M25.50 POLYARTHRALGIA: ICD-10-CM

## 2019-12-26 DIAGNOSIS — Z12.31 OTHER SCREENING MAMMOGRAM: ICD-10-CM

## 2019-12-26 LAB — HBA1C MFR BLD: 7 %

## 2019-12-26 PROCEDURE — G8417 CALC BMI ABV UP PARAM F/U: HCPCS | Performed by: FAMILY MEDICINE

## 2019-12-26 PROCEDURE — 3045F PR MOST RECENT HEMOGLOBIN A1C LEVEL 7.0-9.0%: CPT | Performed by: FAMILY MEDICINE

## 2019-12-26 PROCEDURE — 99214 OFFICE O/P EST MOD 30 MIN: CPT | Performed by: FAMILY MEDICINE

## 2019-12-26 PROCEDURE — G8484 FLU IMMUNIZE NO ADMIN: HCPCS | Performed by: FAMILY MEDICINE

## 2019-12-26 PROCEDURE — 1036F TOBACCO NON-USER: CPT | Performed by: FAMILY MEDICINE

## 2019-12-26 PROCEDURE — 2022F DILAT RTA XM EVC RTNOPTHY: CPT | Performed by: FAMILY MEDICINE

## 2019-12-26 PROCEDURE — 83036 HEMOGLOBIN GLYCOSYLATED A1C: CPT | Performed by: FAMILY MEDICINE

## 2019-12-26 PROCEDURE — 3017F COLORECTAL CA SCREEN DOC REV: CPT | Performed by: FAMILY MEDICINE

## 2019-12-26 PROCEDURE — G8427 DOCREV CUR MEDS BY ELIG CLIN: HCPCS | Performed by: FAMILY MEDICINE

## 2019-12-26 RX ORDER — AMLODIPINE BESYLATE 5 MG/1
TABLET ORAL
Qty: 90 TABLET | Refills: 1 | Status: SHIPPED | OUTPATIENT
Start: 2019-12-26 | End: 2020-11-03 | Stop reason: SDUPTHER

## 2019-12-26 RX ORDER — FLASH GLUCOSE SENSOR
KIT MISCELLANEOUS
Qty: 2 EACH | Refills: 5 | Status: SHIPPED | OUTPATIENT
Start: 2019-12-26 | End: 2021-01-28 | Stop reason: SDUPTHER

## 2019-12-26 RX ORDER — FLASH GLUCOSE SCANNING READER
EACH MISCELLANEOUS
Qty: 1 DEVICE | Refills: 0 | Status: SHIPPED | OUTPATIENT
Start: 2019-12-26 | End: 2021-02-09

## 2019-12-26 RX ORDER — QUETIAPINE FUMARATE 25 MG/1
TABLET, FILM COATED ORAL
Qty: 90 TABLET | Refills: 0 | OUTPATIENT
Start: 2019-12-26

## 2019-12-26 RX ORDER — GABAPENTIN 600 MG/1
TABLET ORAL
Qty: 120 TABLET | Refills: 1 | Status: SHIPPED | OUTPATIENT
Start: 2019-12-26 | End: 2020-03-09

## 2020-01-02 RX ORDER — QUETIAPINE FUMARATE 25 MG/1
TABLET, FILM COATED ORAL
Qty: 90 TABLET | Refills: 0 | Status: SHIPPED | OUTPATIENT
Start: 2020-01-02 | End: 2020-01-08 | Stop reason: SDUPTHER

## 2020-01-08 ENCOUNTER — TELEPHONE (OUTPATIENT)
Dept: FAMILY MEDICINE CLINIC | Age: 60
End: 2020-01-08

## 2020-01-08 RX ORDER — QUETIAPINE FUMARATE 50 MG/1
50 TABLET, FILM COATED ORAL NIGHTLY
COMMUNITY
Start: 2020-01-08 | End: 2020-03-23

## 2020-01-08 NOTE — TELEPHONE ENCOUNTER
I called pt. Has been unable to sleep at night. States she has a lot going on ,very anxious. Takes Seroquel 25 mg at bedtime w Gabapentin and has added Unisom. Still not able to fall asleep.   States Seroquel is no longer effective  Please advise

## 2020-02-03 RX ORDER — METOPROLOL SUCCINATE 50 MG/1
TABLET, EXTENDED RELEASE ORAL
Qty: 90 TABLET | Refills: 0 | Status: SHIPPED | OUTPATIENT
Start: 2020-02-03 | End: 2020-06-02

## 2020-02-03 RX ORDER — TOLTERODINE 4 MG/1
CAPSULE, EXTENDED RELEASE ORAL
Qty: 90 CAPSULE | Refills: 1 | Status: SHIPPED | OUTPATIENT
Start: 2020-02-03 | End: 2020-06-02

## 2020-02-03 RX ORDER — LISINOPRIL 40 MG/1
TABLET ORAL
Qty: 90 TABLET | Refills: 0 | Status: SHIPPED | OUTPATIENT
Start: 2020-02-03 | End: 2020-06-22

## 2020-03-09 RX ORDER — GABAPENTIN 600 MG/1
TABLET ORAL
Qty: 120 TABLET | Refills: 1 | Status: SHIPPED | OUTPATIENT
Start: 2020-03-09 | End: 2020-07-27

## 2020-03-12 ENCOUNTER — TELEPHONE (OUTPATIENT)
Dept: FAMILY MEDICINE CLINIC | Age: 60
End: 2020-03-12

## 2020-03-12 NOTE — TELEPHONE ENCOUNTER
Patient needs a pre authorization for a new meter one touch sent to pharmacy in [facility]  Patient insurance will not cover old meter tabs, and insurance is stating patient needs a whole new meter.      Please advise

## 2020-03-17 RX ORDER — FLUOXETINE HYDROCHLORIDE 20 MG/1
CAPSULE ORAL
Qty: 90 CAPSULE | Refills: 1 | Status: SHIPPED | OUTPATIENT
Start: 2020-03-17 | End: 2020-05-01

## 2020-03-18 RX ORDER — BLOOD-GLUCOSE METER
1 EACH MISCELLANEOUS DAILY
Qty: 1 KIT | Refills: 0 | Status: SHIPPED | OUTPATIENT
Start: 2020-03-18 | End: 2021-02-09

## 2020-03-21 ENCOUNTER — TELEPHONE (OUTPATIENT)
Dept: FAMILY MEDICINE CLINIC | Age: 60
End: 2020-03-21

## 2020-03-23 RX ORDER — QUETIAPINE FUMARATE 25 MG/1
TABLET, FILM COATED ORAL
Qty: 90 TABLET | Refills: 0 | Status: SHIPPED | OUTPATIENT
Start: 2020-03-23 | End: 2020-05-01 | Stop reason: SDUPTHER

## 2020-04-06 ENCOUNTER — TELEPHONE (OUTPATIENT)
Dept: FAMILY MEDICINE CLINIC | Age: 60
End: 2020-04-06

## 2020-04-08 ENCOUNTER — VIRTUAL VISIT (OUTPATIENT)
Dept: FAMILY MEDICINE CLINIC | Age: 60
End: 2020-04-08
Payer: MEDICARE

## 2020-04-08 PROCEDURE — G2012 BRIEF CHECK IN BY MD/QHP: HCPCS | Performed by: FAMILY MEDICINE

## 2020-04-08 NOTE — PROGRESS NOTES
Job Sutton is a 63 y.o. female evaluated via telephone on 2020. Consent:  She and/or health care decision maker is aware that that she may receive a bill for this telephone service, depending on her insurance coverage, and has provided verbal consent to proceed: No - Not billable    I affirm this is a Patient Initiated Episode with an Established Patient who has not had a related appointment within my department in the past 7 days or scheduled within the next 24 hours. Total Time: minutes: 11-20 minutes    Note: not billable if this call serves to triage the patient into an appointment for the relevant concern      Rom Tejada       · Having throat pain, hurts to swallow  · R upper tooth pain  · Dad  couple weeks ago, mood down, unable to sleep despite OTC  · Test strips no longer covered. E-VISIT Assessment and Plan:      Diagnosis Orders   1. Psychophysiological insomnia  cyclobenzaprine (FLEXERIL) 10 MG tablet   2. DM (diabetes mellitus), type 2, uncontrolled with complications ([facility])  blood glucose monitor kit and supplies    blood glucose monitor strips   3. Acute pharyngitis due to other specified organisms  amoxicillin-clavulanate (AUGMENTIN) 875-125 MG per tablet   4. Grieving       · Please finish taking ALL of the antibiotics. · Try flexeril for sleep since issues with other meds in past    11-20 minutes were spent on the digital evaluation and management of this patient. Lab Results   Component Value Date    LABA1C 7.0 2019     Lab Results   Component Value Date    .0 2018     11-20 minutes were spent on the digital evaluation and management of this patient. Prior to Visit Medications    Medication Sig Taking? Authorizing Provider   blood glucose monitor kit and supplies Test 2 times a day & as needed for symptoms of irregular blood glucose.  Yes Evangelista Booker MD   blood glucose monitor strips Test 2 times a day & as needed for symptoms of irregular blood - CNP   fluticasone (FLONASE) 50 MCG/ACT nasal spray SPRAY TWO SPRAYS IN EACH NOSTRIL ONCE DAILY  Laura Garcia MD   nystatin (MYCOSTATIN) 111153 UNIT/GM ointment APPLY TO AFFECTED AREA(S) TWO TIMES A DAY AS NEEDED  Laura Garcia MD   pravastatin (PRAVACHOL) 40 MG tablet Take 1 tablet by mouth  Historical Provider, MD   dicyclomine (BENTYL) 20 MG tablet Take 1 tablet by mouth every 6 hours as needed (cramping)  Laura Garcia MD   atorvastatin (LIPITOR) 80 MG tablet Take 1 tablet by mouth daily  Laura Garcia MD   Blood Glucose Monitoring Suppl (TRUE METRIX METER) w/Device KIT three times per day  Laura Garcia MD   TRUEPLUS LANCETS 33G MISC three times per day  Laura Garcia MD   omeprazole (PRILOSEC) 40 MG delayed release capsule   Historical Provider, MD   ibuprofen (ADVIL;MOTRIN) 400 MG tablet Take 1 tablet by mouth every 8 hours as needed for Pain  Patient not taking: Reported on 12/26/2019  Mahogany Mcduffie., MD   busPIRone (BUSPAR) 15 MG tablet Take 1/3 tab BID for 5 days, then 1/2 tab BID for 5 days, then 2/3 tab BID for 5 days, then 1 po BID. Patient not taking: Reported on 12/26/2019  Laura Garcia MD   metroNIDAZOLE (METROGEL) 0.75 % gel Apply topically 2 times daily. Laura Garcia MD   anastrozole (ARIMIDEX) 1 MG tablet TAKE ONE TABLET BY MOUTH DAILY  [facility] Burnetta Cranker., MD   B-D INS SYR HALF-UNIT .3CC/31G 31G X 5/16\" 0.3 ML MISC USE AS DIRECTED TWO TIMES A DAY  Jessica Ruiz MD   MICROLET LANCETS MISC 1 each by Does not apply route 6 times daily. Laura Garcia MD   lubiprostone (AMITIZA) 8 MCG CAPS capsule Take 8 mcg by mouth 2 times daily (with meals).   Historical Provider, MD     Patient Active Problem List   Diagnosis    DM (diabetes mellitus), type 2, uncontrolled with complications ([address]    Diabetic gastroparesis ([address]    Allergic rhinitis    Insomnia    Essential hypertension    Chronic knee pain    Headaches due to old head injury    [facility]phropathy, diabetic [facility]    Migraine    Hip pain, bilateral    Depression, major    Right knee DJD    Calcific Achilles tendonitis, right    Right sided sciatica    Lumbar spinal stenosis    TMJ (temporomandibular joint disorder)    Urge incontinence of urine    Personal history of breast cancer    Hyperlipidemia LDL goal <612    Metallic taste    Polyarthralgia    Severe obesity (BMI 35.0-39. 9) with comorbidity (HCC)    Neck pain    Chronic low back pain    Intractable low back pain    Plantar fasciitis of right foot    Anxiety    History of colon polyps    Irritable bowel syndrome with diarrhea      Allergies   Allergen Reactions    Ambien [Zolpidem Tartrate] Other (See Comments)     Vivid dreams    Naproxen     Trazodone And Nefazodone Other (See Comments)     headache    Diamox [Acetazolamide] Rash    Lorazepam Other (See Comments)     confusion    Reglan [Metoclopramide] Anxiety    Sulfa Antibiotics Rash     Shaking

## 2020-04-20 RX ORDER — VENLAFAXINE HYDROCHLORIDE 75 MG/1
CAPSULE, EXTENDED RELEASE ORAL
Qty: 270 CAPSULE | Refills: 0 | Status: SHIPPED | OUTPATIENT
Start: 2020-04-20 | End: 2020-09-10

## 2020-05-01 ENCOUNTER — VIRTUAL VISIT (OUTPATIENT)
Dept: FAMILY MEDICINE CLINIC | Age: 60
End: 2020-05-01
Payer: MEDICARE

## 2020-05-01 PROCEDURE — 99441 PR PHYS/QHP TELEPHONE EVALUATION 5-10 MIN: CPT | Performed by: FAMILY MEDICINE

## 2020-05-01 RX ORDER — QUETIAPINE FUMARATE 25 MG/1
TABLET, FILM COATED ORAL
Qty: 90 TABLET | Refills: 0 | Status: SHIPPED | OUTPATIENT
Start: 2020-05-01 | End: 2020-06-17

## 2020-05-04 ENCOUNTER — HOSPITAL ENCOUNTER (EMERGENCY)
Age: 60
Discharge: HOME | End: 2020-05-05
Attending: EMERGENCY MEDICINE
Payer: OTHER MISCELLANEOUS

## 2020-05-04 ENCOUNTER — APPOINTMENT (OUTPATIENT)
Dept: CT IMAGING | Age: 60
End: 2020-05-04
Payer: OTHER MISCELLANEOUS

## 2020-05-04 VITALS
HEART RATE: 77 BPM | TEMPERATURE: 98.8 F | BODY MASS INDEX: 36.64 KG/M2 | WEIGHT: 206.79 LBS | HEIGHT: 63 IN | OXYGEN SATURATION: 98 % | RESPIRATION RATE: 16 BRPM | SYSTOLIC BLOOD PRESSURE: 156 MMHG | DIASTOLIC BLOOD PRESSURE: 84 MMHG

## 2020-05-04 LAB
A/G RATIO: 1.1 (ref 1.1–2.2)
ALBUMIN SERPL-MCNC: 4.4 G/DL (ref 3.4–5)
ALP BLD-CCNC: 132 U/L (ref 40–129)
ALT SERPL-CCNC: 20 U/L (ref 10–40)
ANION GAP SERPL CALCULATED.3IONS-SCNC: 16 MMOL/L (ref 3–16)
AST SERPL-CCNC: 26 U/L (ref 15–37)
BASOPHILS ABSOLUTE: 0 K/UL (ref 0–0.2)
BASOPHILS RELATIVE PERCENT: 0.1 %
BILIRUB SERPL-MCNC: 0.3 MG/DL (ref 0–1)
BUN BLDV-MCNC: 15 MG/DL (ref 7–20)
CALCIUM SERPL-MCNC: 9.5 MG/DL (ref 8.3–10.6)
CHLORIDE BLD-SCNC: 100 MMOL/L (ref 99–110)
CO2: 25 MMOL/L (ref 21–32)
CREAT SERPL-MCNC: 0.9 MG/DL (ref 0.6–1.2)
EOSINOPHILS ABSOLUTE: 0.3 K/UL (ref 0–0.6)
EOSINOPHILS RELATIVE PERCENT: 2.1 %
GFR AFRICAN AMERICAN: >60
GFR NON-AFRICAN AMERICAN: >60
GLOBULIN: 3.9 G/DL
GLUCOSE BLD-MCNC: 201 MG/DL (ref 70–99)
HCT VFR BLD CALC: 45.2 % (ref 36–48)
HEMOGLOBIN: 14.3 G/DL (ref 12–16)
LYMPHOCYTES ABSOLUTE: 2.6 K/UL (ref 1–5.1)
LYMPHOCYTES RELATIVE PERCENT: 21 %
MCH RBC QN AUTO: 27.2 PG (ref 26–34)
MCHC RBC AUTO-ENTMCNC: 31.6 G/DL (ref 31–36)
MCV RBC AUTO: 86.1 FL (ref 80–100)
MONOCYTES ABSOLUTE: 0.8 K/UL (ref 0–1.3)
MONOCYTES RELATIVE PERCENT: 6.5 %
NEUTROPHILS ABSOLUTE: 8.8 K/UL (ref 1.7–7.7)
NEUTROPHILS RELATIVE PERCENT: 70.3 %
PDW BLD-RTO: 14.5 % (ref 12.4–15.4)
PLATELET # BLD: 296 K/UL (ref 135–450)
PMV BLD AUTO: 8.2 FL (ref 5–10.5)
POTASSIUM REFLEX MAGNESIUM: 4.1 MMOL/L (ref 3.5–5.1)
RBC # BLD: 5.25 M/UL (ref 4–5.2)
SODIUM BLD-SCNC: 141 MMOL/L (ref 136–145)
TOTAL PROTEIN: 8.3 G/DL (ref 6.4–8.2)
WBC # BLD: 12.5 K/UL (ref 4–11)

## 2020-05-04 PROCEDURE — 72125 CT NECK SPINE W/O DYE: CPT

## 2020-05-04 PROCEDURE — 36415 COLL VENOUS BLD VENIPUNCTURE: CPT

## 2020-05-04 PROCEDURE — 80053 COMPREHEN METABOLIC PANEL: CPT

## 2020-05-04 PROCEDURE — 6370000000 HC RX 637 (ALT 250 FOR IP): Performed by: EMERGENCY MEDICINE

## 2020-05-04 PROCEDURE — 74177 CT ABD & PELVIS W/CONTRAST: CPT

## 2020-05-04 PROCEDURE — 6360000004 HC RX CONTRAST MEDICATION: Performed by: EMERGENCY MEDICINE

## 2020-05-04 PROCEDURE — 70450 CT HEAD/BRAIN W/O DYE: CPT

## 2020-05-04 PROCEDURE — 85025 COMPLETE CBC W/AUTO DIFF WBC: CPT

## 2020-05-04 PROCEDURE — 99284 EMERGENCY DEPT VISIT MOD MDM: CPT

## 2020-05-04 RX ORDER — METHOCARBAMOL 750 MG/1
750-1500 TABLET, FILM COATED ORAL 3 TIMES DAILY
Qty: 15 TABLET | Refills: 0 | Status: SHIPPED | OUTPATIENT
Start: 2020-05-04 | End: 2020-05-09

## 2020-05-04 RX ORDER — HYDROCODONE BITARTRATE AND ACETAMINOPHEN 5; 325 MG/1; MG/1
1 TABLET ORAL ONCE
Status: COMPLETED | OUTPATIENT
Start: 2020-05-04 | End: 2020-05-04

## 2020-05-04 RX ADMIN — HYDROCODONE BITARTRATE AND ACETAMINOPHEN 1 TABLET: 5; 325 TABLET ORAL at 22:22

## 2020-05-04 RX ADMIN — IOVERSOL 100 ML: 678 INJECTION INTRA-ARTERIAL; INTRAVENOUS at 23:14

## 2020-05-04 ASSESSMENT — PAIN DESCRIPTION - PAIN TYPE
TYPE: ACUTE PAIN
TYPE: ACUTE PAIN

## 2020-05-04 ASSESSMENT — PAIN DESCRIPTION - DESCRIPTORS
DESCRIPTORS: ACHING
DESCRIPTORS: ACHING

## 2020-05-04 ASSESSMENT — PAIN DESCRIPTION - ORIENTATION
ORIENTATION: RIGHT
ORIENTATION: RIGHT

## 2020-05-04 ASSESSMENT — PAIN SCALES - GENERAL
PAINLEVEL_OUTOF10: 8
PAINLEVEL_OUTOF10: 9

## 2020-05-04 ASSESSMENT — PAIN DESCRIPTION - FREQUENCY
FREQUENCY: CONTINUOUS
FREQUENCY: CONTINUOUS

## 2020-05-04 ASSESSMENT — PAIN DESCRIPTION - LOCATION
LOCATION: CHEST;BACK;NECK
LOCATION: CHEST;BACK;NECK

## 2020-05-05 ASSESSMENT — PAIN - FUNCTIONAL ASSESSMENT: PAIN_FUNCTIONAL_ASSESSMENT: 0-10

## 2020-05-05 ASSESSMENT — PAIN SCALES - GENERAL: PAINLEVEL_OUTOF10: 6

## 2020-05-05 ASSESSMENT — PAIN DESCRIPTION - PAIN TYPE: TYPE: ACUTE PAIN
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
## 2020-05-05 NOTE — ED NOTES
Gave patient discharge instructions. She states understanding.  Patient discharged to home      Mario Pacheco RN  05/05/20 2946

## 2020-05-11 ENCOUNTER — TELEPHONE (OUTPATIENT)
Dept: FAMILY MEDICINE CLINIC | Age: 60
End: 2020-05-11
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
## 2020-05-12 ENCOUNTER — VIRTUAL VISIT (OUTPATIENT)
Dept: FAMILY MEDICINE CLINIC | Age: 60
End: 2020-05-12
Payer: MEDICARE

## 2020-05-12 PROCEDURE — 99443 PR PHYS/QHP TELEPHONE EVALUATION 21-30 MIN: CPT | Performed by: NURSE PRACTITIONER

## 2020-05-12 RX ORDER — TIZANIDINE 4 MG/1
4 TABLET ORAL EVERY 8 HOURS PRN
Qty: 30 TABLET | Refills: 0 | Status: SHIPPED | OUTPATIENT
Start: 2020-05-12 | End: 2020-08-05

## 2020-05-13 RX ORDER — CYCLOBENZAPRINE HCL 10 MG
TABLET ORAL
Qty: 10 TABLET | Refills: 0 | OUTPATIENT
Start: 2020-05-13

## 2020-05-27 ENCOUNTER — TELEPHONE (OUTPATIENT)
Dept: FAMILY MEDICINE CLINIC | Age: 60
End: 2020-05-27

## 2020-06-02 RX ORDER — TOLTERODINE 4 MG/1
CAPSULE, EXTENDED RELEASE ORAL
Qty: 90 CAPSULE | Refills: 0 | Status: SHIPPED | OUTPATIENT
Start: 2020-06-02 | End: 2020-12-09

## 2020-06-02 RX ORDER — METOPROLOL SUCCINATE 50 MG/1
TABLET, EXTENDED RELEASE ORAL
Qty: 90 TABLET | Refills: 0 | Status: SHIPPED | OUTPATIENT
Start: 2020-06-02 | End: 2020-09-10

## 2020-06-05 ENCOUNTER — OFFICE VISIT (OUTPATIENT)
Dept: FAMILY MEDICINE CLINIC | Age: 60
End: 2020-06-05
Payer: MEDICARE

## 2020-06-05 VITALS
OXYGEN SATURATION: 95 % | DIASTOLIC BLOOD PRESSURE: 86 MMHG | HEART RATE: 80 BPM | SYSTOLIC BLOOD PRESSURE: 136 MMHG | BODY MASS INDEX: 37.38 KG/M2 | WEIGHT: 211 LBS

## 2020-06-05 PROCEDURE — 99214 OFFICE O/P EST MOD 30 MIN: CPT | Performed by: FAMILY MEDICINE

## 2020-06-05 NOTE — PROGRESS NOTES
conjunctivae and sclerae normal  · Eyes: no periorbital cellulitis. ENT:   External nose and ears appear normal  normal TM's and external ear canals both ears  Pharynx: normal. Exudates: None  Lips, mucosa, and tongue normal   NECK:   · No adenopathy, supple, symmetrical, trachea midline  · Thyroid not enlarged, symmetric, no tenderness/mass/nodules  LYMPH:  · no cervical nodes, no supraclavicular nodes  LUNGS:    · Breathing unlabored  · clear to auscultation bilaterally and good air movement  CARDIOVASC:   · Regular rate and rhythm, S1, S2 normal. No murmur, click, rub or gallop  · LEGS:  Lower extremity edema: none    SKIN: warm and dry  PSYCH:    · Alert and oriented  · Normal reasoning, insight good  · Facial expressions full, mood appropriate  MUSC  · Left index, decreased flexion at MCP, slight at PIP, no swelling or obvious malformation  · Neck slight decreased ROM. Spasm over post muscles  · Back mils tenderness diffusely      Assessment and Plan:      Diagnosis Orders   1. Strain of neck muscle, subsequent encounter  Ambulatory referral to Physical Therapy   2. Thoracic myofascial strain, subsequent encounter  Ambulatory referral to Physical Therapy   3. Muscle tension headache  Ambulatory referral to Physical Therapy   4. Finger pain, left  XR HAND LEFT (MIN 3 VIEWS)   5. Chronic sore throat  Ambulatory referral to ENT   Stable. Plan as above and below. INSTRUCTIONS  · See ENT for throat. · Go to PT.  · Get x-ray next week. .  [facility] to [facility] to get the x-ray. No appointment needed.

## 2020-06-10 RX ORDER — ATORVASTATIN CALCIUM 80 MG/1
TABLET, FILM COATED ORAL
Qty: 90 TABLET | Refills: 1 | Status: SHIPPED | OUTPATIENT
Start: 2020-06-10 | End: 2021-01-04

## 2020-06-17 RX ORDER — QUETIAPINE FUMARATE 25 MG/1
TABLET, FILM COATED ORAL
Qty: 90 TABLET | Refills: 0 | Status: SHIPPED | OUTPATIENT
Start: 2020-06-17 | End: 2020-08-10

## 2020-06-22 RX ORDER — VENLAFAXINE HYDROCHLORIDE 75 MG/1
CAPSULE, EXTENDED RELEASE ORAL
Qty: 270 CAPSULE | Refills: 0 | OUTPATIENT
Start: 2020-06-22

## 2020-06-22 RX ORDER — AMLODIPINE BESYLATE 10 MG/1
TABLET ORAL
Qty: 90 TABLET | Refills: 0 | OUTPATIENT
Start: 2020-06-22

## 2020-06-22 RX ORDER — LISINOPRIL 40 MG/1
TABLET ORAL
Qty: 90 TABLET | Refills: 0 | Status: SHIPPED | OUTPATIENT
Start: 2020-06-22 | End: 2020-09-10

## 2020-07-07 RX ORDER — AMLODIPINE BESYLATE 10 MG/1
TABLET ORAL
Qty: 90 TABLET | Refills: 0 | OUTPATIENT
Start: 2020-07-07

## 2020-07-08 ENCOUNTER — HOSPITAL ENCOUNTER (OUTPATIENT)
Dept: PHYSICAL THERAPY | Age: 60
Setting detail: THERAPIES SERIES
Discharge: HOME | End: 2020-07-08
Payer: MEDICARE

## 2020-07-08 NOTE — FLOWSHEET NOTE
Physical Therapy  Cancellation/No-show Note  Patient Name:  Jonathan Rogel  :  1960   Date:  2020  Cancelled visits to date: 1  No-shows to date: 0    For today's appointment patient:  [x]  Cancelled  []  Rescheduled appointment  []  No-show     Reason given by patient:  [x]  Patient ill  []  Conflicting appointment  []  No transportation    []  Conflict with work  []  No reason given  []  Other:     Comments:  Pt called to cancel her initial PT evaluation. Pt rescheduled for next week.     Electronically signed by:  Leah Chaudhary PT

## 2020-07-13 ENCOUNTER — APPOINTMENT (OUTPATIENT)
Dept: PHYSICAL THERAPY | Age: 60
End: 2020-07-13
Payer: MEDICARE

## 2020-07-15 ENCOUNTER — HOSPITAL ENCOUNTER (OUTPATIENT)
Dept: PHYSICAL THERAPY | Age: 60
Setting detail: THERAPIES SERIES
Discharge: HOME | End: 2020-07-15
Payer: MEDICARE

## 2020-07-15 PROCEDURE — 97161 PT EVAL LOW COMPLEX 20 MIN: CPT

## 2020-07-15 PROCEDURE — 97110 THERAPEUTIC EXERCISES: CPT

## 2020-07-15 NOTE — PROGRESS NOTES
Outpatient Physical Therapy  [] [facility]    Phone: [phone]   Fax: [phone]   [] [facility]  Phone: [phone]              Fax: [phone]  [x] [facility]   Phone: [phone]   Fax: [phone]     To: Referring Practitioner: Dr. Michael Eller      Patient: Steve Mcmillan   : 1960   MRN: [id number]  Evaluation Date: 7/15/2020      Diagnosis Information:  · Diagnosis: Strain of neck muscle, subsequent encounter (S16.1XXD); Thoracic myofascial strain, subsequent encounter (S29.019); Muscle tension headache (G44.209)   · Treatment Diagnosis: Decreased ADL status     Physical Therapy Certification/Re-Certification Form  Dear Dr. Jen Crow,   The following patient has been evaluated for physical therapy services and for therapy to continue, Medicare requires monthly physician review of the treatment plan. Please review the attached evaluation and/or summary of the patient's plan of care, and verify that you agree therapy should continue by signing the attached document and sending it back to our office. Plan of Care/Treatment to date:  [x] Therapeutic Exercise    [x] Modalities:  [] Therapeutic Activity     [] Ultrasound  [] Electrical Stimulation  [] Gait Training      [] Cervical Traction [] Lumbar Traction  [] Neuromuscular Re-education    [] Cold/hotpack [] Iontophoresis   [x] Instruction in HEP     Other:  [x] Manual Therapy      []             [] Aquatic Therapy      []           ? Frequency/Duration:  # Days per week: [x] 1 day # Weeks: [] 1 week [] 5 weeks     [] 2 days? [] 2 weeks [x] 6 weeks     [x] 3 days   [] 3 weeks [] 7 weeks     [] 4 days   [x] 4 weeks [] 8 weeks    Rehab Potential: [] Excellent [] Good [x] Fair  [] Poor       Electronically signed by:  Raghav Bailey PT      If you have any questions or concerns, please don't hesitate to call.   Thank you for your referral.      Physician Signature:________________________________Date:__________________  By signing above, therapists plan is approved by physician

## 2020-07-15 NOTE — FLOWSHEET NOTE
be \"all day, I try to lay down and sleep, try to get the pain away. \"  Pain affects sleep- has difficulty falling asleep. Subjective:       Objective:   Observation:    Test measurements:     7/15/20  Neurological screen:  DTR B biceps 2/3, B brachioradialis 0/3, B triceps 2/3. (-) B clonus, Hoffmans. Sensation C2-T2 intact to light touch B. (-) Rhomberg and Hautard's test.  AROM cervical spine: WFL all directions; pain all directions. Pain with flexion at posterior neck B; extension at posterior neck B; SB L at L neck behind ear; SB R at R posterior neck; pain posterior neck B with rotation. AROM B UE WFL. Strength B upper traps, biceps, wrist extension, triceps, wrist flexors, thumb ABD, finger ABD WFL. Pt had weakness of R ABD 4/5. Special Tests: (-) cranial nerve screening testing including confrontation, eye tracking, facial sensation, facial expression. Exercises:  Exercise/Equipment Resistance/Repetitions Other comments                                                                            Other Therapeutic Activities:    7/15/20 PT reviewed with pt attendance policy. Home Exercise Program:    7/15/20 HEP: shrugs, scapular pinch, AROM cervical spine flexion and Rotation B. HEP was issued through [address] [place] [address]. Access code is [email or web address]. Manual Treatments:     Modalities:     Progression Towards Functional goals:  [] Patient is progressing as expected towards functional goals listed. [] Progression is slowed due to complexities listed. [] Progression has been slowed due to co-morbidities. [x] Plan just implemented, too soon to assess goals progression  [] Other:    Charges: Therapeutic Exercise:  [] (53574) Provided verbal/tactile cueing for activities to restore or maintain strength, flexibility, endurance, ROM for improvements with self-care, mobility, lifting and ambulation.     Neuromuscular Re-Education  [] (34099) Provided verbal/tactile cueing for activities to restore or maintain balance, coordination, kinesthetic sense, posture, motor skill, proprioception for self-care, mobility, lifting, and ambulation. Therapeutic Activities:    [] (62733) Provided verbal/tactile cueing to address functional limitations related to loss of mobility, strength, balance, and coordination. Gait Training:  [] (34514) Provided training and instruction to the patient for proper postural muscle recruitment and positioning with ambulation re-education     Home Exercise Program:    [x] (83308) Reviewed/Progressed HEP activities related to strengthening, flexibility, endurance, ROM for functional self-care, mobility, lifting and ambulation   [] (35411) Reviewed/Progressed HEP activities related to improving balance, coordination, kinesthetic sense, posture, motor skill, proprioception for self-care, mobility, lifting, and ambulation      Manual Treatments:  MFR / STM / Oscillations-Mobs:  G-I, II, III, IV / Manipulation / MLD  [] (92350) Provided manual therapy to mobilize  soft tissue/joints/fluid for the purpose of modulating pain, promoting relaxation, increasing ROM, reducing/eliminating soft tissue swelling/inflammation/restriction, improving soft tissue extensibility and allowing for proper ROM for normal function with self- care, mobility, lifting and ambulation.         Timed Code Treatment Minutes: 12   Total Treatment Minutes: 32     [x] EVAL (LOW) 31088   [] EVAL (MOD) 71874   [] EVAL (HIGH) 89808   [] RE-EVAL   [x] TE (65076) x     [] Aquatic (40817) x  [] NMR (82313)   x  [] Aquatic Group (68036) x  [] Manual (85074) x    [] Ultrasound (03809) x  [] TA (26404) x  [] Mech Traction (69512)  [] Ionto (31972)           [] ES (un) (08781):   [] Vasopump (64525) [] Other:      Assessment  [] Patient tolerated treatment well [] Patient limited by fatigue  [x] Patient limited by pain  [] Patient limited by other medical complications  [] Other:     Prognosis: [] Good [] Fair  [] Poor    Goals:    Long term goals  Time Frame for Long term goals : 6 weeks  Long term goal 1: Pt will be independent with HEP. Long term goal 2: Pt will be able to perform kitchen related duties for 20 minutes without aggravation of neck pain. Long term goal 3: Neck pain will be decreased from 9-10/10 to 2-6/10. Long term goal 4: Strength R shoulder ABD will be 5/5. Long term goal 5: AROM cervical spine rotation will not increase cervical spine region pain. Patient Requires Follow-up: [] Yes  [] No    Plan:   [] Continue per plan of care [] Alter current plan (see comments)  [x] Plan of care initiated [] Hold pending MD visit [] Discharge  Note: If patient does not return for scheduled/ recommended follow up visits, this note will serve as a discharge from care along with most recent update on progress. Plan for Next Session:  Begin chin tuck progression, manual assessment of cervical spine with consideration to suboccipitals, t-band rows.     Electronically signed by:  Giancarlo Ca PT

## 2020-07-15 NOTE — PROGRESS NOTES
Neck Disability Index Questionnaire    Patient: Lance Quijano  : 1960  MRN: [id number]  Date: 7/15/2020  Electronically Signed by: Radha Best     Please Read: This questionnaire is designed to enable us to understand how much your neck pain has affected your ability to manage your everyday activities. Please answer each section by selecting ONE CHOICE that most applies to you. We realize that you may feel that more than one statement may relate to you, but PLEASE JUST SELECT ONE CHOICE THAT MOST CLOSELY DESCRIBES YOUR PROBLEM RIGHT NOW. SECTION 1 - Pain Intensity  []A. I have no pain at the moment  []B. The pain is very mild at the moment  []C. The pain is moderate at the moment  []D. The pain is fairly severe at the moment  [x]E. The pain is very severe at the moment  []F. The pain is the worst imaginable at the moment SECTION 6 - Concentration  []A. I can concentrate fully when I want to with no difficulty  []B. I can concentrate fully when I want to with slight difficulty  [x]C. I have a fair degree of difficulty in concentrating when I want to  []D. I have a lot of difficulty in concentrating when I want to  []E. I have a great deal of difficulty in concentrating when I want to  []F. I cannot concentrate at all   SECTION 2 - Personal Care [place]  []A. I can look after myself normally without causing extra pain  []B. I can look after myself normally, but it causes me extra pain  [x]C. It is painful to look after myself and I am slow and careful  []D. I need some help, but manage most of my personal care  []E. I need help every day in most aspects of personal care  []F. I do not get dressed, I wash with difficulty and stay in bed SECTION 7 - Work  []A. I can do as much work as I want to  []B. I can only do my usual work, but no more  []C. I can do most of my usual work, but no more  []D. I cannot do my usual work  [x]E. I can hardly do any work at all  []F.  I cannot do any work at all   [address]  []A. I can lift heavy weights without extra pain  []B. I can lift heavy weights, but it gives me extra pain  []C. Pain prevents me from lifting heavy weights off the floor but I can manage if they are conveniently positioned, for example, on a table  [x]D. Pain prevents me from lifting heavy weights, but I can manage light to medium weights if they are conveniently positioned  []E. I can lift very light weights  []F. I cannot lift or carry anything at all SECTION 8 - Driving  []A. I can drive my car without any neck pain  [x]B. I can drive my car as long as I want with slight pain in my neck  []C. I can drive my car as long as I want with moderate pain in my neck  []D. I cannot drive my car as long as I want because of moderate pain  []E. I can hardly drive at all because of severe pain in my neck  []F. I cannot drive my car at all   SECTION 4 - Reading  []A. I can read as much as I want to with no pain in my neck  []B. I can read as much as I want to with slight pain in my neck  []C. I can read as much as I want to with moderate pain in my neck   []D. I cannot read as much as I want because of moderate pain in my neck  [x]E. I cannot read as much as I want because of severe pain in my neck  []F. I cannot read at all SECTION 9 - Sleeping  []A. I have no trouble sleeping  []B. My sleep is slightly disturbed (< 1 hour sleepless)  []C. My sleep is mildly disturbed (1-2 hours sleepless)  [x]D. My sleep is moderately disturbed (2-3 hours sleepless)  []E. My sleep is greatly disturbed (3-5 hours sleepless)  []F. My sleep is completely disturbed (5-7 hours sleepless)   SECTION 5 - Headaches  []A. I have no headaches at all  []B. I have slight headaches which come infrequently  []C. I have moderate headaches which come infrequently  []D. I have moderate headaches which come frequently  []E. I have severe headaches which come frequently  [x]F.  I have headaches almost all the time SECTION 10 - Recreation  []A. I am able to engage in all of my recreational activities with no neck pain at all  []B. I am able to engage in all of my recreational activities with some pain in my neck  []C. I am able to engage in most, but not all of my recreational activities because of pain in my neck  []D. I am able to engage in a few of my recreational activities because of my neck  [x]E. I can hardly do any recreational activities because of pain in my neck  []F. I cannot do any recreational activities at all     COMMENTS:     Patient Score:  64% dysfunction    Scoring Method for the Neck Disability Index      1. Each of the 10 sections is scored separately (0 to 5 points each) then added up (max. Total = 50). EXAMPLE:  Section 1. Pain Intensity  Item Score Item Description Point Value   A I have no pain at the moment 0   B The pain is very mild at the moment 1   C The pain is moderate at the moment 2   D The pain is fairly severe at the moment 3   E The pain is very severe at the moment 4   F The pain is the worst imaginable 5     2. If all 10 sections are completed, simply double the patient's score. 3. If a section is omitted, divide the patient's total score by the number of sections completed times 5. FORMULA: [(Patient's score) / (# Sections Completed X 5)] X 100 = ____% Disability    EXAMPLE:  If number of sections completed = 9  If patient's score = 22  The equation = [22 / (9 X 5)] X 100 = 48.9% Disability    4. Interpretation of disability scores:    SCORE   0-20% = Minimal disability   20-40% = Moderate disability   40-60% = Severe disability   60-80% = Crippled   % = Bed-bound or exaggerating     Nik Rodriguez The Neck Disability Index: A study of reliability and validity.  J Manipulative Physiol Ther 1991;14:409-415    G-Code [place]:  NDI Total Score Disability Index CMS Modifier   0 0% []CH   1-9 1-19% []CI   10-19 20-39% []CJ   20-29 40-59% []CK   30-39 60-79% []CL   40-49 80-99% []CM 50 100% []CN
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
## 2020-07-20 ENCOUNTER — APPOINTMENT (OUTPATIENT)
Dept: PHYSICAL THERAPY | Age: 60
End: 2020-07-20
Payer: MEDICARE

## 2020-07-20 RX ORDER — INSULIN ASPART 100 [IU]/ML
INJECTION, SUSPENSION SUBCUTANEOUS
Qty: 5 PEN | Refills: 0 | OUTPATIENT
Start: 2020-07-20

## 2020-07-20 RX ORDER — INSULIN ASPART 100 [IU]/ML
INJECTION, SUSPENSION SUBCUTANEOUS
Qty: 15 PEN | Refills: 3 | Status: SHIPPED | OUTPATIENT
Start: 2020-07-20 | End: 2020-09-28

## 2020-07-20 NOTE — TELEPHONE ENCOUNTER
It will not let me escrbe. It says medication name is too long. Can it be edited or should a call in verbally?

## 2020-07-22 ENCOUNTER — APPOINTMENT (OUTPATIENT)
Dept: PHYSICAL THERAPY | Age: 60
End: 2020-07-22
Payer: MEDICARE

## 2020-07-29 ENCOUNTER — APPOINTMENT (OUTPATIENT)
Dept: PHYSICAL THERAPY | Age: 60
End: 2020-07-29
Payer: MEDICARE

## 2020-08-05 ENCOUNTER — OFFICE VISIT (OUTPATIENT)
Dept: FAMILY MEDICINE CLINIC | Age: 60
End: 2020-08-05
Payer: MEDICARE

## 2020-08-05 VITALS
TEMPERATURE: 97.4 F | HEIGHT: 63 IN | OXYGEN SATURATION: 96 % | WEIGHT: 215 LBS | BODY MASS INDEX: 38.09 KG/M2 | RESPIRATION RATE: 16 BRPM | HEART RATE: 79 BPM | SYSTOLIC BLOOD PRESSURE: 128 MMHG | DIASTOLIC BLOOD PRESSURE: 84 MMHG

## 2020-08-05 LAB
BILIRUBIN, POC: NEGATIVE
BLOOD URINE, POC: ABNORMAL
CLARITY, POC: ABNORMAL
COLOR, POC: ABNORMAL
GLUCOSE URINE, POC: NEGATIVE
KETONES, POC: ABNORMAL
LEUKOCYTE EST, POC: ABNORMAL
NITRITE, POC: POSITIVE
PH, POC: 5.5
PROTEIN, POC: ABNORMAL
SPECIFIC GRAVITY, POC: >=1.03
UROBILINOGEN, POC: ABNORMAL

## 2020-08-05 PROCEDURE — 99213 OFFICE O/P EST LOW 20 MIN: CPT | Performed by: FAMILY MEDICINE

## 2020-08-05 PROCEDURE — 81002 URINALYSIS NONAUTO W/O SCOPE: CPT | Performed by: FAMILY MEDICINE
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
## 2020-08-05 NOTE — PATIENT INSTRUCTIONS
INSTRUCTIONS  · NEXT APPOINTMENT: Please schedule check-up in 1 month. · PLEASE TAKE THIS FORM TO CHECK-OUT WINDOW TO SCHEDULE NEXT VISIT. · See neurology about post concussion issues  · See orthopedics about joints  · Please finish taking ALL of the antibiotics.

## 2020-08-06 ENCOUNTER — TELEPHONE (OUTPATIENT)
Dept: FAMILY MEDICINE CLINIC | Age: 60
End: 2020-08-06

## 2020-08-06 RX ORDER — CIPROFLOXACIN 500 MG/1
500 TABLET, FILM COATED ORAL 2 TIMES DAILY
Qty: 14 TABLET | Refills: 0 | Status: SHIPPED | OUTPATIENT
Start: 2020-08-06 | End: 2020-08-07

## 2020-08-06 RX ORDER — NORTRIPTYLINE HYDROCHLORIDE 25 MG/1
25-50 CAPSULE ORAL NIGHTLY
Qty: 60 CAPSULE | Refills: 0 | Status: SHIPPED | OUTPATIENT
Start: 2020-08-06 | End: 2020-09-02

## 2020-08-07 LAB
ORGANISM: ABNORMAL
URINE CULTURE, ROUTINE: ABNORMAL

## 2020-08-07 RX ORDER — NITROFURANTOIN 25; 75 MG/1; MG/1
100 CAPSULE ORAL 2 TIMES DAILY
Qty: 20 CAPSULE | Refills: 0 | Status: SHIPPED | OUTPATIENT
Start: 2020-08-07 | End: 2020-08-17

## 2020-08-10 RX ORDER — QUETIAPINE FUMARATE 25 MG/1
TABLET, FILM COATED ORAL
Qty: 90 TABLET | Refills: 0 | Status: SHIPPED | OUTPATIENT
Start: 2020-08-10 | End: 2020-09-21

## 2020-08-12 ENCOUNTER — OFFICE VISIT (OUTPATIENT)
Dept: ORTHOPEDIC SURGERY | Age: 60
End: 2020-08-12
Payer: MEDICARE

## 2020-08-12 VITALS — TEMPERATURE: 97.9 F

## 2020-08-12 PROCEDURE — 99203 OFFICE O/P NEW LOW 30 MIN: CPT | Performed by: PHYSICIAN ASSISTANT

## 2020-08-12 RX ORDER — METHOCARBAMOL 750 MG/1
750 TABLET, FILM COATED ORAL 3 TIMES DAILY
Qty: 90 TABLET | Refills: 0 | Status: SHIPPED | OUTPATIENT
Start: 2020-08-12 | End: 2020-09-11
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
## 2020-08-20 RX ORDER — GABAPENTIN 600 MG/1
TABLET ORAL
Qty: 360 TABLET | Refills: 0 | Status: SHIPPED | OUTPATIENT
Start: 2020-08-20 | End: 2020-12-10

## 2020-08-21 ENCOUNTER — TELEPHONE (OUTPATIENT)
Dept: FAMILY MEDICINE CLINIC | Age: 60
End: 2020-08-21

## 2020-08-21 NOTE — TELEPHONE ENCOUNTER
Pt hasn't been feeling well for the past week. She has a horrible headache, she feels achy, and nauseous. She ran a fever of 100 I asked if she can smell and taste she said she can smell but can't taste very well. Pt isn't sure what she should do. Her headache are horrible. She hasn't been doing anything but laying around. She wants to know if she should be taking the nortriptyline on top of the Seroquel. She takes 2 of the 25 mg seroquel at night. But pt states her neck and head is killing her. Ortho is going to put her through therapy she can't start till next week.

## 2020-08-21 NOTE — TELEPHONE ENCOUNTER
Should get COVID testing. Can take both meds but watch for too much sleepiness. Keep alternating ice and heat.

## 2020-08-24 ENCOUNTER — OFFICE VISIT (OUTPATIENT)
Dept: PRIMARY CARE CLINIC | Age: 60
End: 2020-08-24
Payer: MEDICARE

## 2020-08-24 PROCEDURE — 99211 OFF/OP EST MAY X REQ PHY/QHP: CPT | Performed by: NURSE PRACTITIONER

## 2020-08-24 NOTE — PROGRESS NOTES
Shantell Shahid received a viral test for COVID-19. They were educated on isolation and quarantine as appropriate. For any symptoms, they were directed to seek care from their PCP, given contact information to establish with a doctor, directed to an urgent care or the emergency room.

## 2020-08-25 ENCOUNTER — TELEPHONE (OUTPATIENT)
Dept: FAMILY MEDICINE CLINIC | Age: 60
End: 2020-08-25

## 2020-08-25 LAB — SARS-COV-2, NAA: NOT DETECTED

## 2020-09-01 ENCOUNTER — TELEPHONE (OUTPATIENT)
Dept: FAMILY MEDICINE CLINIC | Age: 60
End: 2020-09-01

## 2020-09-01 NOTE — TELEPHONE ENCOUNTER
Pt has a DM on 9/28/20 wants her labs A1C ordered ahead of time pt also wants her kidneys checked she has no concerned       Please advise

## 2020-09-21 RX ORDER — QUETIAPINE FUMARATE 25 MG/1
TABLET, FILM COATED ORAL
Qty: 180 TABLET | Refills: 0 | Status: SHIPPED | OUTPATIENT
Start: 2020-09-21 | End: 2021-01-04

## 2020-09-28 ENCOUNTER — OFFICE VISIT (OUTPATIENT)
Dept: FAMILY MEDICINE CLINIC | Age: 60
End: 2020-09-28
Payer: MEDICARE

## 2020-09-28 VITALS
TEMPERATURE: 97.3 F | HEIGHT: 63 IN | RESPIRATION RATE: 16 BRPM | HEART RATE: 73 BPM | DIASTOLIC BLOOD PRESSURE: 78 MMHG | BODY MASS INDEX: 38.98 KG/M2 | SYSTOLIC BLOOD PRESSURE: 126 MMHG | WEIGHT: 220 LBS | OXYGEN SATURATION: 98 %

## 2020-09-28 PROCEDURE — 90686 IIV4 VACC NO PRSV 0.5 ML IM: CPT | Performed by: FAMILY MEDICINE

## 2020-09-28 PROCEDURE — 99214 OFFICE O/P EST MOD 30 MIN: CPT | Performed by: FAMILY MEDICINE

## 2020-09-28 PROCEDURE — G0008 ADMIN INFLUENZA VIRUS VAC: HCPCS | Performed by: FAMILY MEDICINE

## 2020-09-28 RX ORDER — QUETIAPINE FUMARATE 25 MG/1
TABLET, FILM COATED ORAL
Qty: 90 TABLET | Refills: 0 | OUTPATIENT
Start: 2020-09-28

## 2020-09-28 RX ORDER — PEN NEEDLE, DIABETIC 31 GX5/16"
1 NEEDLE, DISPOSABLE MISCELLANEOUS 3 TIMES DAILY
Qty: 300 EACH | Refills: 3 | Status: SHIPPED | OUTPATIENT
Start: 2020-09-28 | End: 2022-03-16

## 2020-09-28 RX ORDER — INSULIN ASPART 100 [IU]/ML
INJECTION, SUSPENSION SUBCUTANEOUS
Qty: 30 PEN | Refills: 1 | Status: SHIPPED | OUTPATIENT
Start: 2020-09-28 | End: 2021-01-28

## 2020-09-28 RX ORDER — TIZANIDINE 4 MG/1
4 TABLET ORAL EVERY 8 HOURS PRN
Qty: 30 TABLET | Refills: 0 | Status: SHIPPED | OUTPATIENT
Start: 2020-09-28 | End: 2020-11-03 | Stop reason: SDUPTHER

## 2020-09-28 NOTE — PROGRESS NOTES
DIABETES MELLITUS FOLOW-UP  Subjective:      Chief Complaint   Patient presents with    Diabetes     Juan Ocasio is an 63 y.o. female who presents for follow up of following chronic problems:  1. DM (diabetes mellitus), type 2, uncontrolled with complications ([address]    2. Needs flu shot    3. Strain of neck muscle, initial encounter    4. Diabetic nephropathy associated with type 2 diabetes mellitus ([address]    5. Hyperlipidemia LDL goal <100    6. Diabetic gastroparesis ([address]    7. Anxiety    8. Uncontrolled type 2 diabetes mellitus with complication, with long-term current use of insulin (HCC)      Complaints: pt is still having bad headaches and neck is still hurting, pt is having muscle spasms in her back, but she is running out of muscle relaxer. Saw ortho. Plan PT  · she forgets to take insulin before she eats. · Patient checks sugars 2  time(s) daily. Average: 200-300. · Any low sugars? No  · Patent follows diabetic diet? No  · Exercise: walking never  · Taking medicines daily as directed? Yes  · Is the patient reporting any side effects of medications? No  · Patient checks bottom of feet daily? Yes  · Tobacco history updated:  reports that she has never smoked. She has never used smokeless tobacco.    Review of Systems   General ROS: fever? No,   Night sweats? No  Ophthalmic ROS: change in vision? No  Endocrine ROS: fatigue? No  Unexpected weight changes? No  Respiratory ROS: Shortness of breath? No  Cardiovascular ROS: chest pain? No  Gastrointestinal ROS: abdominal pain? No  Change in stools? No  Genito-Urinary ROS: painful urination? No  Trouble urinating? No  Neurological ROS: TIA or stroke symptoms? No  Numbness/tingling? No  Dermatological ROS: rash or sores on feet? No  Changes in skin spots?     No    Health Maintenance Due   Topic Date Due    DTaP/Tdap/Td vaccine (1 - Tdap) 01/10/1978    Shingles Vaccine (2 of 3) 11/02/2016    Diabetic retinal exam 01/23/2019    Breast cancer screen  08/06/2019    Lipid screen  04/18/2020    Flu vaccine (1) 09/01/2020     LAST LABS  LDL Calculated   Date Value Ref Range Status   04/18/2019 152 (H) <100 mg/dL Final     Lab Results   Component Value Date    HDL 42 04/18/2019     Lab Results   Component Value Date    TRIG 212 (H) 04/18/2019     Lab Results   Component Value Date    GLUCOSE 201 (H) 05/04/2020     Lab Results   Component Value Date     05/04/2020    K 4.1 05/04/2020    CREATININE 0.9 05/04/2020     Lab Results   Component Value Date    WBC 12.5 (H) 05/04/2020    HGB 14.3 05/04/2020     05/04/2020     Lab Results   Component Value Date    ALT 20 05/04/2020    AST 26 05/04/2020    ALKPHOS 132 (H) 05/04/2020    BILITOT 0.3 05/04/2020     TSH (uIU/mL)   Date Value   04/07/2018 1.30     Lab Results   Component Value Date    LABA1C 7.0 12/26/2019    LABA1C 8.5 07/05/2019    LABA1C 9.5 04/18/2019       *Chief complaint, HPI, History and ROS provided by the medical assistant has been reviewed and verified by provider- Sari Grady MD    LAST LABS  LDL Calculated   Date Value Ref Range Status   04/18/2019 152 (H) <100 mg/dL Final     Lab Results   Component Value Date    HDL 42 04/18/2019     Lab Results   Component Value Date    TRIG 212 (H) 04/18/2019     Lab Results   Component Value Date    GLUCOSE 201 (H) 05/04/2020     Lab Results   Component Value Date     05/04/2020    K 4.1 05/04/2020    CREATININE 0.9 05/04/2020     Lab Results   Component Value Date    WBC 12.5 (H) 05/04/2020    HGB 14.3 05/04/2020     05/04/2020     Lab Results   Component Value Date    ALT 20 05/04/2020    AST 26 05/04/2020    ALKPHOS 132 (H) 05/04/2020    BILITOT 0.3 05/04/2020     TSH (uIU/mL)   Date Value   04/07/2018 1.30     Lab Results   Component Value Date    LABA1C 7.0 12/26/2019    LABA1C 8.5 07/05/2019    LABA1C 9.5 04/18/2019     HISTORY:  Patient's medications, allergies, past medical, and social histories were reviewed and updated as appropriate. CHART REVIEW  Health Maintenance Due   Topic Date Due    DTaP/Tdap/Td vaccine (1 - Tdap) 01/10/1978    Shingles Vaccine (2 of 3) 11/02/2016    Diabetic retinal exam  01/23/2019    Breast cancer screen  08/06/2019    Lipid screen  04/18/2020    Flu vaccine (1) 09/01/2020     Social History     Tobacco Use    Smoking status: Never Smoker    Smokeless tobacco: Never Used    Tobacco comment: congrats   Substance Use Topics    Alcohol use: No    Drug use: No      The 10-year ASCVD risk score (Leonardo Post., et al., 2013) is: 11.9%    Values used to calculate the score:      Age: 63 years      Sex: Female      Is Non- : No      Diabetic: Yes      Tobacco smoker: No      Systolic Blood Pressure: 152 mmHg      Is BP treated: Yes      HDL Cholesterol: 42 mg/dL      Total Cholesterol: 236 mg/dL  Prior to Visit Medications    Medication Sig Taking?  Authorizing Provider   tiZANidine (ZANAFLEX) 4 MG tablet Take 1 tablet by mouth every 8 hours as needed (muscle spasm) Yes Yung Emerson MD   insulin aspart protamine-insulin aspart (NOVOLOG MIX 70/30 FLEXPEN) (70-30) 100 UNIT/ML injection 80 units in AM, 45 units in PM Yes Yung Emerson MD   Insulin Pen Needle (B-D UF III MINI PEN NEEDLES) 31G X 5 MM MISC 1 each by Does not apply route 3 times daily Yes Yung Emerson MD   QUEtiapine (SEROQUEL) 25 MG tablet TAKE TWO TABLETS BY MOUTH ONCE NIGHTLY Yes Yung Emerson MD   venlafaxine (EFFEXOR XR) 75 MG extended release capsule TAKE THREE CAPSULES BY MOUTH DAILY Yes Yung Emerson MD   metoprolol succinate (TOPROL XL) 50 MG extended release tablet TAKE ONE TABLET BY MOUTH DAILY Yes Yung Emerson MD   lisinopril (PRINIVIL;ZESTRIL) 40 MG tablet TAKE ONE TABLET BY MOUTH DAILY Yes Yung Emerson MD   nortriptyline (PAMELOR) 25 MG capsule TAKE ONE TO TWO CAPSULES BY MOUTH ONCE NIGHTLY Yes Yung Emerson MD   atorvastatin (LIPITOR) 80 MG tablet TAKE ONE CAPS capsule Take 8 mcg by mouth 2 times daily (with meals). Yes Historical Provider, MD   gabapentin (NEURONTIN) 600 MG tablet TAKE ONE TABLET BY MOUTH EVERY MORNING TAKE ONE TABLET BY MOUTH EVERY EVENING TAKE TWO TABLETS BY MOUTH EVERY NIGHT AT BEDTIME  Adan Claudio MD      Family History   Problem Relation Age of Onset    Cancer Mother         forehead soft tissue    Diabetes Mother     Arthritis Father     High Blood Pressure Father     High Cholesterol Father     Alzheimer's Disease Father     Diabetes Brother     Depression Sister      Objective:   PHYSICAL EXAM   /78 (Site: Right Upper Arm, Position: Sitting, Cuff Size: Large Adult)   Pulse 73   Temp 97.3 °F (36.3 °C) (Temporal)   Resp 16   Ht 5' 3\" (1.6 m)   Wt 220 lb (99.8 kg)   SpO2 98%   BMI 38.97 kg/m²   BP Readings from Last 5 Encounters:   09/28/20 126/78   08/05/20 128/84   06/05/20 136/86   05/04/20 (!) 156/84   12/26/19 100/60     Wt Readings from Last 5 Encounters:   09/28/20 220 lb (99.8 kg)   08/05/20 215 lb (97.5 kg)   06/05/20 211 lb (95.7 kg)   05/04/20 206 lb 12.7 oz (93.8 kg)   12/26/19 220 lb (99.8 kg)      GENERAL:   · well-developed, well-nourished, alert, no distress. EYES:   · External findings: lids and lashes normal and conjunctivae and sclerae normal  LUNGS:    · Breathing unlabored  · clear to auscultation bilaterally and good air movement  CARDIOVASC:   · regular rate and rhythm  · LEGS:  Lower extremity edema: none    SKIN: warm and dry  PSYCH:    · Alert and oriented  · Normal reasoning, insight good  · Facial expressions full, mood appropriate  · No memory disturbance noted  MUSCULOSKEL:    · No significant finger or nail findings  NEURO:   · CN 2-12 intact     Diagnosis Orders   1. DM (diabetes mellitus), type 2, uncontrolled with complications (HCC)   DIABETES EYE EXAM    Insulin Pen Needle (B-D UF III MINI PEN NEEDLES) 31G X 5 MM MISC   2.  Needs flu shot  INFLUENZA, QUADV, 3 YRS AND OLDER, IM PF, PREFILL SYR OR SDV, 0.5ML (AFLURIA QUADV, PF)   3. Strain of neck muscle, initial encounter  tiZANidine (ZANAFLEX) 4 MG tablet   4. Diabetic nephropathy associated with type 2 diabetes mellitus ([facility]     5. Hyperlipidemia LDL goal <100     6. Diabetic gastroparesis ([facility]     7. Anxiety     8. Uncontrolled type 2 diabetes mellitus with complication, with long-term current use of insulin ([facility])  insulin aspart protamine-insulin aspart (NOVOLOG MIX 70/30 FLEXPEN) (70-30) 100 UNIT/ML injection   Stable. Continue current Tx plan. Any changes marked below. INSTRUCTIONS  NEXT APPOINTMENT: Please schedule check-up in 3 months. · PLEASE TAKE THIS FORM TO CHECK-OUT WINDOW TO SCHEDULE NEXT VISIT. · PLEASE GET BLOODWORK DRAWN TODAY ON FIRST FLOOR in 170. Take orders with you. RESULTS- most blood tests back in couple days. We will call you if any problems. If bloodwork good, you will get letter in mail or notified thru [address] (if signed up) within 2 weeks. If you do not, please call office. · Please get annual dilated eye exam to screen for diabetic retinopathy which can lead to vision loss. Ask for report to be faxed to [phone]. · Please get mammogram soon to screen for breast cancer. To schedule at a [facility], please call [phone]. · Take 70/30 mix 80 units in AM, 45 units in PM.  · Call with sugars if still over 200 in 2 weeks.

## 2020-09-28 NOTE — PATIENT INSTRUCTIONS
INSTRUCTIONS  NEXT APPOINTMENT: Please schedule check-up in 3 months. · PLEASE TAKE THIS FORM TO CHECK-OUT WINDOW TO SCHEDULE NEXT VISIT. · PLEASE GET BLOODWORK DRAWN TODAY ON FIRST FLOOR in 170. Take orders with you. RESULTS- most blood tests back in couple days. We will call you if any problems. If bloodwork good, you will get letter in mail or notified thru [address] (if signed up) within 2 weeks. If you do not, please call office. · Please get annual dilated eye exam to screen for diabetic retinopathy which can lead to vision loss. Ask for report to be faxed to [phone]. · Please get mammogram soon to screen for breast cancer. To schedule at a [facility], please call [phone]. · Take 70/30 mix 80 units in AM, 45 units in PM.  · Call with sugars if still over 200 in 2 weeks.

## 2020-09-28 NOTE — PROGRESS NOTES
Vaccine Information Sheet, \"Influenza - Inactivated\"  given to Edwina Reynolds, or parent/legal guardian of  Edwina Reynolds and verbalized understanding. Patient responses:    Have you ever had a reaction to a flu vaccine? No  Do you have any current illness? No  Have you ever had [place] Syndrome? No  Do you have a serious allergy to any of the follow: Neomycin, Polymyxin, Thimerosal, eggs or egg products? No    Flu vaccine given per order. Please see immunization tab. Risks and benefits explained. Current VIS given.       Immunizations Administered     Name Date Dose Route    Influenza, Quadv, IM, PF (6 mo and older Fluzone, Flulaval, Fluarix, and 3 yrs and older Afluria) 9/28/2020 0.5 mL Intramuscular    Site: Deltoid- Right    Lot: [id number]    NDC: 64305-021-81

## 2020-10-02 ENCOUNTER — OFFICE VISIT (OUTPATIENT)
Dept: ORTHOPEDIC SURGERY | Age: 60
End: 2020-10-02
Payer: MEDICARE

## 2020-10-02 VITALS — BODY MASS INDEX: 38.98 KG/M2 | HEIGHT: 63 IN | WEIGHT: 220 LBS | TEMPERATURE: 97 F

## 2020-10-02 PROCEDURE — 99203 OFFICE O/P NEW LOW 30 MIN: CPT | Performed by: ORTHOPAEDIC SURGERY

## 2020-10-02 NOTE — PROGRESS NOTES
Ms. Klaus Cardona is a 63 y.o. right handed woman  who is seen today in Hand Surgical Consultation at the request of Jacqueline Rosales MD.    She presents today regarding left symptoms which have been present for approximately 2 months. A history of antecedent trauma or injury is Absent. She reports symptoms to include moderate pain and stiffness with rare popping, catching or locking of the left Index Finger. Finger symptoms are Frequently worse in the morning or overnight. She reports marked pain located at the base of the symptomatic finger(s). Symptoms are worsening over time. She also presents today regarding right sided symptoms which have been present for approximately \"quite a while\". A history of antecedent trauma or injury is Absent. She reports symptoms to include severe pain at the right global elbow(s). Symptoms are worse with certain lifting or gripping activities. Pain is worse with use. She reports severe pain exacerbation with elbow extension and flexion. Symptoms show no change over time. Previous treatment has included no prior treatments used. She does not claim relation of her symptoms to her required work activities. She has not undergone any form of testing. The patient's , past medical history, medications, allergies,  family history, social history, and review of systems have been updated, reviewed and have been scanned into the chart today. Physical Exam:  Ms. Art Castro's most recent vitals:  Vitals  Temp: 97 °F (36.1 °C)  Temp Source: Infrared  Height: 5' 3\" (160 cm)  Weight: 220 lb (99.8 kg)    She is well nourished, oriented to person, place & time. She demonstrates appropriate mood and affect as well as normal gait and station. Skin: Normal in appearance, Normal Color and Free of Lesions Bilaterally   Digital range of motion is limited by pain on the Left, greater than Right. Inducible triggering is noted upon flexion in the left Index Finger.   There is not an associated flexion contracture of the PIP joint. No other digit demonstrates evidence for stenosing tenosynovitis bilaterally. Wrist range of motion is without significant limitation bilaterally. Sensation is normal in the Whole Hand bilaterally  Vascular examination reveals normal and good capillary refill bilaterally  Swelling is mild in the symptomatic digit, absent elsewhere bilaterally  There is no evidence of gross joint instability bilaterally. Muscular strength is clinically appropriate bilaterally. Examination for Stenosing Tenosynovitis demonstrates moderate tenderness, thickening & nodularity at the A-1 pulley(s) of the Left Index Finger. There is a palpable Nota's Node. There is Inducible triggering on active flexion with pain. No other digits demonstrate evidence of Stenosing Tenosynovitis. Examination of the first dorsal extensor compartments of the wrist demonstrates no thickening or fullness. There is no tenderness to palpation over the extensor tendons. Pain is not elicited with resisted thumb extension, and Finklestein's maneuver is negative bilaterally. Examination for Epicondylar Elbow pain shows diffuse tenderness to palpation of the right circumferential elbow region(s). Pain is minimally exacerbated with digital and wrist extension against resistance. No obvious elbow instability is encountered. Radiographic Evaluation:  Radiographs were obtained today (3 views of the right elbow). They demonstrate no evidence of acute fracture, subluxation, or dislocation. There is mild degenerative change at the radiocapitellar and/or ulnotrochelar joints. Impression:  Ms. Benjy Covarrubias is showing clinical evidence of Stenosing Tenosynovitis (Trigger Finger) with some diffuse right elbow pain and presents requesting further treatment. Plan:  I have had a thorough discussion with Ms. Benjy Covarrubias regarding the treatment options available for her initially presenting Index Finger stenosing tenosynovitis, which is causing her significant  limitations. I have outlined for Ms. Amador Parisi the benefits and consequences of the various treatment modalities, including the fact that surgical treatment is the only modality which is reasonably expected to provide long lasting or permanent resolution of her symptoms. Based upon our current discussion and a reasonable understating of the options available to her, Ms. Amador Parisi has selected to proceed with surgical Index Finger Trigger Finger Release. I have discussed the details of the surgical procedure, the pre, georges and postoperative concerns and the appropriate expectations after surgery with Ms. Amador Parisi today. She was given the opportunity to ask questions, voiced an understanding of the procedure, and she did wish to proceed with Left Index Finger Trigger Finger Release (A1 Pulley Release). I had an extensive discussion with Ms. Amador Parisi (and any family members present with her today) regarding the natural history, etiology, and long term consequences of this problem. We have mutually selected a surgical treatment plan  and, in my opinion, surgical intervention is indicated at this time. I have discussed with her the potential complications, limitations, expectations, alternatives, and risks of Trigger Finger Release. She has had full opportunity to ask her questions. I have answered them all to her satisfaction. I feel that Ms. Amador Parisi (and any family members present with her today) do understand our discussion today and she has provided informed consent for Left Index Finger Trigger Finger Release (A1 Pulley Release). I have also discussed with Ms. Amador Parisi the other treatment options available to her for this condition. We have today selected to proceed with Surgical treatment.   She has voiced and  understanding that there are other less aggressive treatment options which are available in this situation, albeit possibly less efficacious or durable, and she is comfortable with the plan that she has chosen. I have explained to Ms. Núñez Fearing that despite successful treatment (surgical or otherwise) of her current presenting condition, that due to her coexistent conditions (both diagnosed and undiagnosed), that she is likely to have some permanent residual symptoms related to these conditions that do not improve long term. I have also explained that maximal recovery of function & symptom improvement may take a full year or longer to realize. She voiced a clear understanding of this. With regard to her  Right elbow:  I have had a thorough discussion with Ms. Wilton Snow regarding the treatment options available for her initially presenting right elbow pain, which is causing her significant symptoms and difficulty. I have outlined for Ms. Wilton Snow the risk, benefits and consequences of the various treatment modalities, including a reasonable expectation for the long term success of each. We have discussed the fact that further, more aggressive treatment may not be the best solution for her current presenting condition. Based upon our current discussion and a reasonable understating of the options available to her, Ms. Wilton Snow has selected to proceed with a conservative plan of treatment consisting of: the use of protective adaptation, activity modification, and the judicious use of over-the-counter anti-inflammatory medications if allowed by her primary care physician. We discussed the option of pursuing formalized hand therapy and a prescription was offered and declined. Instructions were given regarding activity modification as well as suggestions for use of the other modalities were discussed.   I have clearly explained to her that the above outlined treatment plan should not be expected to 'cure' her Epicondylar Elbow Pain, but we are rather treating the symptoms with which she presents. She has understood that in order to achieve more durable relief of her symptoms and to prevent future worsening or further damage, that appropriate healing of the damaged tissues is required. Ms. Eleazar Paniagua  voiced an appropriate understanding of our discussion, the options available to her, and of the expectations of her selected  treatment. I have explained to Ms. Eleazar Paniagua  that Recovery after an episode of common extensor origin tendinopathy may take an extended period of time to be complete. I have reminded her that her symptoms should be expected to slowly continue to improve for up to a year or more. She voiced an understanding of the expected time course necessary for improvement. Ms. Eleazar Paniagua has been given a full verbal list of instructions and precautions related to her present condition. I have asked her to followup with me in the office at the prescribed time. She is also specifically requested to call or return to the office sooner if her symptoms change or worsen prior to the next scheduled appointment.

## 2020-10-02 NOTE — LETTER
CONSENT TO OPERATION  AND/OR OTHER PROCEDURE(S)          PATIENT : Jeaneth Loyd   YOB: 1960      DATE : 10/2/20          1. I request and consent that Dr. Maria Fernanda Prakash. Alberto Larson,  and/or his associates or assistants perform an operation and/or procedures on the above patient at  [place] [address], to treat the condition(s) which appear indicated by the diagnostic studies already performed. I have been told that in general terms the nature, purpose and reasonable expectations of the operation and/or procedure(s) are:      left Index Finger Trigger Finger Release       2. It has been explained to me by the informing physician that during the course of the operation and/or procedure(s) unforeseen conditions may be revealed that necessitate an extension of the original operation and/or procedure(s) or different operation and/or procedures than those set forth in Paragraph 1. I therefore authorize and request that my physician and/or his associates or assistants perform such operations and/or procedures as are necessary and desirable in the exercise of professional judgment. The authority granted under this Paragraph 2 shall extend to all conditions that require treatment and are known to my physician at the time the operation is commenced. 3. I have been made aware by the informing physician of certain risks and consequences that are associated with the operation and/or procedure(s) described in Paragraph 1, the reasonable alternative methods or treatment, the possible consequences, the possibility that the operation and/or procedure(s) may be unsuccessful and the possibility of complications. I understand the reasonably known risks to be:      ? Bleeding  ? Infection  ? Poor Healing  ? Possible Damage to Nerve, Vessel, Tendon/Muscle or Bone  ? Need for further Treatment/Surgery  ? Stiffness  ? Pain  ? Residual or Recurrent Symptoms  ?  Anesthetic and/or Medical Risks ? We have discussed the specific limitations and risks of hospital and/or office based treatment at this time due to the COVID-19 pandemic                I have been counseled about the risks of ruben Covid-19 in the georges-operative and post-operative periods related to this procedure. I have been made aware that ruben Covid-19 around the time of a surgical procedure may worsen my prognosis for recovering from the virus and lend to a higher morbidity and or mortality risk. With this knowledge, I have requested to proceed with the procedure as scheduled. 4. I have also been informed by the informing physician that there are other risks from both known and unknown causes that are attendant to the performance of any surgical procedure. I am aware that the practice of medicine and surgery is not an exact science, and that no guarantees have been made to me concerning the results of the operation and/or procedure(s). 5. I   CONSENT / REFUSE CONSENT  (strike the phrase that does not apply) to the taking of photographs before, during and/or after the operation or procedure for scientific/educational purposes. 6. I consent to the administration of anesthesia and to the use of such anesthetics as may be deemed advisable by the anesthesiologist who has been engaged by me or my physician.     7. I certify that I have read and understand the above consent to operation and/or other procedure(s); that the explanations therein referred to were made to me by the informing physician in advance of my signing this consent; that all blanks or statements requiring insertion or completion were filled in and inapplicable paragraphs, if any, were stricken before I signed; and that all questions asked by me about the operation and/or procedure(s) which I have consented to have been fully answered in a satisfactory manner.                                 _______________________           10/2/20 Witness     Signature Of Patient         Date        Carrie Lindquist                                                 Informing Physician                                           Signature of Informing Physician                              If patient is unable to sign or is a minor, complete one of the following:    (A)  Patient is a minor   years of age. (B)  Patient is unable to sign because: The undersigned represents that he or she is duly authorized to execute this consent for and on behalf of the above named patient. Witness               o  Parent  o  Guardian   o  Spouse       o  Other (specify)                                                          Patient Name: Flores Parisi  Patient YOB: 1960  Dr. Debbie Thomas' Return To Work Policy  Regarding your ability to return to work after surgery or injury, Dr. Debbie Thomas will not state that any patient is off of work or cannot work at all. He will place you on restrictions after your surgical procedure or injury. This means that you will be allowed to return to work the day after your office visit or surgery with restrictions. Depending on the details of your particular situation, Dr. Debbie Thomas may state that you will have either light use or no use of your hand for a specific number of weeks. It is your obligation to communicate with your employer regarding your restrictions. It is your employer's decision as to whether they will accommodate your restrictions (i.e. allow you to come to work in your restricted capacity) or to not allow you to return to work under your restrictions. Dr. Debbie Thomas does not participate in making this decision and cannot influence your employer regarding their decision.   If you do not communicate your restrictions to your employer, or if you do not present to work as you are scheduled to, Dr. Debbie Thomas will not provide an 'excuse' to explain your absence. A doctors note, or official forms (BWC, FMLA, etc.) will be filled out, upon request, to indicate your date of surgery and your restrictions as stated above. Dr. Sourav Graham' Narcotic Policy  Patients will only be prescribed narcotics after surgical procedures or significant injury. Not all procedures cause pain great enough to require Narcotics and thus, not all patients will receive prescriptions after surgical procedures or injuries. Narcotics are never prescribed for chronic conditions. Narcotics are never prescribed for use longer than one week at a time. Refills are only granted in unusual circumstances and only at Dr. Mikala Mckeon discretion. Patients who are receiving narcotic medication from another physician or who are under pain management contracts will not be given a prescription for narcotics for any reason. I have read the above policies and understand that by agreeing to proceed with treatment by Dr. Garrett Escalona and his team, that I am agreeing to abide by these policies.   Patient Name:  Jen Koch    Patient Signature:  _____________________________    Darsteve [place] Date:   10/2/20

## 2020-10-02 NOTE — PATIENT INSTRUCTIONS
Information & Instructions   After Finger, Hand, Wrist, or Elbow Injection    Rach Kate MD    You have received an injection of local anesthetic (Bupivicaine without Epinephrine) for comfort & a steroid (Kenalog) for its strong anti-inflammatory effects. In order to give the medication a chance to reduce your inflammation and discomfort, it is recommended that you take it easy for a day or so. You may use your hand and arm as you feel comfortable, but you should avoid highly strenuous activity and heavy use for several days. Relief from the injection will often not begin for several days, and you may not feel full relief for up to one month. It is not uncommon to experience some local discomfort or pain at or around the injection site for a few days. To relieve these symptoms you may do the following if you feel necessary:       Apply ice to the affected area 20 minutes on and 20 minutes off. Do not apply ice directly to the skin. Use a thin layer (T-shirt, pillowcase, towel, etc.) to protect the skin. - If allowed by your other medical physicians, you may take -     2 Tylenol extra strength tablets every 4-6 hours       1-2 Aleve tablets twice a day     2-3 Advil tablets two to three times a day    If you are diabetic, the steroid medication may increase your blood sugar, so you are advised to monitor your sugar more closely so you can adjust it accordingly for a few days following your injection. If you need assistance with the control of you blood sugar, please contact you primary care physician for further advice. I will request that you please call the office one month after your injection at [phone] if you have not experienced relief of your symptoms (unless I have instructed you otherwise). If your injection has given you good relief of you symptoms as expected, then you only need to call the office if your symptoms return.

## 2020-10-02 NOTE — LETTER
[facility] SURGERY  Surgery Scheduling Form:      DEMOGRAPHICS:                                                                                                              .    Patient Name:  Aiden Greene   Patient :  1960   Patient SS#:  [id number]    Patient Phone:  [phone] (home)     Patient Address:  [address] [place] [address]    PCP:  Vitaly Naidu MD  Insurance:   Payor/Plan Subscr  Sex Relation Sub. Ins. ID Effective Group Num    NO INS    DIAGNOSIS & PROCEDURE:                                                                                            .  Diagnosis:   left Index Finger Trigger Finger (727.03)   M65.30  Operation:  left Index Finger Trigger Finger Release  [CPT: 83584]  Location:  [facility] ORTHOPEDIC [facility] SP[facility]  Surgeon:  Kofi Sheppard    SCHEDULING INFORMATION:                                                                                         .    Surgeon's Scheduling Instruction:  elective    RN Post-op Appt:  [x] Yes   [] No  Preferred Thursday:   [] Yes   [] No    Requested Date:      OR Time:   12:00 Patient Arrival Time:  10:30     OR Time Required:  10  Minutes  Anesthesia:  MAC/TIVA  Equipment:  None                                       COVID    10-30   Mini C-Arm:  No   Standard C-Arm:  No  Status:  outpatient  PAT Required:  Yes  Comments:                      Kaye Ferguson. Rock Kaleb MD  10/2/20     BILLING INFORMATION:                                                                                                    .    Procedure:       CPT Code Modifier  left Index Finger Trigger Finger Release      .  02439                    Pre Operative Physician Prophylaxis Orders - SCIP Protocols      Pre-Operative Antibiotic Order:    Allergies   Allergen Reactions    Ambien [Zolpidem Tartrate] Other (See Comments)     Vivid dreams    Naproxen     Trazodone And Nefazodone Other (See Comments)     headache    Diamox [Acetazolamide] Rash

## 2020-10-05 RX ORDER — QUETIAPINE FUMARATE 25 MG/1
TABLET, FILM COATED ORAL
Qty: 90 TABLET | Refills: 0 | OUTPATIENT
Start: 2020-10-05

## 2020-10-06 DIAGNOSIS — E78.5 HYPERLIPIDEMIA LDL GOAL <100: ICD-10-CM

## 2020-10-06 LAB
A/G RATIO: 1.5 (ref 1.1–2.2)
ALBUMIN SERPL-MCNC: 4 G/DL (ref 3.4–5)
ALP BLD-CCNC: 123 U/L (ref 40–129)
ALT SERPL-CCNC: 12 U/L (ref 10–40)
ANION GAP SERPL CALCULATED.3IONS-SCNC: 12 MMOL/L (ref 3–16)
AST SERPL-CCNC: 16 U/L (ref 15–37)
BILIRUB SERPL-MCNC: 0.3 MG/DL (ref 0–1)
BUN BLDV-MCNC: 11 MG/DL (ref 7–20)
CALCIUM SERPL-MCNC: 9.3 MG/DL (ref 8.3–10.6)
CHLORIDE BLD-SCNC: 105 MMOL/L (ref 99–110)
CHOLESTEROL, TOTAL: 186 MG/DL (ref 0–199)
CO2: 27 MMOL/L (ref 21–32)
CREAT SERPL-MCNC: 0.8 MG/DL (ref 0.6–1.2)
GFR AFRICAN AMERICAN: >60
GFR NON-AFRICAN AMERICAN: >60
GLOBULIN: 2.6 G/DL
GLUCOSE BLD-MCNC: 60 MG/DL (ref 70–99)
HDLC SERPL-MCNC: 45 MG/DL (ref 40–60)
LDL CHOLESTEROL CALCULATED: 104 MG/DL
POTASSIUM SERPL-SCNC: 3.8 MMOL/L (ref 3.5–5.1)
SODIUM BLD-SCNC: 144 MMOL/L (ref 136–145)
TOTAL PROTEIN: 6.6 G/DL (ref 6.4–8.2)
TRIGL SERPL-MCNC: 186 MG/DL (ref 0–150)
VLDLC SERPL CALC-MCNC: 37 MG/DL

## 2020-10-07 LAB
ESTIMATED AVERAGE GLUCOSE: 228.8 MG/DL
HBA1C MFR BLD: 9.6 %

## 2020-10-09 ENCOUNTER — TELEPHONE (OUTPATIENT)
Dept: FAMILY MEDICINE CLINIC | Age: 60
End: 2020-10-09

## 2020-10-09 NOTE — TELEPHONE ENCOUNTER
----- Message from Orvin Dancer sent at 10/9/2020  3:48 PM EDT -----  Subject: Appointment Request    Reason for Call: Routine Pre-Op    QUESTIONS  Type of Appointment? Established Patient  Reason for appointment request? No appointments available during search  Additional Information for Provider? surgery on finger/hand on 10/22/2020   at [facility] performed by Dr Leah Pandey; no availability before the   surgery  ---------------------------------------------------------------------------  --------------  CALL BACK INFO  What is the best way for the office to contact you? OK to leave message on   voicemail  Preferred Call Back Phone Number? [phone]  ---------------------------------------------------------------------------  --------------  SCRIPT ANSWERS  Relationship to Patient? Self  Appointment reason? Symptomatic  Select script based on patient symptoms? Adult Pre-Op  Do you have question for your provider that need to be answered prior to   scheduling your pre-op appointment? No  Have you been diagnosed with   tested for   or told that you are suspected of having COVID-19 (Coronavirus)? No  Have you had a fever or taken medication to treat a fever within the past   3 days? No  Have you had a cough   shortness of breath or flu-like symptoms within the past 3 days? No  Do you currently have flu-like symptoms including fever or chills   cough   shortness of breath   or difficulty breathing   or new loss of taste or smell? No  (Service Expert  click yes below to proceed with doggyloot As Usual   Scheduling)?  Yes

## 2020-10-09 NOTE — TELEPHONE ENCOUNTER
No availabilities on Dr. Kedar Rogel schedule. Are we able to squeeze pt in for pre op before 10/22?    Please advise

## 2020-10-12 NOTE — TELEPHONE ENCOUNTER
Pt called back in sounds hoarse her throat is dry and she has a slight cough   Can we do the pre op virtual?    Please advise

## 2020-10-13 ENCOUNTER — TELEPHONE (OUTPATIENT)
Dept: FAMILY MEDICINE CLINIC | Age: 60
End: 2020-10-13

## 2020-10-13 NOTE — TELEPHONE ENCOUNTER
Pt wanted Dr Gloria Morrell to know she will get tested for covid her throat dry and a slight cough   The pt will try and postpone surgery date FYI

## 2020-10-17 ENCOUNTER — NURSE TRIAGE (OUTPATIENT)
Dept: OTHER | Facility: CLINIC | Age: 60
End: 2020-10-17

## 2020-10-17 NOTE — TELEPHONE ENCOUNTER
Received call from [facility]. Reason for Disposition   Wheezing is present    Answer Assessment - Initial Assessment Questions  1. ONSET: \"When did the cough begin? \"       Almost a week ago    2. SEVERITY: \"How bad is the cough today? \"       Worsening, dry cough    3. RESPIRATORY DISTRESS: \"Describe your breathing. \"       Denies SOB, however she does report wheezing    4. FEVER: \"Do you have a fever? \" If so, ask: \"What is your temperature, how was it measured, and when did it start? \"      Denies     5. HEMOPTYSIS: \"Are you coughing up any blood? \" If so ask: \"How much? \" (flecks, streaks, tablespoons, etc.)      Denies     6. TREATMENT: \"What have you done so far to treat the cough? \" (e.g., meds, fluids, humidifier)      Pt has tried Nyquil    7. CARDIAC HISTORY: \"Do you have any history of heart disease? \" (e.g., heart attack, congestive heart failure)       High blood pressure    8. LUNG HISTORY: \"Do you have any history of lung disease? \"  (e.g., pulmonary embolus, asthma, emphysema)      Denies     9. PE RISK FACTORS: \"Do you have a history of blood clots? \" (or: recent major surgery, recent prolonged travel, bedridden)      Denies     10. OTHER SYMPTOMS: \"Do you have any other symptoms? (e.g., runny nose, wheezing, chest pain)        Congestion, headache     11. PREGNANCY: \"Is there any chance you are pregnant? \" \"When was your last menstrual period? \"        N/a     12. TRAVEL: \"Have you traveled out of the country in the last month? \" (e.g., travel history, exposures)        Denies    Protocols used: COUGH - ACUTE NON-PRODUCTIVE-ADULT-    Recommended that pt be seen within the next 4 hours. Provided pt with care advice. Attention Provider: Thank you for allowing me to participate in the care of your patient. The  patient was connected to triage in response to information provided to the [facility].  Please do not respond through this encounter as the response is not directed to a shared pool.

## 2020-10-28 ENCOUNTER — TELEPHONE (OUTPATIENT)
Dept: ORTHOPEDIC SURGERY | Age: 60
End: 2020-10-28

## 2020-10-28 ENCOUNTER — TELEPHONE (OUTPATIENT)
Dept: FAMILY MEDICINE CLINIC | Age: 60
End: 2020-10-28

## 2020-10-30 ENCOUNTER — OFFICE VISIT (OUTPATIENT)
Dept: PRIMARY CARE CLINIC | Age: 60
End: 2020-10-30
Payer: MEDICARE

## 2020-10-30 PROCEDURE — 99211 OFF/OP EST MAY X REQ PHY/QHP: CPT | Performed by: NURSE PRACTITIONER

## 2020-11-02 LAB — SARS-COV-2: NOT DETECTED

## 2020-11-03 ENCOUNTER — OFFICE VISIT (OUTPATIENT)
Dept: FAMILY MEDICINE CLINIC | Age: 60
End: 2020-11-03
Payer: MEDICARE

## 2020-11-03 VITALS
DIASTOLIC BLOOD PRESSURE: 74 MMHG | WEIGHT: 221.6 LBS | SYSTOLIC BLOOD PRESSURE: 124 MMHG | RESPIRATION RATE: 16 BRPM | TEMPERATURE: 97.1 F | HEART RATE: 85 BPM | BODY MASS INDEX: 39.27 KG/M2 | OXYGEN SATURATION: 96 % | HEIGHT: 63 IN

## 2020-11-03 PROCEDURE — 99214 OFFICE O/P EST MOD 30 MIN: CPT | Performed by: NURSE PRACTITIONER

## 2020-11-03 PROCEDURE — 93000 ELECTROCARDIOGRAM COMPLETE: CPT | Performed by: NURSE PRACTITIONER

## 2020-11-03 RX ORDER — TIZANIDINE 4 MG/1
4 TABLET ORAL EVERY 8 HOURS PRN
Qty: 30 TABLET | Refills: 1 | Status: SHIPPED | OUTPATIENT
Start: 2020-11-03 | End: 2021-01-05

## 2020-11-03 RX ORDER — AMLODIPINE BESYLATE 5 MG/1
TABLET ORAL
Qty: 90 TABLET | Refills: 1 | Status: SHIPPED | OUTPATIENT
Start: 2020-11-03 | End: 2021-04-29

## 2020-11-03 RX ORDER — GABAPENTIN 600 MG/1
TABLET ORAL
Qty: 360 TABLET | Refills: 0 | Status: CANCELLED | OUTPATIENT
Start: 2020-11-03 | End: 2020-12-04

## 2020-11-03 NOTE — PROGRESS NOTES

## 2020-11-03 NOTE — PROGRESS NOTES
Phone message left to call [facility] dept at [phone]  for history review and surgery instructions on 11/3/20 @ 5734

## 2020-11-03 NOTE — PROGRESS NOTES
[facility] time______0930______        Surgery time___1055_______    Take the following medications with a sip of water: Follow your MD/Surgeons pre-procedure instructions regarding your medications    Do not eat or drink anything after 12:00 midnight prior to your surgery. This includes water chewing gum, mints and ice chips. You may brush your teeth and gargle the morning of your surgery, but do not swallow the water     Please see your family doctor/pediatrician for a history and physical and/or concerning medications. Bring any test results/reports from your physicians office. If you are under the care of a heart doctor or specialist doctor, please be aware that you may be asked to them for clearance    You may be asked to stop blood thinners such as Coumadin, Plavix, Fragmin, Lovenox, etc., or any anti-inflammatories such as:  Aspirin, Ibuprofen, Advil, Naproxen prior to your surgery. We also ask that you stop any OTC medications such as fish oil, vitamin E, glucosamine, garlic, Multivitamins, COQ 10, etc.    We ask that you do not smoke 24 hours prior to surgery  We ask that you do not  drink any alcoholic beverages 24 hours prior to surgery     You must make arrangements for a responsible adult to take you home after your surgery. For your safety you will not be allowed to leave alone or drive yourself home. Your surgery will be cancelled if you do not have a ride home. Also for your safety, it is strongly suggested that someone stay with you the first 24 hours after your surgery. A parent or legal guardian must accompany a child scheduled for surgery and plan to stay at the hospital until the child is discharged. Please do not bring other children with you. For your comfort, please wear simple loose fitting clothing to the hospital.  Please do not bring valuables.     Do not wear any make-up or nail polish on your fingers or toes      For your safety, please do not wear any jewelry or body piercing's on the day of surgery. All jewelry must be removed. If you have dentures, they will be removed before going to operating room. For your convenience, we will provide you with a container. If you wear contact lenses or glasses, they will be removed, please bring a case for them. If you have a living will and a durable power of  for healthcare, please bring in a copy. As part of our patient safety program to minimize surgical site infections, we ask you to do the following:    · Please notify your surgeon if you develop any illness between         now and the  day of your surgery. · This includes a cough, cold, fever, sore throat, nausea,         or vomiting, and diarrhea, etc.  ·  Please notify your surgeon if you experience dizziness, shortness         of breath or blurred vision between now and the time of your surgery. Do not shave your operative site 96 hours prior to surgery. For face and neck surgery, men may use an electric razor 48 hours   prior to surgery. You may shower the night before surgery or the morning of   your surgery with an antibacterial soap. You will need to bring a photo ID and insurance card    [facility] has an onsite pharmacy, would you like to utilize our pharmacy     If you will be staying overnight and use a C-pap machine, please bring   your C-pap to hospital     Our goal is to provide you with excellent care, therefore, visitors will be limited to two(2) in the room at a time so that we may focus on providing this care for you. Please contact pre-admission testing if you have any further questions. [facility] phone number:  [phone] PAT fax number:  [phone]  Please note these are generalized instructions for all surgical cases, you may be provided with more specific instructions according to your surgery.

## 2020-11-03 NOTE — PROGRESS NOTES
Preoperative Consultation      Delia Love  YOB: 1960    Date of Service:  11/3/2020    Vitals:    11/03/20 1017   BP: 124/74   Site: Right Upper Arm   Position: Sitting   Cuff Size: Large Adult   Pulse: 85   Resp: 16   Temp: 97.1 °F (36.2 °C)   TempSrc: Temporal   SpO2: 96%   Weight: 221 lb 9.6 oz (100.5 kg)   Height: 5' 3\" (1.6 m)      Wt Readings from Last 2 Encounters:   11/03/20 221 lb 9.6 oz (100.5 kg)   10/02/20 220 lb (99.8 kg)     BP Readings from Last 3 Encounters:   11/03/20 124/74   09/28/20 126/78   08/05/20 128/84        Chief Complaint   Patient presents with    Pre-op Exam     lt index finger. dr Jason Marquez.  11/05/2020     Allergies   Allergen Reactions    Ambien [Zolpidem Tartrate] Other (See Comments)     Vivid dreams    Naproxen     Trazodone And Nefazodone Other (See Comments)     headache    Diamox [Acetazolamide] Rash    Lorazepam Other (See Comments)     confusion    Reglan [Metoclopramide] Anxiety    Sulfa Antibiotics Rash     Shaking     Outpatient Medications Marked as Taking for the 11/3/20 encounter (Office Visit) with GLADIS Cruz - CNP   Medication Sig Dispense Refill    tiZANidine (ZANAFLEX) 4 MG tablet Take 1 tablet by mouth every 8 hours as needed (muscle spasm) 30 tablet 0    insulin aspart protamine-insulin aspart (NOVOLOG MIX 70/30 FLEXPEN) (70-30) 100 UNIT/ML injection 80 units in AM, 45 units in PM 30 pen 1    Insulin Pen Needle (B-D UF III MINI PEN NEEDLES) 31G X 5 MM MISC 1 each by Does not apply route 3 times daily 300 each 3    QUEtiapine (SEROQUEL) 25 MG tablet TAKE TWO TABLETS BY MOUTH ONCE NIGHTLY 180 tablet 0    venlafaxine (EFFEXOR XR) 75 MG extended release capsule TAKE THREE CAPSULES BY MOUTH DAILY 270 capsule 0    metoprolol succinate (TOPROL XL) 50 MG extended release tablet TAKE ONE TABLET BY MOUTH DAILY 90 tablet 0    lisinopril (PRINIVIL;ZESTRIL) 40 MG tablet TAKE ONE TABLET BY MOUTH DAILY 90 tablet 0    nortriptyline ([facility] [address] 2014    ESBL (extended spectrum beta-lactamase) producing bacteria infection 10/06/2018    urine    GERD (gastroesophageal reflux disease)     GERD (gastroesophageal reflux disease)     History of migraine headaches     HTN (hypertension) 5/15/2014    Hyperlipidemia     Irritable bowel syndrome (IBS)     Obesity      Past Surgical History:   Procedure Laterality Date    ACHILLES TENDON SURGERY Right     BACK SURGERY  2013    lumbar    BREAST LUMPECTOMY Left 2010    CARPAL TUNNEL RELEASE Right      SECTION      x 1    CHOLECYSTECTOMY      COLONOSCOPY  10/16/2017    polypectomy- Dr Mack Yandy 5/15/2019    ESOPHAGEAL [place] performed by Rosa Elena Garner MD at [facility] ENDOSCOPY N/A 2019    EGD BIOPSY performed by Rosa Elena Garner MD at [address] [place] N/A 5/15/2019    EGD BIOPSY [facility] by Rosa Elena [facility] MD at [facility]     Family History   Problem Relation Age of Onset    Cancer Mother         forehead soft tissue    Diabetes Mother     Arthritis Father     High Blood Pressure Father     High Cholesterol Father     Alzheimer's Disease Father     Diabetes Brother     Depression Sister      Social History     Socioeconomic History    Marital status:    Tobacco Use    Smoking status: Never Smoker    Smokeless tobacco: Never Used    Tobacco comment: congrats   Substance and Sexual Activity    Alcohol use: No    Drug use: No    Sexual activity: Yes     Partners: Male       Review of Systems  A comprehensive review of systems was negative except for what was noted in the HPI. Physical Exam   Constitutional: She is oriented to person, place, and time. She appears well-developed and well-nourished. No distress. HENT:   Head: Normocephalic and atraumatic.    Mouth/Throat: Uvula is midline, oropharynx is clear and moist and mucous membranes are normal.   Eyes: Conjunctivae and EOM are normal. Pupils are equal, round, and reactive to light. Neck: Trachea normal and normal range of motion. Neck supple. No JVD present. Carotid bruit is not present. Cardiovascular: Normal rate, regular rhythm, normal heart sounds and intact distal pulses. Exam reveals no gallop and no friction rub. No murmur heard. Pulmonary/Chest: Effort normal and breath sounds normal. No respiratory distress. She has no wheezes. She has no rales. Musculoskeletal: She exhibits no edema. Left index finger with decreased ROM and tender to palpation. Neurological: She is alert and oriented to person, place, and time. She has normal strength. No cranial nerve deficit or sensory deficit. Coordination and gait normal.   Skin: Skin is warm and dry. No rash noted. No erythema. Psychiatric: She has a normal mood and affect. Her behavior is normal.     EKG Interpretation:  normal sinus rhythm, occasional PVC noted, unifocal, no significant change from previous tracing (2/2/18). Lab Review   Lab Results   Component Value Date     10/06/2020    K 3.8 10/06/2020    K 4.1 05/04/2020     10/06/2020    CO2 27 10/06/2020    BUN 11 10/06/2020    CREATININE 0.8 10/06/2020    GLUCOSE 60 10/06/2020    GLUCOSE 380 12/04/2015    CALCIUM 9.3 10/06/2020     Lab Results   Component Value Date    WBC 12.5 05/04/2020    HGB 14.3 05/04/2020    HCT 45.2 05/04/2020    MCV 86.1 05/04/2020     05/04/2020      Lab Results   Component Value Date    LABA1C 9.6 10/06/2020     Lab Results   Component Value Date    .8 10/06/2020        Assessment:       63 y.o. patient with planned surgery as above. Known risk factors for perioperative complications: Diabetes mellitus, Hypertension  Current medications which may produce withdrawal symptoms if withheld perioperatively: none      Plan:     1. Preoperative workup as follows: ECG  2.  Change in medication regimen before surgery: Hold all medications on morning of surgery- Patient takes HTN medications in the evening. 3. Prophylaxis for cardiac events with perioperative beta-blockers: Currently taking  metoprolol  ACC/AHA indications for pre-operative beta-blocker use:    · Vascular surgery with history of postitive stress test  · Intermediate or high risk surgery with history of CAD   · Intermediate or high risk surgery with multiple clinical predictors of CAD- 2 of the following: history of compensated or prior heart failure, history of cerebrovascular disease, DM, or renal insufficiency    Routine administration of higher-dose, long-acting metoprolol in beta-blocker-naïve patients on the day of surgery, and in the absence of dose titration is associated with an overall increase in mortality. Beta-blockers should be started days to weeks prior to surgery and titrated to pulse < 70.  4. Deep vein thrombosis prophylaxis: regimen to be chosen by surgical team  5. No contraindications to planned surgery of left index finger trigger finger release with Dr. Aida Duckworth on 11/5/20.   6. Uncontrolled DM- advised to hold insulin AM of the procedure. Can restart after surgery once she is able to tolerate POs. Insulin dose was increased at last visit a month ago. Advised to continue to work on low carb diet, aerobic exercise, and weight loss. 7. HTN- controlled current therapy. She takes her medications in the evening. 8. Refilled routine tizanidine that she takes for chronic muscle spasms/ pains. Tolerates medication well.

## 2020-11-04 ENCOUNTER — ANESTHESIA EVENT (OUTPATIENT)
Dept: OPERATING ROOM | Age: 60
End: 2020-11-04
Payer: MEDICARE

## 2020-11-05 ENCOUNTER — HOSPITAL ENCOUNTER (OUTPATIENT)
Age: 60
Setting detail: OUTPATIENT SURGERY
Discharge: HOME | End: 2020-11-05
Attending: ORTHOPAEDIC SURGERY | Admitting: ORTHOPAEDIC SURGERY
Payer: MEDICARE

## 2020-11-05 ENCOUNTER — ANESTHESIA (OUTPATIENT)
Dept: OPERATING ROOM | Age: 60
End: 2020-11-05
Payer: MEDICARE

## 2020-11-05 VITALS
HEART RATE: 71 BPM | OXYGEN SATURATION: 98 % | BODY MASS INDEX: 39.16 KG/M2 | HEIGHT: 63 IN | DIASTOLIC BLOOD PRESSURE: 74 MMHG | SYSTOLIC BLOOD PRESSURE: 134 MMHG | RESPIRATION RATE: 18 BRPM | TEMPERATURE: 97 F | WEIGHT: 221 LBS

## 2020-11-05 VITALS
RESPIRATION RATE: 1 BRPM | SYSTOLIC BLOOD PRESSURE: 131 MMHG | OXYGEN SATURATION: 98 % | DIASTOLIC BLOOD PRESSURE: 60 MMHG

## 2020-11-05 LAB
GLUCOSE BLD-MCNC: 106 MG/DL (ref 70–99)
GLUCOSE BLD-MCNC: 96 MG/DL (ref 70–99)
PERFORMED ON: ABNORMAL
PERFORMED ON: NORMAL

## 2020-11-05 PROCEDURE — 7100000001 HC PACU RECOVERY - ADDTL 15 MIN: Performed by: ORTHOPAEDIC SURGERY

## 2020-11-05 PROCEDURE — 2500000003 HC RX 250 WO HCPCS: Performed by: ORTHOPAEDIC SURGERY

## 2020-11-05 PROCEDURE — 3600000005 HC SURGERY LEVEL 5 BASE: Performed by: ORTHOPAEDIC SURGERY

## 2020-11-05 PROCEDURE — 7100000011 HC PHASE II RECOVERY - ADDTL 15 MIN: Performed by: ORTHOPAEDIC SURGERY

## 2020-11-05 PROCEDURE — 3600000015 HC SURGERY LEVEL 5 ADDTL 15MIN: Performed by: ORTHOPAEDIC SURGERY

## 2020-11-05 PROCEDURE — 2709999900 HC NON-CHARGEABLE SUPPLY: Performed by: ORTHOPAEDIC SURGERY

## 2020-11-05 PROCEDURE — 7100000010 HC PHASE II RECOVERY - FIRST 15 MIN: Performed by: ORTHOPAEDIC SURGERY

## 2020-11-05 PROCEDURE — 2580000003 HC RX 258: Performed by: ANESTHESIOLOGY

## 2020-11-05 PROCEDURE — 2500000003 HC RX 250 WO HCPCS: Performed by: NURSE ANESTHETIST, CERTIFIED REGISTERED

## 2020-11-05 PROCEDURE — 2580000003 HC RX 258: Performed by: ORTHOPAEDIC SURGERY

## 2020-11-05 PROCEDURE — 7100000000 HC PACU RECOVERY - FIRST 15 MIN: Performed by: ORTHOPAEDIC SURGERY

## 2020-11-05 PROCEDURE — 6360000002 HC RX W HCPCS: Performed by: NURSE ANESTHETIST, CERTIFIED REGISTERED

## 2020-11-05 PROCEDURE — 3700000000 HC ANESTHESIA ATTENDED CARE: Performed by: ORTHOPAEDIC SURGERY

## 2020-11-05 PROCEDURE — 3700000001 HC ADD 15 MINUTES (ANESTHESIA): Performed by: ORTHOPAEDIC SURGERY

## 2020-11-05 RX ORDER — LABETALOL HYDROCHLORIDE 5 MG/ML
5 INJECTION, SOLUTION INTRAVENOUS EVERY 10 MIN PRN
Status: DISCONTINUED | OUTPATIENT
Start: 2020-11-05 | End: 2020-11-05 | Stop reason: HOSPADM

## 2020-11-05 RX ORDER — FENTANYL CITRATE 50 UG/ML
25 INJECTION, SOLUTION INTRAMUSCULAR; INTRAVENOUS EVERY 5 MIN PRN
Status: DISCONTINUED | OUTPATIENT
Start: 2020-11-05 | End: 2020-11-05 | Stop reason: HOSPADM

## 2020-11-05 RX ORDER — PROMETHAZINE HYDROCHLORIDE 25 MG/ML
6.25 INJECTION, SOLUTION INTRAMUSCULAR; INTRAVENOUS
Status: DISCONTINUED | OUTPATIENT
Start: 2020-11-05 | End: 2020-11-05 | Stop reason: HOSPADM

## 2020-11-05 RX ORDER — MAGNESIUM HYDROXIDE 1200 MG/15ML
LIQUID ORAL CONTINUOUS PRN
Status: COMPLETED | OUTPATIENT
Start: 2020-11-05 | End: 2020-11-05

## 2020-11-05 RX ORDER — LIDOCAINE HYDROCHLORIDE 20 MG/ML
INJECTION, SOLUTION EPIDURAL; INFILTRATION; INTRACAUDAL; PERINEURAL PRN
Status: DISCONTINUED | OUTPATIENT
Start: 2020-11-05 | End: 2020-11-05 | Stop reason: SDUPTHER

## 2020-11-05 RX ORDER — SODIUM CHLORIDE 0.9 % (FLUSH) 0.9 %
10 SYRINGE (ML) INJECTION PRN
Status: DISCONTINUED | OUTPATIENT
Start: 2020-11-05 | End: 2020-11-05 | Stop reason: HOSPADM

## 2020-11-05 RX ORDER — PROPOFOL 10 MG/ML
INJECTION, EMULSION INTRAVENOUS CONTINUOUS PRN
Status: DISCONTINUED | OUTPATIENT
Start: 2020-11-05 | End: 2020-11-05 | Stop reason: SDUPTHER

## 2020-11-05 RX ORDER — SODIUM CHLORIDE 0.9 % (FLUSH) 0.9 %
10 SYRINGE (ML) INJECTION EVERY 12 HOURS SCHEDULED
Status: DISCONTINUED | OUTPATIENT
Start: 2020-11-05 | End: 2020-11-05 | Stop reason: HOSPADM

## 2020-11-05 RX ORDER — PROPOFOL 10 MG/ML
INJECTION, EMULSION INTRAVENOUS PRN
Status: DISCONTINUED | OUTPATIENT
Start: 2020-11-05 | End: 2020-11-05 | Stop reason: SDUPTHER

## 2020-11-05 RX ORDER — SODIUM CHLORIDE 9 MG/ML
INJECTION, SOLUTION INTRAVENOUS CONTINUOUS
Status: DISCONTINUED | OUTPATIENT
Start: 2020-11-05 | End: 2020-11-05 | Stop reason: HOSPADM

## 2020-11-05 RX ADMIN — PROPOFOL 100 MG: 10 INJECTION, EMULSION INTRAVENOUS at 10:47

## 2020-11-05 RX ADMIN — SODIUM CHLORIDE: 9 INJECTION, SOLUTION INTRAVENOUS at 10:26

## 2020-11-05 RX ADMIN — LIDOCAINE HYDROCHLORIDE 60 MG: 20 INJECTION, SOLUTION EPIDURAL; INFILTRATION; INTRACAUDAL; PERINEURAL at 10:47

## 2020-11-05 RX ADMIN — PROPOFOL 180 MCG/KG/MIN: 10 INJECTION, EMULSION INTRAVENOUS at 10:47

## 2020-11-05 ASSESSMENT — PAIN DESCRIPTION - PAIN TYPE
TYPE: SURGICAL PAIN

## 2020-11-05 ASSESSMENT — PULMONARY FUNCTION TESTS
PIF_VALUE: 0

## 2020-11-05 ASSESSMENT — PAIN DESCRIPTION - ORIENTATION
ORIENTATION: RIGHT

## 2020-11-05 ASSESSMENT — PAIN DESCRIPTION - DESCRIPTORS
DESCRIPTORS: TINGLING;NUMBNESS
DESCRIPTORS: TINGLING
DESCRIPTORS: NUMBNESS;SHARP

## 2020-11-05 ASSESSMENT — PAIN DESCRIPTION - FREQUENCY
FREQUENCY: CONTINUOUS

## 2020-11-05 ASSESSMENT — PAIN SCALES - GENERAL
PAINLEVEL_OUTOF10: 3

## 2020-11-05 ASSESSMENT — PAIN DESCRIPTION - LOCATION
LOCATION: FINGER (COMMENT WHICH ONE)

## 2020-11-05 ASSESSMENT — PAIN DESCRIPTION - PROGRESSION
CLINICAL_PROGRESSION: NOT CHANGED
CLINICAL_PROGRESSION: NOT CHANGED

## 2020-11-05 ASSESSMENT — PAIN - FUNCTIONAL ASSESSMENT
PAIN_FUNCTIONAL_ASSESSMENT: 0-10
PAIN_FUNCTIONAL_ASSESSMENT: ACTIVITIES ARE NOT PREVENTED
PAIN_FUNCTIONAL_ASSESSMENT: ACTIVITIES ARE NOT PREVENTED
PAIN_FUNCTIONAL_ASSESSMENT: 0-10
PAIN_FUNCTIONAL_ASSESSMENT: ACTIVITIES ARE NOT PREVENTED

## 2020-11-05 ASSESSMENT — PAIN DESCRIPTION - ONSET
ONSET: AWAKENED FROM SLEEP
ONSET: ON-GOING
ONSET: ON-GOING

## 2020-11-05 NOTE — H&P
Pre-operative Update of H&P:    I  have seen & examined Ms. Dylon Javier related solely to her hand and upper extremity conditions, prior to the scheduled procedure on the date of her surgery. The indications for the planned surgical procedure & and her upper-extremity condition are unchanged.

## 2020-11-05 NOTE — OP NOTE
OPERATIVE REPORT          Patient:  Delia Love    YOB: 1960  Date of Service:  11/5/2020   Location:  [facility]        Preoperative Diagnosis:  Left Index Finger trigger finger. Postoperative Diagnosis: Same. Procedure: Left Index Finger trigger finger release (A1 pulley release). Surgeon:    Gerard Ayala. Stepan Kate MD    Surgical Assistant:    Cole Morrison PA-C    Anesthesia:  Local with sedation. Blood Loss:  Minimal.     Complications:  None. Tourniquet Time:  2 minutes. Indications:  Ms. Delia Love  is a 63y.o.  year old female with a Left Index Finger trigger finger. I have discussed preoperatively with her the complications, limitations, expectations, alternatives and risks of the planned surgical care. She has voiced an understanding of our discussion. All of her questions have been fully answered to her satisfaction, and she has provided written informed consent to proceed. Procedure:  After written consent was obtained and the proper operative site was identified and marked, Ms. Delia Love  was brought to the operating room, placed in the supine position on the operating room table with the Left arm extended upon a hand table. Under an appropriate level of sedation, local anesthetic (1% Lidocaine and 1/2% Marcaine both without Epinephrine) was instilled in the planned surgical field. Her Left upper extremity was prepped and draped in the usual sterile fashion. After Eshmarch exsanguination the pneumo-tourniquet was inflated to 250 milimeters of mercury. A 1 centimeter oblique incision was fashioned over the base of the flexor tendon sheath of the Left Index Finger. Dissection was carried carefully through the subcutaneous tissues, taking great care to identify and protect the neurovascular structures. The flexor tendon sheath was carefully identified and cleared of surrounding soft tissue.  The A1 pulley was identified and incised longitudinally along its entire length under direct visualization. The flexor tendons were gently withdrawn from the sheath and inspected. They were found to be in good condition. The tendons were returned to their appropriate location and the finger was placed through a full range of motion. There was no evidence of residual stenosis or triggering. The wound was irrigated copiously with sterile saline for irrigation and the pneumo-tourniquet was deflated after a period of 2 minutes elevation. Hemostasis was easily obtained with direct pressure and electrocautery. The wound was closed with interrupted sutures in the skin. The wound was dressed with adaptic, dry sterile gauze, and a bulky, hand based dressing was applied. Ms. Steff Shane  was awakened from light sedation, having tolerated the procedure without apparent complication, and was returned to the recovery room in stable condition. At the conclusion of the procedure all needle, instrument, and sponge counts were correct. Alejandrina Chang MD   11/5/2020, 10:58 AM

## 2020-11-05 NOTE — PROGRESS NOTES
Tolerating oral intake and being up in chair. States pain is tolerable. Discharge instructions given to patient and . Verbalize understand.

## 2020-11-05 NOTE — PROGRESS NOTES
Received from PACU. Admitted to Phase 2 care. Awake and alert, respirations easy and even. Oriented to room and surroundings. States pain is tolerable.

## 2020-11-05 NOTE — ANESTHESIA PRE PROCEDURE
producing bacteria infection 10/06/2018    urine    GERD (gastroesophageal reflux disease)     History of migraine headaches     HTN (hypertension) 5/15/2014    Hyperlipidemia     Irritable bowel syndrome (IBS)     Obesity     Wears glasses     reading     Past Surgical History:   Procedure Laterality Date    ACHILLES TENDON SURGERY Right     BACK SURGERY  2013    lumbar    BREAST LUMPECTOMY Left 2010    CARPAL TUNNEL RELEASE Bilateral      SECTION      x 1    CHOLECYSTECTOMY      COLONOSCOPY  10/16/2017    polypectomy- Dr Sue Peacock 5/15/2019    ESOPHAGEAL [place] performed by Manjit Romo MD at [address] [facility] ENDOSCOPY N/A 2019    EGD BIOPSY performed by Manjit Romo MD at [address] [facility] 5/15/2019    EGD BIOPSY performed by Manjit Romo MD at [address] [facility] [address] History     Tobacco Use    Smoking status: Never Smoker    Smokeless tobacco: Never Used    Tobacco comment: congrats   Substance Use Topics    Alcohol use: No    Drug use: Never     Medications  No current facility-administered medications on file prior to encounter.       Current Outpatient Medications on File Prior to Encounter   Medication Sig Dispense Refill    insulin aspart protamine-insulin aspart (NOVOLOG MIX 70/30 FLEXPEN) (70-30) 100 UNIT/ML injection 80 units in AM, 45 units in PM 30 pen 1    Insulin Pen Needle (B-D UF III MINI PEN NEEDLES) 31G X 5 MM MISC 1 each by Does not apply route 3 times daily 300 each 3    QUEtiapine (SEROQUEL) 25 MG tablet TAKE TWO TABLETS BY MOUTH ONCE NIGHTLY 180 tablet 0    venlafaxine (EFFEXOR XR) 75 MG extended release capsule TAKE THREE CAPSULES BY MOUTH DAILY 270 capsule 0    metoprolol succinate (TOPROL XL) 50 MG extended release tablet TAKE ONE TABLET BY MOUTH DAILY 90 tablet 0    lisinopril (PRINIVIL;ZESTRIL) 40 MG tablet TAKE ONE of irregular blood glucose. 200 strip 3    dicyclomine (BENTYL) 20 MG tablet Take 1 tablet by mouth every 6 hours as needed (cramping) 60 tablet 3    metroNIDAZOLE (METROGEL) 0.75 % gel Apply topically 2 times daily. (Patient taking differently: 2 times daily as needed ) 1 Tube 3     Current Facility-Administered Medications   Medication Dose Route Frequency Provider Last Rate Last Dose    0.9 % sodium chloride infusion   Intravenous Continuous Kofi Hines  mL/hr at 20 1026      sodium chloride flush 0.9 % injection 10 mL  10 mL Intravenous 2 times per day Kofi Hines MD        sodium chloride flush 0.9 % injection 10 mL  10 mL Intravenous PRN Kofi Hines MD         Vital Signs (Current)   Vitals:    20 1018   BP: (!) 148/91   Pulse: 98   Resp: 17   Temp: 97 °F (36.1 °C)   SpO2: 97%     Vital Signs Statistics (for past 48 hrs)     Temp  Av °F (36.1 °C)  Min: 97 °F (36.1 °C)   Min taken time: 20 1018  Max: 97 °F (36.1 °C)   Max taken time: 20 1018  Pulse  Av  Min: 98   Min taken time: 20 1018  Max: 98   Max taken time: 20 1018  Resp  Av  Min: 16   Min taken time: 20 1018  Max: 16   Max taken time: 20 1018  BP  Min: 148/91   Min taken time: 20 1018  Max: 148/91   Max taken time: 20 1018  SpO2  Av %  Min: 97 %   Min taken time: 20 1018  Max: 97 %   Max taken time: 20 1018    BP Readings from Last 3 Encounters:   20 (!) 148/91   20 124/74   20 126/78     BMI  Body mass index is 39.15 kg/m². Estimated body mass index is 39.15 kg/m² as calculated from the following:    Height as of this encounter: 5' 3\" (1.6 m). Weight as of this encounter: 221 lb (100.2 kg).     CBC   Lab Results   Component Value Date    WBC 12.5 2020    RBC 5.25 2020    RBC 4.74 2015    HGB 14.3 2020    HCT 45.2 2020    MCV 86.1 2020    RDW 14.5 2020  05/04/2020     CMP    Lab Results   Component Value Date     10/06/2020    K 3.8 10/06/2020    K 4.1 05/04/2020     10/06/2020    CO2 27 10/06/2020    BUN 11 10/06/2020    CREATININE 0.8 10/06/2020    GFRAA >60 10/06/2020    GFRAA >60 04/10/2013    AGRATIO 1.5 10/06/2020    LABGLOM >60 10/06/2020    GLUCOSE 60 10/06/2020    GLUCOSE 380 12/04/2015    PROT 6.6 10/06/2020    PROT 6.9 12/04/2015    CALCIUM 9.3 10/06/2020    BILITOT 0.3 10/06/2020    ALKPHOS 123 10/06/2020    AST 16 10/06/2020    ALT 12 10/06/2020     BMP    Lab Results   Component Value Date     10/06/2020    K 3.8 10/06/2020    K 4.1 05/04/2020     10/06/2020    CO2 27 10/06/2020    BUN 11 10/06/2020    CREATININE 0.8 10/06/2020    CALCIUM 9.3 10/06/2020    GFRAA >60 10/06/2020    GFRAA >60 04/10/2013    LABGLOM >60 10/06/2020    GLUCOSE 60 10/06/2020    GLUCOSE 380 12/04/2015     POCGlucose  No results for input(s): GLUCOSE in the last 72 hours.    Coags    Lab Results   Component Value Date    PROTIME 13.0 08/20/2017    INR 1.15 08/20/2017    APTT 30.4 52/35/6896     HCG (If Applicable)   Lab Results   Component Value Date    PREGTESTUR Negative 04/06/2014      ABGs No results found for: PHART, PO2ART, PTT2HEF, HNL4JUG, BEART, U3QMDFKF   Type & Screen (If Applicable)  No results found for: LABABO, LABRH                         BMI: Wt Readings from Last 3 Encounters:       NPO Status:   Date of last liquid consumption: 11/04/20   Time of last liquid consumption: 2300   Date of last solid food consumption: 11/04/20      Time of last solid consumption: 2300       Anesthesia Evaluation  Patient summary reviewed no history of anesthetic complications:   Airway: Mallampati: III  TM distance: >3 FB   Neck ROM: full   Dental:    (+) partials      Pulmonary:Negative Pulmonary ROS and normal exam                               Cardiovascular:  Exercise tolerance: good (>4 METS),   (+) hypertension:,         Rhythm: regular  Rate:

## 2020-11-05 NOTE — ANESTHESIA POSTPROCEDURE EVALUATION
[facility] Department of Anesthesiology  Post-Anesthesia Note       Name:  Jeaneth Loyd                                  Age:  63 y.o. MRN:  [id number]     Last Vitals & Oxygen Saturation: /74   Pulse 71   Temp 97 °F (36.1 °C) (Temporal)   Resp 18   Ht 5' 3\" (1.6 m)   Wt 221 lb (100.2 kg)   SpO2 98%   BMI 39.15 kg/m²   Patient Vitals for the past 4 hrs:   BP Temp Temp src Pulse Resp SpO2 Height Weight   11/05/20 1220 134/74 -- -- 71 18 98 % -- --   11/05/20 1134 113/65 -- -- 75 18 96 % -- --   11/05/20 1126 -- 97 °F (36.1 °C) Temporal -- -- -- -- --   11/05/20 1125 123/62 -- -- 74 18 93 % -- --   11/05/20 1120 123/62 -- -- 73 15 94 % -- --   11/05/20 1115 119/60 -- -- 74 14 94 % -- --   11/05/20 1110 (!) 115/57 -- -- 77 11 95 % -- --   11/05/20 1105 -- -- -- 76 13 95 % -- --   11/05/20 1104 (!) 112/52 -- -- 76 10 95 % -- --   11/05/20 1103 -- -- -- 77 11 95 % -- --   11/05/20 1101 (!) 112/52 97.8 °F (36.6 °C) Temporal -- -- -- -- --   11/05/20 1018 (!) 148/91 97 °F (36.1 °C) Temporal 98 17 97 % -- --   11/05/20 1011 -- -- -- -- -- -- 5' 3\" (1.6 m) 221 lb (100.2 kg)       Level of consciousness:  Awake, alert    Respiratory: Respirations easy, no distress. Stable. Cardiovascular: Hemodynamically stable. Hydration: Adequate. PONV: Adequately managed. Post-op pain: Adequately controlled. Post-op assessment: Tolerated anesthetic well without complication. Complications:  None.     Princess Mullins MD  November 5, 2020   12:29 PM

## 2020-11-13 ENCOUNTER — OFFICE VISIT (OUTPATIENT)
Dept: ORTHOPEDIC SURGERY | Age: 60
End: 2020-11-13

## 2020-11-13 ENCOUNTER — TELEPHONE (OUTPATIENT)
Dept: ORTHOPEDIC SURGERY | Age: 60
End: 2020-11-13

## 2020-11-13 VITALS — BODY MASS INDEX: 39.16 KG/M2 | WEIGHT: 221 LBS | RESPIRATION RATE: 16 BRPM | HEIGHT: 63 IN | TEMPERATURE: 97.7 F

## 2020-11-13 PROCEDURE — 99024 POSTOP FOLLOW-UP VISIT: CPT | Performed by: ORTHOPAEDIC SURGERY

## 2020-11-13 NOTE — PATIENT INSTRUCTIONS
Postoperative Instructions After Trigger Finger Release    Dr. Kaye Lindquist          1. After bandages are removed one week from surgery, you may chose to wear a small bandage over the incision if you wish, though you do not need to. 2. Keep incision dry until sutures have fully dissolved  or it has been 14 days since your surgery. Thereafter, you may wash with mild soap and water and shower normally. 3. Once your stiches have fully disappeared & skin appears normal, you should begin gently massaging the incision with Vitamin E (may use Vitamin E lotion or contents of Vitamin E capsule). 4. Work hard on motion of the fingers and wrist, straightening each finger fully and bending each finger fully, bending wrist forward and bending wrist backwards. Do not be concerned if you experience discomfort. This will not damage the surgery. 5. You may begin using the hand as it feels comfortable beginning 12-14 days from the day of surgery. You may not feel entirely comfortable gripping or lifting heavy objects for several weeks. 6. You may expect to see some skin peel off around the incision. You may be left with a small area of pink baby skin. This is quite normal.    Thank you for choosing [facility] Physicians for your Hand and Upper Extremity needs. If we can be of any further assistance to you, please do not hesitate to contact us.     Office Phone Number:  [phone]  or  [phone]

## 2020-11-13 NOTE — PROGRESS NOTES
Ms. Radha Moreira returns today in follow-up of her recent left Index Finger A1 Pulley (Trigger Finger) Release done approximately 1 week ago. She has done well noting no discomfort in left hand, some chronic discomfort noted ulnar side, dorsal side of hand. She notes pre-operative symptoms to be completely resolved at this time. Physical Exam:  Skin incision is healing well, no significant drainage, no dehiscence, no significant erythema. Digital range of motion is finger flexion to 3 cm from palm in the Index Finger, normal in all other digits. Wrist range of motion is full and equal bilateral.  Sensation is normal in the Whole Hand. Vascular examination reveals normal, good capillary refill, good color and warm. Swelling is mild to moderate around incision only. Her preoperative triggering is completely resolved. Impression:  Ms. Radha Moreira is doing well after recent left Index Finger Trigger Finger Release. Plan:  Ms. Radha Moreira is instructed in work on Active & Passive range of motion of the digits, wrist, & elbow. These modalities were specifically demonstrated to her today  and she return demonstrated proper understanding. We discussed the appropriateness of gradual resumption of use of the operated hand and the return to normal use as comfort allows. She is given instructions regarding management of the fresh surgical incision and progressive use of desensitization and tissue massage techniques. We discussed the appropriate expectations and timeline for symptom improvement. She is provided a written patient instruction sheet titled: Postoperative Instructions After Trigger Finger Release. I have asked Ms. Radha Moreira to follow-up with me or contact me by telephone over the next 4 weeks if her symptoms have not fully resolved or if she has not regained full & painless return of function.      She is also specifically instructed to return to the office or call for an appointment sooner if her symptoms are changing or worsening prior to that time.

## 2020-11-13 NOTE — Clinical Note
Dear  Lisbeth Lesch, MD,    Thank you very much for your referral or Ms. Kenneth Tracy to me for evaluation and treatment of her Hand & Wrist condition. I appreciate your confidence in me and thank you for allowing me the opportunity to care for your patients. If I can be of any further assistance to you on this or any other patient, please do not hesitate to contact me. Sincerely,    Isabel Yu.  Kristi Garcia MD

## 2020-11-13 NOTE — TELEPHONE ENCOUNTER
General Question     Subject: 9:30 APPT RUNNING 10-15 MIN LATE, STATES TRAFFIC IS BAD  Patient and /or Facility Request: [facility]  Contact Number: [phone]

## 2020-12-01 RX ORDER — VENLAFAXINE HYDROCHLORIDE 75 MG/1
CAPSULE, EXTENDED RELEASE ORAL
Qty: 270 CAPSULE | Refills: 0 | Status: SHIPPED | OUTPATIENT
Start: 2020-12-01 | End: 2021-03-15

## 2020-12-07 RX ORDER — NORTRIPTYLINE HYDROCHLORIDE 25 MG/1
CAPSULE ORAL
Qty: 180 CAPSULE | Refills: 1 | Status: SHIPPED | OUTPATIENT
Start: 2020-12-07 | End: 2021-06-08

## 2020-12-09 RX ORDER — TOLTERODINE 4 MG/1
CAPSULE, EXTENDED RELEASE ORAL
Qty: 90 CAPSULE | Refills: 0 | Status: SHIPPED | OUTPATIENT
Start: 2020-12-09 | End: 2021-03-15

## 2020-12-09 RX ORDER — METOPROLOL SUCCINATE 50 MG/1
TABLET, EXTENDED RELEASE ORAL
Qty: 90 TABLET | Refills: 0 | Status: SHIPPED | OUTPATIENT
Start: 2020-12-09 | End: 2021-03-15

## 2020-12-10 RX ORDER — GABAPENTIN 600 MG/1
TABLET ORAL
Qty: 360 TABLET | Refills: 0 | Status: SHIPPED | OUTPATIENT
Start: 2020-12-10 | End: 2021-01-28 | Stop reason: SDUPTHER

## 2020-12-14 ENCOUNTER — TELEPHONE (OUTPATIENT)
Dept: FAMILY MEDICINE CLINIC | Age: 60
End: 2020-12-14

## 2020-12-14 ENCOUNTER — NURSE TRIAGE (OUTPATIENT)
Dept: OTHER | Facility: CLINIC | Age: 60
End: 2020-12-14

## 2020-12-14 NOTE — TELEPHONE ENCOUNTER
Reason for Disposition   Side (flank) or lower back pain present    Answer Assessment - Initial Assessment Questions  1. SYMPTOM: \"What's the main symptom you're concerned about? \" (e.g., frequency, incontinence)      Low back pain, frequency    2. ONSET: \"When did the  Frequency  start? \"      Saturday    3. PAIN: \"Is there any pain? \" If so, ask: \"How bad is it? \" (Scale: 1-10; mild, moderate, severe)      Lower back pain- walking 8/10,      4. CAUSE: \"What do you think is causing the symptoms? \"      Uti, I am prone to UTI    5. OTHER SYMPTOMS: \"Do you have any other symptoms? \" (e.g., fever, flank pain, blood in urine, pain with urination)      Fever(101.5) on Friday or Saturday joint pain, a little urgency    6. PREGNANCY: \"Is there any chance you are pregnant? \" \"When was your last menstrual period? \"      No    Protocols used: URINARY SYMPTOMS-ADULT-[place]    . Patient called pre-service center [facility] with red flag complaint. Warm transfer from [facility] description of triage: urinary frequency and low back pain    Triage indicates for patient to be seen in office today. Care advice provided, patient verbalizes understanding; denies any other questions or concerns; instructed to call back for any new or worsening symptoms. Writer provided warm transfer to [facility] at [facility] for appointment scheduling. Attention Provider: Thank you for allowing me to participate in the care of your patient. The patient was connected to triage in response to information provided to the [facility]. Please do not respond through this encounter as the response is not directed to a shared pool.

## 2020-12-14 NOTE — TELEPHONE ENCOUNTER
Pt has a fever   Pt thinks she has a UTI when she goes to the bathroom it smells started this past Friday       Please advise

## 2020-12-14 NOTE — TELEPHONE ENCOUNTER
Please call pt and let her know that we cannot see pts if they are running a fever.   She will need to go to an urgent care unfortunately

## 2021-01-02 DIAGNOSIS — E78.5 HYPERLIPIDEMIA LDL GOAL <100: ICD-10-CM

## 2021-01-04 ENCOUNTER — TELEPHONE (OUTPATIENT)
Dept: FAMILY MEDICINE CLINIC | Age: 61
End: 2021-01-04

## 2021-01-04 DIAGNOSIS — N30.00 ACUTE CYSTITIS WITHOUT HEMATURIA: Primary | ICD-10-CM

## 2021-01-04 RX ORDER — QUETIAPINE FUMARATE 25 MG/1
TABLET, FILM COATED ORAL
Qty: 180 TABLET | Refills: 0 | Status: SHIPPED | OUTPATIENT
Start: 2021-01-04 | End: 2021-04-01

## 2021-01-04 RX ORDER — ATORVASTATIN CALCIUM 80 MG/1
TABLET, FILM COATED ORAL
Qty: 90 TABLET | Refills: 0 | Status: SHIPPED | OUTPATIENT
Start: 2021-01-04 | End: 2021-04-01

## 2021-01-04 RX ORDER — PHENAZOPYRIDINE HYDROCHLORIDE 100 MG/1
100 TABLET, FILM COATED ORAL 3 TIMES DAILY PRN
Qty: 9 TABLET | Refills: 0 | Status: SHIPPED | OUTPATIENT
Start: 2021-01-04 | End: 2021-01-07

## 2021-01-04 RX ORDER — CIPROFLOXACIN 500 MG/1
500 TABLET, FILM COATED ORAL 2 TIMES DAILY
Qty: 20 TABLET | Refills: 0 | Status: SHIPPED | OUTPATIENT
Start: 2021-01-04 | End: 2021-01-14

## 2021-01-04 NOTE — TELEPHONE ENCOUNTER
Patient was supposed to have an appt today at 2:00 patient is having cough, and sinus drainage for a couple days. Ron ferrell cannot see in office. Offered vv but patient is having Lower back pain urine more frequently. Patient thinks possible uti going on for a week? Are we able to do this virtually for uti on Friday or have to be seen at red ?    Please advise

## 2021-01-05 DIAGNOSIS — E78.5 HYPERLIPIDEMIA LDL GOAL <100: ICD-10-CM

## 2021-01-05 DIAGNOSIS — S16.1XXD STRAIN OF NECK MUSCLE, SUBSEQUENT ENCOUNTER: ICD-10-CM

## 2021-01-05 DIAGNOSIS — I10 ESSENTIAL HYPERTENSION: ICD-10-CM

## 2021-01-05 RX ORDER — QUETIAPINE FUMARATE 25 MG/1
TABLET, FILM COATED ORAL
Qty: 180 TABLET | Refills: 0 | OUTPATIENT
Start: 2021-01-05

## 2021-01-05 RX ORDER — TIZANIDINE 4 MG/1
TABLET ORAL
Qty: 30 TABLET | Refills: 1 | Status: SHIPPED | OUTPATIENT
Start: 2021-01-05 | End: 2021-04-05

## 2021-01-05 RX ORDER — LISINOPRIL 40 MG/1
TABLET ORAL
Qty: 90 TABLET | Refills: 0 | Status: SHIPPED | OUTPATIENT
Start: 2021-01-05 | End: 2021-03-05

## 2021-01-05 RX ORDER — ATORVASTATIN CALCIUM 80 MG/1
TABLET, FILM COATED ORAL
Qty: 90 TABLET | Refills: 0 | OUTPATIENT
Start: 2021-01-05

## 2021-01-05 NOTE — TELEPHONE ENCOUNTER
Pt called back states cipro was called in for her and she believes cipro has sulfur in it and pt is allergic to sulfur   Please advise

## 2021-01-06 ENCOUNTER — OFFICE VISIT (OUTPATIENT)
Dept: PRIMARY CARE CLINIC | Age: 61
End: 2021-01-06
Payer: MEDICARE

## 2021-01-06 DIAGNOSIS — Z20.822 SUSPECTED COVID-19 VIRUS INFECTION: Primary | ICD-10-CM

## 2021-01-06 PROCEDURE — 99211 OFF/OP EST MAY X REQ PHY/QHP: CPT | Performed by: NURSE PRACTITIONER

## 2021-01-06 NOTE — PROGRESS NOTES
Jose Armando Cabrera received a viral test for COVID-19. They were educated on isolation and quarantine as appropriate. For any symptoms, they were directed to seek care from their PCP, given contact information to establish with a doctor, directed to an urgent care or the emergency room.

## 2021-01-07 LAB — SARS-COV-2, NAA: NOT DETECTED

## 2021-01-08 ENCOUNTER — TELEPHONE (OUTPATIENT)
Dept: FAMILY MEDICINE CLINIC | Age: 61
End: 2021-01-08

## 2021-01-11 ENCOUNTER — TELEPHONE (OUTPATIENT)
Dept: FAMILY MEDICINE CLINIC | Age: 61
End: 2021-01-11

## 2021-01-14 RX ORDER — CALCIUM CITRATE/VITAMIN D3 200MG-6.25
TABLET ORAL
Qty: 200 STRIP | Refills: 2 | Status: SHIPPED | OUTPATIENT
Start: 2021-01-14 | End: 2022-01-10

## 2021-01-28 ENCOUNTER — OFFICE VISIT (OUTPATIENT)
Dept: FAMILY MEDICINE CLINIC | Age: 61
End: 2021-01-28
Payer: MEDICARE

## 2021-01-28 VITALS
TEMPERATURE: 97.1 F | DIASTOLIC BLOOD PRESSURE: 86 MMHG | SYSTOLIC BLOOD PRESSURE: 138 MMHG | RESPIRATION RATE: 14 BRPM | WEIGHT: 230.2 LBS | BODY MASS INDEX: 40.79 KG/M2 | HEART RATE: 83 BPM | OXYGEN SATURATION: 97 % | HEIGHT: 63 IN

## 2021-01-28 DIAGNOSIS — M25.50 POLYARTHRALGIA: ICD-10-CM

## 2021-01-28 DIAGNOSIS — Z87.440 RECENT URINARY TRACT INFECTION: ICD-10-CM

## 2021-01-28 DIAGNOSIS — Z01.810 PREOP CARDIOVASCULAR EXAM: ICD-10-CM

## 2021-01-28 DIAGNOSIS — I10 ESSENTIAL HYPERTENSION: ICD-10-CM

## 2021-01-28 DIAGNOSIS — H26.9 CATARACT OF BOTH EYES, UNSPECIFIED CATARACT TYPE: Primary | ICD-10-CM

## 2021-01-28 LAB
BILIRUBIN, POC: NEGATIVE
BLOOD URINE, POC: ABNORMAL
CLARITY, POC: ABNORMAL
COLOR, POC: YELLOW
GLUCOSE URINE, POC: NEGATIVE
HBA1C MFR BLD: 9.5 %
KETONES, POC: NEGATIVE
LEUKOCYTE EST, POC: NEGATIVE
NITRITE, POC: POSITIVE
PH, POC: 5.5
PROTEIN, POC: 300
REASON FOR REJECTION: NORMAL
REJECTED TEST: NORMAL
SPECIFIC GRAVITY, POC: 1.03
UROBILINOGEN, POC: 0.2

## 2021-01-28 PROCEDURE — 81002 URINALYSIS NONAUTO W/O SCOPE: CPT | Performed by: FAMILY MEDICINE

## 2021-01-28 PROCEDURE — 83036 HEMOGLOBIN GLYCOSYLATED A1C: CPT | Performed by: FAMILY MEDICINE

## 2021-01-28 PROCEDURE — 99212 OFFICE O/P EST SF 10 MIN: CPT | Performed by: FAMILY MEDICINE

## 2021-01-28 RX ORDER — INSULIN GLARGINE 100 [IU]/ML
100 INJECTION, SOLUTION SUBCUTANEOUS NIGHTLY
Qty: 10 PEN | Refills: 5 | Status: SHIPPED | OUTPATIENT
Start: 2021-01-28 | End: 2021-03-10 | Stop reason: DRUGHIGH

## 2021-01-28 RX ORDER — FLASH GLUCOSE SENSOR
KIT MISCELLANEOUS
Qty: 2 EACH | Refills: 5 | Status: SHIPPED | OUTPATIENT
Start: 2021-01-28 | End: 2022-08-18

## 2021-01-28 RX ORDER — INSULIN ASPART 100 [IU]/ML
INJECTION, SOLUTION INTRAVENOUS; SUBCUTANEOUS
Qty: 5 PEN | Refills: 3 | Status: SHIPPED | OUTPATIENT
Start: 2021-01-28 | End: 2021-01-28 | Stop reason: SDUPTHER

## 2021-01-28 RX ORDER — INSULIN ASPART 100 [IU]/ML
INJECTION, SOLUTION INTRAVENOUS; SUBCUTANEOUS
Qty: 10 PEN | Refills: 3 | Status: SHIPPED | OUTPATIENT
Start: 2021-01-28 | End: 2022-02-22

## 2021-01-28 RX ORDER — GABAPENTIN 600 MG/1
600-1200 TABLET ORAL NIGHTLY
Qty: 180 TABLET | Refills: 2 | Status: SHIPPED | OUTPATIENT
Start: 2021-01-28 | End: 2021-08-05 | Stop reason: SDUPTHER

## 2021-01-28 NOTE — PROGRESS NOTES
PRE-OP HISTORY AND PHYSICAL   Subjective:    Shawanda Gorman is a 57 y.o. female who presents to the office today for a preoperative consultation. Chief Complaint   Patient presents with    Pre-op Exam     cataract sx. rt eye 2/4 and lt eye 2/19. lashonda chaudhari/cei. dr Chris Whitehead. fax to [phone]     This consultation is requested for the specific conditions prompting preoperative evaluation (i.e. because of potential affect on operative risk):   1. Cataract of both eyes, unspecified cataract type    2. Preop cardiovascular exam    3. Essential hypertension    4. Uncontrolled type 2 diabetes mellitus with complication, with long-term current use of insulin ([address]    5. Recent urinary tract infection    6. Polyarthralgia    7. DM (diabetes mellitus), type 2, uncontrolled with complications ([address]      Planned anesthesia is Local and IV sedation. The patient has the following known anesthesia issues: none. Bleeding risk: no recent abnormal bleeding, no remote history of abnormal bleeding. Slipped in parking lot last evening hit back of head. Denies LOC. Mild headache today. Review of Systems   General ROS: fever? No,    Night sweats? No  Ophthalmic ROS: change in vision? Yes  Endocrine ROS: fatigue? No   Unexpected weight changes? No  Hematological and Lymphatic ROS: easy bruising? No   Swollen lymph nodes? No  ENT ROS: headaches? No   Sore throat? No  Respiratory ROS: cough? No   Wheezing? No  Cardiovascular ROS: chest pain? No   Shortness of breath? No  Gastrointestinal ROS: abdominal pain? No   Change in stools? No  Genito-Urinary ROS: painful urination? No   Trouble urinating? No  Musculoskeletal ROS: trouble walking? No   Joint pain? Yes- hands and fingers  Neurological ROS: TIA or stroke symptoms? No   Numbness/tingling? No  Dermatological ROS: rash? No   Changes in skin spots?  No    Patient Active Problem List   Diagnosis    DM (diabetes mellitus), type 2, uncontrolled with complications ([address]  Diabetic gastroparesis (HCC)    Allergic rhinitis    Insomnia    Essential hypertension    Chronic knee pain    Headaches due to old head injury    Nephropathy, diabetic ([address]    Migraine    Hip pain, bilateral    Depression, major    Right knee DJD    Calcific Achilles tendonitis, right    Right sided sciatica    Lumbar spinal stenosis    TMJ (temporomandibular joint disorder)    Urge incontinence of urine    Personal history of breast cancer    Hyperlipidemia LDL goal <589    Metallic taste    Polyarthralgia    Severe obesity (BMI 35.0-39. 9) with comorbidity ([address]    Neck pain    Chronic low back pain    Intractable low back pain    Plantar fasciitis of right foot    Anxiety    History of colon polyps    Irritable bowel syndrome with diarrhea     Past Medical History:   Diagnosis Date    Allergic rhinitis 5/15/2014    Anxiety and depression     Arthritis     Breast cancer ([address] 2010    left breast    Cataract     bilateral    Chronic back pain     Diabetes mellitus ([address]     DM (diabetes mellitus), type 2, uncontrolled with complications ([address]    ESBL (extended spectrum beta-lactamase) producing bacteria infection 10/06/2018    urine    GERD (gastroesophageal reflux disease)     History of migraine headaches     HTN (hypertension) 5/15/2014    Hyperlipidemia     Irritable bowel syndrome (IBS)     Obesity     Wears glasses     reading     Past Surgical History:   Procedure Laterality Date    ACHILLES TENDON SURGERY Right     BACK SURGERY  2013    lumbar    BREAST LUMPECTOMY Left 2010    CARPAL TUNNEL RELEASE Bilateral      SECTION      x 1    CHOLECYSTECTOMY      COLONOSCOPY  10/16/2017    polypectomy- Dr Dia [place] 5/15/2019    ESOPHAGEAL Sobia Bark performed by Toribio Carlson MD at [address] Left 2020    LEFT INDEX FINGER TRIGGER FINGER RELEASE performed by Miryam Phipps Rodney Wayne MD at [address] [place] [address] ENDOSCOPY N/A 4/23/2019    EGD BIOPSY performed by Heaven Irvin MD at [facility] N/A 5/15/2019    EGD BIOPSY performed by Heaven Irvin MD at [facility]     Family History   Problem Relation Age of Onset    Cancer Mother         forehead soft tissue    Diabetes Mother     Arthritis Father     High Blood Pressure Father     High Cholesterol Father     Alzheimer's Disease Father     Diabetes Brother     Depression Sister      Social History     Tobacco Use    Smoking status: Never Smoker    Smokeless tobacco: Never Used    Tobacco comment: congrats   Substance Use Topics    Alcohol use: No    Drug use: Never     Allergies   Allergen Reactions    Trazodone And Nefazodone Other (See Comments)     headache    Ambien [Zolpidem Tartrate] Other (See Comments)     Vivid dreams    Diamox [Acetazolamide] Rash    Lorazepam Other (See Comments)     confusion    Naproxen Rash    Reglan [Metoclopramide] Anxiety    Sulfa Antibiotics Rash     Prior to Visit Medications    Medication Sig Taking? Authorizing Provider   Continuous Blood Gluc Sensor ([address] [place] [address]) MISC as needed Yes Keshia Langston MD   gabapentin (NEURONTIN) 600 MG tablet Take 1-2 tablets by mouth nightly. Yes Keshia Langston MD   insulin glargine (BASAGLAR KWIKPEN) 100 UNIT/ML injection pen Inject 100 Units into the skin nightly Yes Keshia Langston MD   insulin aspart (NOVOLOG FLEXPEN) 100 UNIT/ML injection pen Before meal: Glucose < 100 no coverage; 100-180 take 10 units; 181-230 take 15 units; 231-280 take 20 units; > 281 take 25 units.  Yes Keshia Langston MD   blood glucose test strips (TRUE METRIX BLOOD GLUCOSE TEST) strip USE TO TEST TWO TIMES A DAY AND AS NEEDED FOR SYMPTOMS OF IRREGULAR BLOOD GLUCOSE Yes Keshia Langston MD   lisinopril (PRINIVIL;ZESTRIL) 40 MG tablet TAKE ONE TABLET BY MOUTH DAILY Yes Delray Gemma topically 2 times daily. Patient taking differently: 2 times daily as needed  Yes Cristy Zavala MD   anastrozole (ARIMIDEX) 1 MG tablet TAKE ONE TABLET BY MOUTH DAILY Yes Rosalie Garcia MD   B-D INS SYR HALF-UNIT .3CC/31G 31G X 5/16\" 0.3 ML MISC USE AS DIRECTED TWO TIMES A DAY Yes Suraj Wesley MD   MICROLET LANCETS MISC 1 each by Does not apply route 6 times daily. Yes Cristy Zavala MD   lubiprostone (AMITIZA) 8 MCG CAPS capsule Take 8 mcg by mouth 2 times daily (with meals). Yes Historical Provider, MD       HISTORY:  Patient's medications, allergies, past medical, surgical, social and family histories were reviewed and updated as appropriate (See above). Objective:   PHYSICAL EXAM  /86 (Site: Left Upper Arm, Position: Sitting, Cuff Size: Large Adult)   Pulse 83   Temp 97.1 °F (36.2 °C) (Temporal)   Resp 14   Ht 5' 3\" (1.6 m)   Wt 230 lb 3.2 oz (104.4 kg)   SpO2 97%   BMI 40.78 kg/m²   BP Readings from Last 5 Encounters:   01/28/21 138/86   11/05/20 131/60   11/05/20 134/74   11/03/20 124/74   09/28/20 126/78     Wt Readings from Last 5 Encounters:   01/28/21 230 lb 3.2 oz (104.4 kg)   11/13/20 221 lb (100.2 kg)   11/05/20 221 lb (100.2 kg)   11/03/20 221 lb 9.6 oz (100.5 kg)   10/02/20 220 lb (99.8 kg)      GENERAL:   · well-developed, well-nourished, alert, no distress. EYES:   · External findings: lids and lashes normal and conjunctivae and sclerae normal  · Eyes: no periorbital cellulitis.    ENT:   · External nose and ears appear normal  · normal TM's and external ear canals both ears  NECK:   · No adenopathy, supple, symmetrical, trachea midline  · Thyroid not enlarged, symmetric, no tenderness/mass/nodules  LYMPH:  · no cervical nodes, no supraclavicular nodes  LUNGS:    · Breathing unlabored  · clear to auscultation bilaterally and good air movement  CARDIOVASC:   · regular rate and rhythm, S1, S2 normal. No murmur, click, rub or gallop  · Apical impulse normal  · LEGS: Lower extremity edema: none    · No carotid bruits  ABDOMEN:   · Soft, non-tender, no masses  · No hepatosplenomegaly  · No hernias noted. Exam limited by body habitus  SKIN: warm and dry  · No rashes or suspicious lesions  PSYCH:    · Alert and oriented  · Normal reasoning, insight good  · Facial expressions full, mood appropriate  · No memory disturbance noted    Lab Review   Lab Results   Component Value Date     10/06/2020    K 3.8 10/06/2020    CREATININE 0.8 10/06/2020     Lab Results   Component Value Date    WBC 12.5 (H) 05/04/2020    HGB 14.3 05/04/2020    HCT 45.2 05/04/2020    MCV 86.1 05/04/2020     05/04/2020     Lab Results   Component Value Date    LABA1C 9.6 10/06/2020   A1c 9.5 today. Assessment:    57 y.o. female with planned surgery as above. Diagnosis Orders   1. Cataract of both eyes, unspecified cataract type     2. Preop cardiovascular exam     3. Essential hypertension     4. Uncontrolled type 2 diabetes mellitus with complication, with long-term current use of insulin ([facility])  Continuous Blood Gluc Sensor (FREESTYLE KENDAL SENSOR SYSTEM) [facility]    insulin glargine (BASAGLAR KWIKPEN) 100 UNIT/ML injection pen    insulin aspart (NOVOLOG FLEXPEN) 100 UNIT/ML injection pen    DISCONTINUED: insulin aspart (NOVOLOG FLEXPEN) 100 UNIT/ML injection pen   5. Recent urinary tract infection  Urinalysis Reflex to Culture   6. Polyarthralgia  gabapentin (NEURONTIN) 600 MG tablet   7. DM (diabetes mellitus), type 2, uncontrolled with complications ([facility])  insulin aspart (NOVOLOG FLEXPEN) 100 UNIT/ML injection pen    DISCONTINUED: insulin aspart (NOVOLOG FLEXPEN) 100 UNIT/ML injection pen       Plan:   Ok to proceed with the surgery. Low cardiac risk. Pt. advised to stop all Rx except anti-hypertensives the a.m. of surgery. Matthew Mabry MD      INSTRUCTIONS  · Please schedule check-up in 3 weeks and bring sugar and insulin log with you. .    · AM of surgery take the following with a sip of water: lisinopril, metoprolol, amlodipine. HOLD insulin until eat. · See if insurance covers [facility] or [facility] · STOP 70/30 mix. · START Basaglar 100 units daily. · CHANGE Novolog before meals to new sliding scale. Before meal: Glucose < 100 no coverage; 100-180 take 10 units; 181-230 take 15 units; 231-280 take 20 units; > 281 take 25 units.

## 2021-01-28 NOTE — PATIENT INSTRUCTIONS
INSTRUCTIONS  · Please schedule check-up in 3 weeks and bring sugar and insulin log with you. .    · AM of surgery take the following with a sip of water: lisinopril, metoprolol, amlodipine. HOLD insulin until eat. · See if insurance covers [facility] or [address] [place] [address] [address] · STOP 70/30 mix. · START Basaglar 100 units daily. · CHANGE Novolog before meals to new sliding scale. Before meal: Glucose < 100 no coverage; 100-180 take 10 units; 181-230 take 15 units; 231-280 take 20 units; > 281 take 25 units.

## 2021-01-29 ENCOUNTER — TELEPHONE (OUTPATIENT)
Dept: FAMILY MEDICINE CLINIC | Age: 61
End: 2021-01-29

## 2021-01-29 NOTE — TELEPHONE ENCOUNTER
Lucia Palacio from [employer] called stating that this pt specimen was rejected. It was a UAR specimen and it was collected in a gray top but it should be collected in a speckled top.     Please Advise

## 2021-01-29 NOTE — PROGRESS NOTES
eye drops or ointment. Do not wear any make-up or nail polish on your fingers or toes      For your safety, please do not wear any jewelry or body piercing's on the day of surgery. All jewelry must be removed. If you have dentures, they will be removed before going to operating room. For your convenience, we will provide you with a container. If you wear contact lenses or glasses, they will be removed, please bring a case for them. If you have a living will and a durable power of  for healthcare, please bring in a copy. As part of our patient safety program to minimize surgical site infections, we ask you to do the following:    · Please notify your surgeon if you develop any illness between         now and the  day of your surgery. · This includes a cough, cold, fever, sore throat, nausea,         or vomiting, and diarrhea, etc.  ·  Please notify your surgeon if you experience dizziness, shortness         of breath or blurred vision between now and the time of your surgery. Do not shave your operative site 96 hours prior to surgery. For face and neck surgery, men may use an electric razor 48 hours   prior to surgery. You may shower the night before surgery or the morning of   your surgery with an antibacterial soap. You will need to bring a photo ID and insurance card    [facility] has an onsite pharmacy, would you like to utilize our pharmacy     If you will be staying overnight and use a C-pap machine, please bring   your C-pap to hospital     Our goal is to provide you with excellent care, therefore, visitors will be limited to two(2) in the room at a time so that we may focus on providing this care for you. Please contact pre-admission testing if you have any further questions.                  [facility] phone number:  [phone]  Please note these are generalized instructions for all surgical cases, you may be provided with more specific instructions according to your surgery.

## 2021-01-31 LAB
ORGANISM: ABNORMAL
URINE CULTURE, ROUTINE: ABNORMAL

## 2021-02-01 ENCOUNTER — TELEPHONE (OUTPATIENT)
Dept: FAMILY MEDICINE CLINIC | Age: 61
End: 2021-02-01

## 2021-02-01 RX ORDER — NITROFURANTOIN 25; 75 MG/1; MG/1
100 CAPSULE ORAL 2 TIMES DAILY
Qty: 14 CAPSULE | Refills: 0 | Status: SHIPPED | OUTPATIENT
Start: 2021-02-01 | End: 2021-02-08

## 2021-02-04 ENCOUNTER — ANESTHESIA EVENT (OUTPATIENT)
Dept: SURGERY | Age: 61
End: 2021-02-04
Payer: MEDICARE

## 2021-02-04 NOTE — PROGRESS NOTES
[facility]  [phone]        Pre-Op Phone Call:     Patient Name: Hammad Jorgensen     Telephone Information:   Mobile [phone]     Home phone:  [phone]    Surgery Time:   11:10 AM     Arrival Time:  0940      Left extended Message:  Yes     Message left with: vm     Recent change in health status: call MD     Advised of transportation/ policy:  Yes     NPO policy reviewed: Yes vm     Advised to take morning heart/blood pressure medications with sips of water morning of surgery? Yes     Instructed to bring eye drops, photo identification, and insurance card day of surgery? Yes     Advised to wear short sleeved button down shirt (no T-shirt underneath):  Yes     Advised not to wear jewelry, hairpins, or pantyhose day of surgery? Yes     Advised not to wear make-up and to wash face day of surgery?   Yes    Remarks:        Electronically signed by:  Angelika Jorgensen RN at 2/4/2021 2:47 PM

## 2021-02-05 ENCOUNTER — ANESTHESIA (OUTPATIENT)
Dept: SURGERY | Age: 61
End: 2021-02-05
Payer: MEDICARE

## 2021-02-05 ENCOUNTER — HOSPITAL ENCOUNTER (OUTPATIENT)
Age: 61
Setting detail: OUTPATIENT SURGERY
Discharge: HOME | End: 2021-02-05
Attending: OPHTHALMOLOGY | Admitting: OPHTHALMOLOGY
Payer: MEDICARE

## 2021-02-05 ENCOUNTER — PREP FOR PROCEDURE (OUTPATIENT)
Dept: OPHTHALMOLOGY | Age: 61
End: 2021-02-05

## 2021-02-05 VITALS
RESPIRATION RATE: 16 BRPM | DIASTOLIC BLOOD PRESSURE: 71 MMHG | OXYGEN SATURATION: 100 % | SYSTOLIC BLOOD PRESSURE: 124 MMHG

## 2021-02-05 VITALS
WEIGHT: 230 LBS | OXYGEN SATURATION: 96 % | DIASTOLIC BLOOD PRESSURE: 65 MMHG | HEIGHT: 63 IN | RESPIRATION RATE: 15 BRPM | HEART RATE: 74 BPM | TEMPERATURE: 97.5 F | BODY MASS INDEX: 40.75 KG/M2 | SYSTOLIC BLOOD PRESSURE: 131 MMHG

## 2021-02-05 LAB
GLUCOSE BLD-MCNC: 124 MG/DL (ref 70–99)
GLUCOSE BLD-MCNC: 132 MG/DL (ref 70–99)
PERFORMED ON: ABNORMAL
PERFORMED ON: ABNORMAL

## 2021-02-05 PROCEDURE — 6360000002 HC RX W HCPCS: Performed by: NURSE ANESTHETIST, CERTIFIED REGISTERED

## 2021-02-05 PROCEDURE — 2709999900 HC NON-CHARGEABLE SUPPLY: Performed by: OPHTHALMOLOGY

## 2021-02-05 PROCEDURE — 3600000004 HC SURGERY LEVEL 4 BASE: Performed by: OPHTHALMOLOGY

## 2021-02-05 PROCEDURE — 7100000010 HC PHASE II RECOVERY - FIRST 15 MIN: Performed by: OPHTHALMOLOGY

## 2021-02-05 PROCEDURE — 6370000000 HC RX 637 (ALT 250 FOR IP): Performed by: OPHTHALMOLOGY

## 2021-02-05 PROCEDURE — 3600000014 HC SURGERY LEVEL 4 ADDTL 15MIN: Performed by: OPHTHALMOLOGY

## 2021-02-05 PROCEDURE — V2632 POST CHMBR INTRAOCULAR LENS: HCPCS | Performed by: OPHTHALMOLOGY

## 2021-02-05 PROCEDURE — 2580000003 HC RX 258: Performed by: ANESTHESIOLOGY

## 2021-02-05 PROCEDURE — 3700000000 HC ANESTHESIA ATTENDED CARE: Performed by: OPHTHALMOLOGY

## 2021-02-05 PROCEDURE — 2500000003 HC RX 250 WO HCPCS: Performed by: OPHTHALMOLOGY

## 2021-02-05 PROCEDURE — 3700000001 HC ADD 15 MINUTES (ANESTHESIA): Performed by: OPHTHALMOLOGY

## 2021-02-05 DEVICE — LENS IOL SN60WF 20.5D: Type: IMPLANTABLE DEVICE | Site: EYE | Status: FUNCTIONAL

## 2021-02-05 RX ORDER — SODIUM CHLORIDE 9 MG/ML
INJECTION, SOLUTION INTRAVENOUS CONTINUOUS
Status: DISCONTINUED | OUTPATIENT
Start: 2021-02-05 | End: 2021-02-05 | Stop reason: HOSPADM

## 2021-02-05 RX ORDER — MIDAZOLAM HYDROCHLORIDE 1 MG/ML
INJECTION INTRAMUSCULAR; INTRAVENOUS PRN
Status: DISCONTINUED | OUTPATIENT
Start: 2021-02-05 | End: 2021-02-05 | Stop reason: SDUPTHER

## 2021-02-05 RX ORDER — PHENYLEPHRINE HCL 2.5 %
1 DROPS OPHTHALMIC (EYE) SEE ADMIN INSTRUCTIONS
Status: CANCELLED | OUTPATIENT
Start: 2021-02-05

## 2021-02-05 RX ORDER — SODIUM CHLORIDE 0.9 % (FLUSH) 0.9 %
10 SYRINGE (ML) INJECTION PRN
Status: DISCONTINUED | OUTPATIENT
Start: 2021-02-05 | End: 2021-02-05 | Stop reason: HOSPADM

## 2021-02-05 RX ORDER — OFLOXACIN 3 MG/ML
SOLUTION/ DROPS OPHTHALMIC
Status: COMPLETED | OUTPATIENT
Start: 2021-02-05 | End: 2021-02-05

## 2021-02-05 RX ORDER — KETOROLAC TROMETHAMINE 5 MG/ML
1 SOLUTION OPHTHALMIC SEE ADMIN INSTRUCTIONS
Status: COMPLETED | OUTPATIENT
Start: 2021-02-05 | End: 2021-02-05

## 2021-02-05 RX ORDER — TETRACAINE HYDROCHLORIDE 5 MG/ML
SOLUTION OPHTHALMIC
Status: COMPLETED | OUTPATIENT
Start: 2021-02-05 | End: 2021-02-05

## 2021-02-05 RX ORDER — SODIUM CHLORIDE 0.9 % (FLUSH) 0.9 %
10 SYRINGE (ML) INJECTION EVERY 12 HOURS SCHEDULED
Status: DISCONTINUED | OUTPATIENT
Start: 2021-02-05 | End: 2021-02-05 | Stop reason: HOSPADM

## 2021-02-05 RX ORDER — KETOROLAC TROMETHAMINE 5 MG/ML
1 SOLUTION OPHTHALMIC SEE ADMIN INSTRUCTIONS
Status: CANCELLED | OUTPATIENT
Start: 2021-02-05

## 2021-02-05 RX ORDER — TETRACAINE HYDROCHLORIDE 5 MG/ML
1 SOLUTION OPHTHALMIC SEE ADMIN INSTRUCTIONS
Status: CANCELLED | OUTPATIENT
Start: 2021-02-05

## 2021-02-05 RX ORDER — TETRACAINE HYDROCHLORIDE 5 MG/ML
1 SOLUTION OPHTHALMIC SEE ADMIN INSTRUCTIONS
Status: DISCONTINUED | OUTPATIENT
Start: 2021-02-05 | End: 2021-02-05 | Stop reason: HOSPADM

## 2021-02-05 RX ORDER — SODIUM CHLORIDE 0.9 % (FLUSH) 0.9 %
10 SYRINGE (ML) INJECTION PRN
Status: DISCONTINUED | OUTPATIENT
Start: 2021-02-05 | End: 2021-02-05 | Stop reason: SDUPTHER

## 2021-02-05 RX ORDER — SODIUM CHLORIDE 0.9 % (FLUSH) 0.9 %
10 SYRINGE (ML) INJECTION EVERY 12 HOURS SCHEDULED
Status: DISCONTINUED | OUTPATIENT
Start: 2021-02-05 | End: 2021-02-05 | Stop reason: SDUPTHER

## 2021-02-05 RX ORDER — BRIMONIDINE TARTRATE 2 MG/ML
SOLUTION/ DROPS OPHTHALMIC
Status: COMPLETED | OUTPATIENT
Start: 2021-02-05 | End: 2021-02-05

## 2021-02-05 RX ORDER — TROPICAMIDE 10 MG/ML
1 SOLUTION/ DROPS OPHTHALMIC SEE ADMIN INSTRUCTIONS
Status: CANCELLED | OUTPATIENT
Start: 2021-02-05

## 2021-02-05 RX ORDER — ONDANSETRON 2 MG/ML
4 INJECTION INTRAMUSCULAR; INTRAVENOUS
Status: DISCONTINUED | OUTPATIENT
Start: 2021-02-05 | End: 2021-02-05 | Stop reason: HOSPADM

## 2021-02-05 RX ORDER — TROPICAMIDE 10 MG/ML
1 SOLUTION/ DROPS OPHTHALMIC SEE ADMIN INSTRUCTIONS
Status: DISCONTINUED | OUTPATIENT
Start: 2021-02-05 | End: 2021-02-05 | Stop reason: HOSPADM

## 2021-02-05 RX ORDER — PHENYLEPHRINE HCL 2.5 %
1 DROPS OPHTHALMIC (EYE) SEE ADMIN INSTRUCTIONS
Status: DISCONTINUED | OUTPATIENT
Start: 2021-02-05 | End: 2021-02-05 | Stop reason: HOSPADM

## 2021-02-05 RX ORDER — SODIUM CHLORIDE 0.9 % (FLUSH) 0.9 %
10 SYRINGE (ML) INJECTION EVERY 12 HOURS SCHEDULED
Status: CANCELLED | OUTPATIENT
Start: 2021-02-05

## 2021-02-05 RX ORDER — BALANCED SALT SOLUTION 6.4; .75; .48; .3; 3.9; 1.7 MG/ML; MG/ML; MG/ML; MG/ML; MG/ML; MG/ML
SOLUTION OPHTHALMIC
Status: COMPLETED | OUTPATIENT
Start: 2021-02-05 | End: 2021-02-05

## 2021-02-05 RX ORDER — CIPROFLOXACIN HYDROCHLORIDE 3.5 MG/ML
1 SOLUTION/ DROPS TOPICAL SEE ADMIN INSTRUCTIONS
Status: COMPLETED | OUTPATIENT
Start: 2021-02-05 | End: 2021-02-05

## 2021-02-05 RX ORDER — CIPROFLOXACIN HYDROCHLORIDE 3.5 MG/ML
1 SOLUTION/ DROPS TOPICAL SEE ADMIN INSTRUCTIONS
Status: CANCELLED | OUTPATIENT
Start: 2021-02-05

## 2021-02-05 RX ORDER — LIDOCAINE HYDROCHLORIDE 10 MG/ML
INJECTION, SOLUTION EPIDURAL; INFILTRATION; INTRACAUDAL; PERINEURAL
Status: COMPLETED | OUTPATIENT
Start: 2021-02-05 | End: 2021-02-05

## 2021-02-05 RX ORDER — SODIUM CHLORIDE 0.9 % (FLUSH) 0.9 %
10 SYRINGE (ML) INJECTION PRN
Status: CANCELLED | OUTPATIENT
Start: 2021-02-05

## 2021-02-05 RX ADMIN — POVIDONE-IODINE 0.1 ML: 5 SOLUTION OPHTHALMIC at 10:31

## 2021-02-05 RX ADMIN — MIDAZOLAM 0.5 MG: 1 INJECTION INTRAMUSCULAR; INTRAVENOUS at 11:20

## 2021-02-05 RX ADMIN — SODIUM CHLORIDE: 9 INJECTION, SOLUTION INTRAVENOUS at 10:36

## 2021-02-05 RX ADMIN — MIDAZOLAM 1 MG: 1 INJECTION INTRAMUSCULAR; INTRAVENOUS at 11:04

## 2021-02-05 RX ADMIN — CIPROFLOXACIN 1 DROP: 3 SOLUTION OPHTHALMIC at 10:20

## 2021-02-05 RX ADMIN — TROPICAMIDE 1 DROP: 10 SOLUTION/ DROPS OPHTHALMIC at 10:20

## 2021-02-05 RX ADMIN — TROPICAMIDE 1 DROP: 10 SOLUTION/ DROPS OPHTHALMIC at 10:30

## 2021-02-05 RX ADMIN — TETRACAINE HYDROCHLORIDE 1 DROP: 5 SOLUTION OPHTHALMIC at 10:29

## 2021-02-05 RX ADMIN — PHENYLEPHRINE HYDROCHLORIDE 1 DROP: 2.5 SOLUTION/ DROPS OPHTHALMIC at 10:20

## 2021-02-05 RX ADMIN — MIDAZOLAM 0.5 MG: 1 INJECTION INTRAMUSCULAR; INTRAVENOUS at 11:14

## 2021-02-05 RX ADMIN — PHENYLEPHRINE HYDROCHLORIDE 1 DROP: 2.5 SOLUTION/ DROPS OPHTHALMIC at 10:30

## 2021-02-05 RX ADMIN — SODIUM CHLORIDE: 9 INJECTION, SOLUTION INTRAVENOUS at 11:03

## 2021-02-05 RX ADMIN — KETOROLAC TROMETHAMINE 1 DROP: 5 SOLUTION/ DROPS OPHTHALMIC at 10:20

## 2021-02-05 RX ADMIN — CIPROFLOXACIN 1 DROP: 3 SOLUTION OPHTHALMIC at 10:30

## 2021-02-05 ASSESSMENT — PAIN SCALES - GENERAL
PAINLEVEL_OUTOF10: 0
PAINLEVEL_OUTOF10: 0

## 2021-02-05 NOTE — ANESTHESIA PRE PROCEDURE
[facility] Department of Anesthesiology  Pre-Anesthesia Evaluation/Consultation       Name:  Ritu Guerrero  : 1960  Age:  57 y.o. MRN:  [id number]  Date: 2021           Surgeon: Surgeon(s):  Jyothi Kiran MD    Procedure: Procedure(s):  PHACOEMULSIFICATION WITH INTRAOCULAR LENS IMPLANT - RIGHT EYE     Allergies   Allergen Reactions    Trazodone And Nefazodone Other (See Comments)     headache    Ambien [Zolpidem Tartrate] Other (See Comments)     Vivid dreams    Diamox [Acetazolamide] Rash    Lorazepam Other (See Comments)     confusion    Naproxen Rash    Reglan [Metoclopramide] Anxiety    Sulfa Antibiotics Rash     Patient Active Problem List   Diagnosis    DM (diabetes mellitus), type 2, uncontrolled with complications ([address]    Diabetic gastroparesis ([address]    Allergic rhinitis    Insomnia    Essential hypertension    Chronic knee pain    Headaches due to old head injury    Nephropathy, diabetic ([address]    Migraine    Hip pain, bilateral    Depression, major    Right knee DJD    Calcific Achilles tendonitis, right    Right sided sciatica    Lumbar spinal stenosis    TMJ (temporomandibular joint disorder)    Urge incontinence of urine    Personal history of breast cancer    Hyperlipidemia LDL goal <894    Metallic taste    Polyarthralgia    Severe obesity (BMI 35.0-39. 9) with comorbidity ([address]    Neck pain    Chronic low back pain    Intractable low back pain    Plantar fasciitis of right foot    Anxiety    History of colon polyps    Irritable bowel syndrome with diarrhea     Past Medical History:   Diagnosis Date    Allergic rhinitis 5/15/2014    Anxiety and depression     Arthritis     Breast cancer ([address] 2010    left breast    Cataract     bilateral    Chronic back pain     Diabetes mellitus ([address]     DM (diabetes mellitus), type 2, uncontrolled with complications ([address] 5361    ESBL (extended spectrum beta-lactamase) producing bacteria infection 10/06/2018    urine    GERD (gastroesophageal reflux disease)     History of migraine headaches     HTN (hypertension) 5/15/2014    Hyperlipidemia     Irritable bowel syndrome (IBS)     Obesity     Wears glasses     reading     Past Surgical History:   Procedure Laterality Date    ACHILLES TENDON SURGERY Right     BACK SURGERY  2013    lumbar    BREAST LUMPECTOMY Left 2010    CARPAL TUNNEL RELEASE Bilateral      SECTION      x 1    CHOLECYSTECTOMY      COLONOSCOPY  10/16/2017    polypectomy- Dr James Hope 5/15/2019    ESOPHAGEAL Jinnie Nahid performed by Meena Collins MD at [address] Left 2020    LEFT INDEX FINGER TRIGGER FINGER RELEASE performed by Wade Fields MD at [address]  [facility] ENDOSCOPY N/A 2019    EGD BIOPSY performed by Meena Collins MD at [address] [facility] N/A 5/15/2019    EGD BIOPSY performed by Meena Collins MD at [address] [facility] [address] Use    Smoking status: Never Smoker    Smokeless tobacco: Never Used    Tobacco comment: congrats   Substance Use Topics    Alcohol use: No    Drug use: Never     Medications  Current Outpatient Medications on File Prior to Visit   Medication Sig Dispense Refill    nitrofurantoin, macrocrystal-monohydrate, (MACROBID) 100 MG capsule Take 1 capsule by mouth 2 times daily for 7 days 14 capsule 0    Continuous Blood Gluc Sensor ([address] [place] [address]) MISC as needed 2 each 5    gabapentin (NEURONTIN) 600 MG tablet Take 1-2 tablets by mouth nightly.  180 tablet 2    insulin glargine (BASAGLAR KWIKPEN) 100 UNIT/ML injection pen Inject 100 Units into the skin nightly 10 pen 5    insulin aspart (NOVOLOG FLEXPEN) 100 UNIT/ML injection pen Before meal: Glucose < 100 no coverage; 100-180 take 10 units; 181-230 take 15 units; 231-280 take 20 units; > 281 take 25 units. 10 pen 3    blood glucose test strips (TRUE METRIX BLOOD GLUCOSE TEST) strip USE TO TEST TWO TIMES A DAY AND AS NEEDED FOR SYMPTOMS OF IRREGULAR BLOOD GLUCOSE 200 strip 2    lisinopril (PRINIVIL;ZESTRIL) 40 MG tablet TAKE ONE TABLET BY MOUTH DAILY 90 tablet 0    tiZANidine (ZANAFLEX) 4 MG tablet TAKE ONE TABLET BY MOUTH EVERY 8 HOURS AS NEEDED FOR MUSCLE SPASM 30 tablet 1    atorvastatin (LIPITOR) 80 MG tablet TAKE ONE TABLET BY MOUTH DAILY 90 tablet 0    QUEtiapine (SEROQUEL) 25 MG tablet TAKE TWO TABLETS BY MOUTH ONCE NIGHTLY 180 tablet 0    tolterodine (DETROL LA) 4 MG extended release capsule TAKE ONE CAPSULE BY MOUTH DAILY 90 capsule 0    metoprolol succinate (TOPROL XL) 50 MG extended release tablet TAKE ONE TABLET BY MOUTH DAILY 90 tablet 0    nortriptyline (PAMELOR) 25 MG capsule TAKE ONE TO TWO CAPSULES BY MOUTH ONCE NIGHTLY 180 capsule 1    venlafaxine (EFFEXOR XR) 75 MG extended release capsule TAKE THREE CAPSULES BY MOUTH DAILY 270 capsule 0    amLODIPine (NORVASC) 5 MG tablet TAKE ONE TABLET BY MOUTH DAILY (new lower dose) 90 tablet 1    Insulin Pen Needle (B-D UF III MINI PEN NEEDLES) 31G X 5 MM MISC 1 each by Does not apply route 3 times daily 300 each 3    blood glucose monitor kit and supplies Test 2 times a day & as needed for symptoms of irregular blood glucose.  1 kit 0    Blood Glucose Monitoring Suppl (ONE TOUCH ULTRA 2) w/Device KIT 1 kit by Does not apply route daily 1 kit 0    Continuous Blood Gluc  (FREESTYLE KENDAL 14 DAY READER) VISHNU as needed 1 Device 0    Insulin Pen Needle (B-D UF III MINI PEN NEEDLES) 31G X 5 MM MISC Apply 1 each topically 2 times daily 200 each 3    dicyclomine (BENTYL) 20 MG tablet Take 1 tablet by mouth every 6 hours as needed (cramping) 60 tablet 3    Blood Glucose Monitoring Suppl (TRUE METRIX METER) w/Device KIT three times per day 1 kit 0    TRUEPLUS LANCETS 33G MISC three times per day 300 each 3    omeprazole (PRILOSEC) 40 MG delayed release capsule Take 40 mg by mouth daily       metroNIDAZOLE (METROGEL) 0.75 % gel Apply topically 2 times daily. (Patient taking differently: 2 times daily as needed ) 1 Tube 3    anastrozole (ARIMIDEX) 1 MG tablet TAKE ONE TABLET BY MOUTH DAILY 30 tablet 5    B-D INS SYR HALF-UNIT .3CC/31G 31G X 5/16\" 0.3 ML MISC USE AS DIRECTED TWO TIMES A  each 0    MICROLET LANCETS MISC 1 each by Does not apply route 6 times daily. 600 each 3    lubiprostone (AMITIZA) 8 MCG CAPS capsule Take 8 mcg by mouth 2 times daily (with meals). No current facility-administered medications on file prior to visit. No current outpatient medications on file. No current facility-administered medications for this visit. Vital Signs (Current)   There were no vitals filed for this visit. Vital Signs Statistics (for past 48 hrs)     No data recorded    BP Readings from Last 3 Encounters:   01/28/21 138/86   11/05/20 131/60   11/05/20 134/74     BMI  There is no height or weight on file to calculate BMI. Estimated body mass index is 40.74 kg/m² as calculated from the following:    Height as of 1/29/21: 5' 3\" (1.6 m). Weight as of 1/29/21: 230 lb (104.3 kg).     CBC   Lab Results   Component Value Date    WBC 12.5 05/04/2020    RBC 5.25 05/04/2020    RBC 4.74 12/04/2015    HGB 14.3 05/04/2020    HCT 45.2 05/04/2020    MCV 86.1 05/04/2020    RDW 14.5 05/04/2020     05/04/2020     CMP    Lab Results   Component Value Date     10/06/2020    K 3.8 10/06/2020    K 4.1 05/04/2020     10/06/2020    CO2 27 10/06/2020    BUN 11 10/06/2020    CREATININE 0.8 10/06/2020    GFRAA >60 10/06/2020    GFRAA >60 04/10/2013    AGRATIO 1.5 10/06/2020    LABGLOM >60 10/06/2020    GLUCOSE 60 10/06/2020    GLUCOSE 380 12/04/2015    PROT 6.6 10/06/2020    PROT 6.9 12/04/2015    CALCIUM 9.3 10/06/2020    BILITOT 0.3 10/06/2020    ALKPHOS 123 10/06/2020    AST 16 10/06/2020    ALT 12 10/06/2020     BMP    Lab Results   Component Value Date     10/06/2020    K 3.8 10/06/2020    K 4.1 05/04/2020     10/06/2020    CO2 27 10/06/2020    BUN 11 10/06/2020    CREATININE 0.8 10/06/2020    CALCIUM 9.3 10/06/2020    GFRAA >60 10/06/2020    GFRAA >60 04/10/2013    LABGLOM >60 10/06/2020    GLUCOSE 60 10/06/2020    GLUCOSE 380 12/04/2015     POCGlucose  No results for input(s): GLUCOSE in the last 72 hours. Coags    Lab Results   Component Value Date    PROTIME 13.0 08/20/2017    INR 1.15 08/20/2017    APTT 30.4 90/73/3402     HCG (If Applicable)   Lab Results   Component Value Date    PREGTESTUR Negative 04/06/2014      ABGs No results found for: PHART, PO2ART, HGP4DTA, IDA1MUM, BEART, H6ZAGOAT   Type & Screen (If Applicable)  No results found for: LABABO, LABRH                         BMI: Wt Readings from Last 3 Encounters:       NPO Status:                          Anesthesia Evaluation  Patient summary reviewed no history of anesthetic complications:   Airway: Mallampati: III  TM distance: >3 FB   Neck ROM: full   Dental:    (+) partials      Pulmonary:Negative Pulmonary ROS and normal exam                               Cardiovascular:  Exercise tolerance: good (>4 METS),   (+) hypertension:,         Rhythm: regular  Rate: normal           Beta Blocker:  Dose within 24 Hrs         Neuro/Psych:   (+) neuromuscular disease:, headaches:, psychiatric history:depression/anxiety             GI/Hepatic/Renal:   (+) GERD:, morbid obesity          Endo/Other:    (+) DiabetesType II DM, using insulin, . Abdominal:   (+) obese,         Vascular: negative vascular ROS. Anesthesia Plan      MAC     ASA 3       Induction: intravenous. MIPS: Prophylactic antiemetics administered. Anesthetic plan and risks discussed with patient. Plan discussed with CRNA.     Attending anesthesiologist reviewed and agrees with Pre Eval content          This pre-anesthesia assessment may be used as a history and physical.    DOS STAFF ADDENDUM:    Pt seen and examined, chart reviewed (including anesthesia, drug and allergy history). No interval changes to history and physical examination. Anesthetic plan, risks, benefits, alternatives, and personnel involved discussed with patient. Questions and concerns addressed. Patient(family) verbalized an understanding and agrees to proceed.       Rory Rand MD  February 5, 2021  10:03 AM

## 2021-02-05 NOTE — ANESTHESIA POSTPROCEDURE EVALUATION
Department of Anesthesiology  Postprocedure Note    Patient: Delmi Brand  MRN: [id number]  YOB: 1960  Date of evaluation: 2/5/2021  Time:  11:27 AM     Procedure Summary     Date: 02/05/21 Room / Location: [address]    Anesthesia Start: 1104 Anesthesia Stop: 5397    Procedure: PHACOEMULSIFICATION WITH INTRAOCULAR LENS IMPLANT - RIGHT EYE (Right Eye) Diagnosis:       Combined forms of age-related cataract of right eye      (cataract of right eye)    Surgeons: Andrew Pendleton MD Responsible Provider: Rory Rand MD    Anesthesia Type: MAC ASA Status: 3          Anesthesia Type: MAC    Eva Phase I: Eva Score: 10    Eva Phase II:      Last vitals: Reviewed and per EMR flowsheets.        Anesthesia Post Evaluation    Patient location during evaluation: bedside  Patient participation: complete - patient participated  Level of consciousness: awake  Pain score: 0  Airway patency: patent  Nausea & Vomiting: no nausea and no vomiting  Complications: no  Cardiovascular status: blood pressure returned to baseline  Respiratory status: acceptable  Hydration status: euvolemic

## 2021-02-05 NOTE — OP NOTE
Dimas Prado    OPERATIVE NOTE    Preoperative Diagnosis: Cataract right eye    Postoperative Diagnosis: Cataract right eye    Procedure: Phacoemulsification with intraocular lens implantation, right eye  Surgeon: Evelin Carroll MD    Anesthesia: MAC, topical.    Complications: none    Estimated blood loss: less than 1 ml. Specimens: none    Indications for procedure: The patient is a 57y.o. year old with decreased vision, glare and halos around lights, and trouble with activities of daily living. Examination revealed a visually significant cataract in the right eye. Risks, benefits, and alternatives to surgery were discussed with the patient and the patient elected to proceed with phacoemulsification with lens implantation. Details of the procedure: Following informed consent, the patient was taken to the operating room and placed in the supine position. Monitored anesthesia care was administered. The eye was prepped and draped in the usual sterile fashion using aseptic technique for cataract surgery. A side port incision was made. 1% preservative free lidocaine was injected through the side port incision for topical anesthesia. The eye was filled with viscoelastic and a 2.4 mm keratome blade was used to make a 3-plane clear corneal incision in the temporal cornea. The cystitome was used to make a tear in the anterior capsule and a Utrata forceps was used to make a complete curvilinear capsulorrhexis. The lens was hydrodissected and freely rotated. Phacoemulsification was performed. Irrigation/aspiration was used to remove all cortical material from the capsular bag. The eye was filled with viscoelastic and a foldable posterior chamber intraocular lens was injected into the capsular bag. The lens was rotated to the appropriate axis as needed. Irrigation/aspiration was used to remove all excess viscoelastic.   The eye was pressurized with BSS and the wounds were check for leaks and none were found.  The patient had ofloxacin and Alphagan solutions placed on the eye. The patient went to the PACU in excellent condition, having tolerated the procedure well.

## 2021-02-09 ENCOUNTER — HOSPITAL ENCOUNTER (INPATIENT)
Age: 61
LOS: 2 days | Discharge: LEFT BEFORE DISCHARGE | DRG: 683 | End: 2021-02-11
Attending: FAMILY MEDICINE | Admitting: FAMILY MEDICINE
Payer: COMMERCIAL

## 2021-02-09 ENCOUNTER — APPOINTMENT (OUTPATIENT)
Dept: CT IMAGING | Age: 61
DRG: 683 | End: 2021-02-09
Payer: COMMERCIAL

## 2021-02-09 DIAGNOSIS — N39.0 URINARY TRACT INFECTION WITHOUT HEMATURIA, SITE UNSPECIFIED: ICD-10-CM

## 2021-02-09 DIAGNOSIS — N17.9 AKI (ACUTE KIDNEY INJURY) (HCC): Primary | ICD-10-CM

## 2021-02-09 LAB
ANION GAP SERPL CALCULATED.3IONS-SCNC: 14 MMOL/L (ref 3–16)
BACTERIA: ABNORMAL /HPF
BASOPHILS ABSOLUTE: 0.1 K/UL (ref 0–0.2)
BASOPHILS RELATIVE PERCENT: 0.9 %
BILIRUBIN URINE: ABNORMAL
BLOOD, URINE: NEGATIVE
BUN BLDV-MCNC: 51 MG/DL (ref 7–20)
CALCIUM SERPL-MCNC: 8.9 MG/DL (ref 8.3–10.6)
CHLORIDE BLD-SCNC: 100 MMOL/L (ref 99–110)
CLARITY: ABNORMAL
CO2: 20 MMOL/L (ref 21–32)
COLOR: ABNORMAL
COMMENT UA: ABNORMAL
CREAT SERPL-MCNC: 3.4 MG/DL (ref 0.6–1.2)
EOSINOPHILS ABSOLUTE: 0.3 K/UL (ref 0–0.6)
EOSINOPHILS RELATIVE PERCENT: 2.6 %
EPITHELIAL CELLS, UA: 1 /HPF (ref 0–5)
GFR AFRICAN AMERICAN: 17
GFR NON-AFRICAN AMERICAN: 14
GLUCOSE BLD-MCNC: 113 MG/DL (ref 70–99)
GLUCOSE BLD-MCNC: 65 MG/DL (ref 70–99)
GLUCOSE BLD-MCNC: 68 MG/DL (ref 70–99)
GLUCOSE BLD-MCNC: 71 MG/DL (ref 70–99)
GLUCOSE URINE: NEGATIVE MG/DL
HCT VFR BLD CALC: 34.4 % (ref 36–48)
HEMOGLOBIN: 11.2 G/DL (ref 12–16)
HYALINE CASTS: 10 /LPF (ref 0–8)
KETONES, URINE: NEGATIVE MG/DL
LEUKOCYTE ESTERASE, URINE: ABNORMAL
LYMPHOCYTES ABSOLUTE: 3 K/UL (ref 1–5.1)
LYMPHOCYTES RELATIVE PERCENT: 28.5 %
MCH RBC QN AUTO: 28.8 PG (ref 26–34)
MCHC RBC AUTO-ENTMCNC: 32.4 G/DL (ref 31–36)
MCV RBC AUTO: 89 FL (ref 80–100)
MICROSCOPIC EXAMINATION: YES
MONOCYTES ABSOLUTE: 1 K/UL (ref 0–1.3)
MONOCYTES RELATIVE PERCENT: 9.4 %
NEUTROPHILS ABSOLUTE: 6.2 K/UL (ref 1.7–7.7)
NEUTROPHILS RELATIVE PERCENT: 58.6 %
NITRITE, URINE: NEGATIVE
PDW BLD-RTO: 15.1 % (ref 12.4–15.4)
PERFORMED ON: ABNORMAL
PERFORMED ON: ABNORMAL
PERFORMED ON: NORMAL
PH UA: 5 (ref 5–8)
PLATELET # BLD: 246 K/UL (ref 135–450)
PMV BLD AUTO: 9.8 FL (ref 5–10.5)
POTASSIUM REFLEX MAGNESIUM: 5.1 MMOL/L (ref 3.5–5.1)
PROTEIN UA: 100 MG/DL
RBC # BLD: 3.87 M/UL (ref 4–5.2)
RBC UA: ABNORMAL /HPF (ref 0–4)
SODIUM BLD-SCNC: 134 MMOL/L (ref 136–145)
SPECIFIC GRAVITY UA: 1.02 (ref 1–1.03)
URINE REFLEX TO CULTURE: YES
URINE TYPE: ABNORMAL
UROBILINOGEN, URINE: 1 E.U./DL
WBC # BLD: 10.5 K/UL (ref 4–11)
WBC UA: 23 /HPF (ref 0–5)

## 2021-02-09 PROCEDURE — 87186 SC STD MICRODIL/AGAR DIL: CPT

## 2021-02-09 PROCEDURE — 74176 CT ABD & PELVIS W/O CONTRAST: CPT

## 2021-02-09 PROCEDURE — 94761 N-INVAS EAR/PLS OXIMETRY MLT: CPT

## 2021-02-09 PROCEDURE — 87086 URINE CULTURE/COLONY COUNT: CPT

## 2021-02-09 PROCEDURE — G0378 HOSPITAL OBSERVATION PER HR: HCPCS

## 2021-02-09 PROCEDURE — 6370000000 HC RX 637 (ALT 250 FOR IP): Performed by: NURSE PRACTITIONER

## 2021-02-09 PROCEDURE — 96366 THER/PROPH/DIAG IV INF ADDON: CPT

## 2021-02-09 PROCEDURE — 99283 EMERGENCY DEPT VISIT LOW MDM: CPT

## 2021-02-09 PROCEDURE — 2700000000 HC OXYGEN THERAPY PER DAY

## 2021-02-09 PROCEDURE — 6370000000 HC RX 637 (ALT 250 FOR IP): Performed by: FAMILY MEDICINE

## 2021-02-09 PROCEDURE — 2580000003 HC RX 258: Performed by: HOSPITALIST

## 2021-02-09 PROCEDURE — 6360000002 HC RX W HCPCS: Performed by: FAMILY MEDICINE

## 2021-02-09 PROCEDURE — 6360000002 HC RX W HCPCS: Performed by: NURSE PRACTITIONER

## 2021-02-09 PROCEDURE — 87077 CULTURE AEROBIC IDENTIFY: CPT

## 2021-02-09 PROCEDURE — 2580000003 HC RX 258: Performed by: NURSE PRACTITIONER

## 2021-02-09 PROCEDURE — 96367 TX/PROPH/DG ADDL SEQ IV INF: CPT

## 2021-02-09 PROCEDURE — 96365 THER/PROPH/DIAG IV INF INIT: CPT

## 2021-02-09 PROCEDURE — 2580000003 HC RX 258: Performed by: FAMILY MEDICINE

## 2021-02-09 PROCEDURE — 80048 BASIC METABOLIC PNL TOTAL CA: CPT

## 2021-02-09 PROCEDURE — 2500000003 HC RX 250 WO HCPCS: Performed by: HOSPITALIST

## 2021-02-09 PROCEDURE — 1200000000 HC SEMI PRIVATE

## 2021-02-09 PROCEDURE — 96372 THER/PROPH/DIAG INJ SC/IM: CPT

## 2021-02-09 PROCEDURE — 85025 COMPLETE CBC W/AUTO DIFF WBC: CPT

## 2021-02-09 PROCEDURE — 81001 URINALYSIS AUTO W/SCOPE: CPT

## 2021-02-09 RX ORDER — KETOROLAC TROMETHAMINE 5 MG/ML
1 SOLUTION OPHTHALMIC 4 TIMES DAILY
Status: DISCONTINUED | OUTPATIENT
Start: 2021-02-09 | End: 2021-02-11 | Stop reason: HOSPADM

## 2021-02-09 RX ORDER — PANTOPRAZOLE SODIUM 40 MG/1
40 TABLET, DELAYED RELEASE ORAL
Status: DISCONTINUED | OUTPATIENT
Start: 2021-02-10 | End: 2021-02-11 | Stop reason: HOSPADM

## 2021-02-09 RX ORDER — NICOTINE POLACRILEX 4 MG
15 LOZENGE BUCCAL PRN
Status: DISCONTINUED | OUTPATIENT
Start: 2021-02-09 | End: 2021-02-11 | Stop reason: HOSPADM

## 2021-02-09 RX ORDER — ATORVASTATIN CALCIUM 80 MG/1
80 TABLET, FILM COATED ORAL DAILY
Status: DISCONTINUED | OUTPATIENT
Start: 2021-02-09 | End: 2021-02-11 | Stop reason: HOSPADM

## 2021-02-09 RX ORDER — GABAPENTIN 400 MG/1
400 CAPSULE ORAL NIGHTLY
Status: DISCONTINUED | OUTPATIENT
Start: 2021-02-09 | End: 2021-02-11

## 2021-02-09 RX ORDER — PROMETHAZINE HYDROCHLORIDE 25 MG/1
12.5 TABLET ORAL EVERY 6 HOURS PRN
Status: DISCONTINUED | OUTPATIENT
Start: 2021-02-09 | End: 2021-02-11 | Stop reason: HOSPADM

## 2021-02-09 RX ORDER — ACETAMINOPHEN 325 MG/1
650 TABLET ORAL EVERY 6 HOURS PRN
Status: DISCONTINUED | OUTPATIENT
Start: 2021-02-09 | End: 2021-02-11 | Stop reason: HOSPADM

## 2021-02-09 RX ORDER — KETOROLAC TROMETHAMINE 5 MG/ML
1 SOLUTION OPHTHALMIC 4 TIMES DAILY
COMMUNITY
Start: 2021-02-05 | End: 2021-02-19

## 2021-02-09 RX ORDER — VENLAFAXINE HYDROCHLORIDE 75 MG/1
75 CAPSULE, EXTENDED RELEASE ORAL
Status: DISCONTINUED | OUTPATIENT
Start: 2021-02-10 | End: 2021-02-11 | Stop reason: HOSPADM

## 2021-02-09 RX ORDER — PREDNISOLONE ACETATE 10 MG/ML
1 SUSPENSION/ DROPS OPHTHALMIC 4 TIMES DAILY
Status: DISCONTINUED | OUTPATIENT
Start: 2021-02-09 | End: 2021-02-11 | Stop reason: HOSPADM

## 2021-02-09 RX ORDER — INSULIN GLARGINE 100 [IU]/ML
100 INJECTION, SOLUTION SUBCUTANEOUS NIGHTLY
Status: DISCONTINUED | OUTPATIENT
Start: 2021-02-09 | End: 2021-02-10

## 2021-02-09 RX ORDER — TROSPIUM CHLORIDE 20 MG/1
20 TABLET, FILM COATED ORAL NIGHTLY
Status: DISCONTINUED | OUTPATIENT
Start: 2021-02-09 | End: 2021-02-11 | Stop reason: HOSPADM

## 2021-02-09 RX ORDER — DEXTROSE MONOHYDRATE 50 MG/ML
100 INJECTION, SOLUTION INTRAVENOUS PRN
Status: DISCONTINUED | OUTPATIENT
Start: 2021-02-09 | End: 2021-02-11 | Stop reason: HOSPADM

## 2021-02-09 RX ORDER — OFLOXACIN 3 MG/ML
1 SOLUTION/ DROPS OPHTHALMIC 4 TIMES DAILY
Status: DISCONTINUED | OUTPATIENT
Start: 2021-02-09 | End: 2021-02-11 | Stop reason: HOSPADM

## 2021-02-09 RX ORDER — 0.9 % SODIUM CHLORIDE 0.9 %
1000 INTRAVENOUS SOLUTION INTRAVENOUS ONCE
Status: COMPLETED | OUTPATIENT
Start: 2021-02-09 | End: 2021-02-09

## 2021-02-09 RX ORDER — PREDNISOLONE ACETATE 10 MG/ML
1 SUSPENSION/ DROPS OPHTHALMIC 4 TIMES DAILY
COMMUNITY
Start: 2021-02-05 | End: 2021-02-19

## 2021-02-09 RX ORDER — ONDANSETRON 2 MG/ML
4 INJECTION INTRAMUSCULAR; INTRAVENOUS EVERY 6 HOURS PRN
Status: DISCONTINUED | OUTPATIENT
Start: 2021-02-09 | End: 2021-02-11 | Stop reason: HOSPADM

## 2021-02-09 RX ORDER — DEXTROSE MONOHYDRATE 25 G/50ML
12.5 INJECTION, SOLUTION INTRAVENOUS PRN
Status: DISCONTINUED | OUTPATIENT
Start: 2021-02-09 | End: 2021-02-11 | Stop reason: HOSPADM

## 2021-02-09 RX ORDER — ACETAMINOPHEN 650 MG/1
650 SUPPOSITORY RECTAL EVERY 6 HOURS PRN
Status: DISCONTINUED | OUTPATIENT
Start: 2021-02-09 | End: 2021-02-11 | Stop reason: HOSPADM

## 2021-02-09 RX ORDER — HEPARIN SODIUM 5000 [USP'U]/ML
5000 INJECTION, SOLUTION INTRAVENOUS; SUBCUTANEOUS EVERY 8 HOURS SCHEDULED
Status: DISCONTINUED | OUTPATIENT
Start: 2021-02-09 | End: 2021-02-11 | Stop reason: HOSPADM

## 2021-02-09 RX ORDER — 0.9 % SODIUM CHLORIDE 0.9 %
1000 INTRAVENOUS SOLUTION INTRAVENOUS ONCE
Status: DISCONTINUED | OUTPATIENT
Start: 2021-02-09 | End: 2021-02-09

## 2021-02-09 RX ORDER — LUBIPROSTONE 8 UG/1
8 CAPSULE, GELATIN COATED ORAL 2 TIMES DAILY WITH MEALS
Status: DISCONTINUED | OUTPATIENT
Start: 2021-02-09 | End: 2021-02-11 | Stop reason: HOSPADM

## 2021-02-09 RX ORDER — SODIUM CHLORIDE 0.9 % (FLUSH) 0.9 %
10 SYRINGE (ML) INJECTION EVERY 12 HOURS SCHEDULED
Status: DISCONTINUED | OUTPATIENT
Start: 2021-02-09 | End: 2021-02-11 | Stop reason: HOSPADM

## 2021-02-09 RX ORDER — SODIUM CHLORIDE 0.9 % (FLUSH) 0.9 %
10 SYRINGE (ML) INJECTION PRN
Status: DISCONTINUED | OUTPATIENT
Start: 2021-02-09 | End: 2021-02-11 | Stop reason: HOSPADM

## 2021-02-09 RX ORDER — ANASTROZOLE 1 MG/1
1 TABLET ORAL DAILY
Status: DISCONTINUED | OUTPATIENT
Start: 2021-02-09 | End: 2021-02-11 | Stop reason: HOSPADM

## 2021-02-09 RX ORDER — DICYCLOMINE HCL 20 MG
20 TABLET ORAL EVERY 6 HOURS PRN
Status: DISCONTINUED | OUTPATIENT
Start: 2021-02-09 | End: 2021-02-11 | Stop reason: HOSPADM

## 2021-02-09 RX ORDER — OFLOXACIN 3 MG/ML
1 SOLUTION/ DROPS OPHTHALMIC 4 TIMES DAILY
COMMUNITY
Start: 2021-02-05 | End: 2021-02-12

## 2021-02-09 RX ORDER — QUETIAPINE FUMARATE 25 MG/1
50 TABLET, FILM COATED ORAL NIGHTLY
Status: DISCONTINUED | OUTPATIENT
Start: 2021-02-09 | End: 2021-02-11 | Stop reason: HOSPADM

## 2021-02-09 RX ORDER — SODIUM CHLORIDE 9 MG/ML
INJECTION, SOLUTION INTRAVENOUS CONTINUOUS
Status: DISCONTINUED | OUTPATIENT
Start: 2021-02-09 | End: 2021-02-09

## 2021-02-09 RX ADMIN — PREDNISOLONE ACETATE 1 DROP: 10 SUSPENSION/ DROPS OPHTHALMIC at 23:02

## 2021-02-09 RX ADMIN — SODIUM CHLORIDE: 9 INJECTION, SOLUTION INTRAVENOUS at 17:13

## 2021-02-09 RX ADMIN — OFLOXACIN 1 DROP: 3 SOLUTION OPHTHALMIC at 23:03

## 2021-02-09 RX ADMIN — CEFTRIAXONE 1000 MG: 1 INJECTION, POWDER, FOR SOLUTION INTRAMUSCULAR; INTRAVENOUS at 13:28

## 2021-02-09 RX ADMIN — ATORVASTATIN CALCIUM 80 MG: 80 TABLET, FILM COATED ORAL at 18:16

## 2021-02-09 RX ADMIN — TROSPIUM CHLORIDE 20 MG: 20 TABLET, FILM COATED ORAL at 23:03

## 2021-02-09 RX ADMIN — GABAPENTIN 400 MG: 400 CAPSULE ORAL at 23:03

## 2021-02-09 RX ADMIN — HEPARIN SODIUM 5000 UNITS: 5000 INJECTION INTRAVENOUS; SUBCUTANEOUS at 17:16

## 2021-02-09 RX ADMIN — HEPARIN SODIUM 5000 UNITS: 5000 INJECTION INTRAVENOUS; SUBCUTANEOUS at 23:03

## 2021-02-09 RX ADMIN — QUETIAPINE 50 MG: 25 TABLET ORAL at 23:03

## 2021-02-09 RX ADMIN — SODIUM CHLORIDE 1000 ML: 9 INJECTION, SOLUTION INTRAVENOUS at 13:27

## 2021-02-09 RX ADMIN — KETOROLAC TROMETHAMINE 1 DROP: 5 SOLUTION OPHTHALMIC at 23:03

## 2021-02-09 RX ADMIN — SODIUM BICARBONATE: 84 INJECTION, SOLUTION INTRAVENOUS at 19:05

## 2021-02-09 ASSESSMENT — PAIN SCALES - GENERAL: PAINLEVEL_OUTOF10: 7

## 2021-02-09 ASSESSMENT — PAIN DESCRIPTION - LOCATION: LOCATION: BACK

## 2021-02-09 ASSESSMENT — PAIN DESCRIPTION - DESCRIPTORS: DESCRIPTORS: DISCOMFORT

## 2021-02-09 NOTE — CONSULTS
Office: [phone]       Fax: [phone]      Nephrology Initial Consult Note        Patient's Name: John Saez  Admit Date: 2021  Date of Visit: 2021    Reason for Consult: ASHOK  Requesting Physician:  Giovanna Bryant DO  PCP: Nathaly Villanueva MD    Chief Complaint:  Flank pain     History of Present Illness:      John Saez is a 57 y.o. female with PMHx of hypertension, diabetes mellitus, obesity, breast cancer who was hospitalized on 2021 with complaints of right flank pain  Creat noted to be 3.4  Per pt, gets frequent UTIs. (3 last year)  No hematuria, no history of renal stone  Denies previous history of renal dis  Dm for 11-12 yrs. No retinopathy, per pt. NSAID use:  Advil 4-6 tab/d for about 6 weeks  IV contrast: None recent   Home meds reviewed and noted to be on lisinopril    Past Medical History:   Diagnosis Date    Allergic rhinitis 5/15/2014    Anxiety and depression     Arthritis     [facility] 2010    left breast    Cataract     bilateral    Chronic back pain     Diabetes mellitus ([address]     DM (diabetes mellitus), type 2, uncontrolled with complications ([address] 1012    ESBL (extended spectrum beta-lactamase) producing bacteria infection 10/06/2018    urine    GERD (gastroesophageal reflux disease)     History of migraine headaches     HTN (hypertension) 5/15/2014    Hyperlipidemia     Irritable bowel syndrome (IBS)     Obesity     Wears glasses     reading       Past Surgical History:   Procedure Laterality Date    ACHILLES TENDON SURGERY Right     BACK SURGERY  2013    lumbar    BREAST LUMPECTOMY Left 2010    CARPAL TUNNEL RELEASE Bilateral      SECTION      x 1    CHOLECYSTECTOMY      COLONOSCOPY  10/16/2017    polypectomy- Dr Inez De La Vega N/A 5/15/2019    ESOPHAGEAL Kristy Crome performed by Zack Lane MD at [address] Left 11/5/2020    LEFT INDEX FINGER TRIGGER FINGER RELEASE performed by Kush Gaffney MD at [address] Right 2/5/2021    PHACOEMULSIFICATION WITH INTRAOCULAR LENS IMPLANT - RIGHT EYE performed by Yudy Mesa MD at [address] [facility] ENDOSCOPY N/A 4/23/2019    EGD BIOPSY performed by Zack Lane MD at [address] [facility] N/A 5/15/2019    EGD BIOPSY performed [facility]       Family History   Problem Relation Age of Onset    Cancer Mother         forehead soft tissue    Diabetes Mother     Arthritis Father     High Blood Pressure Father     High Cholesterol Father     Alzheimer's Disease Father     Diabetes Brother     Depression Sister         reports that she has never smoked. She has never used smokeless tobacco. She reports that she does not drink alcohol or use drugs. Medications:    Allergies:  Trazodone and nefazodone, Ambien [zolpidem tartrate], Diamox [acetazolamide], Lorazepam, Naproxen, Reglan [metoclopramide], and Sulfa antibiotics    Scheduled Meds:   lubiprostone  8 mcg Oral BID WC    anastrozole  1 mg Oral Daily    [START ON 2/10/2021] pantoprazole  40 mg Oral QAM AC    trospium  20 mg Oral Nightly    atorvastatin  80 mg Oral Daily    gabapentin  400 mg Oral Nightly    insulin glargine  100 Units Subcutaneous Nightly    QUEtiapine  50 mg Oral Nightly    [START ON 2/10/2021] venlafaxine  75 mg Oral Daily with breakfast    [START ON 2/10/2021] cefTRIAXone (ROCEPHIN) IV  1,000 mg Intravenous Q24H    sodium chloride flush  10 mL Intravenous 2 times per day    heparin (porcine)  5,000 Units Subcutaneous 3 times per day     Continuous Infusions:   sodium chloride 75 mL/hr at 02/09/21 1713       Labs:  CBC:   Recent Labs stones or hydronephrosis noted  -Volume: Euvolemic  -Electrolytes: Hyperkalemia (hogh normal)  -Acid-Base: NAGMA and AGMA   -ASHOK in the setting of NSAID use. history of recurrent UTIs    Recent Labs     02/09/21  1218   BUN 51*   CREATININE 3.4*     Recent Labs     02/09/21  1218   *   K 5.1   CO2 20*       2. HTN  -Blood pressure at goal     BP Readings from Last 1 Encounters:   02/09/21 124/75       3. DM  -not on metformin    4. UTI      Plan:     - IVF for hemodynamic support. Will use sod bicarb for kaliuresis  - no NSAID (educated)  - bladder scan, PVR  - Hold Lisinopril  - Antimicrobials per primary team   - follow up on cultures  - Monitor BMP    -Monitor I/O, UOP  -Maintain MAP>65  -Avoid nephrotoxin, if able. -Dose meds to current eGFR    Thank you for allowing us to participate in care of Carrie Rico . We will continue to follow. Feel free to contact me with any questions.       Herberth Wharton  2/9/2021    [facility]  [address]  Office : [phone]  Fax :[phone]

## 2021-02-09 NOTE — ED NOTES
Report called to   Truman Sandoval RN   To Room  0882  Cardiac monitor on during transfer  Pt's pain level   denies  VSS, Afebrile   IV site is clean, dry and intact, Normal saline locked   Family updated on transfer            Vito Pelaez RN  02/09/21 5326

## 2021-02-09 NOTE — CONSULTS
Consult received  Full note to follow    Elizabeth Reyes  2/9/2021    [facility] [address]  Office : [phone]  Fax :[phone]

## 2021-02-09 NOTE — ED PROVIDER NOTES
[address] [facility]  [address] [address]  Dept: [phone]  Loc: [address] ENCOUNTER        This patient was not seen or evaluated by the attending physician. I evaluated this patient, the attending physician was available for consultation. CHIEF COMPLAINT    Chief Complaint   Patient presents with    Back Pain     left side greater than right. started on antibiotics yesterday per urine sample she had a UTI. didnt get better since yesterday. HPI    Michaell Olszewski is a 57 y.o. female who presents with right flank and back pain. Onset was last night. The duration has been intermittent since the onset. The patient has had no associated nausea vomiting or diarrhea. No anterior abdominal pain. States that she is currently on antibiotics for a UTI. Is taking Cipro. Has only taken 1 dose of the antibiotic. She states that Dr. Saji Dixon told her come to the ED for further evaluation and treatment, her primary care provider. The pain severity is 7/10. The quality is sharp. There are no aggravating or alleviating factors. States that the pain does worsen with movements. No history of renal calculi. Came to the ED for further evaluation and treatment. REVIEW OF SYSTEMS    Musculoskeletal: No midline back pain or extremity injury  : + dysuria, + hematuria  GI: no abdominal pain, no diarrhea  General: No fevers or chills  Neurologic: No bowel or bladder incontinence, No saddle anesthesia, No leg weakness  All other systems reviewed and are negative.      PAST MEDICAL & SURGICAL HISTORY    Past Medical History:   Diagnosis Date    Allergic rhinitis 5/15/2014    Anxiety and depression     Arthritis     Breast cancer ([facility] [address] 02/2010    left breast    Cataract     bilateral    Chronic back pain     Diabetes mellitus ([facility] [address]     DM (diabetes mellitus), type 2, uncontrolled with complications ([facility] [address] 2014    ESBL (extended spectrum beta-lactamase) producing bacteria infection 10/06/2018    urine    GERD (gastroesophageal reflux disease)     History of migraine headaches     HTN (hypertension) 5/15/2014    Hyperlipidemia     Irritable bowel syndrome (IBS)     Obesity     Wears glasses     reading     Past Surgical History:   Procedure Laterality Date    ACHILLES TENDON SURGERY Right     BACK SURGERY  2013    lumbar    BREAST LUMPECTOMY Left 2010    CARPAL TUNNEL RELEASE Bilateral      SECTION      x 1    CHOLECYSTECTOMY      COLONOSCOPY  10/16/2017    polypectomy- Dr Anguiano Dates 5/15/2019    ESOPHAGEAL Nola Cordia performed by Dorys Kan MD at [address] Left 2020    LEFT INDEX FINGER TRIGGER FINGER RELEASE performed by Carlitos Duran MD at [address] Right 2021    PHACOEMULSIFICATION WITH INTRAOCULAR LENS IMPLANT - RIGHT EYE performed by Catalino Flood MD at [address] [facility] N/A 2019    EGD BIOPSY performed by Dorys Kan MD at [address] 5/15/2019    EGD BIOPSY performed by Dorys Kan MD at [place] [address]  (may include discharge medications prescribed in the ED)  Current Outpatient Rx   Medication Sig Dispense Refill    Continuous Blood Gluc Sensor ([address] [place] [address]) MISC as needed 2 each 5    gabapentin (NEURONTIN) 600 MG tablet Take 1-2 tablets by mouth nightly. 180 tablet 2    insulin glargine (BASAGLAR KWIKPEN) 100 UNIT/ML injection pen Inject 100 Units into the skin nightly 10 pen 5    insulin aspart (NOVOLOG FLEXPEN) 100 UNIT/ML injection pen Before meal: Glucose < 100 no coverage; 100-180 take 10 units; 181-230 take 15 units; 231-280 take 20 units; > 281 take 25 units.  10 pen 3    blood glucose test strips (TRUE METRIX BLOOD GLUCOSE TEST) strip USE TO TEST TWO TIMES A DAY AND AS NEEDED FOR SYMPTOMS OF IRREGULAR BLOOD GLUCOSE 200 strip 2    lisinopril (PRINIVIL;ZESTRIL) 40 MG tablet TAKE ONE TABLET BY MOUTH DAILY 90 tablet 0    tiZANidine (ZANAFLEX) 4 MG tablet TAKE ONE TABLET BY MOUTH EVERY 8 HOURS AS NEEDED FOR MUSCLE SPASM 30 tablet 1    atorvastatin (LIPITOR) 80 MG tablet TAKE ONE TABLET BY MOUTH DAILY 90 tablet 0    QUEtiapine (SEROQUEL) 25 MG tablet TAKE TWO TABLETS BY MOUTH ONCE NIGHTLY 180 tablet 0    tolterodine (DETROL LA) 4 MG extended release capsule TAKE ONE CAPSULE BY MOUTH DAILY 90 capsule 0    metoprolol succinate (TOPROL XL) 50 MG extended release tablet TAKE ONE TABLET BY MOUTH DAILY 90 tablet 0    nortriptyline (PAMELOR) 25 MG capsule TAKE ONE TO TWO CAPSULES BY MOUTH ONCE NIGHTLY 180 capsule 1    venlafaxine (EFFEXOR XR) 75 MG extended release capsule TAKE THREE CAPSULES BY MOUTH DAILY 270 capsule 0    amLODIPine (NORVASC) 5 MG tablet TAKE ONE TABLET BY MOUTH DAILY (new lower dose) 90 tablet 1    Insulin Pen Needle (B-D UF III MINI PEN NEEDLES) 31G X 5 MM MISC 1 each by Does not apply route 3 times daily 300 each 3    dicyclomine (BENTYL) 20 MG tablet Take 1 tablet by mouth every 6 hours as needed (cramping) 60 tablet 3    omeprazole (PRILOSEC) 40 MG delayed release capsule Take 40 mg by mouth daily       metroNIDAZOLE (METROGEL) 0.75 % gel Apply topically 2 times daily. (Patient taking differently: 2 times daily as needed ) 1 Tube 3    anastrozole (ARIMIDEX) 1 MG tablet TAKE ONE TABLET BY MOUTH DAILY 30 tablet 5    lubiprostone (AMITIZA) 8 MCG CAPS capsule Take 8 mcg by mouth 2 times daily (with meals).          ALLERGIES    Allergies   Allergen Reactions    Trazodone And Nefazodone Other (See Comments)     headache    Ambien [Zolpidem Tartrate] Other (See Comments)     Vivid dreams    Diamox [Acetazolamide] Rash    Lorazepam Other (See Comments)     confusion    Naproxen Rash    Reglan [Metoclopramide] Anxiety    Sulfa Antibiotics Rash       SOCIAL HISTORY    Social History     Socioeconomic History    Marital status:      Spouse name: Not on file    Number of children: Not on file    Years of education: Not on file    Highest education level: Not on file   Occupational History    Not on file   Social Needs    Financial resource strain: Not on file    Food insecurity     Worry: Not on file     Inability: Not on file    Transportation needs     Medical: Not on file     Non-medical: Not on file   Tobacco Use    Smoking status: Never Smoker    Smokeless tobacco: Never Used    Tobacco comment: congrats   Substance and Sexual Activity    Alcohol use: No    Drug use: Never    Sexual activity: Yes     Partners: Male   Lifestyle    Physical activity     Days per week: Not on file     Minutes per session: Not on file    Stress: Not on file   Relationships    Social connections     Talks on phone: Not on file     Gets together: Not on file     Attends [employer] service: Not on file     Active member of club or organization: Not on file     Attends meetings of clubs or organizations: Not on file     Relationship status: Not on file    Intimate partner violence     Fear of current or ex partner: Not on file     Emotionally abused: Not on file     Physically abused: Not on file     Forced sexual activity: Not on file   Other Topics Concern    Not on file   Social History Narrative    Self-breast exams: yes    Lives with spouse and son.     Exercise:  rarely    Seatbelt use: Always    Living will: no,   additional information provided       PHYSICAL EXAM    VITAL SIGNS: BP 99/64   Pulse 84   Temp 96.7 °F (35.9 °C) (Temporal)   Resp 16   Ht 5' 3\" (1.6 m)   Wt 240 lb 11.9 oz (109.2 kg)   SpO2 92%   BMI 42.65 kg/m²    Constitutional:  Well developed, well nourished, patient appears non-toxic  HENT:  Atraumatic, moist mucus membranes  Neck: No JVD, supple Respiratory:  Lungs clear to auscultation bilaterally, no respiratory distress  Cardiovascular:  regular rate,  no murmurs   GI:  Soft, no abdominal tenderness, no pulsatile masses  Musculoskeletal:  No edema, no acute deformities  Back: no soft tissue swelling, + right CVA/thoracolumbar paraspinous right-sided tenderness to palpation. Also has some right lumbar paraspinous tenderness. No midline bony spine tenderness  Vascular: DP pulses 2+ and equal bilaterally  Integument:  Skin is warm and dry   Neurologic: Awake, alert and oriented, motor 5/5 bilaterally, patellar reflexes are 2+ and equal bilaterally, sensation to light touch is intact in the groin and lower extremities bilaterally    RADIOLOGY/PROCEDURES    CT ABDOMEN PELVIS WO CONTRAST Additional Contrast? None   Final Result   No stones or hydronephrosis noted      Mild biliary ductal dilatation, similar to prior, presumably due to post   cholecystectomy state. Normal caliber appendix. There is diverticulosis. No diverticulitis. Mild skin thickening anterior abdominal wall. Correlate for signs of   cellulitis             ED COURSE & MEDICAL DECISION MAKING    Pertinent Labs & Imaging studies interpreted and reviewed. (See chart for details)  See chart for details of medications given during the ED stay. Vitals:    02/09/21 1127   BP: 99/64   Pulse: 84   Resp: 16   Temp: 96.7 °F (35.9 °C)   TempSrc: Temporal   SpO2: 92%   Weight: 240 lb 11.9 oz (109.2 kg)   Height: 5' 3\" (1.6 m)       Differential Diagnosis: Kidney Stone, Pyelonephritis, Renal Artery Aneurysm, Abdominal Aortic Aneurysm, Metastases to back, Mechanical Back Pain, Cauda Equina Syndrome    Patient is afebrile and nontoxic in appearance. Labs reveal no leukocytosis; stable anemia. Metabolic panel shows no severe electrolyte derangements, however she appears to have a ASHOK.   In comparison to blood work from October of last year her creatinine is now 3.4 with a BUN of 51 and GFR 14 when these were within normal limits in October. She was given IV fluids. Urinalysis does show a cloudy clarity, small amount of bilirubin, 100 protein small amount leukocytes. Nitrite negative. Microscopic urinalysis shows 2+ bacteria with 23 WBC. Was given a gram of IV Rocephin. CT findings as above. Given patient's significant ASHOK with a UTI do believe she needs to be admitted for further evaluation and treatment. I therefore consulted the hospitalist via perfect serve who agreed to admit patient and write orders for admission. FINAL IMPRESSION    1. ASHOK (acute kidney injury) ([facility] [address]    2.  Urinary tract infection without hematuria, site unspecified        PLAN  Admission       (Please note that this note was completed with a voice recognition program.  Every attempt was made to edit the dictations, but inevitably there remain words that are mis-transcribed.)          GLADIS Lopez - LARS  02/09/21 1139

## 2021-02-09 NOTE — CARE COORDINATION
DISCHARGE PLAN: Pt plans to return home with her adult son and spouse at d/c. Spouse to transport home.   ___________________________________  Met w/pt to address barriers to dc. HOME: Pt reported that she resides in a two bedroom rented home with her spouse and adult son. [address].    DME/O2: Pt reported that she has a standard walker at home. No other DME and no other DME noted at this time. ACTIVE SERVICES: Pt reported that she was independent with all self care PTA. Pt stated that she plans to return home upon d/c and denied then need for assistance. TRANSPORTATION: Pt no longer drives and stated that her spouse will transport her home upon d/c. PHARMACY: denies difficulty obtaining/taking meds. Pt has all medication filled at [facility] in [place]. PCP: Cheri Graham    DEMOGRAPHICS: verified address/phone number as correct    INSURANCE: Medicare     HD/PD: No    THERAPY RECS Not ordered-Pt is ambulatory. Discharge planning team will remain available for needs. Please consult for any specifics not addressed in this note.     Jose Stacy [place]  [phone]  Electronically signed by Mariusz Solorio on 2/9/2021 at 3:57 PM

## 2021-02-09 NOTE — PROGRESS NOTES
Call from PCA with critical glucose of 68. Given 8oz of apple juice. Rechecked 15 min, BG of 71. Patient ordered dinner, will continue to monitor.    Electronically signed by Rukhsana Diallo RN on 2/9/2021 at 5:03 PM

## 2021-02-09 NOTE — PROGRESS NOTES
Medication Reconciliation    List of medications patient is currently taking is complete. Source of information: 1. Conversation with patient at bedside                                      2. Epic records     Allergies  Trazodone and nefazodone, Ambien [zolpidem tartrate], Diamox [acetazolamide], Lorazepam, Naproxen, Reglan [metoclopramide], and Sulfa antibiotics     Notes regarding home medications:   1. Patient did not receive any of her home medications prior to arrival to the emergency department today. Patient recently completed a 7-day course of Macrobid.     Abel Hernandez, PharmD, BCPS  2/9/2021 2:15 PM

## 2021-02-09 NOTE — ED NOTES
Bed: B-10  Expected date:   Expected time:   Means of arrival:   Comments:  Geronimo Tracy RN  02/09/21 1122

## 2021-02-10 LAB
ALBUMIN SERPL-MCNC: 3.1 G/DL (ref 3.4–5)
ANION GAP SERPL CALCULATED.3IONS-SCNC: 12 MMOL/L (ref 3–16)
BASOPHILS ABSOLUTE: 0.1 K/UL (ref 0–0.2)
BASOPHILS RELATIVE PERCENT: 0.8 %
BUN BLDV-MCNC: 44 MG/DL (ref 7–20)
CALCIUM SERPL-MCNC: 8.6 MG/DL (ref 8.3–10.6)
CHLORIDE BLD-SCNC: 101 MMOL/L (ref 99–110)
CO2: 22 MMOL/L (ref 21–32)
CREAT SERPL-MCNC: 1.7 MG/DL (ref 0.6–1.2)
EOSINOPHILS ABSOLUTE: 0.2 K/UL (ref 0–0.6)
EOSINOPHILS RELATIVE PERCENT: 3.1 %
GFR AFRICAN AMERICAN: 37
GFR NON-AFRICAN AMERICAN: 30
GLUCOSE BLD-MCNC: 102 MG/DL (ref 70–99)
GLUCOSE BLD-MCNC: 142 MG/DL (ref 70–99)
GLUCOSE BLD-MCNC: 160 MG/DL (ref 70–99)
GLUCOSE BLD-MCNC: 167 MG/DL (ref 70–99)
GLUCOSE BLD-MCNC: 87 MG/DL (ref 70–99)
HCT VFR BLD CALC: 34.1 % (ref 36–48)
HEMOGLOBIN: 11.2 G/DL (ref 12–16)
LYMPHOCYTES ABSOLUTE: 2 K/UL (ref 1–5.1)
LYMPHOCYTES RELATIVE PERCENT: 31.6 %
MCH RBC QN AUTO: 28.6 PG (ref 26–34)
MCHC RBC AUTO-ENTMCNC: 32.7 G/DL (ref 31–36)
MCV RBC AUTO: 87.5 FL (ref 80–100)
MONOCYTES ABSOLUTE: 0.6 K/UL (ref 0–1.3)
MONOCYTES RELATIVE PERCENT: 9.6 %
NEUTROPHILS ABSOLUTE: 3.4 K/UL (ref 1.7–7.7)
NEUTROPHILS RELATIVE PERCENT: 54.9 %
PDW BLD-RTO: 14.6 % (ref 12.4–15.4)
PERFORMED ON: ABNORMAL
PHOSPHORUS: 3 MG/DL (ref 2.5–4.9)
PLATELET # BLD: 208 K/UL (ref 135–450)
PMV BLD AUTO: 9.5 FL (ref 5–10.5)
POTASSIUM SERPL-SCNC: 4.4 MMOL/L (ref 3.5–5.1)
RBC # BLD: 3.9 M/UL (ref 4–5.2)
SODIUM BLD-SCNC: 135 MMOL/L (ref 136–145)
WBC # BLD: 6.3 K/UL (ref 4–11)

## 2021-02-10 PROCEDURE — 96372 THER/PROPH/DIAG INJ SC/IM: CPT

## 2021-02-10 PROCEDURE — 6370000000 HC RX 637 (ALT 250 FOR IP): Performed by: FAMILY MEDICINE

## 2021-02-10 PROCEDURE — 96366 THER/PROPH/DIAG IV INF ADDON: CPT

## 2021-02-10 PROCEDURE — 36415 COLL VENOUS BLD VENIPUNCTURE: CPT

## 2021-02-10 PROCEDURE — 6360000002 HC RX W HCPCS: Performed by: FAMILY MEDICINE

## 2021-02-10 PROCEDURE — 80069 RENAL FUNCTION PANEL: CPT

## 2021-02-10 PROCEDURE — 51798 US URINE CAPACITY MEASURE: CPT

## 2021-02-10 PROCEDURE — G0378 HOSPITAL OBSERVATION PER HR: HCPCS

## 2021-02-10 PROCEDURE — 2580000003 HC RX 258: Performed by: FAMILY MEDICINE

## 2021-02-10 PROCEDURE — 1200000000 HC SEMI PRIVATE

## 2021-02-10 PROCEDURE — 6370000000 HC RX 637 (ALT 250 FOR IP): Performed by: HOSPITALIST

## 2021-02-10 PROCEDURE — 94760 N-INVAS EAR/PLS OXIMETRY 1: CPT

## 2021-02-10 PROCEDURE — 85025 COMPLETE CBC W/AUTO DIFF WBC: CPT

## 2021-02-10 RX ORDER — INSULIN GLARGINE 100 [IU]/ML
90 INJECTION, SOLUTION SUBCUTANEOUS NIGHTLY
Status: DISCONTINUED | OUTPATIENT
Start: 2021-02-10 | End: 2021-02-10

## 2021-02-10 RX ORDER — INSULIN GLARGINE 100 [IU]/ML
15 INJECTION, SOLUTION SUBCUTANEOUS NIGHTLY
Status: DISCONTINUED | OUTPATIENT
Start: 2021-02-10 | End: 2021-02-11 | Stop reason: HOSPADM

## 2021-02-10 RX ADMIN — HEPARIN SODIUM 5000 UNITS: 5000 INJECTION INTRAVENOUS; SUBCUTANEOUS at 05:05

## 2021-02-10 RX ADMIN — ANASTROZOLE 1 MG: 1 TABLET ORAL at 08:56

## 2021-02-10 RX ADMIN — INSULIN GLARGINE 15 UNITS: 100 INJECTION, SOLUTION SUBCUTANEOUS at 22:00

## 2021-02-10 RX ADMIN — SODIUM CHLORIDE, PRESERVATIVE FREE 10 ML: 5 INJECTION INTRAVENOUS at 08:57

## 2021-02-10 RX ADMIN — ATORVASTATIN CALCIUM 80 MG: 80 TABLET, FILM COATED ORAL at 08:56

## 2021-02-10 RX ADMIN — QUETIAPINE 50 MG: 25 TABLET ORAL at 23:37

## 2021-02-10 RX ADMIN — SODIUM CHLORIDE, PRESERVATIVE FREE 10 ML: 5 INJECTION INTRAVENOUS at 22:00

## 2021-02-10 RX ADMIN — PREDNISOLONE ACETATE 1 DROP: 10 SUSPENSION/ DROPS OPHTHALMIC at 16:28

## 2021-02-10 RX ADMIN — VENLAFAXINE HYDROCHLORIDE 75 MG: 75 CAPSULE, EXTENDED RELEASE ORAL at 08:56

## 2021-02-10 RX ADMIN — KETOROLAC TROMETHAMINE 1 DROP: 5 SOLUTION OPHTHALMIC at 23:38

## 2021-02-10 RX ADMIN — PREDNISOLONE ACETATE 1 DROP: 10 SUSPENSION/ DROPS OPHTHALMIC at 23:37

## 2021-02-10 RX ADMIN — KETOROLAC TROMETHAMINE 1 DROP: 5 SOLUTION OPHTHALMIC at 12:22

## 2021-02-10 RX ADMIN — KETOROLAC TROMETHAMINE 1 DROP: 5 SOLUTION OPHTHALMIC at 08:57

## 2021-02-10 RX ADMIN — OFLOXACIN 1 DROP: 3 SOLUTION OPHTHALMIC at 23:38

## 2021-02-10 RX ADMIN — ACETAMINOPHEN 650 MG: 325 TABLET ORAL at 16:27

## 2021-02-10 RX ADMIN — CEFTRIAXONE 1000 MG: 1 INJECTION, POWDER, FOR SOLUTION INTRAMUSCULAR; INTRAVENOUS at 12:58

## 2021-02-10 RX ADMIN — HEPARIN SODIUM 5000 UNITS: 5000 INJECTION INTRAVENOUS; SUBCUTANEOUS at 13:57

## 2021-02-10 RX ADMIN — OFLOXACIN 1 DROP: 3 SOLUTION OPHTHALMIC at 16:28

## 2021-02-10 RX ADMIN — OFLOXACIN 1 DROP: 3 SOLUTION OPHTHALMIC at 08:57

## 2021-02-10 RX ADMIN — PREDNISOLONE ACETATE 1 DROP: 10 SUSPENSION/ DROPS OPHTHALMIC at 12:22

## 2021-02-10 RX ADMIN — INSULIN LISPRO 2 UNITS: 100 INJECTION, SOLUTION INTRAVENOUS; SUBCUTANEOUS at 17:04

## 2021-02-10 RX ADMIN — HEPARIN SODIUM 5000 UNITS: 5000 INJECTION INTRAVENOUS; SUBCUTANEOUS at 22:00

## 2021-02-10 RX ADMIN — KETOROLAC TROMETHAMINE 1 DROP: 5 SOLUTION OPHTHALMIC at 16:28

## 2021-02-10 RX ADMIN — GABAPENTIN 400 MG: 400 CAPSULE ORAL at 22:00

## 2021-02-10 RX ADMIN — OFLOXACIN 1 DROP: 3 SOLUTION OPHTHALMIC at 12:22

## 2021-02-10 RX ADMIN — INSULIN LISPRO 1 UNITS: 100 INJECTION, SOLUTION INTRAVENOUS; SUBCUTANEOUS at 22:00

## 2021-02-10 RX ADMIN — PANTOPRAZOLE SODIUM 40 MG: 40 TABLET, DELAYED RELEASE ORAL at 05:05

## 2021-02-10 RX ADMIN — PREDNISOLONE ACETATE 1 DROP: 10 SUSPENSION/ DROPS OPHTHALMIC at 08:57

## 2021-02-10 RX ADMIN — TROSPIUM CHLORIDE 20 MG: 20 TABLET, FILM COATED ORAL at 23:37

## 2021-02-10 RX ADMIN — INSULIN LISPRO 2 UNITS: 100 INJECTION, SOLUTION INTRAVENOUS; SUBCUTANEOUS at 11:45

## 2021-02-10 ASSESSMENT — PAIN DESCRIPTION - ORIENTATION: ORIENTATION: MID

## 2021-02-10 ASSESSMENT — PAIN SCALES - GENERAL: PAINLEVEL_OUTOF10: 4

## 2021-02-10 ASSESSMENT — PAIN - FUNCTIONAL ASSESSMENT: PAIN_FUNCTIONAL_ASSESSMENT: ACTIVITIES ARE NOT PREVENTED

## 2021-02-10 ASSESSMENT — PAIN DESCRIPTION - PAIN TYPE: TYPE: ACUTE PAIN

## 2021-02-10 ASSESSMENT — PAIN DESCRIPTION - LOCATION: LOCATION: HEAD

## 2021-02-10 ASSESSMENT — PAIN DESCRIPTION - PROGRESSION: CLINICAL_PROGRESSION: NOT CHANGED

## 2021-02-10 NOTE — H&P
Hospital Medicine History & Physical      PCP: Ruthie Harris MD    Date of Admission: 2021    Date of Service: Pt seen/examined, with 1st encounter, on 21 and Admitted to Inpatient     Chief Complaint:  Rt flank pain, back pain      History Of Present Illness: The patient is a 57 y.o. female who presents to [facility] with right flank and back pain. She was being treated for a uti but has only had one dose of cipro. In the ed she was found to have a uti and zaki with crt 3.4. ct abdomen/pelvis without stones or other acute pathology. Vitals were stable. She had leukocytosis.      Past Medical History:        Diagnosis Date    Allergic rhinitis 5/15/2014    Anxiety and depression     Arthritis     Breast cancer ([facility] [address] 2010    left breast    Cataract     bilateral    Chronic back pain     Diabetes mellitus ([facility] [address]     DM (diabetes mellitus), type 2, uncontrolled with complications ([facility] [address] 1964    ESBL (extended spectrum beta-lactamase) producing bacteria infection 10/06/2018    urine    GERD (gastroesophageal reflux disease)     History of migraine headaches     HTN (hypertension) 5/15/2014    Hyperlipidemia     Irritable bowel syndrome (IBS)     Obesity     Wears glasses     reading       Past Surgical History:        Procedure Laterality Date    ACHILLES TENDON SURGERY Right     BACK SURGERY  2013    lumbar    BREAST LUMPECTOMY Left     CARPAL TUNNEL RELEASE Bilateral      SECTION      x 1    CHOLECYSTECTOMY      COLONOSCOPY  10/16/2017    polypectomy- Dr Geronimo George 5/15/2019    ESOPHAGEAL Prosper Lissa performed by Beatriz Ceron MD at [address] [place], [place] [address] Left 2020    LEFT INDEX FINGER TRIGGER FINGER RELEASE performed by Shasha Wilkins MD at [address] [place] [address] Rd CATARACT EXTRACTION Right 2/5/2021    PHACOEMULSIFICATION WITH INTRAOCULAR LENS IMPLANT - RIGHT EYE performed by Ashley Quarles MD at [address] ENDOSCOPY N/A 4/23/2019    EGD BIOPSY performed by Jenise Cormier MD at [address] [facility] N/A 5/15/2019    EGD BIOPSY performed by Jenise Cormier MD at [place] [address]       Medications Prior to Admission:    Prior to Admission medications    Medication Sig Start Date End Date Taking? Authorizing Provider   ofloxacin (OCUFLOX) 0.3 % solution Place 1 drop into the right eye 4 times daily 2/5/21 2/12/21 Yes Historical Provider, MD   ketorolac (ACULAR) 0.5 % ophthalmic solution Place 1 drop into the right eye 4 times daily 2/5/21 2/19/21 Yes Historical Provider, MD   prednisoLONE acetate (PRED FORTE) 1 % ophthalmic suspension Place 1 drop into the right eye 4 times daily 2/5/21 2/19/21 Yes Historical Provider, MD   Continuous Blood Gluc Sensor ([address] [place] [address]) MISC as needed 1/28/21  Yes Jules Carreno MD   gabapentin (NEURONTIN) 600 MG tablet Take 1-2 tablets by mouth nightly. 1/28/21 1/28/22 Yes Jules Carreno MD   insulin glargine [facility]) 100 UNIT/ML injection pen Inject 100 Units into the skin nightly 1/28/21  Yes Jules Carreno MD   insulin aspart (NOVOLOG FLEXPEN) 100 UNIT/ML injection pen Before meal: Glucose < 100 no coverage; 100-180 take 10 units; 181-230 take 15 units; 231-280 take 20 units; > 281 take 25 units.  1/28/21  Yes Jules Carreno MD   blood glucose test strips (TRUE METRIX BLOOD GLUCOSE TEST) strip USE TO TEST TWO TIMES A DAY AND AS NEEDED FOR SYMPTOMS OF IRREGULAR BLOOD GLUCOSE 1/14/21  Yes Jules Carreno MD   lisinopril (PRINIVIL;ZESTRIL) 40 MG tablet TAKE ONE TABLET BY MOUTH DAILY 1/5/21  Yes Jules Carreno MD   tiZANidine (ZANAFLEX) 4 MG tablet TAKE ONE TABLET BY MOUTH EVERY 8 HOURS AS NEEDED FOR MUSCLE SPASM 1/5/21  Yes Jules Carreno MD atorvastatin (LIPITOR) 80 MG tablet TAKE ONE TABLET BY MOUTH DAILY 1/4/21  Yes Nazario Manzo MD   QUEtiapine (SEROQUEL) 25 MG tablet TAKE TWO TABLETS BY MOUTH ONCE NIGHTLY  Patient taking differently: Take 50 mg by mouth nightly TAKE TWO TABLETS BY MOUTH ONCE NIGHTLY 1/4/21  Yes Nazario Manzo MD   tolterodine (DETROL LA) 4 MG extended release capsule TAKE ONE CAPSULE BY MOUTH DAILY 12/9/20  Yes Liza Harper MD   metoprolol succinate (TOPROL XL) 50 MG extended release tablet TAKE ONE TABLET BY MOUTH DAILY 12/9/20  Yes Nazario Manzo MD   nortriptyline (PAMELOR) 25 MG capsule TAKE ONE TO TWO CAPSULES BY MOUTH ONCE NIGHTLY 12/7/20  Yes Nazario Manzo MD   venlafaxine (EFFEXOR XR) 75 MG extended release capsule TAKE THREE CAPSULES BY MOUTH DAILY 12/1/20  Yes Nazario Manzo MD   amLODIPine (NORVASC) 5 MG tablet TAKE ONE TABLET BY MOUTH DAILY (new lower dose) 11/3/20  Yes GLADIS Lyn - CNP   Insulin Pen Needle (B-D UF III MINI PEN NEEDLES) 31G X 5 MM MISC 1 each by Does not apply route 3 times daily 9/28/20  Yes Nazario Manzo MD   dicyclomine (BENTYL) 20 MG tablet Take 1 tablet by mouth every 6 hours as needed (cramping) 7/5/19  Yes Nazario Manzo MD   omeprazole (PRILOSEC) 40 MG delayed release capsule Take 40 mg by mouth daily  9/28/18  Yes Historical Provider, MD   metroNIDAZOLE (METROGEL) 0.75 % gel Apply topically 2 times daily. Patient taking differently: 2 times daily as needed  5/15/17  Yes Nazario Manzo MD   anastrozole (ARIMIDEX) 1 MG tablet TAKE ONE TABLET BY MOUTH DAILY 4/24/17  Yes Ansley Hodge MD   lubiprostone (AMITIZA) 8 MCG CAPS capsule Take 8 mcg by mouth 2 times daily (with meals). Yes Historical Provider, MD       Allergies:  Trazodone and nefazodone, Ambien [zolpidem tartrate], Diamox [acetazolamide], Lorazepam, Naproxen, Reglan [metoclopramide], and Sulfa antibiotics    Social History:      TOBACCO:   reports that she has never smoked.  She has never used smokeless tobacco.  ETOH:   reports no history of alcohol use. Family History:          Problem Relation Age of Onset    Cancer Mother         forehead soft tissue    Diabetes Mother     Arthritis Father     High Blood Pressure Father     High Cholesterol Father     Alzheimer's Disease Father     Diabetes Brother     Depression Sister        REVIEW OF SYSTEMS:   Positive for back pain and as noted in the HPI. All other systems reviewed and negative. PHYSICAL EXAM:    /75   Pulse 83   Temp 98.3 °F (36.8 °C) (Oral)   Resp 16   Ht 5' 3\" (1.6 m)   Wt 240 lb 11.9 oz (109.2 kg)   SpO2 93%   BMI 42.65 kg/m²     General appearance: No apparent distress, cooperative. HEENT Normal cephalic, atraumatic without obvious deformity. PERRL. EOM. Conjunctivae/corneas clear. Neck: Supple, No jugular venous distention/bruits. Trachea midline   Lungs: Clear to auscultation, bilaterally without Rales/Wheezes/Rhonchi without accessory muscle use. Heart: Regular rate and rhythm with Normal S1/S2 without murmurs, rubs or gallops  Abdomen: Soft, non-tender or non-distended without rigidity or guarding and positive bowel sounds all four quadrants. Rt cva tenderness  Extremities: No clubbing, cyanosis, or edema bilaterally. Skin: Skin color, texture, turgor normal.  No rashes or lesions. Neurologic: Alert and oriented X 3, neurovascularly intact with sensory/motor intact upper extremities/lower extremities, bilaterally. Grossly non-focal.  Mental status: Alert, oriented, thought content appropriate. Peripheral Pulses: +3 Easily felt, not easily obliterated with pressure  Cap refill  +2 sec      CBC   Recent Labs     02/09/21  1218   WBC 10.5   HGB 11.2*   HCT 34.4*         RENAL  Recent Labs     02/09/21  1218   *   K 5.1      CO2 20*   BUN 51*   CREATININE 3.4*     LFT'S  No results for input(s): AST, ALT, ALB, BILIDIR, BILITOT, ALKPHOS in the last 72 hours.   COAG  No results for input(s): INR in the last 72 hours. CARDIAC ENZYMES  No results for input(s): CKTOTAL, CKMB, CKMBINDEX, TROPONINI in the last 72 hours. U/A:    Lab Results   Component Value Date    NITRITE POSITIVE 01/28/2021    COLORU DK YELLOW 02/09/2021    WBCUA 23 02/09/2021    RBCUA 0-2 02/09/2021    MUCUS 1+ 07/12/2018    BACTERIA 2+ 02/09/2021    CLARITYU CLOUDY 02/09/2021    SPECGRAV 1.020 02/09/2021    LEUKOCYTESUR SMALL 02/09/2021    BLOODU Negative 02/09/2021    GLUCOSEU Negative 02/09/2021    GLUCOSEU NEGATIVE 07/19/2010       ABG  No results found for: KNO8UED, BEART, O6WDIVPW, PHART, THGBART, FLE8EME, PO2ART, AQO4HPV        Active Hospital Problems    Diagnosis Date Noted    ASHOK (acute kidney injury) ([facility] [address] [N17.9] 02/09/2021         PHYSICIANS CERTIFICATION:    I certify that Lolita Lynch is expected to be hospitalized for more than 2 midnights based on the following assessment and plan:      ASSESSMENT/PLAN:  \ASHOK  - crt baseline 1, now 3.4  - IVF  - Avoid nephrotoxins  - check: UACS, Mena/UCrt, uric acid, TSH, U osmol  - follow renal function tests; if no improvement will get renal US   - non acute ct abd/pelvis  - consult nephrology    UTI  - UCx sent  - rocephin started in the ED, will continue    Diabetes  - stable  - hold home PO meds  - cont home insulin  - SSI, accuchecks      DVT Prophylaxis: sqh  Diet: DIET RENAL; Carb Control: 5 carb choices (75 gms)/meal; Low Potassium  Code Status: Full Code    Dispo - in pt       Charbel Lima DO    Thank you Cynthia Scherer MD for the opportunity to be involved in this patient's care. If you have any questions or concerns please feel free to contact me at [phone].

## 2021-02-10 NOTE — CARE COORDINATION
[facility]  Diabetes Education   Progress Note       NAME:  Tami Beltran  MEDICAL RECORD NUMBER:  [id number]  AGE: 57 y.o. GENDER: female  : 1960  TODAY'S DATE:  2/10/2021    Subjective   Reason for Diabetic Education Evaluation and Assessment: hypoglycemia, uncontrolled diabetes    Mateusz Briceno agrees to meet with me for diabetes education. Mateusz Briceno describes diabetes as \"hard\". She denies any missed insulin doses. She also denies any low blood sugars at home. She reports most of her home results are near 200. Mateusz Briceno has a very limited site rotation plan for her insulin with Lipohypertrophy present.       Visit Type: evaluation      Tami Beltran is a 57 y.o. female referred by:     [x] Physician  [] Nursing  [] Chart Review   [] Other:     PAST MEDICAL HISTORY        Diagnosis Date    Allergic rhinitis 5/15/2014    Anxiety and depression     Arthritis     Breast cancer ([facility] 2010    left breast    Cataract     bilateral    Chronic back pain     Diabetes mellitus ([facility]     DM (diabetes mellitus), type 2, uncontrolled with complications ([address] 3/2/7172    ESBL (extended spectrum beta-lactamase) producing bacteria infection 10/06/2018    urine    GERD (gastroesophageal reflux disease)     History of migraine headaches     HTN (hypertension) 5/15/2014    Hyperlipidemia     Irritable bowel syndrome (IBS)     Obesity     Wears glasses     reading       PAST SURGICAL HISTORY    Past Surgical History:   Procedure Laterality Date    ACHILLES TENDON SURGERY Right     BACK SURGERY  2013    lumbar    BREAST LUMPECTOMY Left 2010    CARPAL TUNNEL RELEASE Bilateral      SECTION      x 1    CHOLECYSTECTOMY      COLONOSCOPY  10/16/2017    polypectomy- Dr Thornell Goldmann 5/15/2019    ESOPHAGEAL Inez Zenobia performed by Tomi Mack MD at [address] Left 2020    LEFT INDEX FINGER TRIGGER FINGER RELEASE performed by Loring Gaucher, MD at [address] Right 2/5/2021    PHACOEMULSIFICATION WITH INTRAOCULAR LENS IMPLANT - RIGHT EYE performed by Tamara Runner, MD at [address] ENDOSCOPY N/A 4/23/2019    EGD BIOPSY performed by Klarissa Cross MD at [facility] N/A 5/15/2019    EGD BIOPSY performed by Klarissa Cross MD at [address] [facility]    Family History   Problem Relation Age of Onset    Cancer Mother         forehead soft tissue    Diabetes Mother     Arthritis Father     High Blood Pressure Father     High Cholesterol Father     Alzheimer's Disease Father     Diabetes Brother     Depression Sister        SOCIAL HISTORY    Social History     Tobacco Use    Smoking status: Never Smoker    Smokeless tobacco: Never Used    Tobacco comment: congrats   Substance Use Topics    Alcohol use: No    Drug use: Never       ALLERGIES    Allergies   Allergen Reactions    Trazodone And Nefazodone Other (See Comments)     headache    Ambien [Zolpidem Tartrate] Other (See Comments)     Vivid dreams    Diamox [Acetazolamide] Rash    Lorazepam Other (See Comments)     confusion    Naproxen Rash    Reglan [Metoclopramide] Anxiety    Sulfa Antibiotics Rash       MEDICATIONS     insulin glargine  90 Units Subcutaneous Nightly    lubiprostone  8 mcg Oral BID WC    anastrozole  1 mg Oral Daily    pantoprazole  40 mg Oral QAM AC    trospium  20 mg Oral Nightly    atorvastatin  80 mg Oral Daily    gabapentin  400 mg Oral Nightly    QUEtiapine  50 mg Oral Nightly    venlafaxine  75 mg Oral Daily with breakfast    cefTRIAXone (ROCEPHIN) IV  1,000 mg Intravenous Q24H    sodium chloride flush  10 mL Intravenous 2 times per day    heparin (porcine)  5,000 Units Subcutaneous 3 times per day    prednisoLONE acetate  1 drop Right Eye 4x Daily    ofloxacin  1 drop Right Eye 4x Daily    ketorolac  1 drop Right Eye 4x Daily    insulin lispro  0-12 Units Subcutaneous TID WC    insulin lispro  0-6 Units Subcutaneous Nightly       Objective        Patient Active Problem List   Diagnosis Code    DM (diabetes mellitus), type 2, uncontrolled with complications ([facility] F97.1, E11.65    Diabetic gastroparesis (HCC) E11.43, K31.84    Allergic rhinitis J30.9    Insomnia G47.00    Essential hypertension I10    Chronic knee pain M25.569, G89.29    Headaches due to old head injury G44.309, S09.90XS    Nephropathy, diabetic (HCC) E11.21    Migraine G43.909    Hip pain, bilateral M25.551, M25.552    Depression, major F32.9    Right knee DJD M17.11    Calcific Achilles tendonitis, right M65.28    Right sided sciatica M54.31    Lumbar spinal stenosis M48.061    TMJ (temporomandibular joint disorder) M26.609    Urge incontinence of urine N39.41    Personal history of breast cancer Z85.3    Hyperlipidemia LDL goal <487 X49.1    Metallic taste P54.7    Polyarthralgia M25.50    Severe obesity (BMI 35.0-39. 9) with comorbidity ([facility] E66.01    Neck pain M54.2    Chronic low back pain M54.5, G89.29    Intractable low back pain M54.5    Plantar fasciitis of right foot M72.2    Anxiety F41.9    History of colon polyps Z86.010    Irritable bowel syndrome with diarrhea K58.0    ASHOK (acute kidney injury) ([facility] N17.9        /77   Pulse 97   Temp 98.5 °F (36.9 °C) (Oral)   Resp 18   Ht 5' 3\" (1.6 m)   Wt 240 lb (108.9 kg)   SpO2 93%   BMI 42.51 kg/m²     HgBA1c:    Lab Results   Component Value Date    LABA1C 9.5 01/28/2021       Recent Labs     02/09/21  1643 02/09/21  2223 02/10/21  0743 02/10/21  1134   POCGLU 71 113* 102* 167*       BUN/Creatinine:    Lab Results   Component Value Date    BUN 44 02/10/2021    CREATININE 1.7 02/10/2021       Assessment        Diabetes Management and Education    Does the patient have a Primary Care Physician?  Yes, Kat Dean, MD       Does the patient require new medication instruction? Yes. Lipohypertrophy present. Person responsible for administration of Insulin/Medication:        [x] Self     [] Caregiver       [] Spouse       [] Other Family Member   []  Other    Insulin Instruction:  insulin pen  Injection Site:   [x] location    [x] rotation   Recommend avoiding preferred injection sites and identified new locations to try. Space injections at least 1 inch. Level of patient/caregiver understanding able to:        [x] Verbalized Understanding   [] Demonstrated Understanding       [] Teach Back       [] Needs Reinforcement     []  Other:        Does the patient/caregiver monitor Blood Glucoses? Yes  Recommend testing BG before meals and bedtime. Reviewed glycemic control targets, testing frequency and when to call PCP. Level of patient/caregiver understanding able to:        [] Verbalized Understanding   [] Demonstrated Understanding       [] Teach Back       [] Needs Reinforcement     []  Other:        Does the patient/caregiver follow a Meal Plan? No: Recently switched to diet soda. Recommend making water, unsweetened tea or coffee primary drinks. Reviewed importance of eating three meals per day and plate method for consistent carb intake. Level of patient/caregiver understanding able to:       [] Verbalized Understanding   [] Demonstrated Understanding       [] Teach Back       [x] Needs Reinforcement     []  Other:      Does the patient/caregiver understand S/S of Hypoglycemia? No: Denies any symptoms yesterday. Reviewed symptoms, prevention and treatment. Level of patient/caregiver understanding able to:       [x] Verbalized Understanding   [] Demonstrated Understanding       [] Teach Back       [] Needs Reinforcement     [x]  Other:  Agrees to notify staff if she experiences symptoms. Does the patient/caregiver understand S/S of Hyperglycemia?   No:     Reviewed symptoms, prevention and treatment. Level of patient/caregiver understanding able to:        [] Verbalized Understanding   [] Demonstrated Understanding       [] Teach Back       [x] Needs Reinforcement     []  Other:           Plan        Ongoing diabetes education and blood glucose monitoring. Lipohypertrophy present with very limited site rotation of her insulin at home. Recommend reducing Lantus dose - renal weight based dose would be 16 units and add prandial 5 - 6 units if BG >180.   Notified Mary Valenzuela MD      The following educational and support materials were provided:  · My contact information     · The Diabetes Education Program:  Planning Healthy Meals   · Academy of Nutrition and Dietetics handout - Carbohydrate Counting for People with Diabetes  · Blood Glucose Log Book                                           Discharge Plan:  Discharge needs: no prescription needs identified        Teaching Time Diabetes Education:  30 minutes     Electronically signed by Kayce Guy on 2/10/2021 at 11:44 AM

## 2021-02-10 NOTE — PROGRESS NOTES
Office: [phone]       Fax: [phone]      Nephrology Progress Note        Patient's Name: Pebbles Benitez  Admit Date: 2/9/2021  Date of Visit: 2/10/2021    Reason for Consult:  ASHOK      Subjective:      Pebbles Benitez is a 57 y.o. female with PMHx of hypertension, diabetes mellitus, obesity, breast cancer who was hospitalized on 2/9/2021 with complaints of right flank pain  Creat noted to be 3.4  Per pt, gets frequent UTIs. (3 last year)  No hematuria, no history of renal stone  Denies previous history of renal dis  Dm for 11-12 yrs. No retinopathy, per pt. NSAID use: Advil 4-6 tab/d for about 6 weeks  IV contrast: None recent   Home meds reviewed and noted to be on lisinopril    INTERVAL HISTORY    Feels better  Shortness of breath: No   UOP: Fair  Creat: trending down    Medications:    Allergies:  Trazodone and nefazodone, Ambien [zolpidem tartrate], Diamox [acetazolamide], Lorazepam, Naproxen, Reglan [metoclopramide], and Sulfa antibiotics    Scheduled Meds:   insulin glargine  90 Units Subcutaneous Nightly    lubiprostone  8 mcg Oral BID WC    anastrozole  1 mg Oral Daily    pantoprazole  40 mg Oral QAM AC    trospium  20 mg Oral Nightly    atorvastatin  80 mg Oral Daily    gabapentin  400 mg Oral Nightly    QUEtiapine  50 mg Oral Nightly    venlafaxine  75 mg Oral Daily with breakfast    cefTRIAXone (ROCEPHIN) IV  1,000 mg Intravenous Q24H    sodium chloride flush  10 mL Intravenous 2 times per day    heparin (porcine)  5,000 Units Subcutaneous 3 times per day    prednisoLONE acetate  1 drop Right Eye 4x Daily    ofloxacin  1 drop Right Eye 4x Daily    ketorolac  1 drop Right Eye 4x Daily    insulin lispro  0-12 Units Subcutaneous TID WC    insulin lispro  0-6 Units Subcutaneous Nightly     Continuous Infusions:   dextrose Labs:  CBC:   Recent Labs     02/09/21  1218 02/10/21  0638   WBC 10.5 6.3   HGB 11.2* 11.2*    208     Ca/Mg/Phos:   Recent Labs     02/09/21  1218 02/10/21  0638   CALCIUM 8.9 8.6   PHOS  --  3.0     UA:  Recent Labs     02/09/21  1143   COLORU DK YELLOW   CLARITYU CLOUDY*   GLUCOSEU Negative   BILIRUBINUR SMALL*   KETUA Negative   SPECGRAV 1.020   BLOODU Negative   PHUR 5.0   PROTEINU 100*   UROBILINOGEN 1.0   NITRU Negative   LEUKOCYTESUR SMALL*   LABMICR YES   URINETYPE NotGiven      Urine Microscopic:   Recent Labs     02/09/21  1143   BACTERIA 2+*   COMU see below   HYALCAST 10*   WBCUA 23*   RBCUA 0-2   EPIU 1         Objective:     Vitals: /77   Pulse 97   Temp 98.5 °F (36.9 °C) (Oral)   Resp 18   Ht 5' 3\" (1.6 m)   Wt 240 lb (108.9 kg)   SpO2 93%   BMI 42.51 kg/m²    Wt Readings from Last 3 Encounters:   02/10/21 240 lb (108.9 kg)   02/05/21 230 lb (104.3 kg)   01/28/21 230 lb 3.2 oz (104.4 kg)      24HR INTAKE/OUTPUT:      Intake/Output Summary (Last 24 hours) at 2/10/2021 3433  Last data filed at 2/9/2021 2330  Gross per 24 hour   Intake 265 ml   Output 800 ml   Net -535 ml     Constitutional:  awake, NAD, obese  HEENT:  MMM, No icterus  Neck: no bruits, No JVD  Cardiovascular:  reg rhythm  Respiratory: CTA, no crackles  Abdomen:  +BS, soft, NT, ND  Ext: no lower extremity edema  CNS: alert, no agitation    IMAGING:  CT ABDOMEN PELVIS WO CONTRAST Additional Contrast? None   Final Result   No stones or hydronephrosis noted      Mild biliary ductal dilatation, similar to prior, presumably due to post   cholecystectomy state. Normal caliber appendix. There is diverticulosis. No diverticulitis. Mild skin thickening anterior abdominal wall. Correlate for signs of   cellulitis             Assessment :     1. ASHOK  -Non-Oliguric  -Baseline creat: ?<1. Now 3.4->1.7  -UA conc, prot +++, pyuria, bld negative   -abdominal CT scan (2/9/2021):  No stones or hydronephrosis noted  -Volume: Euvolemic  -Electrolytes: No Dyskalemia   -Acid-Base: NAGMA and AGMA   -ASHOK in the setting of NSAID use. history of recurrent UTIs. No urinary retention    Recent Labs     02/09/21  1218 02/10/21  0638   BUN 51* 44*   CREATININE 3.4* 1.7*     Recent Labs     02/09/21  1218 02/10/21  0638   * 135*   K 5.1 4.4   CO2 20* 22       2. HTN  -Blood pressure at goal     BP Readings from Last 1 Encounters:   02/10/21 139/77       3. DM  -not on metformin    4. UTI      Plan:     - IVF for hemodynamic support only  - no NSAID (educated)  - Hold Lisinopril  - Antimicrobials per primary team   - follow up on cultures  - Monitor BMP    -Monitor I/O, UOP  -Maintain MAP>65  -Avoid nephrotoxin, if able. -Dose meds to current eGFR    Thank you for allowing us to participate in care of Fidel Liao . We will continue to follow. Feel free to contact me with any questions.       Chloe Wall  2/10/2021    [facility] [address]  Office : [phone]  Fax :[phone]

## 2021-02-10 NOTE — PROGRESS NOTES
Hospitalist Progress Note    Patient:  L[facility] House  Unit/Bed:N5U-5426/4265-01   YOB: 1960       MRN: [id number] Acct: [de-identified]  PCP: Nathaly Villanueva MD    Date of Admission: 2/9/2021  --------------------------    Chief Complaint:     Right flank pain/back pain    Hospital Course:     John Saez is a 57 y.o. female hospitalized on 2/9/2021    For right flank pain and back pain, found to have acute kidney injury, urinary tract infection. Broad-spectrum antibiotics initiated. Seen by nephrology service in consultation. Assessment:       1. Urinary tract infection  2. Acute kidney injury, baseline creatinine 1. Admitted creatinine 3.4. CT scan of the abdomen showed no hydronephrosis. 3.      comorbidities:    · Diabetes mellitus type 2 with obesity Body mass index is 42.51 kg/m². · History of breast cancer, on anastrozole  ·  obesity Body mass index is 42.51 kg/m². · Bipolar disorder, continue Seroquel and Effexor  · Constipation, continue Amitiza      Plan:  1. Continue IV ceftriaxone, follow final culture data  2. Creatinine improving, continue close monitoring, avoidance of NSAIDs discussed. No obstruction noted on CT scan of the abdomen. 3. Patient does seems to have solid information about her insulin regimen at home. Diabetic educator consulted, Lantus to 15, monitor blood sugar. Code Status: Full Code         DVT prophylaxis: Heparin     Disposition: Hopefully discharge home in 1 to 2 days, pending urine culture, pending improvement of kidney function. I discussed my thought processes at length with patient/family and patient understood. Question and concerns  Addressed         ----------------      Subjective:     Patient seen and examined  Overnight events noted  RN and ancillary staff note reviewed    Feels better today, no suprapubic pain, less dysuria, no fever. Report chronic back pain.       Diet: DIET RENAL; Carb Control: 5 carb choices (75 gms)/meal; Low Potassium    OBJECTIVE     Exam:  BP (!) 152/78   Pulse 111   Temp 98.9 °F (37.2 °C) (Oral)   Resp 18   Ht 5' 3\" (1.6 m)   Wt 240 lb (108.9 kg)   SpO2 92%   BMI 42.51 kg/m²           Gen: Not in distress. Alert. Head: Normocephalic. Atraumatic. Eyes: Conjunctivae/corneas clear. ENT: Oral mucosa moist  Neck: No JVD. No obvious thyromegaly. CVS: Nml S1S2, no murmur , RRR  Pulmomary: Clear bilaterally. No crackles. No wheezes. Gastrointestinal: Soft, non tender, non distend, . Musculoskeletal: No edema. Warm  Neuro: No focal deficit. Moves extremity spontaneously. Psychiatry: Appropriate affect. Not agitated.         Medications:  Reviewed    Infusion Medications    dextrose       Scheduled Medications    lubiprostone  8 mcg Oral BID WC    anastrozole  1 mg Oral Daily    pantoprazole  40 mg Oral QAM AC    trospium  20 mg Oral Nightly    atorvastatin  80 mg Oral Daily    gabapentin  400 mg Oral Nightly    insulin glargine  100 Units Subcutaneous Nightly    QUEtiapine  50 mg Oral Nightly    venlafaxine  75 mg Oral Daily with breakfast    cefTRIAXone (ROCEPHIN) IV  1,000 mg Intravenous Q24H    sodium chloride flush  10 mL Intravenous 2 times per day    heparin (porcine)  5,000 Units Subcutaneous 3 times per day    prednisoLONE acetate  1 drop Right Eye 4x Daily    ofloxacin  1 drop Right Eye 4x Daily    ketorolac  1 drop Right Eye 4x Daily    insulin lispro  0-12 Units Subcutaneous TID WC    insulin lispro  0-6 Units Subcutaneous Nightly     PRN Meds: dicyclomine, sodium chloride flush, promethazine **OR** ondansetron, acetaminophen **OR** acetaminophen, glucose, dextrose, glucagon (rDNA), dextrose      Intake/Output Summary (Last 24 hours) at 2/10/2021 0833  Last data filed at 2/9/2021 2330  Gross per 24 hour   Intake 265 ml   Output 800 ml   Net -535 ml             Labs:   Recent Labs     02/09/21  1218 02/10/21  0638   WBC 10.5 6.3   HGB 11.2* 11.2*   HCT 34.4* 34.1*    208 Recent Labs     02/09/21  1218 02/10/21  0638   * 135*   K 5.1 4.4    101   CO2 20* 22   BUN 51* 44*   CREATININE 3.4* 1.7*   CALCIUM 8.9 8.6   PHOS  --  3.0     No results for input(s): AST, ALT, BILIDIR, BILITOT, ALKPHOS in the last 72 hours. No results for input(s): INR in the last 72 hours. No results for input(s): Coates Kalpesh in the last 72 hours. Urinalysis:      Lab Results   Component Value Date    NITRU Negative 02/09/2021    WBCUA 23 02/09/2021    BACTERIA 2+ 02/09/2021    RBCUA 0-2 02/09/2021    BLOODU Negative 02/09/2021    SPECGRAV 1.020 02/09/2021    GLUCOSEU Negative 02/09/2021    GLUCOSEU NEGATIVE 07/19/2010       Radiology:  CT ABDOMEN PELVIS WO CONTRAST Additional Contrast? None   Final Result   No stones or hydronephrosis noted      Mild biliary ductal dilatation, similar to prior, presumably due to post   cholecystectomy state. Normal caliber appendix. There is diverticulosis. No diverticulitis. Mild skin thickening anterior abdominal wall.   Correlate for signs of   cellulitis                     Electronically signed by Chelita Andersen MD on 2/10/2021 at 8:33 AM

## 2021-02-10 NOTE — PLAN OF CARE
Problem: Infection:  Goal: Will remain free from infection  Description: Will remain free from infection  2/10/2021 0951 by Remington Kelly RN  Outcome: Ongoing     Problem: Safety:  Goal: Free from accidental physical injury  Description: Free from accidental physical injury  2/10/2021 0951 by Remington Kelly RN  Outcome: Ongoing     Problem: Safety:  Goal: Free from intentional harm  Description: Free from intentional harm  2/10/2021 0951 by Rmeington Kelly RN  Outcome: Ongoing     Problem: Safety:  Goal: Free from intentional harm  Description: Free from intentional harm  2/10/2021 0951 by Remington Kelly RN  Outcome: Ongoing     Problem: Daily Care:  Goal: Daily care needs are met  Description: Daily care needs are met  2/10/2021 0951 by Remington Kelly RN  Outcome: Ongoing     Problem: Pain:  Goal: Patient's pain/discomfort is manageable  Description: Patient's pain/discomfort is manageable  2/10/2021 0951 by Remington Kelly RN  Outcome: Ongoing  Note:   Will continue to use the pain scale (0-10) to measure pt's pain and monitor their needs. Will assess patients pain with assessments and interactions. Pt will notify RN of any changes in pain and where. Will continue to perform therapeutic comfort measures and give pain medications as prescribed/needed. Problem: Skin Integrity:  Goal: Skin integrity will stabilize  Description: Skin integrity will stabilize  2/10/2021 0951 by Remington Kelly RN  Outcome: Ongoing  Note:   Pt will continue daily activities as tolerated and alert RN to any pain and skin changes. RN will continue to assess skin condition, note changes, and take preventative measures against skin breakdown such as movement, foam/silicone dressings on bony prominences, and making sure pt has adequate nutrition.           Problem: Discharge Planning:  Goal: Patients continuum of care needs are met  Description: Patients continuum of care needs are met  2/10/2021 0951 by Remington Kelly RN  Outcome: Ongoing

## 2021-02-10 NOTE — PLAN OF CARE
Problem: Infection:  Goal: Will remain free from infection  Description: Will remain free from infection  Outcome: Ongoing     Problem: Safety:  Goal: Free from accidental physical injury  Description: Free from accidental physical injury  Outcome: Ongoing  Goal: Free from intentional harm  Description: Free from intentional harm  Outcome: Ongoing     Problem: Daily Care:  Goal: Daily care needs are met  Description: Daily care needs are met  Outcome: Ongoing     Problem: Pain:  Goal: Patient's pain/discomfort is manageable  Description: Patient's pain/discomfort is manageable  Outcome: Ongoing     Problem: Skin Integrity:  Goal: Skin integrity will stabilize  Description: Skin integrity will stabilize  Outcome: Ongoing     Problem: Discharge Planning:  Goal: Patients continuum of care needs are met  Description: Patients continuum of care needs are met  Outcome: Ongoing     Problem: Infection:  Goal: Will remain free from infection  Description: Will remain free from infection  Outcome: Ongoing     Problem: Safety:  Goal: Free from accidental physical injury  Description: Free from accidental physical injury  Outcome: Ongoing  Goal: Free from intentional harm  Description: Free from intentional harm  Outcome: Ongoing     Problem: Daily Care:  Goal: Daily care needs are met  Description: Daily care needs are met  Outcome: Ongoing     Problem: Pain:  Goal: Patient's pain/discomfort is manageable  Description: Patient's pain/discomfort is manageable  Outcome: Ongoing     Problem: Skin Integrity:  Goal: Skin integrity will stabilize  Description: Skin integrity will stabilize  Outcome: Ongoing     Problem: Discharge Planning:  Goal: Patients continuum of care needs are met  Description: Patients continuum of care needs are met  Outcome: Ongoing

## 2021-02-10 NOTE — FLOWSHEET NOTE
Reason for Visit: [address], Advance Directives    Jehovah's witness/Spiritual/Philosophical belief: Belief in God     Summary:  met w/pt at bedside. Pt engaged easily w/ in storytelling and processing hospitalization. Pt identified that things have been \"hard\" pertaining to her health. Pt shared that her and her family are still grieving the loss of a family member from this past year. Pt's family remains supportive at this time. Pt expressed interest in having the AD mailed to her home address.  gave a brief description of the AD documents and will mail them. No other needs were expressed by the pt at this time. Recommendations: Should any needs arise, please contact spiritual care services for follow-up.        02/10/21 1530   Encounter Summary   Services provided to: Patient   Referral/Consult From: Nurse   Support System Spouse; Family members   Continue Visiting   (visit, support, will mail AD docs to home. GB 2/10)   Complexity of Encounter Low   Length of Encounter 30 minutes   Spiritual/Caodaism   Type Spiritual support   Assessment Calm; Approachable   Intervention Active listening;Explored feelings, thoughts, concerns   Outcome Connection/belonging;Comfort;Expressed feelings/needs/concerns;Coping;Receptive     Electronically signed by Pat Marcus on 2/10/2021 at 4:09 PM

## 2021-02-11 VITALS
HEART RATE: 104 BPM | WEIGHT: 233.25 LBS | SYSTOLIC BLOOD PRESSURE: 160 MMHG | BODY MASS INDEX: 41.33 KG/M2 | OXYGEN SATURATION: 95 % | RESPIRATION RATE: 18 BRPM | TEMPERATURE: 97.5 F | DIASTOLIC BLOOD PRESSURE: 98 MMHG | HEIGHT: 63 IN

## 2021-02-11 LAB
ALBUMIN SERPL-MCNC: 3.3 G/DL (ref 3.4–5)
ANION GAP SERPL CALCULATED.3IONS-SCNC: 10 MMOL/L (ref 3–16)
BASOPHILS ABSOLUTE: 0.1 K/UL (ref 0–0.2)
BASOPHILS RELATIVE PERCENT: 0.9 %
BUN BLDV-MCNC: 24 MG/DL (ref 7–20)
CALCIUM SERPL-MCNC: 9.3 MG/DL (ref 8.3–10.6)
CHLORIDE BLD-SCNC: 104 MMOL/L (ref 99–110)
CO2: 25 MMOL/L (ref 21–32)
CREAT SERPL-MCNC: 0.8 MG/DL (ref 0.6–1.2)
CREATININE URINE: 44.8 MG/DL (ref 28–259)
EOSINOPHILS ABSOLUTE: 0.1 K/UL (ref 0–0.6)
EOSINOPHILS RELATIVE PERCENT: 2.2 %
GFR AFRICAN AMERICAN: >60
GFR NON-AFRICAN AMERICAN: >60
GLUCOSE BLD-MCNC: 130 MG/DL (ref 70–99)
GLUCOSE BLD-MCNC: 140 MG/DL (ref 70–99)
GLUCOSE BLD-MCNC: 140 MG/DL (ref 70–99)
HCT VFR BLD CALC: 35.5 % (ref 36–48)
HEMOGLOBIN: 12 G/DL (ref 12–16)
LYMPHOCYTES ABSOLUTE: 2.3 K/UL (ref 1–5.1)
LYMPHOCYTES RELATIVE PERCENT: 40.9 %
MCH RBC QN AUTO: 29.2 PG (ref 26–34)
MCHC RBC AUTO-ENTMCNC: 33.7 G/DL (ref 31–36)
MCV RBC AUTO: 86.8 FL (ref 80–100)
MONOCYTES ABSOLUTE: 0.7 K/UL (ref 0–1.3)
MONOCYTES RELATIVE PERCENT: 11.7 %
NEUTROPHILS ABSOLUTE: 2.5 K/UL (ref 1.7–7.7)
NEUTROPHILS RELATIVE PERCENT: 44.3 %
PDW BLD-RTO: 14.9 % (ref 12.4–15.4)
PERFORMED ON: ABNORMAL
PERFORMED ON: ABNORMAL
PHOSPHORUS: 1.9 MG/DL (ref 2.5–4.9)
PLATELET # BLD: 208 K/UL (ref 135–450)
PMV BLD AUTO: 9.2 FL (ref 5–10.5)
POTASSIUM SERPL-SCNC: 4.4 MMOL/L (ref 3.5–5.1)
PROTEIN PROTEIN: 112 MG/DL
PROTEIN/CREAT RATIO: 2.5 MG/DL
RBC # BLD: 4.09 M/UL (ref 4–5.2)
SODIUM BLD-SCNC: 139 MMOL/L (ref 136–145)
WBC # BLD: 5.6 K/UL (ref 4–11)

## 2021-02-11 PROCEDURE — 96366 THER/PROPH/DIAG IV INF ADDON: CPT

## 2021-02-11 PROCEDURE — 97165 OT EVAL LOW COMPLEX 30 MIN: CPT

## 2021-02-11 PROCEDURE — 85025 COMPLETE CBC W/AUTO DIFF WBC: CPT

## 2021-02-11 PROCEDURE — 6370000000 HC RX 637 (ALT 250 FOR IP): Performed by: HOSPITALIST

## 2021-02-11 PROCEDURE — G0378 HOSPITAL OBSERVATION PER HR: HCPCS

## 2021-02-11 PROCEDURE — 97530 THERAPEUTIC ACTIVITIES: CPT

## 2021-02-11 PROCEDURE — 2580000003 HC RX 258: Performed by: FAMILY MEDICINE

## 2021-02-11 PROCEDURE — 94760 N-INVAS EAR/PLS OXIMETRY 1: CPT

## 2021-02-11 PROCEDURE — 96372 THER/PROPH/DIAG INJ SC/IM: CPT

## 2021-02-11 PROCEDURE — 97161 PT EVAL LOW COMPLEX 20 MIN: CPT

## 2021-02-11 PROCEDURE — 84156 ASSAY OF PROTEIN URINE: CPT

## 2021-02-11 PROCEDURE — 80069 RENAL FUNCTION PANEL: CPT

## 2021-02-11 PROCEDURE — 6360000002 HC RX W HCPCS: Performed by: FAMILY MEDICINE

## 2021-02-11 PROCEDURE — 97535 SELF CARE MNGMENT TRAINING: CPT

## 2021-02-11 PROCEDURE — 6370000000 HC RX 637 (ALT 250 FOR IP): Performed by: FAMILY MEDICINE

## 2021-02-11 PROCEDURE — 82570 ASSAY OF URINE CREATININE: CPT

## 2021-02-11 PROCEDURE — 36415 COLL VENOUS BLD VENIPUNCTURE: CPT

## 2021-02-11 RX ORDER — GABAPENTIN 300 MG/1
600 CAPSULE ORAL NIGHTLY
Status: DISCONTINUED | OUTPATIENT
Start: 2021-02-11 | End: 2021-02-11 | Stop reason: HOSPADM

## 2021-02-11 RX ORDER — LISINOPRIL 10 MG/1
10 TABLET ORAL DAILY
Status: DISCONTINUED | OUTPATIENT
Start: 2021-02-11 | End: 2021-02-11 | Stop reason: HOSPADM

## 2021-02-11 RX ORDER — AMLODIPINE BESYLATE 5 MG/1
5 TABLET ORAL DAILY
Status: DISCONTINUED | OUTPATIENT
Start: 2021-02-11 | End: 2021-02-11 | Stop reason: HOSPADM

## 2021-02-11 RX ADMIN — CEFTRIAXONE 1000 MG: 1 INJECTION, POWDER, FOR SOLUTION INTRAMUSCULAR; INTRAVENOUS at 12:40

## 2021-02-11 RX ADMIN — PREDNISOLONE ACETATE 1 DROP: 10 SUSPENSION/ DROPS OPHTHALMIC at 12:40

## 2021-02-11 RX ADMIN — HEPARIN SODIUM 5000 UNITS: 5000 INJECTION INTRAVENOUS; SUBCUTANEOUS at 06:00

## 2021-02-11 RX ADMIN — AMLODIPINE BESYLATE 5 MG: 5 TABLET ORAL at 09:32

## 2021-02-11 RX ADMIN — PREDNISOLONE ACETATE 1 DROP: 10 SUSPENSION/ DROPS OPHTHALMIC at 09:33

## 2021-02-11 RX ADMIN — OFLOXACIN 1 DROP: 3 SOLUTION OPHTHALMIC at 12:40

## 2021-02-11 RX ADMIN — OFLOXACIN 1 DROP: 3 SOLUTION OPHTHALMIC at 09:33

## 2021-02-11 RX ADMIN — PANTOPRAZOLE SODIUM 40 MG: 40 TABLET, DELAYED RELEASE ORAL at 09:33

## 2021-02-11 RX ADMIN — HEPARIN SODIUM 5000 UNITS: 5000 INJECTION INTRAVENOUS; SUBCUTANEOUS at 14:30

## 2021-02-11 RX ADMIN — KETOROLAC TROMETHAMINE 1 DROP: 5 SOLUTION OPHTHALMIC at 09:33

## 2021-02-11 RX ADMIN — ATORVASTATIN CALCIUM 80 MG: 80 TABLET, FILM COATED ORAL at 09:32

## 2021-02-11 RX ADMIN — LISINOPRIL 10 MG: 10 TABLET ORAL at 12:40

## 2021-02-11 RX ADMIN — INSULIN LISPRO 2 UNITS: 100 INJECTION, SOLUTION INTRAVENOUS; SUBCUTANEOUS at 09:33

## 2021-02-11 RX ADMIN — ANASTROZOLE 1 MG: 1 TABLET ORAL at 09:33

## 2021-02-11 RX ADMIN — KETOROLAC TROMETHAMINE 1 DROP: 5 SOLUTION OPHTHALMIC at 12:40

## 2021-02-11 RX ADMIN — ACETAMINOPHEN 650 MG: 325 TABLET ORAL at 04:45

## 2021-02-11 RX ADMIN — VENLAFAXINE HYDROCHLORIDE 75 MG: 75 CAPSULE, EXTENDED RELEASE ORAL at 09:33

## 2021-02-11 RX ADMIN — SODIUM CHLORIDE, PRESERVATIVE FREE 10 ML: 5 INJECTION INTRAVENOUS at 09:34

## 2021-02-11 ASSESSMENT — PAIN SCALES - GENERAL: PAINLEVEL_OUTOF10: 6

## 2021-02-11 NOTE — PLAN OF CARE
Problem: Infection:  Goal: Will remain free from infection  Description: Will remain free from infection  Outcome: Ongoing     Problem: Safety:  Goal: Free from accidental physical injury  Description: Free from accidental physical injury  Outcome: Ongoing     Problem: Safety:  Goal: Free from intentional harm  Description: Free from intentional harm  Outcome: Ongoing     Problem: Daily Care:  Goal: Daily care needs are met  Description: Daily care needs are met  Outcome: Ongoing     Problem: Pain:  Goal: Patient's pain/discomfort is manageable  Description: Patient's pain/discomfort is manageable  Outcome: Ongoing  Note:   Will continue to use the pain scale (0-10) to measure pt's pain and monitor their needs. Will assess patients pain with assessments and interactions. Pt will notify RN of any changes in pain and where. Will continue to perform therapeutic comfort measures and give pain medications as prescribed/needed. Problem: Skin Integrity:  Goal: Skin integrity will stabilize  Description: Skin integrity will stabilize  Outcome: Ongoing  Note:   Pt will continue daily activities as tolerated and alert RN to any pain and skin changes. RN will continue to assess skin condition, note changes, and take preventative measures against skin breakdown such as movement, foam/silicone dressings on bony prominences, and making sure pt has adequate nutrition.           Problem: Discharge Planning:  Goal: Patients continuum of care needs are met  Description: Patients continuum of care needs are met  Outcome: Ongoing

## 2021-02-11 NOTE — PROGRESS NOTES
Pt adamantly told this RN that two MD's told her she was going to be discharged today and she is ready to go, looking through pts notes from MD's it did not appear as if the plan was for discharge today. Pt stated if the MD does not discharge her she will leave AMA due to increased anxiety. MD graham notified of this. MD Buddy Cid returned phone call, confirmed and stated \"he told pt she cannot leave until after urine cultures have resulted. \"     This RN discussed this with pt, at this point pt states she doesn't remember that, started crying and stating she is having bad anxiety. MD Buddy Cid looking into anxiety medicine due to multiple allergies. Electronically signed by Kem Merrill RN on 2/11/2021 at 3:10 PM    Pt states she is leaving AMA, she states her  Is on the way to pick her up. Pt states there is no changing her mind and she has to get home. IV removed without complications. [facility]      Electronically signed by Kem Merrill RN on 2/11/2021 at 4:54 PM

## 2021-02-11 NOTE — PROGRESS NOTES
Hospitalist Progress Note    Patient:  Marco A Osorio  Unit/Bed:V9E-6495/4265-01   YOB: 1960       MRN: [id number] Acct: [de-identified]  PCP: Mary Ramirez MD    Date of Admission: 2/9/2021  --------------------------    Chief Complaint:     Right flank pain/back pain    Hospital Course:     Marco A Osorio is a 57 y.o. female hospitalized on 2/9/2021    For right flank pain and back pain, found to have acute kidney injury, urinary tract infection. Broad-spectrum antibiotics initiated. Seen by nephrology service in consultation. Assessment:       1. Urinary tract infection, multi drug-resistant E. coli noted. 2. Acute kidney injury, baseline creatinine 1. Admitted creatinine 3.4. CT scan of the abdomen showed no hydronephrosis. Likely NSAID induced  3. Hypertensive urgency     comorbidities:    · Diabetes mellitus type 2 with obesity Body mass index is 41.32 kg/m². · History of breast cancer, on anastrozole  ·  obesity Body mass index is 41.32 kg/m². · Bipolar disorder, continue Seroquel and Effexor  · Constipation, continue Amitiza      Plan:  1. Creatinine improved, appreciate nephrology input. Avoid NSAID  2. Restart lisinopril, amlodipine. Monitor blood pressure  3. Patient E. coli is multidrug-resistant, cannot prescribe Bactrim as the patient has ASHOK with risk of recurrence. I asked follow-up: Sensitivity for further oral antibiotics. 4. Patient required significantly less insulin with reasonable fasting blood sugar today (down from 100 units to 20 units) I set up the patient with home health care on discharge to ensure medication adherence and safety  5. Continue IV ceftriaxone, follow final culture data  . Code Status: Full Code         DVT prophylaxis: Heparin     Disposition: Home likely tomorrow, PT and OT ordered. .    I discussed my thought processes at length with patient/family and patient understood.  Question and concerns  Addressed 0-12 Units Subcutaneous TID WC    insulin lispro  0-6 Units Subcutaneous Nightly     PRN Meds: dicyclomine, sodium chloride flush, promethazine **OR** ondansetron, acetaminophen **OR** acetaminophen, glucose, dextrose, glucagon (rDNA), dextrose      Intake/Output Summary (Last 24 hours) at 2/11/2021 1231  Last data filed at 2/11/2021 1034  Gross per 24 hour   Intake 720 ml   Output 1925 ml   Net -1205 ml             Labs:   Recent Labs     02/09/21  1218 02/10/21  0638 02/11/21  0757   WBC 10.5 6.3 5.6   HGB 11.2* 11.2* 12.0   HCT 34.4* 34.1* 35.5*    208 208     Recent Labs     02/09/21  1218 02/10/21  0638 02/11/21  0757   * 135* 139   K 5.1 4.4 4.4    101 104   CO2 20* 22 25   BUN 51* 44* 24*   CREATININE 3.4* 1.7* 0.8   CALCIUM 8.9 8.6 9.3   PHOS  --  3.0 1.9*     No results for input(s): AST, ALT, BILIDIR, BILITOT, ALKPHOS in the last 72 hours. No results for input(s): INR in the last 72 hours. No results for input(s): Dylan Spoon in the last 72 hours. Urinalysis:      Lab Results   Component Value Date    NITRU Negative 02/09/2021    WBCUA 23 02/09/2021    BACTERIA 2+ 02/09/2021    RBCUA 0-2 02/09/2021    BLOODU Negative 02/09/2021    SPECGRAV 1.020 02/09/2021    GLUCOSEU Negative 02/09/2021    GLUCOSEU NEGATIVE 07/19/2010       Radiology:  CT ABDOMEN PELVIS WO CONTRAST Additional Contrast? None   Final Result   No stones or hydronephrosis noted      Mild biliary ductal dilatation, similar to prior, presumably due to post   cholecystectomy state. Normal caliber appendix. There is diverticulosis. No diverticulitis. Mild skin thickening anterior abdominal wall.   Correlate for signs of   cellulitis                     Electronically signed by Paola Hamlin MD on 2/11/2021 at 12:31 PM

## 2021-02-11 NOTE — PROGRESS NOTES
Patient anxious this shift. Home meds not consistent with [facility], doctor updated. BP elevated. Pt still c/o right flank pain. Urine yellow, cloudy. Pt admits to be depressed due to passing of her father.

## 2021-02-11 NOTE — PROGRESS NOTES
Physical Therapy    Facility/Department: [address] MED SURG  Initial Assessment    NAME: Nolan Quintero  : 1960  MRN: [id number]    Date of Service: 2021    Discharge Recommendations:  Home independently     Nloan Quintero scored a 24/24 on the AM-PAC short mobility form. At this time, no further PT is recommended upon discharge due to independence. Recommend patient returns to prior setting with prior services. Assessment   Assessment: The patient is a 57 y.o. female who presents to [facility] on 21 with right flank and back pain. Found to have UTI, ASHOK. Pt currently functioning at her independent baseline. Recommend return home independently. No further skilled therapy recommended. Prognosis: Good  Decision Making: Low Complexity  History: see below  Exam: see below  Clinical Presentation: stable  PT Education: PT Role;Plan of Care;General Safety  No Skilled PT: Independent with functional mobility   REQUIRES PT FOLLOW UP: No       Patient Diagnosis(es): The primary encounter diagnosis was ASHOK (acute kidney injury) ([address] A diagnosis of Urinary tract infection without hematuria, site unspecified was also pertinent to this visit. has a past medical history of Allergic rhinitis, Anxiety and depression, Arthritis, Breast cancer ([address] Cataract, Chronic back pain, Diabetes mellitus ([address] DM (diabetes mellitus), type 2, uncontrolled with complications ([address] ESBL (extended spectrum beta-lactamase) producing bacteria infection, GERD (gastroesophageal reflux disease), History of migraine headaches, HTN (hypertension), Hyperlipidemia, Irritable bowel syndrome (IBS), Obesity, and Wears glasses. has a past surgical history that includes Tonsillectomy; Cholecystectomy; Achilles tendon surgery (Right);  section; Carpal tunnel release (Bilateral); Colonoscopy (10/16/2017); Upper gastrointestinal endoscopy (N/A, 2019); Upper gastrointestinal endoscopy (N/A, 5/15/2019);  Esophagus dilation (N/A, 5/15/2019); Finger trigger release (Left, 11/5/2020); back surgery (2013); Intracapsular cataract extraction (Right, 2/5/2021); and Breast lumpectomy (Left, 2010). Restrictions  Restrictions/Precautions  Restrictions/Precautions: Contact Precautions  Position Activity Restriction  Other position/activity restrictions: MDRO     Vision/Hearing  Vision: Impaired  Vision Exceptions: Cataracts  Hearing: Within functional limits       Subjective  General  Chart Reviewed: Yes  Patient assessed for rehabilitation services?: Yes  Additional Pertinent Hx: The patient is a 57 y.o. female who presents to [facility] on 2/9/21 with right flank and back pain. Found to have UTI, ASHOK. Response To Previous Treatment: Not applicable  Family / Caregiver Present: No  Referring Practitioner: Wendy Murry MD  Referral Date : 02/11/21  Diagnosis: UTI, ASHOK  Follows Commands: Within Functional Limits  Subjective  Subjective: Pt is agreeable to PT. Pain Screening  Patient Currently in Pain: (No complaints of pain)          Orientation  Orientation  Overall Orientation Status: Within Functional Limits     Social/Functional History  Social/Functional History  Lives With: Spouse, Son  Type of Home: House  Home Layout: One level  Home Access: Stairs to enter without rails  Entrance Stairs - Number of Steps: two  Bathroom Shower/Tub: Tub/Shower unit  Bathroom Toilet: Standard  Home Equipment: Rolling walker  ADL Assistance: Independent  Homemaking Assistance: Independent  Ambulation Assistance: Independent  Transfer Assistance: Independent  Active :  Yes  Additional Comments: Reports one recent fall in snow     Cognition   Cognition  Overall Cognitive Status: WFL    Objective  Strength RLE  Strength RLE: WFL  Strength LLE  Strength LLE: WFL  Motor Control  Gross Motor?: WFL     Bed mobility  Supine to Sit: Unable to assess  Sit to Supine: Unable to assess  Comment: Pt in bathroom at beginning of session, recliner at end of session.   Transfers  Sit to Stand: Independent  Ambulation  Ambulation?: Yes  Ambulation 1  Surface: level tile  Device: No Device  Assistance: Independent  Gait Deviations: None  Distance: 30'  Stairs/Curb  Stairs?: No     Balance  Posture: Good  Sitting - Static: Good  Sitting - Dynamic: Good  Standing - Static: Good  Standing - Dynamic: Good        Plan   Safety Devices  Type of devices: Call light within reach, Left in chair, Nurse notified      AM-PAC Score  AM-PAC Inpatient Mobility Raw Score : 24 (02/11/21 1339)  AM-PAC Inpatient T-Scale Score : 61.14 (02/11/21 1339)  Mobility Inpatient CMS 0-100% Score: 0 (02/11/21 1339)  Mobility Inpatient CMS G-Code Modifier : [facility] (02/11/21 1339)            Therapy Time   Individual Concurrent Group Co-treatment   Time In 9671         Time Out 1335         Minutes 30         Timed Code Treatment Minutes: 15 Minutes       Wood Cabrera PT

## 2021-02-11 NOTE — PROGRESS NOTES
Labs:  CBC:   Recent Labs     02/09/21  1218 02/10/21  0638 02/11/21  0757   WBC 10.5 6.3 5.6   HGB 11.2* 11.2* 12.0    208 208     Ca/Mg/Phos:   Recent Labs     02/09/21  1218 02/10/21  0638   CALCIUM 8.9 8.6   PHOS  --  3.0     UA:  Recent Labs     02/09/21  1143   COLORU DK YELLOW   CLARITYU CLOUDY*   GLUCOSEU Negative   BILIRUBINUR SMALL*   KETUA Negative   SPECGRAV 1.020   BLOODU Negative   PHUR 5.0   PROTEINU 100*   UROBILINOGEN 1.0   NITRU Negative   LEUKOCYTESUR SMALL*   LABMICR YES   URINETYPE NotGiven      Urine Microscopic:   Recent Labs     02/09/21  1143   BACTERIA 2+*   COMU see below   HYALCAST 10*   WBCUA 23*   RBCUA 0-2   EPIU 1         Objective:     Vitals: BP (!) 188/99   Pulse 128   Temp 98.4 °F (36.9 °C) (Oral)   Resp 18   Ht 5' 3\" (1.6 m)   Wt 233 lb 4 oz (105.8 kg)   SpO2 90%   BMI 41.32 kg/m²    Wt Readings from Last 3 Encounters:   02/11/21 233 lb 4 oz (105.8 kg)   02/05/21 230 lb (104.3 kg)   01/28/21 230 lb 3.2 oz (104.4 kg)      24HR INTAKE/OUTPUT:      Intake/Output Summary (Last 24 hours) at 2/11/2021 0851  Last data filed at 2/11/2021 0840  Gross per 24 hour   Intake 960 ml   Output 2725 ml   Net -1765 ml     Constitutional:  awake, NAD, obese  HEENT:  MMM, No icterus  Neck: no bruits, No JVD  Cardiovascular:  reg rhythm  Respiratory: CTA, no crackles  Abdomen:  +BS, soft, NT, ND  Ext: no lower extremity edema  CNS: alert, no agitation    IMAGING:  CT ABDOMEN PELVIS WO CONTRAST Additional Contrast? None   Final Result   No stones or hydronephrosis noted      Mild biliary ductal dilatation, similar to prior, presumably due to post   cholecystectomy state. Normal caliber appendix. There is diverticulosis. No diverticulitis. Mild skin thickening anterior abdominal wall. Correlate for signs of   cellulitis             Assessment :     1. ASHOK  -Non-Oliguric  -Baseline creat: ?<1.  Now 3.4->1.7->0.8  -UA conc, prot +++, pyuria, bld negative   -abdominal CT scan (2/9/2021): No stones or hydronephrosis noted  -Volume: Euvolemic  -Electrolytes: No Dyskalemia   -Acid-Base: NAGMA and AGMA   -ASHOK in the setting of NSAID use. history of recurrent UTIs. No urinary retention    Recent Labs     02/09/21  1218 02/10/21  0638 02/11/21  0757   BUN 51* 44* 24*   CREATININE 3.4* 1.7* 0.8     Recent Labs     02/09/21  1218 02/10/21  0638 02/11/21  0757   * 135* 139   K 5.1 4.4 4.4   CO2 20* 22 25       2. HTN  -Blood pressure high    BP Readings from Last 1 Encounters:   02/11/21 (!) 188/99       3. DM  -not on metformin    4. UTI      Plan:     - no IVF  - no NSAID (educated)  - UPC  - may resume Lisinopril at lower dose  - Antimicrobials per primary team   - follow up on cultures  - Monitor BMP    -Monitor I/O, UOP  -Maintain MAP>65  -Avoid nephrotoxin, if able. -Dose meds to current eGFR    Spoke to Angelica Carr MD     Thank you for allowing us to participate in care of Jose Armando Cabrera . We will continue to follow. Feel free to contact me with any questions.       Corbin Loge  2/11/2021    [facility] [address]  Office : [phone]  Fax :[phone]

## 2021-02-11 NOTE — PROGRESS NOTES
Occupational Therapy   Occupational Therapy Initial Assessment  Date: 2021   Patient Name: Sophie Rodriguez  MRN: [id number]     : 1960    Date of Service: 2021    Assessment: Pt is 57 y.o. F who presents with back/flank pain. Pt is being treated for UTI and ASHOK. PTA pt lives with son and  in one story home with a few JOSE MARIA. Pt reports independence in self-care tasks, homemaking responsibilities, and functional mobility with no device. Currently, pt presents with ROM/strength in [facility] for self-care tasks and transfers. Pt completed bed mobility, transfers, and mobility independently. Pt completed toileting independently, anticipate at baseline. Anticipate pt safe to d/c home with assist from family as needed. Discharge Recommendations:  Home with assist PRN       Assessment   Performance deficits / Impairments: Decreased endurance;Decreased strength  Assessment: Pt is 57 y.o. F who presents with back/flank pain. Pt is being treated for UTI and ASHOK. PTA pt lives with son and  in one story home with a few JOSE MARIA. Pt reports independence in self-care tasks, homemaking responsibilities, and functional mobility with no device. Currently, pt presents with ROM/strength in [facility] for self-care tasks and transfers. Pt completed bed mobility, transfers, and mobility independently. Pt completed toileting independently, anticipate at baseline. Anticipate pt safe to d/c home with assist from family as needed. Prognosis: Good  Decision Making: Low Complexity  History: PMH: DM, anxiety, GERD  Exam: ADLs, transfers, func mob, bed mob  Assistance / Modification: Indep  OT Education: OT Role;Transfer Training;Plan of Care;Precautions; ADL Adaptive Strategies  REQUIRES OT FOLLOW UP: Yes  Activity Tolerance  Activity Tolerance: Patient Tolerated treatment well  Safety Devices  Safety Devices in place: Yes(RN (Boy) notified)  Type of devices: Nurse notified;Call light within reach; Left in chair Patient Diagnosis(es): The primary encounter diagnosis was ASHOK (acute kidney injury) ([facility] A diagnosis of Urinary tract infection without hematuria, site unspecified was also pertinent to this visit. has a past medical history of Allergic rhinitis, Anxiety and depression, Arthritis, Breast cancer ([facility] Cataract, Chronic back pain, Diabetes mellitus ([facility] DM (diabetes mellitus), type 2, uncontrolled with complications ([facility] ESBL (extended spectrum beta-lactamase) producing bacteria infection, GERD (gastroesophageal reflux disease), History of migraine headaches, HTN (hypertension), Hyperlipidemia, Irritable bowel syndrome (IBS), Obesity, and Wears glasses. has a past surgical history that includes Tonsillectomy; Cholecystectomy; Achilles tendon surgery (Right);  section; Carpal tunnel release (Bilateral); Colonoscopy (10/16/2017); Upper gastrointestinal endoscopy (N/A, 2019); Upper gastrointestinal endoscopy (N/A, 5/15/2019); Esophagus dilation (N/A, 5/15/2019); Finger trigger release (Left, 2020); back surgery (); Intracapsular cataract extraction (Right, 2021); and Breast lumpectomy (Left, ). Restrictions  Restrictions/Precautions  Restrictions/Precautions: Contact Precautions  Position Activity Restriction  Other position/activity restrictions: MDRO    Subjective   General  Chart Reviewed: Yes  Patient assessed for rehabilitation services?: Yes  Additional Pertinent Hx: Per Devonte Coulter, DO: Pt is \"57 y.o. female who presents to [facility] with right flank and back pain. She was being treated for a uti but has only had one dose of cipro. In the ed she was found to have a uti and ashok with crt 3.4. ct abdomen/pelvis without stones or other acute pathology. Vitals were stable. She had leukocytosis. \"  Family / Caregiver Present: No  Referring Practitioner: Eryn Rosario MD  Diagnosis: Acute Kidney Injury  Subjective  Subjective: Pt met bedside, agreeable for therapy evaluation. Pt seated on commode in BR upon entry. Patient Currently in Pain: (No complaints of pain)  Vital Signs  Temp: 97.5 °F (36.4 °C)  Temp Source: Oral  Pulse: 104  Heart Rate Source: Brachial  Resp: 18  BP: (!) 160/98  BP Location: Left Arm  MAP (mmHg): 119  Patient Currently in Pain: (No complaints of pain)  Oxygen Therapy  SpO2: 95 %  O2 Device: None (Room air)     Social/Functional History  Social/Functional History  Lives With: Spouse, Son  Type of Home: House  Home Layout: One level  Home Access: Stairs to enter without rails  Entrance Stairs - Number of Steps: two  Bathroom Shower/Tub: Tub/Shower unit  Bathroom Toilet: Standard  Home Equipment: Rolling walker  ADL Assistance: Independent  Homemaking Assistance: Independent  Ambulation Assistance: Independent  Transfer Assistance: Independent  Active : Yes  Additional Comments: Reports one recent fall in snow       Objective   Vision: Impaired  Vision Exceptions: Cataracts  Hearing: Within functional limits      Orientation  Overall Orientation Status: Within Functional Limits     Balance  Sitting Balance: Independent  Standing Balance: Independent  Standing Balance  Time: ~3-4 minutes  Activity: Func mob, transfers, and ADL tasks    Functional Mobility  Functional - Mobility Device: No device  Activity: To/from bathroom  Assist Level: Independent  Functional Mobility Comments: Pt completed functional mobility with no device, steady with no overt LOB. Toilet Transfers  Toilet - Technique: Ambulating  Equipment Used: (Comfort height commode)  Toilet Transfer: Independent    ADL  Grooming: Independent(Pt washed hands at sink)  Toileting: Independent(Managed personal clothing and pericare without assist)  Additional Comments: PTA pt reports independence in self-care tasks. [place] pt is at baseline - reports she was looking into getting shower chair.      Tone RUE  RUE Tone: Normotonic  Tone LUE  LUE Tone: Normotonic  Coordination  Movements Are Fluid And Coordinated: Yes     Bed mobility  Supine to Sit: Unable to assess  Sit to Supine: Unable to assess  Comment: Pt in bathroom at beginning of session, recliner at end of session. Transfers  Sit to stand: Independent  Stand to sit: Independent  Transfer Comments: Indep for sit <> stand from EOB and to recliner chair     Cognition  Overall Cognitive Status: WFL        Sensation  Overall Sensation Status: WFL        LUE AROM (degrees)  LUE AROM : WFL  Left Hand AROM (degrees)  Left Hand AROM: WFL  RUE AROM (degrees)  RUE AROM : WFL  Right Hand AROM (degrees)  Right Hand AROM: WFL     LUE Strength  Gross LUE Strength: WFL  RUE Strength  Gross RUE Strength: WFL          Plan   Plan  Plan Comment: Discharged from OT services    AM-PAC Score   No further OT needs     AM-PAC Inpatient Daily Activity Raw Score: 24 (02/11/21 1346)  AM-PAC Inpatient ADL T-Scale Score : 57.54 (02/11/21 1346)  ADL Inpatient CMS 0-100% Score: 0 (02/11/21 1346)  ADL Inpatient CMS G-Code Modifier : [facility] (02/11/21 1346)    Goals  Short term goals  Time Frame for Short term goals: Pt discharged from acute OT services. Therapy Time   Individual Concurrent Group Co-treatment   Time In       1315   Time Out       1340   Minutes       25   Timed Code Treatment Minutes: 10 Minutes(15 min eval)     If pt is discharged prior to next OT session, this note will serve as the discharge summary.     DEWEY Mike/LELIA#[id number]

## 2021-02-12 ENCOUNTER — TELEPHONE (OUTPATIENT)
Dept: FAMILY MEDICINE CLINIC | Age: 61
End: 2021-02-12

## 2021-02-12 LAB
ORGANISM: ABNORMAL
URINE CULTURE, ROUTINE: ABNORMAL
URINE CULTURE, ROUTINE: ABNORMAL

## 2021-02-12 NOTE — TELEPHONE ENCOUNTER
[address] [facility] Transitions Initial Follow Up Call    Outreach made within 2 business days of discharge: Yes    Patient: Janae Cabrera Patient : 1960   MRN: <[id number]>  Reason for Admission: There are no discharge diagnoses documented for the most recent discharge.   Discharge Date: 21       Spoke with: patient has been in touch with care coordinator and has HFU appt  with PCP    Discharge department/facility: [facility]    Scheduled appointment with PCP within 7-14 days    Follow Up  Future Appointments   Date Time Provider Randa Prater   2021 11:00 AM Karen Rodrigez MD [facility] AT [address]

## 2021-02-17 ENCOUNTER — TELEPHONE (OUTPATIENT)
Dept: FAMILY MEDICINE CLINIC | Age: 61
End: 2021-02-17

## 2021-02-17 NOTE — TELEPHONE ENCOUNTER
----- Message from Srikanth Feliciano sent at 2/17/2021 10:28 AM EST -----  Subject: Message to Provider    QUESTIONS  Information for Provider? Pt has cancel apt for 2/18 due to weather. Pt   would like to reschedule apt. But [facility] doesn't work for the pt. pt   would like to come in to the office to be seen. Please call pt to follow   up. Pt cannot do [facility] apt.  ---------------------------------------------------------------------------  --------------  CALL BACK INFO  What is the best way for the office to contact you? OK to leave message on   voicemail  Preferred Call Back Phone Number? [phone]  ---------------------------------------------------------------------------  --------------  SCRIPT ANSWERS  Relationship to Patient?  Self

## 2021-02-24 ENCOUNTER — TELEPHONE (OUTPATIENT)
Dept: FAMILY MEDICINE CLINIC | Age: 61
End: 2021-02-24

## 2021-02-26 RX ORDER — CALCIUM CITRATE/VITAMIN D3 200MG-6.25
TABLET ORAL
Qty: 200 STRIP | Refills: 2 | OUTPATIENT
Start: 2021-02-26

## 2021-03-01 DIAGNOSIS — I10 ESSENTIAL HYPERTENSION: ICD-10-CM

## 2021-03-01 LAB
ANION GAP SERPL CALCULATED.3IONS-SCNC: 11 MMOL/L (ref 3–16)
BUN BLDV-MCNC: 13 MG/DL (ref 7–20)
CALCIUM SERPL-MCNC: 10.1 MG/DL (ref 8.3–10.6)
CHLORIDE BLD-SCNC: 102 MMOL/L (ref 99–110)
CO2: 28 MMOL/L (ref 21–32)
CREAT SERPL-MCNC: 0.9 MG/DL (ref 0.6–1.2)
GFR AFRICAN AMERICAN: >60
GFR NON-AFRICAN AMERICAN: >60
GLUCOSE BLD-MCNC: 136 MG/DL (ref 70–99)
POTASSIUM SERPL-SCNC: 5 MMOL/L (ref 3.5–5.1)
SODIUM BLD-SCNC: 141 MMOL/L (ref 136–145)

## 2021-03-04 NOTE — PROGRESS NOTES
C-Difficile admission screening and protocol:     * Admitted with diarrhea? no     *Prior history of C-Diff. In last 3 months? no     *Antibiotic use in the past 6-8 weeks? For UTI yes     *Prior hospitalization or nursing home in the last month?     no

## 2021-03-04 NOTE — PROGRESS NOTES
[facility] time__0615__________        Surgery time___0730_________    Take the following medications with a sip of water: Lisinopril, Metoprolol, Amlodipine     Do not eat or drink anything after 12:00 midnight prior to your surgery. This includes water chewing gum, mints and ice chips. You may brush your teeth and gargle the morning of your surgery, but do not swallow the water     Please see your family doctor/pediatrician for a history and physical and/or concerning medications. H&P 3/10/2021 Dr. Razia Hamlin    Bring any test results/reports from your physicians office. If you are under the care of a heart doctor or specialist doctor, please be aware that you may be asked to them for clearance    You may be asked to stop blood thinners such as Coumadin, Plavix, Fragmin, Lovenox, etc., or any anti-inflammatories such as:  Aspirin, Ibuprofen, Advil, Naproxen prior to your surgery. We also ask that you stop any OTC medications such as fish oil, vitamin E, glucosamine, garlic, Multivitamins, COQ 10, etc.    We ask that you do not smoke 24 hours prior to surgery  We ask that you do not  drink any alcoholic beverages 24 hours prior to surgery     You must make arrangements for a responsible adult to take you home after your surgery. For your safety you will not be allowed to leave alone or drive yourself home. Your surgery will be cancelled if you do not have a ride home. Also for your safety, it is strongly suggested that someone stay with you the first 24 hours after your surgery. A parent or legal guardian must accompany a child scheduled for surgery and plan to stay at the hospital until the child is discharged. Please do not bring other children with you. For your comfort, please wear simple loose fitting clothing to the hospital.  Please do not bring valuables. Wear loose fitting shirt or button down shirt.  Bring eye drops or ointment day of surgery. Do not wear any make-up or nail polish on your fingers or toes      For your safety, please do not wear any jewelry or body piercing's on the day of surgery. All jewelry must be removed. If you have dentures, they will be removed before going to operating room. For your convenience, we will provide you with a container. If you wear contact lenses or glasses, they will be removed, please bring a case for them. If you have a living will and a durable power of  for healthcare, please bring in a copy. As part of our patient safety program to minimize surgical site infections, we ask you to do the following:    · Please notify your surgeon if you develop any illness between         now and the  day of your surgery. · This includes a cough, cold, fever, sore throat, nausea,         or vomiting, and diarrhea, etc.  ·  Please notify your surgeon if you experience dizziness, shortness         of breath or blurred vision between now and the time of your surgery. Do not shave your operative site 96 hours prior to surgery. For face and neck surgery, men may use an electric razor 48 hours   prior to surgery. You may shower the night before surgery or the morning of   your surgery with an antibacterial soap. You will need to bring a photo ID and insurance card    [facility] has an onsite pharmacy, would you like to utilize our pharmacy     If you will be staying overnight and use a C-pap machine, please bring   your C-pap to hospital     Our goal is to provide you with excellent care, therefore, visitors will be limited to two(2) in the room at a time so that we may focus on providing this care for you. Please contact pre-admission testing if you have any further questions.                  [facility] phone number:  [phone]  Please note these are generalized instructions for all surgical cases, you may be provided with more specific instructions according to your surgery.

## 2021-03-10 ENCOUNTER — OFFICE VISIT (OUTPATIENT)
Dept: FAMILY MEDICINE CLINIC | Age: 61
End: 2021-03-10
Payer: COMMERCIAL

## 2021-03-10 ENCOUNTER — PREP FOR PROCEDURE (OUTPATIENT)
Dept: OPHTHALMOLOGY | Age: 61
End: 2021-03-10

## 2021-03-10 VITALS
HEIGHT: 63 IN | OXYGEN SATURATION: 98 % | HEART RATE: 83 BPM | DIASTOLIC BLOOD PRESSURE: 86 MMHG | BODY MASS INDEX: 41.29 KG/M2 | WEIGHT: 233 LBS | SYSTOLIC BLOOD PRESSURE: 140 MMHG | TEMPERATURE: 97.6 F | RESPIRATION RATE: 16 BRPM

## 2021-03-10 DIAGNOSIS — R82.90 ABNORMAL URINALYSIS: ICD-10-CM

## 2021-03-10 DIAGNOSIS — H25.9 SENILE CATARACT OF LEFT EYE, UNSPECIFIED AGE-RELATED CATARACT TYPE: ICD-10-CM

## 2021-03-10 DIAGNOSIS — Z12.31 OTHER SCREENING MAMMOGRAM: ICD-10-CM

## 2021-03-10 DIAGNOSIS — Z87.440 RECENT URINARY TRACT INFECTION: ICD-10-CM

## 2021-03-10 DIAGNOSIS — E11.65 UNCONTROLLED TYPE 2 DIABETES MELLITUS WITH HYPERGLYCEMIA (HCC): ICD-10-CM

## 2021-03-10 DIAGNOSIS — Z01.810 PREOP CARDIOVASCULAR EXAM: Primary | ICD-10-CM

## 2021-03-10 PROBLEM — N17.9 AKI (ACUTE KIDNEY INJURY) (HCC): Status: RESOLVED | Noted: 2021-02-09 | Resolved: 2021-03-10

## 2021-03-10 LAB
BILIRUBIN, POC: NEGATIVE
BLOOD URINE, POC: ABNORMAL
CLARITY, POC: ABNORMAL
COLOR, POC: YELLOW
GLUCOSE URINE, POC: NEGATIVE
KETONES, POC: NEGATIVE
LEUKOCYTE EST, POC: ABNORMAL
NITRITE, POC: POSITIVE
PH, POC: 5.5
PROTEIN, POC: ABNORMAL
SPECIFIC GRAVITY, POC: >1=1.03
UROBILINOGEN, POC: ABNORMAL

## 2021-03-10 PROCEDURE — 81002 URINALYSIS NONAUTO W/O SCOPE: CPT | Performed by: FAMILY MEDICINE

## 2021-03-10 PROCEDURE — G8482 FLU IMMUNIZE ORDER/ADMIN: HCPCS | Performed by: FAMILY MEDICINE

## 2021-03-10 PROCEDURE — 99212 OFFICE O/P EST SF 10 MIN: CPT | Performed by: FAMILY MEDICINE

## 2021-03-10 PROCEDURE — G8427 DOCREV CUR MEDS BY ELIG CLIN: HCPCS | Performed by: FAMILY MEDICINE

## 2021-03-10 PROCEDURE — 2022F DILAT RTA XM EVC RTNOPTHY: CPT | Performed by: FAMILY MEDICINE

## 2021-03-10 PROCEDURE — G8417 CALC BMI ABV UP PARAM F/U: HCPCS | Performed by: FAMILY MEDICINE

## 2021-03-10 RX ORDER — SODIUM CHLORIDE 0.9 % (FLUSH) 0.9 %
10 SYRINGE (ML) INJECTION EVERY 12 HOURS SCHEDULED
Status: CANCELLED | OUTPATIENT
Start: 2021-03-10

## 2021-03-10 RX ORDER — TROPICAMIDE 10 MG/ML
1 SOLUTION/ DROPS OPHTHALMIC SEE ADMIN INSTRUCTIONS
Status: CANCELLED | OUTPATIENT
Start: 2021-03-10

## 2021-03-10 RX ORDER — PHENYLEPHRINE HCL 2.5 %
1 DROPS OPHTHALMIC (EYE) SEE ADMIN INSTRUCTIONS
Status: CANCELLED | OUTPATIENT
Start: 2021-03-10

## 2021-03-10 RX ORDER — KETOROLAC TROMETHAMINE 5 MG/ML
1 SOLUTION OPHTHALMIC SEE ADMIN INSTRUCTIONS
Status: CANCELLED | OUTPATIENT
Start: 2021-03-10

## 2021-03-10 RX ORDER — SODIUM CHLORIDE 0.9 % (FLUSH) 0.9 %
10 SYRINGE (ML) INJECTION PRN
Status: CANCELLED | OUTPATIENT
Start: 2021-03-10

## 2021-03-10 RX ORDER — TETRACAINE HYDROCHLORIDE 5 MG/ML
1 SOLUTION OPHTHALMIC SEE ADMIN INSTRUCTIONS
Status: CANCELLED | OUTPATIENT
Start: 2021-03-10

## 2021-03-10 RX ORDER — INSULIN GLARGINE 100 [IU]/ML
80 INJECTION, SOLUTION SUBCUTANEOUS NIGHTLY
Qty: 10 PEN | Refills: 5 | Status: SHIPPED | OUTPATIENT
Start: 2021-03-10 | End: 2021-09-13

## 2021-03-10 RX ORDER — CIPROFLOXACIN HYDROCHLORIDE 3.5 MG/ML
1 SOLUTION/ DROPS TOPICAL SEE ADMIN INSTRUCTIONS
Status: CANCELLED | OUTPATIENT
Start: 2021-03-10

## 2021-03-10 NOTE — PATIENT INSTRUCTIONS
INSTRUCTIONS  · Night before surgery only take 40 units Basaglar. · Morning of surgery take 10 units Novolog ONLY IF sugar over 250. · AM of surgery take the following with a sip of water: metoprolol, amlodipine and lisinopril. · NEXT APPOINTMENT: Please schedule check-up in 6 weeks. · PLEASE TAKE THIS FORM TO CHECK-OUT WINDOW TO SCHEDULE NEXT VISIT. · Please get mammogram soon to screen for breast cancer. · Will call with urine culture results. May have UTI again.

## 2021-03-10 NOTE — PROGRESS NOTES
PRE-OP HISTORY AND PHYSICAL   Subjective:    Carrie Rico is a 57 y.o. female who presents to the office today for a preoperative consultation. Surgeon: Dr Nikkie Henao  Procedure: cataract taken off the left eye . Fax number: [phone]. Chief Complaint   Patient presents with    Pre-op Exam     This consultation is requested for the specific conditions prompting preoperative evaluation (i.e. because of potential affect on operative risk):      1. Preop cardiovascular exam    2. Senile cataract of left eye, unspecified age-related cataract type    3. DM (diabetes mellitus), type 2, uncontrolled with complications ([address]    4. Other screening mammogram    5. Recent urinary tract infection    6. Uncontrolled type 2 diabetes mellitus with complication, with long-term current use of insulin ([address]    7. Uncontrolled type 2 diabetes mellitus with hyperglycemia ([address]    8. Abnormal urinalysis      Planned anesthesia is Topical anesthesia and MAC. The patient has the following known anesthesia issues: none. Bleeding risk: no recent abnormal bleeding, no remote history of abnormal bleeding. Hospital for UTI 1 mo ago. SSI needed about twice a day. Review of Systems   General ROS: fever? No,    Night sweats? No  Ophthalmic ROS: change in vision? No  Endocrine ROS: fatigue? Yes, symptoms are at baseline. Unexpected weight changes? No  Hematological and Lymphatic ROS: easy bruising? No   Swollen lymph nodes? No  ENT ROS: headaches? No   Sore throat? No  Respiratory ROS: cough? No   Wheezing? No  Cardiovascular ROS: chest pain? No   Shortness of breath? No  Gastrointestinal ROS: abdominal pain? No   Change in stools? No  Genito-Urinary ROS: painful urination? No   Trouble urinating? No  Musculoskeletal ROS: trouble walking? No   Joint pain? No  Neurological ROS: TIA or stroke symptoms? No   Numbness/tingling? No  Dermatological ROS: rash? No   Changes in skin spots?  No      *Chief complaint, HPI and DILATATION N/A 5/15/2019    ESOPHAGEAL DILATION Janeen McCurtain performed by Marisel Santos MD at [address] Left 11/5/2020    LEFT INDEX FINGER TRIGGER FINGER RELEASE performed by Jass Simmons MD at [address] Right 2/5/2021    PHACOEMULSIFICATION WITH INTRAOCULAR LENS IMPLANT - RIGHT EYE performed by Susy Garcia MD at [address] ENDOSCOPY N/A 4/23/2019    EGD BIOPSY performed by Marisel Santos MD at [address] [facility] N/A 5/15/2019    EGD BIOPSY performed by Marisel Snatos MD at [facility]     Family History   Problem Relation Age of Onset    Cancer Mother         forehead soft tissue    Diabetes Mother     Arthritis Father     High Blood Pressure Father     High Cholesterol Father     Alzheimer's Disease Father     Diabetes Brother     Depression Sister      Social History     Tobacco Use    Smoking status: Never Smoker    Smokeless tobacco: Never Used    Tobacco comment: congrats   Substance Use Topics    Alcohol use: No    Drug use: Never     Allergies   Allergen Reactions    Trazodone And Nefazodone Other (See Comments)     headache    Ambien [Zolpidem Tartrate] Other (See Comments)     Vivid dreams    Diamox [Acetazolamide] Rash    Lorazepam Other (See Comments)     confusion    Naproxen Rash    Reglan [Metoclopramide] Anxiety    Sulfa Antibiotics Rash     Prior to Visit Medications    Medication Sig Taking? Authorizing Provider   insulin glargine (BASAGLAR KWIKPEN) 100 UNIT/ML injection pen Inject 80 Units into the skin nightly Yes Roxie Appiah MD   lisinopril (PRINIVIL;ZESTRIL) 20 MG tablet Take 1 tablet by mouth daily Yes Rick Santiago MD   Continuous Blood Gluc Sensor ([address] [place] [address]) MISC as needed Yes Roxie Appiah MD   gabapentin (NEURONTIN) 600 MG tablet Take 1-2 tablets by mouth nightly.  Yes Frandy Mitchell Raydell Canavan, MD   insulin aspart (NOVOLOG FLEXPEN) 100 UNIT/ML injection pen Before meal: Glucose < 100 no coverage; 100-180 take 10 units; 181-230 take 15 units; 231-280 take 20 units; > 281 take 25 units. Yes Celina Manning MD   blood glucose test strips (TRUE METRIX BLOOD GLUCOSE TEST) strip USE TO TEST TWO TIMES A DAY AND AS NEEDED FOR SYMPTOMS OF IRREGULAR BLOOD GLUCOSE Yes Celina Manning MD   tiZANidine ([place]) 4 MG tablet TAKE ONE TABLET BY MOUTH EVERY 8 HOURS AS NEEDED FOR MUSCLE SPASM Yes Celina Manning MD   atorvastatin (LIPITOR) 80 MG tablet TAKE ONE TABLET BY MOUTH DAILY Yes Celina Manning MD   QUEtiapine (SEROQUEL) 25 MG tablet TAKE TWO TABLETS BY MOUTH ONCE NIGHTLY  Patient taking differently: Take 50 mg by mouth nightly TAKE TWO TABLETS BY MOUTH ONCE NIGHTLY Yes Celina Manning MD   tolterodine (DETROL LA) 4 MG extended release capsule TAKE ONE CAPSULE BY MOUTH DAILY Yes Slime Harper MD   metoprolol succinate (TOPROL XL) 50 MG extended release tablet TAKE ONE TABLET BY MOUTH DAILY Yes Celina Manning MD   nortriptyline (PAMELOR) 25 MG capsule TAKE ONE TO TWO CAPSULES BY MOUTH ONCE NIGHTLY Yes Celina Manning MD   venlafaxine (EFFEXOR XR) 75 MG extended release capsule TAKE THREE CAPSULES BY MOUTH DAILY Yes Celina Manning MD   amLODIPine (NORVASC) 5 MG tablet TAKE ONE TABLET BY MOUTH DAILY (new lower dose) Yes Corinne Grate, APRN - CNP   Insulin Pen Needle (B-D UF III MINI PEN NEEDLES) 31G X 5 MM MISC 1 each by Does not apply route 3 times daily Yes Celina Manning MD   dicyclomine (BENTYL) 20 MG tablet Take 1 tablet by mouth every 6 hours as needed (cramping) Yes Celina Manning MD   omeprazole (PRILOSEC) 40 MG delayed release capsule Take 40 mg by mouth daily  Yes Mayank Alas MD   metroNIDAZOLE (METROGEL) 0.75 % gel Apply topically 2 times daily.   Patient taking differently: 2 times daily as needed  Yes Celina Manning MD   anastrozole (ARIMIDEX) 1 MG tablet TAKE ONE TABLET BY MOUTH DAILY Yes Mary Jacobs Dima Singh MD   lubiprostone (AMITIZA) 8 MCG CAPS capsule Take 8 mcg by mouth 2 times daily (with meals). Yes Historical Provider, MD       HISTORY:  Patient's medications, allergies, past medical, surgical, social and family histories were reviewed and updated as appropriate (See above). Objective:   PHYSICAL EXAM  BP (!) 140/86 (Site: Right Upper Arm, Position: Sitting, Cuff Size: Large Adult)   Pulse 83   Temp 97.6 °F (36.4 °C) (Temporal)   Resp 16   Ht 5' 3\" (1.6 m)   Wt 233 lb (105.7 kg)   SpO2 98%   BMI 41.27 kg/m²   BP Readings from Last 5 Encounters:   03/10/21 (!) 140/86   03/05/21 125/70   02/11/21 (!) 160/98   02/05/21 124/71   02/05/21 131/65     Wt Readings from Last 5 Encounters:   03/10/21 233 lb (105.7 kg)   03/05/21 231 lb 8 oz (105 kg)   02/11/21 233 lb 4 oz (105.8 kg)   02/05/21 230 lb (104.3 kg)   01/28/21 230 lb 3.2 oz (104.4 kg)      GENERAL:   · well-developed, well-nourished, alert, no distress. EYES:   · External findings: lids and lashes normal and conjunctivae and sclerae normal  · Eyes: no periorbital cellulitis. ENT:   · External nose and ears appear normal  · normal TM's and external ear canals both ears  · Pharynx: normal. Exudates: None  · Lips, mucosa, and tongue normal and teeth normal   · Hearing grossly normal.     NECK:   · No adenopathy, supple, symmetrical, trachea midline  · Thyroid not enlarged, symmetric, no tenderness/mass/nodules  LYMPH:  · no cervical nodes, no supraclavicular nodes  LUNGS:    · Breathing unlabored  · clear to auscultation bilaterally and good air movement  CARDIOVASC:   · regular rate and rhythm, S1, S2 normal. No murmur, click, rub or gallop  · Apical impulse normal  · LEGS:  Lower extremity edema: none    · No carotid bruits  ABDOMEN:   · Soft, non-tender, no masses  · No hepatosplenomegaly  · No hernias noted.   Exam limited by N/A  SKIN: warm and dry  · No rashes or suspicious lesions  PSYCH:    · Alert and oriented  · Normal reasoning, insight good  · Facial expressions full, mood appropriate  · No memory disturbance noted    Lab Review   Lab Results   Component Value Date     03/01/2021    K 5.0 03/01/2021    CREATININE 0.9 03/01/2021     Lab Results   Component Value Date    WBC 5.6 02/11/2021    HGB 12.0 02/11/2021    HCT 35.5 (L) 02/11/2021    MCV 86.8 02/11/2021     02/11/2021     Lab Results   Component Value Date    LABA1C 9.5 01/28/2021      Assessment:    57 y.o. female with planned surgery as above. Diagnosis Orders   1. Preop cardiovascular exam     2. Senile cataract of left eye, unspecified age-related cataract type     3. DM (diabetes mellitus), type 2, uncontrolled with complications ([address]     4. Other screening mammogram  [facility]ANDEN DIGITAL SCREEN W OR WO CAD BILATERAL   5. Recent urinary tract infection  POCT Urinalysis no Micro   6. Uncontrolled type 2 diabetes mellitus with complication, with long-term current use of insulin ([facility])  insulin glargine (BASAGLAR KWIKPEN) 100 UNIT/ML injection pen   7. Uncontrolled type 2 diabetes mellitus with hyperglycemia ([address]     8. Abnormal urinalysis  Culture, Urine       Plan:   Ok to proceed with the surgery. Low cardiac risk. Pt. advised to stop all Rx except anti-hypertensives the a.m. of surgery. Lizet Mckenna MD    INSTRUCTIONS  · Night before surgery only take 40 units Basaglar. · Morning of surgery take 10 units Novolog ONLY IF sugar over 250. · AM of surgery take the following with a sip of water: metoprolol, amlodipine and lisinopril. · NEXT APPOINTMENT: Please schedule check-up in 6 weeks. · PLEASE TAKE THIS FORM TO CHECK-OUT WINDOW TO SCHEDULE NEXT VISIT. · Please get mammogram soon to screen for breast cancer. · Will call with urine culture results. May have UTI again.

## 2021-03-11 ENCOUNTER — ANESTHESIA EVENT (OUTPATIENT)
Dept: SURGERY | Age: 61
End: 2021-03-11
Payer: COMMERCIAL

## 2021-03-12 ENCOUNTER — HOSPITAL ENCOUNTER (OUTPATIENT)
Age: 61
Setting detail: OUTPATIENT SURGERY
Discharge: HOME | End: 2021-03-12
Attending: OPHTHALMOLOGY | Admitting: OPHTHALMOLOGY
Payer: COMMERCIAL

## 2021-03-12 ENCOUNTER — ANESTHESIA (OUTPATIENT)
Dept: SURGERY | Age: 61
End: 2021-03-12
Payer: COMMERCIAL

## 2021-03-12 VITALS
BODY MASS INDEX: 41.29 KG/M2 | DIASTOLIC BLOOD PRESSURE: 71 MMHG | SYSTOLIC BLOOD PRESSURE: 142 MMHG | OXYGEN SATURATION: 98 % | WEIGHT: 233 LBS | HEIGHT: 63 IN | RESPIRATION RATE: 16 BRPM | HEART RATE: 83 BPM | TEMPERATURE: 97.3 F

## 2021-03-12 VITALS — DIASTOLIC BLOOD PRESSURE: 83 MMHG | OXYGEN SATURATION: 100 % | SYSTOLIC BLOOD PRESSURE: 171 MMHG

## 2021-03-12 DIAGNOSIS — F33.1 MODERATE EPISODE OF RECURRENT MAJOR DEPRESSIVE DISORDER (HCC): ICD-10-CM

## 2021-03-12 LAB
GLUCOSE BLD-MCNC: 75 MG/DL (ref 70–99)
GLUCOSE BLD-MCNC: 93 MG/DL (ref 70–99)
ORGANISM: ABNORMAL
PERFORMED ON: NORMAL
PERFORMED ON: NORMAL
URINE CULTURE, ROUTINE: ABNORMAL

## 2021-03-12 PROCEDURE — 3700000000 HC ANESTHESIA ATTENDED CARE: Performed by: OPHTHALMOLOGY

## 2021-03-12 PROCEDURE — 6360000002 HC RX W HCPCS: Performed by: NURSE ANESTHETIST, CERTIFIED REGISTERED

## 2021-03-12 PROCEDURE — 3700000001 HC ADD 15 MINUTES (ANESTHESIA): Performed by: OPHTHALMOLOGY

## 2021-03-12 PROCEDURE — 2709999900 HC NON-CHARGEABLE SUPPLY: Performed by: OPHTHALMOLOGY

## 2021-03-12 PROCEDURE — 6370000000 HC RX 637 (ALT 250 FOR IP): Performed by: OPHTHALMOLOGY

## 2021-03-12 PROCEDURE — 2580000003 HC RX 258: Performed by: ANESTHESIOLOGY

## 2021-03-12 PROCEDURE — V2632 POST CHMBR INTRAOCULAR LENS: HCPCS | Performed by: OPHTHALMOLOGY

## 2021-03-12 PROCEDURE — 3600000004 HC SURGERY LEVEL 4 BASE: Performed by: OPHTHALMOLOGY

## 2021-03-12 PROCEDURE — 3600000014 HC SURGERY LEVEL 4 ADDTL 15MIN: Performed by: OPHTHALMOLOGY

## 2021-03-12 PROCEDURE — 7100000010 HC PHASE II RECOVERY - FIRST 15 MIN: Performed by: OPHTHALMOLOGY

## 2021-03-12 PROCEDURE — 2500000003 HC RX 250 WO HCPCS: Performed by: OPHTHALMOLOGY

## 2021-03-12 DEVICE — LENS INTOCU 20.5 DIOPT 118.7 A CONSTANT L13MM DIA6MM 0DEG: Type: IMPLANTABLE DEVICE | Site: EYE | Status: FUNCTIONAL

## 2021-03-12 RX ORDER — BALANCED SALT SOLUTION 6.4; .75; .48; .3; 3.9; 1.7 MG/ML; MG/ML; MG/ML; MG/ML; MG/ML; MG/ML
SOLUTION OPHTHALMIC
Status: COMPLETED | OUTPATIENT
Start: 2021-03-12 | End: 2021-03-12

## 2021-03-12 RX ORDER — MIDAZOLAM HYDROCHLORIDE 1 MG/ML
INJECTION INTRAMUSCULAR; INTRAVENOUS PRN
Status: DISCONTINUED | OUTPATIENT
Start: 2021-03-12 | End: 2021-03-12 | Stop reason: SDUPTHER

## 2021-03-12 RX ORDER — TROPICAMIDE 10 MG/ML
1 SOLUTION/ DROPS OPHTHALMIC SEE ADMIN INSTRUCTIONS
Status: DISCONTINUED | OUTPATIENT
Start: 2021-03-12 | End: 2021-03-12 | Stop reason: HOSPADM

## 2021-03-12 RX ORDER — NITROFURANTOIN 25; 75 MG/1; MG/1
100 CAPSULE ORAL 2 TIMES DAILY
Qty: 20 CAPSULE | Refills: 0 | Status: SHIPPED | OUTPATIENT
Start: 2021-03-12 | End: 2021-03-22

## 2021-03-12 RX ORDER — SODIUM CHLORIDE 9 MG/ML
INJECTION, SOLUTION INTRAVENOUS CONTINUOUS
Status: DISCONTINUED | OUTPATIENT
Start: 2021-03-12 | End: 2021-03-12 | Stop reason: HOSPADM

## 2021-03-12 RX ORDER — KETOROLAC TROMETHAMINE 5 MG/ML
1 SOLUTION OPHTHALMIC SEE ADMIN INSTRUCTIONS
Status: COMPLETED | OUTPATIENT
Start: 2021-03-12 | End: 2021-03-12

## 2021-03-12 RX ORDER — SODIUM CHLORIDE 0.9 % (FLUSH) 0.9 %
10 SYRINGE (ML) INJECTION EVERY 12 HOURS SCHEDULED
Status: DISCONTINUED | OUTPATIENT
Start: 2021-03-12 | End: 2021-03-12 | Stop reason: SDUPTHER

## 2021-03-12 RX ORDER — TETRACAINE HYDROCHLORIDE 5 MG/ML
SOLUTION OPHTHALMIC
Status: COMPLETED | OUTPATIENT
Start: 2021-03-12 | End: 2021-03-12

## 2021-03-12 RX ORDER — OFLOXACIN 3 MG/ML
SOLUTION/ DROPS OPHTHALMIC
Status: COMPLETED | OUTPATIENT
Start: 2021-03-12 | End: 2021-03-12

## 2021-03-12 RX ORDER — TETRACAINE HYDROCHLORIDE 5 MG/ML
1 SOLUTION OPHTHALMIC SEE ADMIN INSTRUCTIONS
Status: DISCONTINUED | OUTPATIENT
Start: 2021-03-12 | End: 2021-03-12 | Stop reason: HOSPADM

## 2021-03-12 RX ORDER — SODIUM CHLORIDE 0.9 % (FLUSH) 0.9 %
10 SYRINGE (ML) INJECTION EVERY 12 HOURS SCHEDULED
Status: DISCONTINUED | OUTPATIENT
Start: 2021-03-12 | End: 2021-03-12 | Stop reason: HOSPADM

## 2021-03-12 RX ORDER — CIPROFLOXACIN HYDROCHLORIDE 3.5 MG/ML
1 SOLUTION/ DROPS TOPICAL SEE ADMIN INSTRUCTIONS
Status: COMPLETED | OUTPATIENT
Start: 2021-03-12 | End: 2021-03-12

## 2021-03-12 RX ORDER — BRIMONIDINE TARTRATE 2 MG/ML
SOLUTION/ DROPS OPHTHALMIC
Status: COMPLETED | OUTPATIENT
Start: 2021-03-12 | End: 2021-03-12

## 2021-03-12 RX ORDER — SODIUM CHLORIDE 0.9 % (FLUSH) 0.9 %
10 SYRINGE (ML) INJECTION PRN
Status: DISCONTINUED | OUTPATIENT
Start: 2021-03-12 | End: 2021-03-12 | Stop reason: HOSPADM

## 2021-03-12 RX ORDER — SODIUM CHLORIDE 0.9 % (FLUSH) 0.9 %
10 SYRINGE (ML) INJECTION PRN
Status: DISCONTINUED | OUTPATIENT
Start: 2021-03-12 | End: 2021-03-12 | Stop reason: SDUPTHER

## 2021-03-12 RX ORDER — PHENAZOPYRIDINE HYDROCHLORIDE 200 MG/1
200 TABLET, FILM COATED ORAL 3 TIMES DAILY PRN
Qty: 9 TABLET | Refills: 0 | Status: SHIPPED | OUTPATIENT
Start: 2021-03-12 | End: 2021-03-15

## 2021-03-12 RX ORDER — PHENYLEPHRINE HCL 2.5 %
1 DROPS OPHTHALMIC (EYE) SEE ADMIN INSTRUCTIONS
Status: DISCONTINUED | OUTPATIENT
Start: 2021-03-12 | End: 2021-03-12 | Stop reason: HOSPADM

## 2021-03-12 RX ADMIN — PHENYLEPHRINE HYDROCHLORIDE 1 DROP: 2.5 SOLUTION/ DROPS OPHTHALMIC at 07:05

## 2021-03-12 RX ADMIN — MIDAZOLAM 1 MG: 1 INJECTION INTRAMUSCULAR; INTRAVENOUS at 07:25

## 2021-03-12 RX ADMIN — PHENYLEPHRINE HYDROCHLORIDE 1 DROP: 2.5 SOLUTION/ DROPS OPHTHALMIC at 06:55

## 2021-03-12 RX ADMIN — SODIUM CHLORIDE: 9 INJECTION, SOLUTION INTRAVENOUS at 07:19

## 2021-03-12 RX ADMIN — KETOROLAC TROMETHAMINE 1 DROP: 5 SOLUTION OPHTHALMIC at 06:55

## 2021-03-12 RX ADMIN — CIPROFLOXACIN 1 DROP: 3 SOLUTION OPHTHALMIC at 06:55

## 2021-03-12 RX ADMIN — TROPICAMIDE 1 DROP: 10 SOLUTION/ DROPS OPHTHALMIC at 06:55

## 2021-03-12 RX ADMIN — SODIUM CHLORIDE: 9 INJECTION, SOLUTION INTRAVENOUS at 06:59

## 2021-03-12 RX ADMIN — MIDAZOLAM 1 MG: 1 INJECTION INTRAMUSCULAR; INTRAVENOUS at 07:27

## 2021-03-12 RX ADMIN — TROPICAMIDE 1 DROP: 10 SOLUTION/ DROPS OPHTHALMIC at 07:05

## 2021-03-12 RX ADMIN — POVIDONE-IODINE 0.1 ML: 5 SOLUTION OPHTHALMIC at 07:05

## 2021-03-12 RX ADMIN — CIPROFLOXACIN 1 DROP: 3 SOLUTION OPHTHALMIC at 07:05

## 2021-03-12 RX ADMIN — TETRACAINE HYDROCHLORIDE 1 DROP: 5 SOLUTION OPHTHALMIC at 06:52

## 2021-03-12 ASSESSMENT — PAIN SCALES - GENERAL: PAINLEVEL_OUTOF10: 0

## 2021-03-12 NOTE — OP NOTE
Jose Armando Cabrera    OPERATIVE NOTE    Preoperative Diagnosis: Cataract left eye    Postoperative Diagnosis: Cataract left eye    Procedure: Phacoemulsification with intraocular lens implantation, left eye  Surgeon: Chey Lindquist MD    Anesthesia: MAC, topical.    Complications: none    Estimated blood loss: less than 1 ml. Specimens: none    Indications for procedure: The patient is a 57y.o. year old with decreased vision, glare and halos around lights, and trouble with activities of daily living. Examination revealed a visually significant cataract in the left eye. Risks, benefits, and alternatives to surgery were discussed with the patient and the patient elected to proceed with phacoemulsification with lens implantation. Details of the procedure: Following informed consent, the patient was taken to the operating room and placed in the supine position. Monitored anesthesia care was administered. The eye was prepped and draped in the usual sterile fashion using aseptic technique for cataract surgery. A side port incision was made. 1% preservative free lidocaine was injected through the side port incision for topical anesthesia. The eye was filled with viscoelastic and a 2.4 mm keratome blade was used to make a 3-plane clear corneal incision in the temporal cornea. The cystitome was used to make a tear in the anterior capsule and a Utrata forceps was used to make a complete curvilinear capsulorrhexis. The lens was hydrodissected and freely rotated. Phacoemulsification was performed. Irrigation/aspiration was used to remove all cortical material from the capsular bag. The eye was filled with viscoelastic and a foldable posterior chamber intraocular lens was injected into the capsular bag. The lens was rotated to the appropriate axis as needed. Irrigation/aspiration was used to remove all excess viscoelastic. The eye was pressurized with BSS and the wounds were check for leaks and none were found. The patient had ofloxacin and Alphagan solutions placed on the eye. The patient went to the PACU in excellent condition, having tolerated the procedure well.

## 2021-03-12 NOTE — H&P
The H&P has been reviewed, the patient was examined, and no significant change has occurred in the patient's condition since the H&P was completed. Risks, benefits, and alternatives to surgery have been discussed with the patient and the patient elected to proceed.

## 2021-03-12 NOTE — ANESTHESIA PRE PROCEDURE
[facility] Department of Anesthesiology  Pre-Anesthesia Evaluation/Consultation       Name:  Dimas Prado  : 1960  Age:  57 y.o. MRN:  [id number]  Date: 3/12/2021           Surgeon: Surgeon(s):  Stacy Barreto MD    Procedure: Procedure(s):  PHACOEMULSIFICATION WITH INTRAOCULAR LENS IMPLANT - LEFT EYE     Allergies   Allergen Reactions    Trazodone And Nefazodone Other (See Comments)     headache    Ambien [Zolpidem Tartrate] Other (See Comments)     Vivid dreams    Diamox [Acetazolamide] Rash    Lorazepam Other (See Comments)     confusion    Naproxen Rash    Reglan [Metoclopramide] Anxiety    Sulfa Antibiotics Rash     Patient Active Problem List   Diagnosis    DM (diabetes mellitus), type 2, uncontrolled with complications ([address]    Diabetic gastroparesis ([address]    Allergic rhinitis    Insomnia    Essential hypertension    Chronic knee pain    Headaches due to old head injury    Nephropathy, diabetic ([address]    Migraine    Hip pain, bilateral    Depression, major    Right knee DJD    Calcific Achilles tendonitis, right    Right sided sciatica    Lumbar spinal stenosis    TMJ (temporomandibular joint disorder)    Urge incontinence of urine    Personal history of breast cancer    Hyperlipidemia LDL goal <624    Metallic taste    Polyarthralgia    Severe obesity (BMI 35.0-39. 9) with comorbidity ([address]    Neck pain    Chronic low back pain    Intractable low back pain    Plantar fasciitis of right foot    Anxiety    History of colon polyps    Irritable bowel syndrome with diarrhea     Past Medical History:   Diagnosis Date    Allergic rhinitis 5/15/2014    Anxiety and depression     Arthritis     Breast cancer ([address] 2010    left breast    Cataract     bilateral    Chronic back pain     Diabetes mellitus ([address]     DM (diabetes mellitus), type 2, uncontrolled with complications ([address] 7/3/1026    ESBL (extended spectrum beta-lactamase) producing bacteria infection 10/06/2018    urine    GERD (gastroesophageal reflux disease)     History of migraine headaches     HTN (hypertension) 5/15/2014    Hyperlipidemia     Irritable bowel syndrome (IBS)     Obesity     Wears glasses     reading     Past Surgical History:   Procedure Laterality Date    ACHILLES TENDON SURGERY Right     BACK SURGERY  2013    lumbar    BREAST LUMPECTOMY Left 2010    CARPAL TUNNEL RELEASE Bilateral      SECTION      x 1    CHOLECYSTECTOMY      COLONOSCOPY  10/16/2017    polypectomy- Dr Carlos Wilkins 5/15/2019    ESOPHAGEAL June Cunas performed by Heaven Irvin MD at [address] Left 2020    LEFT INDEX FINGER TRIGGER FINGER RELEASE performed by Lv Light MD at [address] Right 2021    PHACOEMULSIFICATION WITH INTRAOCULAR LENS IMPLANT - RIGHT EYE performed by Gill Simmonds, MD at [address] [facility] N/A 2019    EGD BIOPSY performed by Heaven Irvin MD at [address] N/A 5/15/2019    EGD BIOPSY performed by Heaven Irvin MD at [address] History     Tobacco Use    Smoking status: Never Smoker    Smokeless tobacco: Never Used    Tobacco comment: congrats   Substance Use Topics    Alcohol use: No    Drug use: Never     Medications  Current Facility-Administered Medications on File Prior to Visit   Medication Dose Route Frequency Provider Last Rate Last Admin    0.9 % sodium chloride infusion   Intravenous Continuous Fany Lepe MD   New Bag at 21 0659    sodium chloride flush 0.9 % injection 10 mL  10 mL Intravenous 2 times per day Fany Lepe MD        sodium chloride flush 0.9 % injection 10 mL  10 mL Intravenous PRN Fany Lepe MD        phenylephrine (MYDFRIN) 2.5 % ophthalmic MOUTH DAILY 90 tablet 0    nortriptyline (PAMELOR) 25 MG capsule TAKE ONE TO TWO CAPSULES BY MOUTH ONCE NIGHTLY 180 capsule 1    venlafaxine (EFFEXOR XR) 75 MG extended release capsule TAKE THREE CAPSULES BY MOUTH DAILY 270 capsule 0    amLODIPine (NORVASC) 5 MG tablet TAKE ONE TABLET BY MOUTH DAILY (new lower dose) 90 tablet 1    Insulin Pen Needle (B-D UF III MINI PEN NEEDLES) 31G X 5 MM MISC 1 each by Does not apply route 3 times daily 300 each 3    dicyclomine (BENTYL) 20 MG tablet Take 1 tablet by mouth every 6 hours as needed (cramping) 60 tablet 3    omeprazole (PRILOSEC) 40 MG delayed release capsule Take 40 mg by mouth daily       metroNIDAZOLE (METROGEL) 0.75 % gel Apply topically 2 times daily. (Patient taking differently: 2 times daily as needed ) 1 Tube 3    anastrozole (ARIMIDEX) 1 MG tablet TAKE ONE TABLET BY MOUTH DAILY 30 tablet 5    lubiprostone (AMITIZA) 8 MCG CAPS capsule Take 8 mcg by mouth 2 times daily (with meals). No current facility-administered medications for this visit. No current outpatient medications on file.      Facility-Administered Medications Ordered in Other Visits   Medication Dose Route Frequency Provider Last Rate Last Admin    0.9 % sodium chloride infusion   Intravenous Continuous Tessa Bello MD   New Bag at 03/12/21 0659    sodium chloride flush 0.9 % injection 10 mL  10 mL Intravenous 2 times per day Tessa Bello MD        sodium chloride flush 0.9 % injection 10 mL  10 mL Intravenous PRN Tessa Bello MD        phenylephrine (MYDFRIN) 2.5 % ophthalmic solution 1 drop  1 drop Left Eye See Admin Instructions Tomy Hodgkins, MD   1 drop at 03/12/21 0705    povidone-iodine 5 % ophthalmic solution 0.1 mL  1 drop Left Eye See Admin Instructions Tomy Hodgkins, MD   0.1 mL at 03/12/21 0705    tetracaine (TETRAVISC) 0.5 % ophthalmic solution 1 drop  1 drop Left Eye See Admin Instructions Tomy Hodgkins, MD   1 drop at 03/12/21 1533    tropicamide (MYDRIACYL) 1 % ophthalmic solution 1 drop  1 drop Left Eye See Admin Instructions Anita Farrell MD   1 drop at 21 0705     Vital Signs (Current)   There were no vitals filed for this visit. Vital Signs Statistics (for past 48 hrs)     Temp  Av.2 °F (35.7 °C)  Min: 96.2 °F (35.7 °C)   Min taken time: 21 0654  Max: 96.2 °F (35.7 °C)   Max taken time: 21 0654  Pulse  Av  Min: 80   Min taken time: 21 0654  Max: 80   Max taken time: 21 0654  Resp  Av  Min: 23   Min taken time: 21 0654  Max: 23   Max taken time: 21 0654  BP  Min: 160/86   Min taken time: 21 0654  Max: 160/86   Max taken time: 21 0654  SpO2  Av %  Min: 99 %   Min taken time: 21 0654  Max: 99 %   Max taken time: 21 0654    BP Readings from Last 3 Encounters:   21 (!) 160/86   03/10/21 (!) 140/86   21 125/70     BMI  There is no height or weight on file to calculate BMI. Estimated body mass index is 41.27 kg/m² as calculated from the following:    Height as of an earlier encounter on 3/12/21: 5' 3\" (1.6 m). Weight as of an earlier encounter on 3/12/21: 233 lb (105.7 kg).     CBC   Lab Results   Component Value Date    WBC 5.6 2021    RBC 4.09 2021    RBC 4.74 2015    HGB 12.0 2021    HCT 35.5 2021    MCV 86.8 2021    RDW 14.9 2021     2021     CMP    Lab Results   Component Value Date     2021    K 5.0 2021    K 5.1 2021     2021    CO2 28 2021    BUN 13 2021    CREATININE 0.9 2021    GFRAA >60 2021    GFRAA >60 04/10/2013    AGRATIO 1.5 10/06/2020    LABGLOM >60 2021    GLUCOSE 136 2021    GLUCOSE 380 2015    PROT 6.6 10/06/2020    PROT 6.9 2015    CALCIUM 10.1 2021    BILITOT 0.3 10/06/2020    ALKPHOS 123 10/06/2020    AST 16 10/06/2020    ALT 12 10/06/2020     BMP    Lab Results   Component Value discussed with CRNA. This pre-anesthesia assessment may be used as a history and physical.    DOS STAFF ADDENDUM:    Pt seen and examined, chart reviewed (including anesthesia, drug and allergy history). No interval changes to history and physical examination. Anesthetic plan, risks, benefits, alternatives, and personnel involved discussed with patient. Questions and concerns addressed. Patient(family) verbalized an understanding and agrees to proceed.       Luwanna Sicard, MD  March 12, 2021  7:11 AM

## 2021-03-12 NOTE — ANESTHESIA POSTPROCEDURE EVALUATION
Department of Anesthesiology  Postprocedure Note    Patient: Keila Leon  MRN: [id number]  YOB: 1960  Date of evaluation: 3/12/2021  Time:  9:44 AM     Procedure Summary     Date: 03/12/21 Room / Location: [address]    Anesthesia Start: 2572 Anesthesia Stop: 0440    Procedure: PHACOEMULSIFICATION WITH INTRAOCULAR LENS IMPLANT - LEFT EYE (Left Eye) Diagnosis:       Combined forms of age-related cataract of left eye      (cataract of left eye)    Surgeons: Shelbi Mccain MD Responsible Provider: Gavi Murphy MD    Anesthesia Type: MAC ASA Status: 3          Anesthesia Type: MAC    Eva Phase I: Eva Score: 10    Eva Phase II: Eva Score: 9    Last vitals: Reviewed and per EMR flowsheets.        Anesthesia Post Evaluation    Patient location during evaluation: PACU  Level of consciousness: awake and alert  Airway patency: patent  Nausea & Vomiting: no nausea and no vomiting  Complications: no  Cardiovascular status: blood pressure returned to baseline  Respiratory status: acceptable  Hydration status: euvolemic  Comments: Postoperative Anesthesia Note    Name:    Keila Leon  MRN:      [id number]    Patient Vitals in the past 12 hrs:  03/12/21 0753, BP:(!) 142/71, Pulse:83, Resp:16, SpO2:98 %  03/12/21 0745, BP:(!) 161/82, Temp:97.3 °F (36.3 °C), Temp src:Temporal, Pulse:82, Resp:20, SpO2:98 %  03/12/21 0654, BP:(!) 160/86, Temp:96.2 °F (35.7 °C), Temp src:Temporal, Pulse:83, Resp:19, SpO2:99 %, Height:5' 3\" (1.6 m), Weight:233 lb (105.7 kg)     LABS:    CBC  Lab Results       Component                Value               Date/Time                  WBC                      5.6                 02/11/2021 07:57 AM        HGB                      12.0                02/11/2021 07:57 AM        HCT                      35.5 (L)            02/11/2021 07:57 AM        PLT                      208                 02/11/2021 07:57 AM   RENAL  Lab Results

## 2021-03-15 RX ORDER — VENLAFAXINE HYDROCHLORIDE 75 MG/1
CAPSULE, EXTENDED RELEASE ORAL
Qty: 270 CAPSULE | Refills: 0 | Status: SHIPPED | OUTPATIENT
Start: 2021-03-15 | End: 2021-07-06

## 2021-03-15 RX ORDER — METOPROLOL SUCCINATE 50 MG/1
TABLET, EXTENDED RELEASE ORAL
Qty: 90 TABLET | Refills: 0 | Status: SHIPPED | OUTPATIENT
Start: 2021-03-15 | End: 2021-07-06 | Stop reason: SDUPTHER

## 2021-04-01 DIAGNOSIS — S16.1XXD STRAIN OF NECK MUSCLE, SUBSEQUENT ENCOUNTER: ICD-10-CM

## 2021-04-05 RX ORDER — QUETIAPINE FUMARATE 25 MG/1
TABLET, FILM COATED ORAL
Qty: 180 TABLET | Refills: 0 | Status: SHIPPED | OUTPATIENT
Start: 2021-04-05 | End: 2021-07-02

## 2021-04-05 RX ORDER — TIZANIDINE 4 MG/1
TABLET ORAL
Qty: 30 TABLET | Refills: 0 | Status: SHIPPED | OUTPATIENT
Start: 2021-04-05 | End: 2021-05-14

## 2021-04-14 ENCOUNTER — TELEPHONE (OUTPATIENT)
Dept: PSYCHIATRY | Age: 61
End: 2021-04-14

## 2021-04-14 NOTE — TELEPHONE ENCOUNTER
----- Message from Sonia Hernandez sent at 4/14/2021  3:45 PM EDT -----  Subject: Message to Provider    QUESTIONS  Information for Provider? Patient believes she has an UTI would like the   pcp to prescribe her some antibiotics to clear this up.   ---------------------------------------------------------------------------  --------------  CALL BACK INFO  What is the best way for the office to contact you? OK to leave message on   voicemail  Preferred Call Back Phone Number? [phone]  ---------------------------------------------------------------------------  --------------  SCRIPT ANSWERS  Relationship to Patient?  Self

## 2021-04-15 ENCOUNTER — OFFICE VISIT (OUTPATIENT)
Dept: FAMILY MEDICINE CLINIC | Age: 61
End: 2021-04-15
Payer: COMMERCIAL

## 2021-04-15 VITALS
TEMPERATURE: 98.8 F | SYSTOLIC BLOOD PRESSURE: 148 MMHG | DIASTOLIC BLOOD PRESSURE: 88 MMHG | RESPIRATION RATE: 16 BRPM | WEIGHT: 225.8 LBS | HEART RATE: 92 BPM | BODY MASS INDEX: 40.01 KG/M2 | HEIGHT: 63 IN | OXYGEN SATURATION: 96 %

## 2021-04-15 DIAGNOSIS — N39.0 RECURRENT UTI: ICD-10-CM

## 2021-04-15 DIAGNOSIS — R35.0 URINARY FREQUENCY: Primary | ICD-10-CM

## 2021-04-15 DIAGNOSIS — E66.01 MORBID OBESITY (HCC): ICD-10-CM

## 2021-04-15 LAB
BILIRUBIN, POC: NEGATIVE
BLOOD URINE, POC: NORMAL
CLARITY, POC: NORMAL
COLOR, POC: YELLOW
GLUCOSE URINE, POC: 250
KETONES, POC: NEGATIVE
LEUKOCYTE EST, POC: NEGATIVE
NITRITE, POC: NEGATIVE
PH, POC: 5.5
PROTEIN, POC: 300
SPECIFIC GRAVITY, POC: 1.03
UROBILINOGEN, POC: 1

## 2021-04-15 PROCEDURE — 99214 OFFICE O/P EST MOD 30 MIN: CPT | Performed by: NURSE PRACTITIONER

## 2021-04-15 PROCEDURE — 81002 URINALYSIS NONAUTO W/O SCOPE: CPT | Performed by: NURSE PRACTITIONER

## 2021-04-15 PROCEDURE — G8417 CALC BMI ABV UP PARAM F/U: HCPCS | Performed by: NURSE PRACTITIONER

## 2021-04-15 PROCEDURE — G8427 DOCREV CUR MEDS BY ELIG CLIN: HCPCS | Performed by: NURSE PRACTITIONER

## 2021-04-15 PROCEDURE — 1036F TOBACCO NON-USER: CPT | Performed by: NURSE PRACTITIONER

## 2021-04-15 PROCEDURE — 3017F COLORECTAL CA SCREEN DOC REV: CPT | Performed by: NURSE PRACTITIONER

## 2021-04-15 RX ORDER — NITROFURANTOIN 25; 75 MG/1; MG/1
100 CAPSULE ORAL 2 TIMES DAILY
Qty: 14 CAPSULE | Refills: 0 | Status: SHIPPED | OUTPATIENT
Start: 2021-04-15 | End: 2021-04-22

## 2021-04-15 ASSESSMENT — ENCOUNTER SYMPTOMS
CONSTIPATION: 1
NAUSEA: 0
DIARRHEA: 0
ABDOMINAL PAIN: 0
BACK PAIN: 1
SHORTNESS OF BREATH: 0
VOMITING: 0

## 2021-04-15 NOTE — PROGRESS NOTES
4/15/2021    This is a 57 y.o. female   Chief Complaint   Patient presents with    Back Pain     lower back. frequency. cloudy and odor to urine. hx of uti   . Urinary Frequency   This is a recurrent problem. The current episode started in the past 7 days. The problem occurs every urination. The problem has been gradually worsening. The patient is experiencing no pain. There has been no fever. Associated symptoms include flank pain, frequency and hematuria. Pertinent negatives include no discharge, nausea or vomiting. Treatments tried: crandberry tabs  The treatment provided no relief. Had e coli UTI on 3/10/21 and treated with macrobid. Reports that she did not have any urinary symptoms at that time. Reports that she has history of recurrent UTI. Has not seen urologist.     Obesity- reports that she would like to decrease her weight. Does not follow specific diet or exercise. Patient Active Problem List   Diagnosis    DM (diabetes mellitus), type 2, uncontrolled with complications ([address]    Diabetic gastroparesis ([address]    Allergic rhinitis    Insomnia    Essential hypertension    Chronic knee pain    Headaches due to old head injury    Nephropathy, diabetic ([address]    Migraine    Hip pain, bilateral    Depression, major    Right knee DJD    Calcific Achilles tendonitis, right    Right sided sciatica    Lumbar spinal stenosis    TMJ (temporomandibular joint disorder)    Urge incontinence of urine    Personal history of breast cancer    Hyperlipidemia LDL goal <206    Metallic taste    Polyarthralgia    Severe obesity (BMI 35.0-39. 9) with comorbidity (HCC)    Neck pain    Chronic low back pain    Intractable low back pain    Plantar fasciitis of right foot    Anxiety    History of colon polyps    Irritable bowel syndrome with diarrhea          Current Outpatient Medications   Medication Sig Dispense Refill    QUEtiapine (SEROQUEL) 25 MG tablet TAKE TWO TABLETS BY MOUTH ONCE NIGHTLY 180 tablet 0    tiZANidine (ZANAFLEX) 4 MG tablet TAKE ONE TABLET BY MOUTH EVERY 8 HOURS AS NEEDED FOR MUSCLE SPASM 30 tablet 0    atorvastatin (LIPITOR) 80 MG tablet TAKE ONE TABLET BY MOUTH DAILY 90 tablet 0    metoprolol succinate (TOPROL XL) 50 MG extended release tablet TAKE ONE TABLET BY MOUTH DAILY 90 tablet 0    venlafaxine (EFFEXOR XR) 75 MG extended release capsule TAKE THREE CAPSULES BY MOUTH DAILY 270 capsule 0    tolterodine (DETROL LA) 4 MG extended release capsule TAKE ONE CAPSULE BY MOUTH DAILY 90 capsule 0    insulin glargine (BASAGLAR KWIKPEN) 100 UNIT/ML injection pen Inject 80 Units into the skin nightly 10 pen 5    lisinopril (PRINIVIL;ZESTRIL) 20 MG tablet Take 1 tablet by mouth daily 30 tablet 1    Continuous Blood Gluc Sensor ([employer] KENDAL SENSOR SYSTEM) MISC as needed 2 each 5    gabapentin (NEURONTIN) 600 MG tablet Take 1-2 tablets by mouth nightly. 180 tablet 2    insulin aspart (NOVOLOG FLEXPEN) 100 UNIT/ML injection pen Before meal: Glucose < 100 no coverage; 100-180 take 10 units; 181-230 take 15 units; 231-280 take 20 units; > 281 take 25 units. 10 pen 3    blood glucose test strips (TRUE METRIX BLOOD GLUCOSE TEST) strip USE TO TEST TWO TIMES A DAY AND AS NEEDED FOR SYMPTOMS OF IRREGULAR BLOOD GLUCOSE 200 strip 2    nortriptyline (PAMELOR) 25 MG capsule TAKE ONE TO TWO CAPSULES BY MOUTH ONCE NIGHTLY 180 capsule 1    amLODIPine (NORVASC) 5 MG tablet TAKE ONE TABLET BY MOUTH DAILY (new lower dose) 90 tablet 1    Insulin Pen Needle (B-D UF III MINI PEN NEEDLES) 31G X 5 MM MISC 1 each by Does not apply route 3 times daily 300 each 3    dicyclomine (BENTYL) 20 MG tablet Take 1 tablet by mouth every 6 hours as needed (cramping) 60 tablet 3    omeprazole (PRILOSEC) 40 MG delayed release capsule Take 40 mg by mouth daily       metroNIDAZOLE (METROGEL) 0.75 % gel Apply topically 2 times daily.  (Patient taking differently: 2 times daily as needed ) 1 Tube 3    anastrozole (ARIMIDEX) 1 MG tablet TAKE ONE TABLET BY MOUTH DAILY 30 tablet 5    lubiprostone (AMITIZA) 8 MCG CAPS capsule Take 8 mcg by mouth 2 times daily (with meals). No current facility-administered medications for this visit. Allergies   Allergen Reactions    Trazodone And Nefazodone Other (See Comments)     headache    Ambien [Zolpidem Tartrate] Other (See Comments)     Vivid dreams    Diamox [Acetazolamide] Rash    Lorazepam Other (See Comments)     confusion    Naproxen Rash    Reglan [Metoclopramide] Anxiety    Sulfa Antibiotics Rash       Review of Systems   Constitutional: Positive for fatigue. Respiratory: Negative for shortness of breath. Cardiovascular: Negative for chest pain. Gastrointestinal: Positive for constipation. Negative for abdominal pain, diarrhea, nausea and vomiting. On amitiza BID for constipation   Genitourinary: Positive for flank pain, frequency, hematuria and pelvic pain. Negative for difficulty urinating and dysuria. Musculoskeletal: Positive for back pain. Vitals:    04/15/21 1448   BP: (!) 148/88   Site: Left Upper Arm   Position: Sitting   Cuff Size: Large Adult   Pulse: 92   Resp: 16   Temp: 98.8 °F (37.1 °C)   SpO2: 96%   Weight: 225 lb 12.8 oz (102.4 kg)   Height: 5' 3\" (1.6 m)       Body mass index is 40 kg/m². Wt Readings from Last 3 Encounters:   04/15/21 225 lb 12.8 oz (102.4 kg)   03/12/21 233 lb (105.7 kg)   03/10/21 233 lb (105.7 kg)       BP Readings from Last 3 Encounters:   04/15/21 (!) 148/88   03/12/21 (!) 171/83   03/12/21 (!) 142/71       Physical Exam  Vitals signs and nursing note reviewed. Constitutional:       General: She is not in acute distress. Appearance: She is well-developed. She is obese. HENT:      Head: Normocephalic and atraumatic. Neck:      Musculoskeletal: Neck supple. Cardiovascular:      Rate and Rhythm: Normal rate and regular rhythm. Heart sounds: Normal heart sounds.  No murmur. No friction rub. No gallop. Pulmonary:      Effort: Pulmonary effort is normal. No respiratory distress. Breath sounds: Normal breath sounds. Abdominal:      Palpations: Abdomen is soft. Tenderness: There is abdominal tenderness in the suprapubic area. There is no right CVA tenderness, left CVA tenderness, guarding or rebound. Skin:     General: Skin is warm and dry. Neurological:      Mental Status: She is alert and oriented to person, place, and time. Psychiatric:         Behavior: Behavior normal.         Thought Content: Thought content normal.         Judgment: Judgment normal.         Assessmentand Plan  Letha Cooper was seen today. Diagnoses and all orders for this visit:    Urinary frequency  -     POCT Urinalysis no Micro  -     Culture, Urine    Recurrent UTI  -     Culture, Urine  -     AFL - Brit Roy MD, Urology, [facility]  -     nitrofurantoin, macrocrystal-monohydrate, (MACROBID) 100 MG capsule; Take 1 capsule by mouth 2 times daily for 7 days For treatment of urinary tract infection. Advised to stay hydrated with water. See urology for recurrent UTIs    Morbid obesity ([address]  -     [address] [facility] [place], DO - Bariatric Surgery, [facility]  Refer to Dr. Keshawn Blake for evaluation. Discussed the importance of diabetes control on overall health. She reports that she has been working with PCP Dr. Oliva Vivar on better BS control. Return in about 8 weeks (around 6/10/2021), or if symptoms worsen or fail to improve, for chronic conditions, with Dr. Oliva Vivar.

## 2021-04-18 LAB
ORGANISM: ABNORMAL
URINE CULTURE, ROUTINE: ABNORMAL

## 2021-04-26 ENCOUNTER — TELEPHONE (OUTPATIENT)
Dept: FAMILY MEDICINE CLINIC | Age: 61
End: 2021-04-26

## 2021-04-26 DIAGNOSIS — E11.21 DIABETIC NEPHROPATHY ASSOCIATED WITH TYPE 2 DIABETES MELLITUS (HCC): Primary | ICD-10-CM

## 2021-04-26 NOTE — TELEPHONE ENCOUNTER
Does she want a doctor for her recurrent UTI? If so, she wants urology (not nephrology). Mara Plata (Nurse Practitioner) put in referral on 4/15. Nephrology is for renal failure. Her renal function is normal.    [facility]  [address] [place] [address]  [phone] (Phone)  [phone] (Fax)

## 2021-04-29 DIAGNOSIS — I10 ESSENTIAL HYPERTENSION: ICD-10-CM

## 2021-04-29 RX ORDER — LISINOPRIL 40 MG/1
TABLET ORAL
Qty: 90 TABLET | Refills: 0 | OUTPATIENT
Start: 2021-04-29

## 2021-04-30 ENCOUNTER — TELEPHONE (OUTPATIENT)
Dept: FAMILY MEDICINE CLINIC | Age: 61
End: 2021-04-30

## 2021-04-30 DIAGNOSIS — I10 UNCONTROLLED HYPERTENSION: ICD-10-CM

## 2021-04-30 DIAGNOSIS — I10 ESSENTIAL HYPERTENSION: ICD-10-CM

## 2021-04-30 DIAGNOSIS — E11.21 DIABETIC NEPHROPATHY ASSOCIATED WITH TYPE 2 DIABETES MELLITUS (HCC): Primary | ICD-10-CM

## 2021-04-30 NOTE — TELEPHONE ENCOUNTER
Pt wants a ref for dr Caroline Ramírez nephrology.   She said her friend goes there and she didn't like dr olivera

## 2021-05-13 DIAGNOSIS — S16.1XXD STRAIN OF NECK MUSCLE, SUBSEQUENT ENCOUNTER: ICD-10-CM

## 2021-05-14 ENCOUNTER — TELEPHONE (OUTPATIENT)
Dept: FAMILY MEDICINE CLINIC | Age: 61
End: 2021-05-14

## 2021-05-14 DIAGNOSIS — N39.0 RECURRENT UTI: Primary | ICD-10-CM

## 2021-05-14 RX ORDER — TIZANIDINE 4 MG/1
TABLET ORAL
Qty: 30 TABLET | Refills: 0 | Status: SHIPPED | OUTPATIENT
Start: 2021-05-14 | End: 2021-09-20

## 2021-05-14 NOTE — TELEPHONE ENCOUNTER
1- should schedule with urology about chronic issue  2- need urine sample before antibiotic  Order in EMR.

## 2021-05-17 ENCOUNTER — NURSE TRIAGE (OUTPATIENT)
Dept: OTHER | Facility: CLINIC | Age: 61
End: 2021-05-17

## 2021-05-17 ENCOUNTER — TELEPHONE (OUTPATIENT)
Dept: FAMILY MEDICINE CLINIC | Age: 61
End: 2021-05-17

## 2021-05-17 NOTE — TELEPHONE ENCOUNTER
provided, patient verbalizes understanding; denies any other questions or concerns; instructed to call back for any new or worsening symptoms. Writer provided warm transfer to [facility] for appointment scheduling. Attention Provider: Thank you for allowing me to participate in the care of your patient. The patient was connected to triage in response to information provided to the ECC. Please do not respond through this encounter as the response is not directed to a shared pool.

## 2021-05-17 NOTE — TELEPHONE ENCOUNTER
----- Message from Lolita Saldaña sent at 5/17/2021 11:16 AM EDT -----  Subject: Appointment Request    Reason for Call: Urgent Return from RN Triage    QUESTIONS  Type of Appointment? Established Patient  Reason for appointment request? No appointments available during search  Additional Information for Provider? Pt cannot come to the [address], she   has a UTI and wanting to get meds if she can, she is having lower back   pain. she said anytime after 12:00pm would be a good time she can come in. She only has one car and has to take her son to work by 9 am so would not   be able to make the 9:20am tomorrow. ---------------------------------------------------------------------------  --------------  Nieves SAENZ  What is the best way for the office to contact you? OK to leave message on   voicemail  Preferred Call Back Phone Number? [phone]  ---------------------------------------------------------------------------  --------------  SCRIPT ANSWERS  Patient needs to be seen today or tomorrow? Yes  Nurse Name? Crystal  Have you been diagnosed with COVID-19 (Coronavirus), tested for COVID-19   (Coronavirus), or told that you are suspected of having COVID-19   (Coronavirus)? No  Have you had a fever or taken medication to treat a fever within the past   3 days? No  Have you had a cough, shortness of breath or flu-like symptoms within the   past 3 days? No  Do you currently have flu-like symptoms including fever or chills, cough,   shortness of breath, or difficulty breathing, or new loss of taste or   smell? No  (Service Expert  click yes below to proceed with [employer] As Usual   Scheduling)?  Yes

## 2021-05-18 ENCOUNTER — OFFICE VISIT (OUTPATIENT)
Dept: FAMILY MEDICINE CLINIC | Age: 61
End: 2021-05-18
Payer: COMMERCIAL

## 2021-05-18 VITALS
SYSTOLIC BLOOD PRESSURE: 134 MMHG | OXYGEN SATURATION: 96 % | HEART RATE: 109 BPM | RESPIRATION RATE: 14 BRPM | WEIGHT: 227 LBS | HEIGHT: 63 IN | DIASTOLIC BLOOD PRESSURE: 78 MMHG | BODY MASS INDEX: 40.22 KG/M2

## 2021-05-18 DIAGNOSIS — R35.0 URINARY FREQUENCY: ICD-10-CM

## 2021-05-18 DIAGNOSIS — N39.0 URINARY TRACT INFECTION WITHOUT HEMATURIA, SITE UNSPECIFIED: Primary | ICD-10-CM

## 2021-05-18 DIAGNOSIS — R31.29 MICROSCOPIC HEMATURIA: ICD-10-CM

## 2021-05-18 LAB
BILIRUBIN, POC: NORMAL
BLOOD URINE, POC: NORMAL
CLARITY, POC: CLEAR
COLOR, POC: NORMAL
GLUCOSE URINE, POC: 100
KETONES, POC: NORMAL
LEUKOCYTE EST, POC: NORMAL
NITRITE, POC: NORMAL
PH, POC: 5.5
PROTEIN, POC: >=300
SPECIFIC GRAVITY, POC: 1.02
UROBILINOGEN, POC: 1

## 2021-05-18 PROCEDURE — 81002 URINALYSIS NONAUTO W/O SCOPE: CPT | Performed by: INTERNAL MEDICINE

## 2021-05-18 PROCEDURE — G8417 CALC BMI ABV UP PARAM F/U: HCPCS | Performed by: INTERNAL MEDICINE

## 2021-05-18 PROCEDURE — 1036F TOBACCO NON-USER: CPT | Performed by: INTERNAL MEDICINE

## 2021-05-18 PROCEDURE — 3017F COLORECTAL CA SCREEN DOC REV: CPT | Performed by: INTERNAL MEDICINE

## 2021-05-18 PROCEDURE — 99213 OFFICE O/P EST LOW 20 MIN: CPT | Performed by: INTERNAL MEDICINE

## 2021-05-18 PROCEDURE — G8427 DOCREV CUR MEDS BY ELIG CLIN: HCPCS | Performed by: INTERNAL MEDICINE

## 2021-05-18 RX ORDER — NITROFURANTOIN 25; 75 MG/1; MG/1
100 CAPSULE ORAL 2 TIMES DAILY
Qty: 16 CAPSULE | Refills: 0 | Status: SHIPPED | OUTPATIENT
Start: 2021-05-18 | End: 2021-05-26

## 2021-05-18 SDOH — ECONOMIC STABILITY: FOOD INSECURITY: WITHIN THE PAST 12 MONTHS, THE FOOD YOU BOUGHT JUST DIDN'T LAST AND YOU DIDN'T HAVE MONEY TO GET MORE.: NEVER TRUE

## 2021-05-18 ASSESSMENT — ENCOUNTER SYMPTOMS
VOMITING: 0
NAUSEA: 0
ABDOMINAL PAIN: 1

## 2021-05-18 NOTE — PROGRESS NOTES
5/18/2021    Chief Complaint   Patient presents with    Urinary Frequency     back pain,freq. Urinary Frequency   This is a new problem. The current episode started in the past 7 days. The quality of the pain is described as aching (back and pelvic pain). There has been no fever. She is not sexually active. There is no history of pyelonephritis. Associated symptoms include frequency. Pertinent negatives include no chills, hematuria, nausea or vomiting. Seeing urology this Thursday  Saw Jc Jaramillo in April  15 took macrobid for 7 days for e coli uti    Review of Systems   Constitutional: Negative for chills and fever. Gastrointestinal: Positive for abdominal pain (epigastric  seeing dr Kelly Alvarez next month). Negative for nausea and vomiting. Genitourinary: Positive for frequency. Negative for dysuria and hematuria.        Health Maintenance   Topic Date Due    DTaP/Tdap/Td vaccine (1 - Tdap) Never done    Shingles Vaccine (2 of 3) 11/02/2016    Breast cancer screen  08/06/2019    Diabetic foot exam  12/26/2020    A1C test (Diabetic or Prediabetic)  04/28/2021    Lipid screen  10/06/2021    Cervical cancer screen  02/18/2022    Potassium monitoring  03/01/2022    Creatinine monitoring  03/01/2022    Colon cancer screen colonoscopy  10/16/2022    Diabetic retinal exam  01/11/2023    Pneumococcal 0-64 years Vaccine (2 of 2) 01/14/2024    Flu vaccine  Completed    COVID-19 Vaccine  Completed    Hepatitis C screen  Completed    HIV screen  Completed    Hepatitis A vaccine  Aged Out    Hib vaccine  Aged Out    Meningococcal (ACWY) vaccine  Aged Out      Social History     Tobacco Use    Smoking status: Never Smoker    Smokeless tobacco: Never Used    Tobacco comment: congrats   Vaping Use    Vaping Use: Never used   Substance Use Topics    Alcohol use: No    Drug use: Never      Family History   Problem Relation Age of Onset    Cancer Mother         forehead soft tissue    Diabetes Mother  Arthritis Father     High Blood Pressure Father     High Cholesterol Father     Alzheimer's Disease Father     Diabetes Brother     Depression Sister      Prior to Visit Medications    Medication Sig Taking? Authorizing Provider   tiZANidine (ZANAFLEX) 4 MG tablet TAKE ONE TABLET BY MOUTH EVERY 8 HOURS AS NEEDED FOR MUSCLE SPASM Yes Ijeoma Austin MD   amLODIPine (NORVASC) 5 MG tablet TAKE ONE TABLET BY MOUTH DAILY Yes Ijeoma Austin MD   QUEtiapine (SEROQUEL) 25 MG tablet TAKE TWO TABLETS BY MOUTH ONCE NIGHTLY Yes Ijeoma Austin MD   atorvastatin (LIPITOR) 80 MG tablet TAKE ONE TABLET BY MOUTH DAILY Yes Ijeoma Austin MD   metoprolol succinate (TOPROL XL) 50 MG extended release tablet TAKE ONE TABLET BY MOUTH DAILY Yes Ijeoma Austin MD   venlafaxine (EFFEXOR XR) 75 MG extended release capsule TAKE THREE CAPSULES BY MOUTH DAILY Yes Ijeoma Austin MD   tolterodine (DETROL LA) 4 MG extended release capsule TAKE ONE CAPSULE BY MOUTH DAILY Yes Ijeoma Austin MD   insulin glargine (BASAGLAR KWIKPEN) 100 UNIT/ML injection pen Inject 80 Units into the skin nightly Yes Ijeoma Austin MD   lisinopril (PRINIVIL;ZESTRIL) 20 MG tablet Take 1 tablet by mouth daily Yes Leonora Gabriel MD   Continuous Blood Gluc Sensor ([address] [place] [address]) MISC as needed Yes Ijeoma Austin MD   gabapentin (NEURONTIN) 600 MG tablet Take 1-2 tablets by mouth nightly. Yes Ijeoma Austin MD   insulin aspart (NOVOLOG FLEXPEN) 100 UNIT/ML injection pen Before meal: Glucose < 100 no coverage; 100-180 take 10 units; 181-230 take 15 units; 231-280 take 20 units; > 281 take 25 units.  Yes Ijeoma Austin MD   blood glucose test strips (TRUE METRIX BLOOD GLUCOSE TEST) strip USE TO TEST TWO TIMES A DAY AND AS NEEDED FOR SYMPTOMS OF IRREGULAR BLOOD GLUCOSE Yes Ijeoma Austin MD   nortriptyline (PAMELOR) 25 MG capsule TAKE ONE TO TWO CAPSULES BY MOUTH ONCE NIGHTLY Yes Ijeoma Austin MD   Insulin Pen Needle (B-D UF III MINI PEN NEEDLES) 31G X 5 MM MISC 1 each by Does not apply route 3 times daily Yes Colt Alonso MD   dicyclomine (BENTYL) 20 MG tablet Take 1 tablet by mouth every 6 hours as needed (cramping) Yes Colt Alonso MD   omeprazole (PRILOSEC) 40 MG delayed release capsule Take 40 mg by mouth daily  Yes Historical Provider, MD   anastrozole (ARIMIDEX) 1 MG tablet TAKE ONE TABLET BY MOUTH DAILY Yes Che Villanueva., MD   lubiprostone (AMITIZA) 8 MCG CAPS capsule Take 8 mcg by mouth 2 times daily (with meals). Yes Historical Provider, MD     Patient Active Problem List   Diagnosis    DM (diabetes mellitus), type 2, uncontrolled with complications ([address]    Diabetic gastroparesis ([address]    Allergic rhinitis    Insomnia    Essential hypertension    Chronic knee pain    Headaches due to old head injury    Nephropathy, diabetic ([address]    Migraine    Hip pain, bilateral    Depression, major    Right knee DJD    Calcific Achilles tendonitis, right    Right sided sciatica    Lumbar spinal stenosis    TMJ (temporomandibular joint disorder)    Urge incontinence of urine    Personal history of breast cancer    Hyperlipidemia LDL goal <496    Metallic taste    Polyarthralgia    Severe obesity (BMI 35.0-39. 9) with comorbidity (HCC)    Neck pain    Chronic low back pain    Intractable low back pain    Plantar fasciitis of right foot    Anxiety    History of colon polyps    Irritable bowel syndrome with diarrhea        LABS:   Lab Results   Component Value Date    GLUCOSE 136 (H) 03/01/2021     Lab Results   Component Value Date     03/01/2021    K 5.0 03/01/2021    CREATININE 0.9 03/01/2021     Cholesterol, Total   Date Value Ref Range Status   10/06/2020 186 0 - 199 mg/dL Final     LDL Calculated   Date Value Ref Range Status   10/06/2020 104 (H) <100 mg/dL Final     HDL   Date Value Ref Range Status   10/06/2020 45 40 - 60 mg/dL Final     Triglycerides   Date Value Ref Range Status   10/06/2020 186 (H) 0 - 150 mg/dL Final     Lab Results   Component Value Date    ALT 12 10/06/2020    AST 16 10/06/2020    ALKPHOS 123 10/06/2020    BILITOT 0.3 10/06/2020      Lab Results   Component Value Date    WBC 5.6 02/11/2021    HGB 12.0 02/11/2021    HCT 35.5 (L) 02/11/2021    MCV 86.8 02/11/2021     02/11/2021     TSH (uIU/mL)   Date Value   04/07/2018 1.30     Lab Results   Component Value Date    LABA1C 9.5 01/28/2021     No results found for: PSA, PSADIA     PHYSICAL EXAM:  /78 Comment: just took her meds on the way here. Pulse 109   Resp 14   Ht 5' 3\" (1.6 m)   Wt 227 lb (103 kg)   SpO2 96%   BMI 40.21 kg/m²    Physical Exam  Constitutional:       Appearance: Normal appearance. She is well-developed. She is obese. HENT:      Head: Normocephalic and atraumatic. Cardiovascular:      Rate and Rhythm: Normal rate and regular rhythm. Heart sounds: Normal heart sounds. No murmur heard. Pulmonary:      Effort: Pulmonary effort is normal.      Breath sounds: Normal breath sounds. No wheezing. Abdominal:      Palpations: Abdomen is soft. Tenderness: There is abdominal tenderness. Skin:     General: Skin is warm and dry. Neurological:      General: No focal deficit present. Mental Status: She is alert. Psychiatric:         Mood and Affect: Mood normal.         Behavior: Behavior normal.         Thought Content: Thought content normal.         Judgment: Judgment normal.       BP Readings from Last 5 Encounters:   05/18/21 134/78   04/15/21 (!) 148/88   03/12/21 (!) 171/83   03/12/21 (!) 142/71   03/10/21 (!) 140/86       Wt Readings from Last 5 Encounters:   05/18/21 227 lb (103 kg)   04/15/21 225 lb 12.8 oz (102.4 kg)   03/12/21 233 lb (105.7 kg)   03/10/21 233 lb (105.7 kg)   03/05/21 231 lb 8 oz (105 kg)     Johnnie Finney was seen today for urinary frequency.     Diagnoses and all orders for this visit:    Urinary tract infection without hematuria, site unspecified    Urinary frequency  -     POCT Urinalysis no Micro  -     Culture, Urine    Microscopic hematuria  -     Culture, Urine    Other orders  -     nitrofurantoin, macrocrystal-monohydrate, (MACROBID) 100 MG capsule; Take 1 capsule by mouth 2 times daily for 8 days    will give macrobid  She will see urology in  2 days  Send a cx  Has ?  Pyridium that she picked up yesterday- not on the med list   urine dip today small helder,  Small blood and positive nitrite

## 2021-05-20 LAB
ORGANISM: ABNORMAL
URINE CULTURE, ROUTINE: ABNORMAL

## 2021-06-08 RX ORDER — NORTRIPTYLINE HYDROCHLORIDE 25 MG/1
CAPSULE ORAL
Qty: 180 CAPSULE | Refills: 0 | Status: SHIPPED | OUTPATIENT
Start: 2021-06-08 | End: 2022-02-17

## 2021-06-22 ENCOUNTER — NURSE TRIAGE (OUTPATIENT)
Dept: OTHER | Facility: CLINIC | Age: 61
End: 2021-06-22

## 2021-06-22 ENCOUNTER — TELEPHONE (OUTPATIENT)
Dept: FAMILY MEDICINE CLINIC | Age: 61
End: 2021-06-22

## 2021-06-22 NOTE — TELEPHONE ENCOUNTER
----- Message from Stevie Colvin sent at 6/21/2021  3:37 PM EDT -----  Subject: Appointment Request    Reason for Call: Urgent Adult Urinary Problem    QUESTIONS  Type of Appointment? Established Patient  Reason for appointment request? Available appointments did not meet   patient need  Additional Information for Provider? Patient believes she has a UTI would   like to have a script called in, Available only later in the day  ---------------------------------------------------------------------------  --------------  [employer]  What is the best way for the office to contact you? OK to leave message on   voicemail  Preferred Call Back Phone Number? [phone]  ---------------------------------------------------------------------------  --------------  SCRIPT ANSWERS  Relationship to Patient? Self  Appointment reason? Symptomatic  Select script based on patient symptoms? Adult Urinary Symptoms   [Urine-related, UTI, Bladder Infection]  Are you having severe back pain with your urinary symptoms? No  Are you having vomiting or nausea? No  Is there blood in your urine? No  Are you having fevers (100.4), chills, or sweats? No  Have you been seen by a provider for this symptom before? No  Have you been diagnosed with, awaiting test results for, or told that you   are suspected of having COVID-19 (Coronavirus)? (If patient has tested   negative or was tested as a requirement for work, school, or travel and   not based on symptoms, answer no)? No  Do you currently have flu-like symptoms including fever or chills, cough,   shortness of breath, difficulty breathing, or new loss of taste or smell? No  Have you had close contact with someone with COVID-19 in the last 14 days? No  (Service Expert  click yes below to proceed with [employer] As Usual   Scheduling)?  Yes

## 2021-06-22 NOTE — TELEPHONE ENCOUNTER
Received call from Charlene Sequeira at [facility] with [employer] Complaint. Brief description of triage:   Pt is calling with c/o lower back pain. Pt believes that she has UTI. Triage indicates for patient to see a provider within the next 3 days. Care advice provided, patient verbalizes understanding; denies any other questions or concerns; instructed to call back for any new or worsening symptoms. Writer provided warm transfer to Sweta Borden at [facility] for appointment scheduling. Attention Provider: Thank you for allowing me to participate in the care of your patient. The patient was connected to triage in response to information provided to the [facility]. Please do not respond through this encounter as the response is not directed to a shared pool. Reason for Disposition   MODERATE back pain (e.g., interferes with normal activities) and present > 3 days    Answer Assessment - Initial Assessment Questions  1. ONSET: \"When did the pain begin? \"       A couple of days ago    2. LOCATION: \"Where does it hurt? \" (upper, mid or lower back)      Lower back    3. SEVERITY: \"How bad is the pain? \"  (e.g., Scale 1-10; mild, moderate, or severe)    - MILD (1-3): doesn't interfere with normal activities     - MODERATE (4-7): interferes with normal activities or awakens from sleep     - SEVERE (8-10): excruciating pain, unable to do any normal activities       Rates pain a 6-7 out of 10    4. PATTERN: \"Is the pain constant? \" (e.g., yes, no; constant, intermittent)       Comes and goes     5. RADIATION: \"Does the pain shoot into your legs or elsewhere? \"      Pain radiates to her lower abdomen at times    6. CAUSE:  \"What do you think is causing the back pain? \"       Possible UTI    7. MEDICATIONS: \"What have you taken so far for the pain? \" (e.g., nothing, acetaminophen, NSAIDS)      Pt has tried Advil    8. OTHER SYMPTOMS: \"Do you have any other symptoms? \" (e.g., fever, abdominal pain, burning with urination,

## 2021-06-24 ENCOUNTER — OFFICE VISIT (OUTPATIENT)
Dept: FAMILY MEDICINE CLINIC | Age: 61
End: 2021-06-24
Payer: COMMERCIAL

## 2021-06-24 VITALS
HEART RATE: 82 BPM | SYSTOLIC BLOOD PRESSURE: 124 MMHG | WEIGHT: 225 LBS | TEMPERATURE: 98.7 F | OXYGEN SATURATION: 95 % | BODY MASS INDEX: 39.87 KG/M2 | HEIGHT: 63 IN | DIASTOLIC BLOOD PRESSURE: 70 MMHG

## 2021-06-24 DIAGNOSIS — R35.0 URINARY FREQUENCY: Primary | ICD-10-CM

## 2021-06-24 DIAGNOSIS — I10 ESSENTIAL HYPERTENSION: ICD-10-CM

## 2021-06-24 DIAGNOSIS — N39.0 RECURRENT UTI: ICD-10-CM

## 2021-06-24 DIAGNOSIS — E78.5 HYPERLIPIDEMIA LDL GOAL <100: ICD-10-CM

## 2021-06-24 LAB
BILIRUBIN, POC: ABNORMAL
BLOOD URINE, POC: ABNORMAL
CLARITY, POC: CLEAR
COLOR, POC: YELLOW
GLUCOSE URINE, POC: ABNORMAL
HBA1C MFR BLD: 9.4 %
KETONES, POC: ABNORMAL
LEUKOCYTE EST, POC: ABNORMAL
NITRITE, POC: ABNORMAL
PH, POC: 5
PROTEIN, POC: ABNORMAL
SPECIFIC GRAVITY, POC: >1.03
UROBILINOGEN, POC: 0.2

## 2021-06-24 PROCEDURE — 2022F DILAT RTA XM EVC RTNOPTHY: CPT | Performed by: NURSE PRACTITIONER

## 2021-06-24 PROCEDURE — G8417 CALC BMI ABV UP PARAM F/U: HCPCS | Performed by: NURSE PRACTITIONER

## 2021-06-24 PROCEDURE — 3046F HEMOGLOBIN A1C LEVEL >9.0%: CPT | Performed by: NURSE PRACTITIONER

## 2021-06-24 PROCEDURE — 99214 OFFICE O/P EST MOD 30 MIN: CPT | Performed by: NURSE PRACTITIONER

## 2021-06-24 PROCEDURE — 1036F TOBACCO NON-USER: CPT | Performed by: NURSE PRACTITIONER

## 2021-06-24 PROCEDURE — 81002 URINALYSIS NONAUTO W/O SCOPE: CPT | Performed by: NURSE PRACTITIONER

## 2021-06-24 PROCEDURE — G8427 DOCREV CUR MEDS BY ELIG CLIN: HCPCS | Performed by: NURSE PRACTITIONER

## 2021-06-24 PROCEDURE — 83036 HEMOGLOBIN GLYCOSYLATED A1C: CPT | Performed by: NURSE PRACTITIONER

## 2021-06-24 PROCEDURE — 3017F COLORECTAL CA SCREEN DOC REV: CPT | Performed by: NURSE PRACTITIONER

## 2021-06-24 RX ORDER — NITROFURANTOIN 25; 75 MG/1; MG/1
100 CAPSULE ORAL 2 TIMES DAILY
Qty: 14 CAPSULE | Refills: 0 | Status: SHIPPED | OUTPATIENT
Start: 2021-06-24 | End: 2021-06-25 | Stop reason: ALTCHOICE

## 2021-06-24 ASSESSMENT — ENCOUNTER SYMPTOMS
VOMITING: 0
COUGH: 0
SHORTNESS OF BREATH: 0
DIARRHEA: 0
ABDOMINAL PAIN: 0
NAUSEA: 0

## 2021-06-24 NOTE — PROGRESS NOTES
6/24/2021    This is a 57 y.o. female   Chief Complaint   Patient presents with    Urinary Frequency     frequency and pressure. going to Urology in [facility] end of june, beginning of July    Diabetes     DM f/u   . Urinary Frequency   This is a recurrent problem. The current episode started in the past 7 days. The problem occurs every urination. The problem has been gradually worsening. The patient is experiencing no pain. There has been no fever. Associated symptoms include flank pain and frequency. Pertinent negatives include no chills, hematuria, nausea or vomiting. She has tried NSAIDs for the symptoms. The treatment provided mild relief. Her past medical history is significant for recurrent UTIs. Urine culture 5/18/21 showed e coli treated with macrobid. Urine culture 4/15/21 showed e coli treated with macrobid  Urine culture 3/10/21 showed e coli treated with macrobid   Urine culture 2/9/21 showed e coli treated with rocephin     Seen urologist Dr. Vinita Mckeon and planning a cystoscopy early next month. She reports that urinary symptoms resolve when taking the antibiotic. Couple days to a week later will restart with urinary symptoms. Diabetes Mellitus Type 2: Has history of uncontrolled DM. Check BS before dinner- running lower 200s. Checks 2 hours after dinner- 160-180   She will take her novolog with dinner. Does not typically check breakfast or lunch BS and does not take novolog. Diet- Reports that it is a poor diet. She is drinking 3-4 pops per day. Hypertension:  Home blood pressure monitoring: No.  She is not adherent to a low sodium diet. Patient denies chest pain, shortness of breath, headache, peripheral edema, palpitations and dry cough. Antihypertensive medication side effects: no medication side effects noted. Hyperlipidemia:  No new myalgias or GI upset on atorvastatin (Lipitor).        Lab Results   Component Value Date    LABA1C 9.5 01/28/2021    LABA1C 9.6 10/06/2020    LABA1C 7.0 12/26/2019     Lab Results   Component Value Date    LABMICR YES 02/09/2021    CREATININE 0.9 03/01/2021     Lab Results   Component Value Date    ALT 12 10/06/2020    AST 16 10/06/2020     Lab Results   Component Value Date    CHOL 186 10/06/2020    TRIG 186 (H) 10/06/2020    HDL 45 10/06/2020    LDLCALC 104 (H) 10/06/2020    LDLDIRECT 168 (H) 01/21/2016             Patient Active Problem List   Diagnosis    DM (diabetes mellitus), type 2, uncontrolled with complications ([address]    Diabetic gastroparesis ([address]    Allergic rhinitis    Insomnia    Essential hypertension    Chronic knee pain    Headaches due to old head injury    Nephropathy, diabetic ([address]    Migraine    Hip pain, bilateral    Depression, major    Right knee DJD    Calcific Achilles tendonitis, right    Right sided sciatica    Lumbar spinal stenosis    TMJ (temporomandibular joint disorder)    Urge incontinence of urine    Personal history of breast cancer    Hyperlipidemia LDL goal <792    Metallic taste    Polyarthralgia    Severe obesity (BMI 35.0-39. 9) with comorbidity (HCC)    Neck pain    Chronic low back pain    Intractable low back pain    Plantar fasciitis of right foot    Anxiety    History of colon polyps    Irritable bowel syndrome with diarrhea          Current Outpatient Medications   Medication Sig Dispense Refill    nortriptyline (PAMELOR) 25 MG capsule TAKE ONE TO TWO CAPSULES BY MOUTH ONCE NIGHTLY 180 capsule 0    lisinopril (PRINIVIL;ZESTRIL) 20 MG tablet Take 1 tablet by mouth daily 30 tablet 1    tiZANidine (ZANAFLEX) 4 MG tablet TAKE ONE TABLET BY MOUTH EVERY 8 HOURS AS NEEDED FOR MUSCLE SPASM 30 tablet 0    amLODIPine (NORVASC) 5 MG tablet TAKE ONE TABLET BY MOUTH DAILY 90 tablet 1    QUEtiapine (SEROQUEL) 25 MG tablet TAKE TWO TABLETS BY MOUTH ONCE NIGHTLY 180 tablet 0    atorvastatin (LIPITOR) 80 MG tablet TAKE ONE TABLET BY MOUTH DAILY 90 tablet 0    metoprolol succinate (TOPROL XL) 50 MG extended release tablet TAKE ONE TABLET BY MOUTH DAILY 90 tablet 0    venlafaxine (EFFEXOR XR) 75 MG extended release capsule TAKE THREE CAPSULES BY MOUTH DAILY 270 capsule 0    tolterodine (DETROL LA) 4 MG extended release capsule TAKE ONE CAPSULE BY MOUTH DAILY 90 capsule 0    insulin glargine (BASAGLAR KWIKPEN) 100 UNIT/ML injection pen Inject 80 Units into the skin nightly 10 pen 5    Continuous Blood Gluc Sensor ([address] [place] [address]) MISC as needed 2 each 5    gabapentin (NEURONTIN) 600 MG tablet Take 1-2 tablets by mouth nightly. 180 tablet 2    insulin aspart (NOVOLOG FLEXPEN) 100 UNIT/ML injection pen Before meal: Glucose < 100 no coverage; 100-180 take 10 units; 181-230 take 15 units; 231-280 take 20 units; > 281 take 25 units. 10 pen 3    blood glucose test strips (TRUE METRIX BLOOD GLUCOSE TEST) strip USE TO TEST TWO TIMES A DAY AND AS NEEDED FOR SYMPTOMS OF IRREGULAR BLOOD GLUCOSE 200 strip 2    Insulin Pen Needle (B-D UF III MINI PEN NEEDLES) 31G X 5 MM MISC 1 each by Does not apply route 3 times daily 300 each 3    dicyclomine (BENTYL) 20 MG tablet Take 1 tablet by mouth every 6 hours as needed (cramping) 60 tablet 3    omeprazole (PRILOSEC) 40 MG delayed release capsule Take 40 mg by mouth daily       anastrozole (ARIMIDEX) 1 MG tablet TAKE ONE TABLET BY MOUTH DAILY 30 tablet 5    lubiprostone (AMITIZA) 8 MCG CAPS capsule Take 8 mcg by mouth 2 times daily (with meals). (Patient not taking: Reported on 6/24/2021)       No current facility-administered medications for this visit.        Allergies   Allergen Reactions    Trazodone And Nefazodone Other (See Comments)     headache    Ambien [Zolpidem Tartrate] Other (See Comments)     Vivid dreams    Diamox [Acetazolamide] Rash    Lorazepam Other (See Comments)     confusion    Naproxen Rash    Reglan [Metoclopramide] Anxiety    Sulfa Antibiotics Rash       Review of Systems   Constitutional: Negative for activity change, chills and fever. Respiratory: Negative for cough and shortness of breath. Cardiovascular: Negative for chest pain. Gastrointestinal: Negative for abdominal pain, diarrhea, nausea and vomiting. Genitourinary: Positive for flank pain, frequency and pelvic pain. Negative for difficulty urinating, dysuria, hematuria and vaginal discharge. Reports that she is having pelvic pressure        Vitals:    06/24/21 1006   BP: 124/70   Pulse: 82   SpO2: 95%   Weight: 225 lb (102.1 kg)   Height: 5' 3\" (1.6 m)       Body mass index is 39.86 kg/m². Wt Readings from Last 3 Encounters:   06/24/21 225 lb (102.1 kg)   05/18/21 227 lb (103 kg)   04/15/21 225 lb 12.8 oz (102.4 kg)       BP Readings from Last 3 Encounters:   06/24/21 124/70   05/18/21 134/78   04/15/21 (!) 148/88       Physical Exam  Vitals and nursing note reviewed. Constitutional:       General: She is not in acute distress. Appearance: She is well-developed. She is obese. HENT:      Head: Normocephalic and atraumatic. Cardiovascular:      Rate and Rhythm: Normal rate and regular rhythm. Heart sounds: Normal heart sounds. No murmur heard. No friction rub. No gallop. Pulmonary:      Effort: Pulmonary effort is normal. No respiratory distress. Breath sounds: Normal breath sounds. Abdominal:      Palpations: Abdomen is soft. Tenderness: There is generalized abdominal tenderness. There is no right CVA tenderness or left CVA tenderness. Musculoskeletal:      Cervical back: Neck supple. Skin:     General: Skin is warm and dry. Neurological:      Mental Status: She is alert and oriented to person, place, and time. Psychiatric:         Behavior: Behavior normal.         Thought Content: Thought content normal.         Judgment: Judgment normal.         Assessmentand Plan  Jason Plan was seen today for urinary frequency and diabetes.     Diagnoses and all orders for this visit:    Urinary frequency  - POCT Urinalysis no Micro  -     Culture, Urine    Recurrent UTI  -     Culture, Urine  -     nitrofurantoin, macrocrystal-monohydrate, (MACROBID) 100 MG capsule; Take 1 capsule by mouth 2 times daily for 7 days For treatment of urinary tract infection. Advised to stay hydrated with water. Has appt with urologist next month for cystoscopy for further evaluation    DM (diabetes mellitus), type 2, uncontrolled with complications ([facility]  -     POCT glycosylated hemoglobin (Hb A1C)- 9.4%    Patient has not been compliant with medications and diet. Advised to check fasting BS, before meal BS, and two hours after meal BS. Currently on basaglar 80 units qhs and has novolog sliding scale. She has not been checking BS with breakfast or lunch and therefore has not been taking novolog. Discussed that she needs to be consistent with taking medications as prescribed. Has history of diabetic gastroparesis so would not want to start GLP-1    Declines referral to endocrinologist.     Discussed the importance of low carb diet. She needs to stop drinking pop. Reports that she is drinking 3-4 can per day. Discussed the importance of aerobic exercise and weight loss. Essential hypertension  Continue- continue current therapies    Hyperlipidemia LDL goal <100  Continue high dose atorvastatin     Return in about 6 weeks (around 8/5/2021), or if symptoms worsen or fail to improve, for chronic conditions, with Dr. Cassandra Hill.

## 2021-06-25 LAB — URINE CULTURE, ROUTINE: NORMAL

## 2021-06-28 ENCOUNTER — OFFICE VISIT (OUTPATIENT)
Dept: ORTHOPEDIC SURGERY | Age: 61
End: 2021-06-28
Payer: COMMERCIAL

## 2021-06-28 VITALS — WEIGHT: 225 LBS | HEIGHT: 63 IN | BODY MASS INDEX: 39.87 KG/M2

## 2021-06-28 DIAGNOSIS — M65.30 TRIGGER FINGER, ACQUIRED: Primary | ICD-10-CM

## 2021-06-28 PROCEDURE — 20550 NJX 1 TENDON SHEATH/LIGAMENT: CPT | Performed by: ORTHOPAEDIC SURGERY

## 2021-06-28 PROCEDURE — 1036F TOBACCO NON-USER: CPT | Performed by: ORTHOPAEDIC SURGERY

## 2021-06-28 PROCEDURE — G8427 DOCREV CUR MEDS BY ELIG CLIN: HCPCS | Performed by: ORTHOPAEDIC SURGERY

## 2021-06-28 PROCEDURE — 3017F COLORECTAL CA SCREEN DOC REV: CPT | Performed by: ORTHOPAEDIC SURGERY

## 2021-06-28 PROCEDURE — G8417 CALC BMI ABV UP PARAM F/U: HCPCS | Performed by: ORTHOPAEDIC SURGERY

## 2021-06-28 PROCEDURE — 99212 OFFICE O/P EST SF 10 MIN: CPT | Performed by: ORTHOPAEDIC SURGERY

## 2021-06-28 RX ORDER — TRIAMCINOLONE ACETONIDE 40 MG/ML
20 INJECTION, SUSPENSION INTRA-ARTICULAR; INTRAMUSCULAR ONCE
Status: COMPLETED | OUTPATIENT
Start: 2021-06-28 | End: 2021-06-28

## 2021-06-28 RX ORDER — LIDOCAINE HYDROCHLORIDE 10 MG/ML
0.5 INJECTION, SOLUTION INFILTRATION; PERINEURAL ONCE
Status: COMPLETED | OUTPATIENT
Start: 2021-06-28 | End: 2021-06-28

## 2021-06-28 RX ADMIN — TRIAMCINOLONE ACETONIDE 20 MG: 40 INJECTION, SUSPENSION INTRA-ARTICULAR; INTRAMUSCULAR at 13:40

## 2021-06-28 RX ADMIN — LIDOCAINE HYDROCHLORIDE 0.5 ML: 10 INJECTION, SOLUTION INFILTRATION; PERINEURAL at 13:39

## 2021-06-28 RX ADMIN — TRIAMCINOLONE ACETONIDE 20 MG: 40 INJECTION, SUSPENSION INTRA-ARTICULAR; INTRAMUSCULAR at 13:39

## 2021-06-28 NOTE — PROGRESS NOTES
This 57 y.o., right hand dominant disabled woman is seen today with a chief complaint of pain, swelling, stiffness and intermittant snapping of the right index finger, middle finger, ring finger. Symptoms have been present for several weeks. The digit is stiff, especially in the morning and will frequently stick in the palm when flexed and pop when extended. This is often associated with pain. Mild swelling has been noticed. The patient denies discoloration or history of injury or overuse. Treatment has not been prescribed. The problem has worsened recently. She underwent successful trigger finger surgery on her left hand 8 months ago. The pain assessment has been reviewed and is correct as stated. .    The patient's social history, past medical history, family history, medications, allergies and review of systems, entered 8/12/20, have been reviewed, and dated and are recorded in the chart. On examination the patient is Height: 5' 3\" (160 cm) tall and weighs Weight: 225 lb (102.1 kg). Respirations are 18 per minute. The patient is well nourished, is oriented to time and place, demonstrates appropriate mood and affect as well as normal gait and station. There is mild soft tissue swelling of the digits, worse in the middle miriam ring. There is no deformity. There is tenderness, thickening and nodularity at the base of the flexor tendon sheaths. Range of motion is slightly limited in flexion and extension. The digit stick in flexion and pop into extension, accompanied by some pain. Skin is intact without lesions. Distal circulation and sensation are intact. Muscle strength and coordination are normal.  Reflexes are present bilaterally. Joints are stable. There are no subcutaneous nodules or enlarged epitrochlear lymph nodes.     I have personally reviewed and interpreted all previous external imaging studies, laboratory tests, diagnostic proceedures and medical encounters pertinent to this patient's visit today. Impression: Right index finger, middle finger and ring finger trigger digits. The nature of this medical problem is fully discussed with the patient, including all treatment options. All questions are answered.    /The right  hand is prepared with Betadine and alcohol and the flexor tendon sheath of the right middle finger are each injected with 1/2 milliliter of 1% lidocaine and 20 mg.of triamcinalone, with good filling. The patient is advised regarding the expected response and possible reactions from the injections. The patient is asked to call me if full, painless function has not returned within 4 weeks. If the index finger persists or worsens she will retirn in about a month for injection of that finger as well. The possibility of recurrence of the problem is discussed.

## 2021-06-30 DIAGNOSIS — E78.5 HYPERLIPIDEMIA LDL GOAL <100: ICD-10-CM

## 2021-06-30 RX ORDER — ATORVASTATIN CALCIUM 80 MG/1
TABLET, FILM COATED ORAL
Qty: 90 TABLET | Refills: 0 | Status: SHIPPED | OUTPATIENT
Start: 2021-06-30 | End: 2021-09-30

## 2021-07-02 RX ORDER — QUETIAPINE FUMARATE 25 MG/1
TABLET, FILM COATED ORAL
Qty: 180 TABLET | Refills: 0 | Status: SHIPPED | OUTPATIENT
Start: 2021-07-02 | End: 2021-09-30

## 2021-07-03 DIAGNOSIS — I10 ESSENTIAL HYPERTENSION: ICD-10-CM

## 2021-07-03 DIAGNOSIS — F33.1 MODERATE EPISODE OF RECURRENT MAJOR DEPRESSIVE DISORDER (HCC): ICD-10-CM

## 2021-07-06 RX ORDER — TOLTERODINE 4 MG/1
CAPSULE, EXTENDED RELEASE ORAL
Qty: 90 CAPSULE | Refills: 0 | Status: SHIPPED | OUTPATIENT
Start: 2021-07-06 | End: 2021-11-04

## 2021-07-06 RX ORDER — LISINOPRIL 20 MG/1
TABLET ORAL
Qty: 90 TABLET | Refills: 0 | Status: SHIPPED | OUTPATIENT
Start: 2021-07-06 | End: 2021-10-11

## 2021-07-06 RX ORDER — VENLAFAXINE HYDROCHLORIDE 75 MG/1
CAPSULE, EXTENDED RELEASE ORAL
Qty: 270 CAPSULE | Refills: 0 | Status: SHIPPED | OUTPATIENT
Start: 2021-07-06 | End: 2021-10-18

## 2021-07-06 RX ORDER — METOPROLOL SUCCINATE 50 MG/1
TABLET, EXTENDED RELEASE ORAL
Qty: 90 TABLET | Refills: 0 | Status: SHIPPED | OUTPATIENT
Start: 2021-07-06 | End: 2021-11-04

## 2021-07-22 DIAGNOSIS — I10 ESSENTIAL HYPERTENSION: ICD-10-CM

## 2021-07-22 RX ORDER — LISINOPRIL 20 MG/1
TABLET ORAL
Qty: 30 TABLET | Refills: 0 | OUTPATIENT
Start: 2021-07-22

## 2021-07-23 ENCOUNTER — TELEPHONE (OUTPATIENT)
Dept: FAMILY MEDICINE CLINIC | Age: 61
End: 2021-07-23

## 2021-07-23 NOTE — TELEPHONE ENCOUNTER
PT called in wanting to schedule as a new patient of Stefanie's. Per PT she says that [facility] okay'ed her as a new patient. Adv Pt that I need to send a message back first before I can schedule.        Best call back number: [phone]

## 2021-07-26 NOTE — TELEPHONE ENCOUNTER
Patient cannot schedule with me without being a patient of the office.  Also, could not get her in until I get back from maternity leave

## 2021-07-30 ENCOUNTER — TELEPHONE (OUTPATIENT)
Dept: FAMILY MEDICINE CLINIC | Age: 61
End: 2021-07-30

## 2021-07-30 NOTE — TELEPHONE ENCOUNTER
PT was in the office today with her son and  for an appointment with [facility]. PT is wanting to establish with [facility] however she was adv that her PCP would need to be at this office in order to be seen. PT's son and  are both patient's of Dr. Andi Mijares and Fany Perrin) and PT would like to establish with Dr. Andi Scherer as well.      Please call back: [phone]

## 2021-07-30 NOTE — TELEPHONE ENCOUNTER
I informed pt that Dr. Isidoro Treviño is NOT accepting new pt's. I also informed her she may be put on the list for the new provider.  Please add pt to new provider list.

## 2021-08-05 ENCOUNTER — OFFICE VISIT (OUTPATIENT)
Dept: FAMILY MEDICINE CLINIC | Age: 61
End: 2021-08-05
Payer: COMMERCIAL

## 2021-08-05 VITALS
WEIGHT: 234.8 LBS | HEIGHT: 63 IN | DIASTOLIC BLOOD PRESSURE: 58 MMHG | SYSTOLIC BLOOD PRESSURE: 108 MMHG | OXYGEN SATURATION: 96 % | TEMPERATURE: 98.4 F | RESPIRATION RATE: 16 BRPM | HEART RATE: 77 BPM | BODY MASS INDEX: 41.6 KG/M2

## 2021-08-05 DIAGNOSIS — Z12.31 OTHER SCREENING MAMMOGRAM: ICD-10-CM

## 2021-08-05 DIAGNOSIS — I10 ESSENTIAL HYPERTENSION: ICD-10-CM

## 2021-08-05 DIAGNOSIS — M25.50 POLYARTHRALGIA: ICD-10-CM

## 2021-08-05 DIAGNOSIS — Z13.31 POSITIVE DEPRESSION SCREENING: ICD-10-CM

## 2021-08-05 DIAGNOSIS — F33.1 MODERATE EPISODE OF RECURRENT MAJOR DEPRESSIVE DISORDER (HCC): ICD-10-CM

## 2021-08-05 DIAGNOSIS — K31.84 DIABETIC GASTROPARESIS (HCC): ICD-10-CM

## 2021-08-05 DIAGNOSIS — E11.43 DIABETIC GASTROPARESIS (HCC): ICD-10-CM

## 2021-08-05 DIAGNOSIS — R10.9 LEFT LATERAL ABDOMINAL PAIN: ICD-10-CM

## 2021-08-05 DIAGNOSIS — Z00.00 ROUTINE GENERAL MEDICAL EXAMINATION AT A HEALTH CARE FACILITY: Primary | ICD-10-CM

## 2021-08-05 PROCEDURE — 3046F HEMOGLOBIN A1C LEVEL >9.0%: CPT | Performed by: FAMILY MEDICINE

## 2021-08-05 PROCEDURE — 3017F COLORECTAL CA SCREEN DOC REV: CPT | Performed by: FAMILY MEDICINE

## 2021-08-05 PROCEDURE — G0438 PPPS, INITIAL VISIT: HCPCS | Performed by: FAMILY MEDICINE

## 2021-08-05 PROCEDURE — G8431 POS CLIN DEPRES SCRN F/U DOC: HCPCS | Performed by: FAMILY MEDICINE

## 2021-08-05 RX ORDER — GABAPENTIN 600 MG/1
600 TABLET ORAL 3 TIMES DAILY
Qty: 270 TABLET | Refills: 1 | Status: SHIPPED | OUTPATIENT
Start: 2021-08-05 | End: 2022-01-31

## 2021-08-05 RX ORDER — METRONIDAZOLE 500 MG/1
500 TABLET ORAL 2 TIMES DAILY
Qty: 14 TABLET | Refills: 0 | Status: SHIPPED | OUTPATIENT
Start: 2021-08-05 | End: 2021-08-12

## 2021-08-05 RX ORDER — CIPROFLOXACIN 500 MG/1
500 TABLET, FILM COATED ORAL 2 TIMES DAILY
Qty: 14 TABLET | Refills: 0 | Status: SHIPPED | OUTPATIENT
Start: 2021-08-05 | End: 2021-08-12

## 2021-08-05 ASSESSMENT — PATIENT HEALTH QUESTIONNAIRE - PHQ9
10. IF YOU CHECKED OFF ANY PROBLEMS, HOW DIFFICULT HAVE THESE PROBLEMS MADE IT FOR YOU TO DO YOUR WORK, TAKE CARE OF THINGS AT HOME, OR GET ALONG WITH OTHER PEOPLE: 1
9. THOUGHTS THAT YOU WOULD BE BETTER OFF DEAD, OR OF HURTING YOURSELF: 0
3. TROUBLE FALLING OR STAYING ASLEEP: 3
SUM OF ALL RESPONSES TO PHQ QUESTIONS 1-9: 12
SUM OF ALL RESPONSES TO PHQ QUESTIONS 1-9: 12
8. MOVING OR SPEAKING SO SLOWLY THAT OTHER PEOPLE COULD HAVE NOTICED. OR THE OPPOSITE, BEING SO FIGETY OR RESTLESS THAT YOU HAVE BEEN MOVING AROUND A LOT MORE THAN USUAL: 0
5. POOR APPETITE OR OVEREATING: 2
7. TROUBLE CONCENTRATING ON THINGS, SUCH AS READING THE NEWSPAPER OR WATCHING TELEVISION: 1
2. FEELING DOWN, DEPRESSED OR HOPELESS: 3
SUM OF ALL RESPONSES TO PHQ9 QUESTIONS 1 & 2: 5
1. LITTLE INTEREST OR PLEASURE IN DOING THINGS: 2
SUM OF ALL RESPONSES TO PHQ QUESTIONS 1-9: 12
4. FEELING TIRED OR HAVING LITTLE ENERGY: 1
6. FEELING BAD ABOUT YOURSELF - OR THAT YOU ARE A FAILURE OR HAVE LET YOURSELF OR YOUR FAMILY DOWN: 0

## 2021-08-05 ASSESSMENT — LIFESTYLE VARIABLES: HOW OFTEN DO YOU HAVE A DRINK CONTAINING ALCOHOL: 0

## 2021-08-05 NOTE — PROGRESS NOTES
7.9%    Values used to calculate the score:      Age: 57 years      Sex: Female      Is Non- : No      Diabetic: Yes      Tobacco smoker: No      Systolic Blood Pressure: 499 mmHg      Is BP treated: Yes      HDL Cholesterol: 45 mg/dL      Total Cholesterol: 186 mg/dL  Prior to Visit Medications    Medication Sig Taking? Authorizing Provider   ciprofloxacin (CIPRO) 500 MG tablet Take 1 tablet by mouth 2 times daily for 7 days Yes Diana Baig MD   metroNIDAZOLE (FLAGYL) 500 MG tablet Take 1 tablet by mouth 2 times daily for 7 days Yes Diana Baig MD   gabapentin (NEURONTIN) 600 MG tablet Take 1 tablet by mouth 3 times daily.  Yes Diana Baig MD   lisinopril (PRINIVIL;ZESTRIL) 20 MG tablet TAKE ONE TABLET BY MOUTH DAILY Yes Diana Baig MD   tolterodine (DETROL LA) 4 MG extended release capsule TAKE ONE CAPSULE BY MOUTH DAILY Yes Diana Baig MD   venlafaxine (EFFEXOR XR) 75 MG extended release capsule TAKE THREE CAPSULES BY MOUTH DAILY Yes Diana Baig MD   metoprolol succinate (TOPROL XL) 50 MG extended release tablet TAKE ONE TABLET BY MOUTH DAILY Yes Diana Baig MD   QUEtiapine (SEROQUEL) 25 MG tablet TAKE TWO TABLETS BY MOUTH ONCE NIGHTLY Yes Diana Baig MD   atorvastatin (LIPITOR) 80 MG tablet TAKE ONE TABLET BY MOUTH DAILY Yes Diana Baig MD   nortriptyline (PAMELOR) 25 MG capsule TAKE ONE TO TWO CAPSULES BY MOUTH ONCE NIGHTLY Yes Diana Baig MD   tiZANidine (ZANAFLEX) 4 MG tablet TAKE ONE TABLET BY MOUTH EVERY 8 HOURS AS NEEDED FOR MUSCLE SPASM Yes Diana Baig MD   amLODIPine (NORVASC) 5 MG tablet TAKE ONE TABLET BY MOUTH DAILY Yes Diana Baig MD   insulin glargine (BASAGLAR KWIKPEN) 100 UNIT/ML injection pen Inject 80 Units into the skin nightly Yes Diana Baig MD   Continuous Blood Gluc Sensor ([address] [place] [address]) MISC as needed Yes Diana Baig MD   insulin aspart (NOVOLOG FLEXPEN) 100 UNIT/ML injection pen Before meal: Glucose < 100 no coverage; Status   10/06/2020 104 (H) <100 mg/dL Final     HDL   Date Value Ref Range Status   10/06/2020 45 40 - 60 mg/dL Final     Triglycerides   Date Value Ref Range Status   10/06/2020 186 (H) 0 - 150 mg/dL Final     Lab Results   Component Value Date    GLUCOSE 136 (H) 03/01/2021     Lab Results   Component Value Date     03/01/2021    K 5.0 03/01/2021    CREATININE 0.9 03/01/2021     Lab Results   Component Value Date    WBC 5.6 02/11/2021    HGB 12.0 02/11/2021    HCT 35.5 (L) 02/11/2021    MCV 86.8 02/11/2021     02/11/2021     Lab Results   Component Value Date    ALT 12 10/06/2020    AST 16 10/06/2020    ALKPHOS 123 10/06/2020    BILITOT 0.3 10/06/2020     TSH (uIU/mL)   Date Value   04/07/2018 1.30     Lab Results   Component Value Date    LABA1C 9.4 06/24/2021        CareTeam (Including outside providers/suppliers regularly involved in providing care):   Patient Care Team:  Byron Luo MD as PCP - Eli Luque MD as PCP - [facility] Provider  Kandis Ferreira MD as Surgeon (Orthopedic Surgery)  Derik Maki MD as Consulting Physician (Endocrinology)    The following problems were reviewed today and where indicated follow up appointments were made and/or referrals ordered. Positive Risk Factor Screenings with Interventions:      Depression:  PHQ-2 Score: 5  PHQ-9 Total Score: 12    Severity:1-4 = minimal depression, 5-9 = mild depression, 10-14 = moderate depression, 15-19 = moderately severe depression, 20-27 = severe depression  Depression Interventions:  · grieving loss of dad, refer to psychiatry and counselling    General Health and ACP:  General  In general, how would you say your health is?: Good  In the past 7 days, have you experienced any of the following?  New or Increased Pain, New or Increased Fatigue, Loneliness, Social Isolation, Stress or Anger?: None of These  Do you get the social and emotional support that you need?: (!) No  Do you have a Living Will?: (!) No  Advance Directives     Power of  Living Will ACP-Advance Directive ACP-Power of     Not on File Not on File Not on File Not on File      General Health Risk Interventions:  · Inadequate social/emotional support: referral for counseling/psychotherapy   · No Living Will: Advance Care Planning addressed with patient today      Health Habits/Nutrition:  Health Habits/Nutrition  Do you exercise for at least 20 minutes 2-3 times per week?: (!) No  Have you lost any weight without trying in the past 3 months?: No  Do you eat only one meal per day?: No  Have you seen the dentist within the past year?: (!) No  Body mass index: (!) 41.59  Health Habits/Nutrition Interventions:  · Inadequate physical activity:  patient is not ready to increase his/her physical activity level at this time  · Nutritional issues:  considering lap band  · Dental exam overdue:  patient encouraged to make appointment with his/her dentist    Safety:  Safety  Do you have working smoke detectors?: Yes  Have all throw rugs been removed or fastened?: (!) No  Do you have non-slip mats or surfaces in all bathtubs/showers?: Yes  Do all of your stairways have a railing or banister?: (!) No  Are your doorways, halls and stairs free of clutter?: Yes  Do you always fasten your seatbelt when you are in a car?: Yes  Safety Interventions:  · Home safety tips provided    Current Health Maintenance Status  Recommendations for Preventive Services Due: see orders.   Recommended screening schedule for the next 5-10 years is provided to the patient in written form: see Patient Instructions/AVS.    PHYSICAL EXAM:  VITALS:  BP (!) 108/58 (Site: Right Upper Arm, Position: Sitting, Cuff Size: Large Adult)   Pulse 77   Temp 98.4 °F (36.9 °C) (Oral)   Resp 16   Ht 5' 3\" (1.6 m)   Wt 234 lb 12.8 oz (106.5 kg)   SpO2 96%   BMI 41.59 kg/m²   BP Readings from Last 5 Encounters:   08/05/21 (!) 108/58   06/24/21 124/70   05/18/21 134/78   04/15/21 (!) 148/88   03/12/21 (!) 171/83     Wt Readings from Last 5 Encounters:   08/05/21 234 lb 12.8 oz (106.5 kg)   06/28/21 225 lb (102.1 kg)   06/24/21 225 lb (102.1 kg)   05/18/21 227 lb (103 kg)   04/15/21 225 lb 12.8 oz (102.4 kg)   Body mass index is 41.59 kg/m². GENERAL: well-developed, well-nourished, alert, no distress, calm   EYES: negative findings: lids and lashes normal and conjunctivae and sclerae normal  ENT: normal TM's and external ear canals both ears  · External nose and ears appear normal  · Pharynx: normal. Exudates: None  · Lips, mucosa, and tongue normal  · Hearing grossly normal.    NECK: No adenopathy, supple, symmetrical, trachea midline  · Thyroid not enlarged, symmetric, no tenderness/mass/nodules  · no cervical nodes, no supraclavicular nodes  LUNGS:  Breathing unlabored  · clear to auscultation bilaterally and good air movement  CARDIOVASC: regular rate and rhythm, S1, S2 normal   LEGS:  Lower extremity edema: none     No carotid bruits  ABDOMEN: Soft, mild-tender LLQ, no masses  · No hepatosplenomegaly  · No hernias noted. Exam limited by body habitus  SKIN: warm and dry  · No rashes or suspicious lesions  PSYCH:  Alert and oriented  · Normal reasoning, insight good  · Facial expressions full, mood appropriate  · No memory disturbance noted  MUSCULOSKEL:  No significant finger or nail findings  · Spine symmetric, no deformities, no kyphosis   GAIT: UP and Go test: <30 seconds with gait: normal.  Speed Normal.  No significant balance checks. No extra steps on turn around. Assistive device: none        Assessment and Plan:      Diagnosis Orders   1. Routine general medical examination at a health care facility     2. Positive depression screening  Positive Screen for Clinical Depression with a Documented Follow-up Plan    3. Diabetic gastroparesis ([address]     4.  DM (diabetes mellitus), type 2, uncontrolled with complications (HCC)  Comprehensive Metabolic Panel    CBC Auto Differential 5. Essential hypertension  Comprehensive Metabolic Panel   6. Moderate episode of recurrent major depressive disorder ([facility] [address]     7. Other screening mammogram  BRANDEN DEE DEE DIGITAL SCREEN BILATERAL   8. Left lateral abdominal pain  ciprofloxacin (CIPRO) 500 MG tablet    metroNIDAZOLE (FLAGYL) 500 MG tablet   9. Polyarthralgia  gabapentin (NEURONTIN) 600 MG tablet   worsened. Plan as above and below. ? diverticulitis    MA- please do paperwork for Dexcom  FYI: While Medicare provides you with a [address], this visit does NOT include management of chronic medical problems or physical examination. Dr. Gloria Morrell usually does a combination visit if you have other medical problems so you don't have to come back for another visit. However, this means that there will be a co-pay. INSTRUCTIONS  NEXT APPOINTMENT: Please schedule check-up in 3 months. · PLEASE TAKE THIS FORM TO CHECK-OUT WINDOW TO SCHEDULE NEXT VISIT. · PLEASE GET BLOODWORK DRAWN TODAY ON FIRST FLOOR in 170. Take orders with you. RESULTS- most blood tests back in couple days. We will call you if any problems. If bloodwork good, you will get letter in mail or notified thru [address] (if signed up) within 2 weeks. If you do not, please call office. · Please get flu vaccine when available in fall. Can get either at this office or at stores such as [employer] and [employer] · For constipation, may take OTC stool softener (colace, docusate) up to 100 mg twice a day as needed. May also take Miralax OTC once a day as needed. · See Uzma Silva CNP for psychiatry at Dr. Annamarie Owens office. Call [phone] to schedule. · Also, see Zelda here for counselling.

## 2021-08-05 NOTE — PATIENT INSTRUCTIONS
MA- please do paperwork for Dexcom  FYI: While Medicare provides you with a [address], this visit does NOT include management of chronic medical problems or physical examination. Dr. Vikram Lane usually does a combination visit if you have other medical problems so you don't have to come back for another visit. However, this means that there will be a co-pay. INSTRUCTIONS  NEXT APPOINTMENT: Please schedule check-up in 3 months. · PLEASE TAKE THIS FORM TO CHECK-OUT WINDOW TO SCHEDULE NEXT VISIT. · PLEASE GET BLOODWORK DRAWN TODAY ON FIRST FLOOR in 170. Take orders with you. RESULTS- most blood tests back in couple days. We will call you if any problems. If bloodwork good, you will get letter in mail or notified thru [address] (if signed up) within 2 weeks. If you do not, please call office. · Please get flu vaccine when available in fall. Can get either at this office or at stores such as [employer] and [facility]. · For constipation, may take OTC stool softener (colace, docusate) up to 100 mg twice a day as needed. May also take Miralax OTC once a day as needed. · See Dominick Vega CNP for psychiatry at Dr. Emely Griffin office. Call [phone] to schedule. · Also, see Zelda here for counselling. Patient Education     ADVANCE DIRECTIVES AND DO NOT RESUSCITATE ORDERS     What is an advance directive? An advance directive tells your doctor what kind of care you would like to have if you become unable to make medical decisions (if you are in a coma, for example). If you are admitted to the hospital, the hospital staff will probably talk to you about advance directives. A good advance directive describes the kind of treatment you would want depending on how sick you are. For example, the directives would describe what kind of care you want if you have an illness that you are unlikely to recover from, or if you are permanently unconscious.  Advance directives usually tell your doctor that you don't want certain kinds of treatment. However, they can also say that you want a certain treatment no matter how ill you are. Advance directives can take many forms. Laws about advance directives are different in each state. You should be aware of the laws in your state. What is a living will? A living will is one type of advance directive. It is a written, legal document that describes the kind of medical treatments or life-sustaining treatments you would want if you were seriously or terminally ill. A living will doesn't let you select someone to make decisions for you. What is a durable power of  for health care? A durable power of  (DPA) for health care is another kind of advance directive. A DPA states whom you have chosen to make health care decisions for you. It becomes active any time you are unconscious or unable to make medical decisions. A DPA is generally more useful than a living will. But a DPA may not be a good choice if you don't have another person you trust to make these decisions for you. Living [place]uillon and DPAs are legal in most states. Even if they aren't officially recognized by the law in your state, they can still guide your loved ones and doctor if you are unable to make decisions about your medical care. Ask your doctor,  or state representative about the law in your state. What is a do not resuscitate order? A do not resuscitate (DNR) order is another kind of advance directive. A DNR is a request not to have cardiopulmonary resuscitation (CPR) if your heart stops or if you stop breathing. (Unless given other instructions, hospital staff will try to help all patients whose heart has stopped or who have stopped breathing.) You can use an advance directive form or tell your doctor that you don't want to be resuscitated. In this case, a DNR order is put in your medical chart by your doctor.  DNR orders are accepted by doctors and hospitals in all as long as you are considered of sound mind to do so. Being of sound mind means that you are still able to think rationally and communicate your wishes in a clear manner. Again, your changesmust be made, signed and notarized according to the laws in your state. Make sure that your doctor and any family members who knew about your directives are also aware that you have changed them. If you do not have time to put your changes in writing, you can make them known while you are in the hospital. Tell your doctor and any family or friends present exactly what you want to happen. Usually, wishes that are made in person will be followed in place of the ones made earlier in writing. Be sure your instructions are clearly understood by everyone you have told. FALLS:  HOW TO LOWER YOUR RISK     Who is at high risk of falling? Anyone can fall, although the risk is higher in older people. This increased risk of falling may be the result of changes that come with aging, and certain medical conditions, such as arthritis, cataracts or hip problems. What can I do to lower my risk of falling? Most falls happen in the home. Consider the following tips to make your home safe: Make sure that you have good lighting in your home. A well lit home will help you avoid tripping over objects that are not easy to see. Put night lights in your bedroom, hallways, stairs and bathrooms. Rugs should be firmly fastened to the floor or have nonskid backing. Loose ends should be tacked down. Electrical cords should not be lying on the floor in walking areas. Put hand rails in your bathroom for bath, shower and toilet use. Have rails on both sides of your stairs for support. In the kitchen, make sure items are within easy reach. Don't store things too high or too low. Then you won't have to use a stepladder or a stool to reach them.  It's also a good idea to avoid storing things too low, so you won't have to bend down to get them. Wear shoes with firm nonskid soles. Avoid wearing loose-fitting slippers that could cause you to trip. What else can I do? Take good care of your body. Try to stay healthy by following these tips:  See your eye doctor once a year. Cataracts and other eye diseases that cause you not to see well, can lead to falls. Get regular physical activity to keep your bones and muscles strong. Take good care of your feet. If you have pain in your feet or if you have large, thick nails and corns, have your doctor look at your feet. Talk to your doctor about any side effects you may have from your medicines. Problems caused by side effects from medicine are a common cause of falls. The more medicines you take, the greater your risk of falling. Talk to your doctor if you have dizzy spells. If your doctor suggests that you use a cane or a walker to help you walk, be sure to use it. This will give you extra stability when walking and will help you avoid falls. Don't smoke. Limit alcohol to no more than 2 drinks per day. When you get out of bed in the morning or at night to use the bathroom, sit on the side of the bed for a few minutes before standing up. Your blood pressure takes some time to adjust when you sit up. It may be too low if you get up quickly. This can make you dizzy, and you might lose your balance and fall. Home Safety: How Well Does Your Home Meet Your Needs? Steps/Stairways/Walkways YesNo    Are they in good shape? Do they have a smooth, safe surface? Are there handrails on both sides of the stairway? How about light switches at the top and bottom of the stairs? Is there grasping space for both knuckles and fingers on railings? Are the stair treads deep enough for your whole foot? Would a ramp be feasible in any of these areas if it became necessary? Floor Surfaces YesNo    Is the surface safe? [place]?     Any throw rugs or doormats that might slip underfoot? Is carpeting loose or torn? Are there changes in floor levels? If so, are they obvious or well marked? Do you have to step over any electric, telephone, or extension cords? Driveway and [employer] [employer]    Is there always space to park? Is it convenient to the entrance? Does the garage door open automatically? Windows & Doors [place]    Are windows and doors easy to open and close? Are locks sturdy and easy to operate? Do doorways accommodate a walker or wheelchair? Can you walk through the doorways easily? Is there space to maneuver while opening and closing doors? Does the front door have a view panel or peephole at the right height? Appliances/Kitchen/[facility]    Is the room arranged safely and conveniently? Do the oven and fridge open easily? Are stove controls clearly marked and easy to use? Is the counter the right height and depth? Can you work sitting down? Are cabinet doorknobs easy to use? Are faucets easy to use? Do you have a hand-held shower head? Are the items you use often on high shelves? Do you have a step stool with handles? Can you easily get in and out of the tub or shower? Do you have a bath or shower seat? Are there grab bars where needed? Is the hot water heater regulated to prevent scalding or burning? Lighting/Ventilation YesNo    Are there enough lights, and are they bright enough? Do you have night lights where needed? Is area well ventilated? Electrical Outlets/Switches/Alarms YesNo    Can you turn switches easily on and off? Are outlets properly grounded to prevent a shock? Are extension cords in good shape? Do you have smoke detectors in all key areas? Do you have an alarm system? Is the telephone readily available for emergencies? Does the telephone have volume control? Can you hear the doorbell ring all throughout the house?                  Personalized Preventive Plan for Delia Love - 8/5/2021  Medicare offers a range of preventive health benefits. Some of the tests and screenings are paid in full while other may be subject to a deductible, co-insurance, and/or copay. Some of these benefits include a comprehensive review of your medical history including lifestyle, illnesses that may run in your family, and various assessments and screenings as appropriate. After reviewing your medical record and screening and assessments performed today your provider may have ordered immunizations, labs, imaging, and/or referrals for you. A list of these orders (if applicable) as well as your Preventive Care list are included within your After Visit Summary for your review. Other Preventive Recommendations:    · A preventive eye exam performed by an eye specialist is recommended every 1-2 years to screen for glaucoma; cataracts, macular degeneration, and other eye disorders. · A preventive dental visit is recommended every 6 months. · Try to get at least 150 minutes of exercise per week or 10,000 steps per day on a pedometer . · Order or download the FREE \"Exercise & Physical Activity: Your Everyday Guide\" from [employer]. Call 7-284.778.3475 or search [employer] online. · You need 1598-8359 mg of calcium and 8112-6126 IU of vitamin D per day. It is possible to meet your calcium requirement with diet alone, but a vitamin D supplement is usually necessary to meet this goal.  · When exposed to the sun, use a sunscreen that protects against both UVA and UVB radiation with an SPF of 30 or greater. Reapply every 2 to 3 hours or after sweating, drying off with a towel, or swimming. · Always wear a seat belt when traveling in a car. Always wear a helmet when riding a bicycle or motorcycle.

## 2021-09-13 RX ORDER — INSULIN GLARGINE 100 [IU]/ML
INJECTION, SOLUTION SUBCUTANEOUS
Qty: 5 PEN | Refills: 3 | Status: SHIPPED | OUTPATIENT
Start: 2021-09-13 | End: 2022-02-01

## 2021-09-20 DIAGNOSIS — S16.1XXD STRAIN OF NECK MUSCLE, SUBSEQUENT ENCOUNTER: ICD-10-CM

## 2021-09-20 RX ORDER — TIZANIDINE 4 MG/1
TABLET ORAL
Qty: 30 TABLET | Refills: 0 | Status: SHIPPED | OUTPATIENT
Start: 2021-09-20 | End: 2022-02-09 | Stop reason: SDUPTHER

## 2021-09-21 ENCOUNTER — OFFICE VISIT (OUTPATIENT)
Dept: FAMILY MEDICINE CLINIC | Age: 61
End: 2021-09-21
Payer: COMMERCIAL

## 2021-09-21 VITALS
DIASTOLIC BLOOD PRESSURE: 82 MMHG | BODY MASS INDEX: 41.82 KG/M2 | SYSTOLIC BLOOD PRESSURE: 124 MMHG | OXYGEN SATURATION: 98 % | WEIGHT: 236 LBS | HEIGHT: 63 IN | HEART RATE: 78 BPM | RESPIRATION RATE: 16 BRPM

## 2021-09-21 DIAGNOSIS — I10 ESSENTIAL HYPERTENSION: ICD-10-CM

## 2021-09-21 DIAGNOSIS — N39.0 RECURRENT UTI: ICD-10-CM

## 2021-09-21 DIAGNOSIS — K58.2 IRRITABLE BOWEL SYNDROME WITH BOTH CONSTIPATION AND DIARRHEA: ICD-10-CM

## 2021-09-21 DIAGNOSIS — E78.5 HYPERLIPIDEMIA LDL GOAL <100: ICD-10-CM

## 2021-09-21 LAB
BILIRUBIN, POC: NORMAL
BLOOD URINE, POC: NORMAL
CLARITY, POC: NORMAL
COLOR, POC: YELLOW
GLUCOSE URINE, POC: 250
KETONES, POC: NORMAL
LEUKOCYTE EST, POC: NORMAL
NITRITE, POC: NORMAL
PH, POC: 5
PROTEIN, POC: 300
SPECIFIC GRAVITY, POC: 1.03
UROBILINOGEN, POC: 0.2

## 2021-09-21 PROCEDURE — G8427 DOCREV CUR MEDS BY ELIG CLIN: HCPCS | Performed by: NURSE PRACTITIONER

## 2021-09-21 PROCEDURE — 99214 OFFICE O/P EST MOD 30 MIN: CPT | Performed by: NURSE PRACTITIONER

## 2021-09-21 PROCEDURE — 81002 URINALYSIS NONAUTO W/O SCOPE: CPT | Performed by: NURSE PRACTITIONER

## 2021-09-21 PROCEDURE — 3046F HEMOGLOBIN A1C LEVEL >9.0%: CPT | Performed by: NURSE PRACTITIONER

## 2021-09-21 PROCEDURE — 1036F TOBACCO NON-USER: CPT | Performed by: NURSE PRACTITIONER

## 2021-09-21 PROCEDURE — G8417 CALC BMI ABV UP PARAM F/U: HCPCS | Performed by: NURSE PRACTITIONER

## 2021-09-21 PROCEDURE — 2022F DILAT RTA XM EVC RTNOPTHY: CPT | Performed by: NURSE PRACTITIONER

## 2021-09-21 PROCEDURE — 3017F COLORECTAL CA SCREEN DOC REV: CPT | Performed by: NURSE PRACTITIONER

## 2021-09-21 ASSESSMENT — ENCOUNTER SYMPTOMS
SHORTNESS OF BREATH: 0
COUGH: 0
VOMITING: 0
ABDOMINAL PAIN: 1
CONSTIPATION: 1
DIARRHEA: 1
NAUSEA: 1

## 2021-09-21 NOTE — PROGRESS NOTES
9/21/2021    This is a 57 y.o. female   Chief Complaint   Patient presents with    Urinary Tract Infection     x1 week, pain but not sure if it's from that or from her gastroporisis. dr Mindy Zhao gave her samples of meds for that but she can't remember the name. urologist said she looked fine. [facility]  Patient has history of recurrent UTIs. Continues with urinary frequency. Denies current dysuria or hematuria. Has seen Dr. Bora Montalvo and per patient report she reported her cystoscopy was normal. Continues on detrol La daily with some help in urinary frequency. Seen Dr. Devan Felipe for gastroparesis, GERD and IBS. Omeprazole was increased to 40 mg daily. She feels that this is helping slightly with her heartburn. Also given sample of medication motegrity and she reports that was helping with her abd pain. She has not yet called Dr. Devan Felipe to continue medication. Denies vomiting. Positive for nausea. Will alternate between diarrhea and constipation. Does not use the bentyl very often. Diabetes Mellitus Type 2:     Using basaglar 80 units at night and novolog 10 units with meals     Home blood sugar records: fasting range: 110-130; Not checking after meal BS. Checks before meal BS with sliding scale insulin. Any episodes of hypoglycemia? no  Eye exam current (within one year): yes  Tobacco history: She  reports that she has never smoked. She has never used smokeless tobacco.        Hypertension:  Home blood pressure monitoring: No.  She is not adherent to a low sodium diet. Patient denies chest pain, shortness of breath, palpitations and dry cough. Antihypertensive medication side effects: no medication side effects noted. Hyperlipidemia:  No new myalgias or GI upset on atorvastatin (Lipitor).        Lab Results   Component Value Date    LABA1C 9.4 06/24/2021    LABA1C 9.5 01/28/2021    LABA1C 9.6 10/06/2020     Lab Results   Component Value Date    LABMICR YES 02/09/2021    CREATININE 1.3 (H) 08/05/2021     Lab Results   Component Value Date    ALT 31 08/05/2021    AST 33 08/05/2021     Lab Results   Component Value Date    CHOL 186 10/06/2020    TRIG 186 (H) 10/06/2020    HDL 45 10/06/2020    LDLCALC 104 (H) 10/06/2020    LDLDIRECT 168 (H) 01/21/2016             Patient Active Problem List   Diagnosis    DM (diabetes mellitus), type 2, uncontrolled with complications ([facility] [address] [facility]    Diabetic gastroparesis (HCC)    Allergic rhinitis    Insomnia    Essential hypertension    Chronic knee pain    Headaches due to old head injury    Nephropathy, diabetic ([facility] [address]    Migraine    Hip pain, bilateral    Depression, major    Right knee DJD    Calcific Achilles tendonitis, right    Right sided sciatica    Lumbar spinal stenosis    TMJ (temporomandibular joint disorder)    Urge incontinence of urine    Personal history of breast cancer    Hyperlipidemia LDL goal <407    Metallic taste    Polyarthralgia    Severe obesity (BMI 35.0-39. 9) with comorbidity (HCC)    Neck pain    Chronic low back pain    Intractable low back pain    Plantar fasciitis of right foot    Anxiety    History of colon polyps    Irritable bowel syndrome with diarrhea    Trigger finger, acquired          Current Outpatient Medications   Medication Sig Dispense Refill    tiZANidine (ZANAFLEX) 4 MG tablet TAKE ONE TABLET BY MOUTH EVERY 8 HOURS AS NEEDED FOR MUSCLE SPASM 30 tablet 0    BASAGLAR KWIKPEN 100 UNIT/ML injection pen INJECT 100 UNITS UNDER THE SKIN ONCE NIGHTLY 5 pen 3    gabapentin (NEURONTIN) 600 MG tablet Take 1 tablet by mouth 3 times daily.  270 tablet 1    lisinopril (PRINIVIL;ZESTRIL) 20 MG tablet TAKE ONE TABLET BY MOUTH DAILY 90 tablet 0    tolterodine (DETROL LA) 4 MG extended release capsule TAKE ONE CAPSULE BY MOUTH DAILY 90 capsule 0    venlafaxine (EFFEXOR XR) 75 MG extended release capsule TAKE THREE CAPSULES BY MOUTH DAILY 270 capsule 0    metoprolol succinate (TOPROL XL) 50 MG extended release tablet TAKE ONE TABLET BY MOUTH DAILY 90 tablet 0    QUEtiapine (SEROQUEL) 25 MG tablet TAKE TWO TABLETS BY MOUTH ONCE NIGHTLY 180 tablet 0    atorvastatin (LIPITOR) 80 MG tablet TAKE ONE TABLET BY MOUTH DAILY 90 tablet 0    nortriptyline (PAMELOR) 25 MG capsule TAKE ONE TO TWO CAPSULES BY MOUTH ONCE NIGHTLY 180 capsule 0    amLODIPine (NORVASC) 5 MG tablet TAKE ONE TABLET BY MOUTH DAILY 90 tablet 1    Continuous Blood Gluc Sensor (FREESTYLE KENDAL SENSOR SYSTEM) MISC as needed 2 each 5    insulin aspart (NOVOLOG FLEXPEN) 100 UNIT/ML injection pen Before meal: Glucose < 100 no coverage; 100-180 take 10 units; 181-230 take 15 units; 231-280 take 20 units; > 281 take 25 units. 10 pen 3    blood glucose test strips (TRUE METRIX BLOOD GLUCOSE TEST) strip USE TO TEST TWO TIMES A DAY AND AS NEEDED FOR SYMPTOMS OF IRREGULAR BLOOD GLUCOSE 200 strip 2    Insulin Pen Needle (B-D UF III MINI PEN NEEDLES) 31G X 5 MM MISC 1 each by Does not apply route 3 times daily 300 each 3    dicyclomine (BENTYL) 20 MG tablet Take 1 tablet by mouth every 6 hours as needed (cramping) 60 tablet 3    omeprazole (PRILOSEC) 40 MG delayed release capsule Take 40 mg by mouth daily        No current facility-administered medications for this visit. Allergies   Allergen Reactions    Trazodone And Nefazodone Other (See Comments)     headache    Ambien [Zolpidem Tartrate] Other (See Comments)     Vivid dreams    Diamox [Acetazolamide] Rash    Lorazepam Other (See Comments)     confusion    Naproxen Rash    Reglan [Metoclopramide] Anxiety    Sulfa Antibiotics Rash       Review of Systems   Constitutional: Negative for activity change. Respiratory: Negative for cough and shortness of breath. Cardiovascular: Negative for chest pain. Gastrointestinal: Positive for abdominal pain, constipation, diarrhea and nausea. Negative for vomiting. Genitourinary: Positive for flank pain and frequency.  Negative for difficulty urinating, dysuria, hematuria and pelvic pain. Vitals:    09/21/21 0840   BP: 124/82   Site: Right Upper Arm   Position: Sitting   Cuff Size: Medium Adult   Pulse: 78   Resp: 16   SpO2: 98%   Weight: 236 lb (107 kg)   Height: 5' 3\" (1.6 m)       Body mass index is 41.81 kg/m². Wt Readings from Last 3 Encounters:   09/21/21 236 lb (107 kg)   08/05/21 234 lb 12.8 oz (106.5 kg)   06/28/21 225 lb (102.1 kg)       BP Readings from Last 3 Encounters:   09/21/21 124/82   08/05/21 (!) 108/58   06/24/21 124/70       Physical Exam  Vitals and nursing note reviewed. Constitutional:       General: She is not in acute distress. Appearance: Normal appearance. She is well-developed. She is obese. HENT:      Head: Normocephalic and atraumatic. Eyes:      Extraocular Movements: Extraocular movements intact. Conjunctiva/sclera: Conjunctivae normal.   Cardiovascular:      Rate and Rhythm: Normal rate and regular rhythm. Heart sounds: Normal heart sounds. No murmur heard. No friction rub. No gallop. Pulmonary:      Effort: Pulmonary effort is normal.      Breath sounds: Normal breath sounds. Abdominal:      Palpations: Abdomen is soft. Tenderness: There is no abdominal tenderness. There is no right CVA tenderness, left CVA tenderness, guarding or rebound. Musculoskeletal:      Cervical back: Neck supple. Skin:     General: Skin is warm and dry. Neurological:      Mental Status: She is alert and oriented to person, place, and time. Psychiatric:         Behavior: Behavior normal.         Thought Content: Thought content normal.         Judgment: Judgment normal.         Assessmentand Plan  Melanie Chou was seen today. Diagnoses and all orders for this visit:    Recurrent UTI  -     POCT Urinalysis no Micro- negative for blood, leukocytes, and nitrites   -     Culture, Urine  Will check urine culture  Advised to stay hydrated with water.    Continue f/u with urologist Dr. Griselda Distad    Uncontrolled type 2 diabetes mellitus with complication, with long-term current use of insulin (HCC)  -     Comprehensive Metabolic Panel; Future  -     Hemoglobin A1C; Future  To have labs drawn after 9/24/21. She is reporting improved BS. Continue basaglar qhs and novolog sliding scale with meals  Advised low carb diet, aerobic exercise, and weight loss. Advised to have evaluation with nephrologist Dr. Iker Byrnes that she has been referred to in the past due to renal function and proteinuria. Essential hypertension  -     Comprehensive Metabolic Panel; Future  Controlled current medications. Hyperlipidemia LDL goal <100  -     Comprehensive Metabolic Panel; Future  -     Lipid Panel; Future  On atorvastatin 80 mg qhs    Irritable bowel syndrome with both constipation and diarrhea  Continue f/u with GI Dr. Danielle Flood. She reports that she had improvement in symptoms when taking motegrity       Return in about 3 months (around 12/27/2021), or if symptoms worsen or fail to improve, for chronic conditions, with Dr. Vikram Lane.

## 2021-09-22 LAB — URINE CULTURE, ROUTINE: NORMAL

## 2021-09-30 DIAGNOSIS — E78.5 HYPERLIPIDEMIA LDL GOAL <100: ICD-10-CM

## 2021-09-30 RX ORDER — QUETIAPINE FUMARATE 25 MG/1
TABLET, FILM COATED ORAL
Qty: 180 TABLET | Refills: 1 | Status: SHIPPED | OUTPATIENT
Start: 2021-09-30 | End: 2021-12-27

## 2021-09-30 RX ORDER — ATORVASTATIN CALCIUM 80 MG/1
TABLET, FILM COATED ORAL
Qty: 90 TABLET | Refills: 1 | Status: SHIPPED | OUTPATIENT
Start: 2021-09-30 | End: 2022-05-23

## 2021-10-08 ENCOUNTER — ANESTHESIA EVENT (OUTPATIENT)
Dept: ENDOSCOPY | Age: 61
End: 2021-10-08
Payer: COMMERCIAL

## 2021-10-11 ENCOUNTER — ANESTHESIA (OUTPATIENT)
Dept: ENDOSCOPY | Age: 61
End: 2021-10-11
Payer: COMMERCIAL

## 2021-10-11 ENCOUNTER — HOSPITAL ENCOUNTER (OUTPATIENT)
Age: 61
Setting detail: OUTPATIENT SURGERY
Discharge: HOME | End: 2021-10-11
Attending: INTERNAL MEDICINE | Admitting: INTERNAL MEDICINE
Payer: COMMERCIAL

## 2021-10-11 VITALS
OXYGEN SATURATION: 98 % | TEMPERATURE: 96.6 F | RESPIRATION RATE: 18 BRPM | DIASTOLIC BLOOD PRESSURE: 83 MMHG | SYSTOLIC BLOOD PRESSURE: 143 MMHG

## 2021-10-11 VITALS
BODY MASS INDEX: 40.46 KG/M2 | OXYGEN SATURATION: 99 % | HEIGHT: 63 IN | WEIGHT: 228.38 LBS | HEART RATE: 75 BPM | RESPIRATION RATE: 18 BRPM | SYSTOLIC BLOOD PRESSURE: 130 MMHG | DIASTOLIC BLOOD PRESSURE: 71 MMHG | TEMPERATURE: 96.4 F

## 2021-10-11 DIAGNOSIS — R11.2 NAUSEA AND VOMITING, INTRACTABILITY OF VOMITING NOT SPECIFIED, UNSPECIFIED VOMITING TYPE: ICD-10-CM

## 2021-10-11 DIAGNOSIS — I10 ESSENTIAL HYPERTENSION: ICD-10-CM

## 2021-10-11 DIAGNOSIS — R10.84 ABDOMINAL PAIN, GENERALIZED: ICD-10-CM

## 2021-10-11 LAB
GLUCOSE BLD-MCNC: 98 MG/DL (ref 70–99)
PERFORMED ON: NORMAL

## 2021-10-11 PROCEDURE — 7100000011 HC PHASE II RECOVERY - ADDTL 15 MIN: Performed by: INTERNAL MEDICINE

## 2021-10-11 PROCEDURE — 3700000000 HC ANESTHESIA ATTENDED CARE: Performed by: INTERNAL MEDICINE

## 2021-10-11 PROCEDURE — 88342 IMHCHEM/IMCYTCHM 1ST ANTB: CPT

## 2021-10-11 PROCEDURE — 2709999900 HC NON-CHARGEABLE SUPPLY: Performed by: INTERNAL MEDICINE

## 2021-10-11 PROCEDURE — 88305 TISSUE EXAM BY PATHOLOGIST: CPT

## 2021-10-11 PROCEDURE — 7100000010 HC PHASE II RECOVERY - FIRST 15 MIN: Performed by: INTERNAL MEDICINE

## 2021-10-11 PROCEDURE — 2580000003 HC RX 258: Performed by: STUDENT IN AN ORGANIZED HEALTH CARE EDUCATION/TRAINING PROGRAM

## 2021-10-11 PROCEDURE — 2500000003 HC RX 250 WO HCPCS

## 2021-10-11 PROCEDURE — 3609012400 HC EGD TRANSORAL BIOPSY SINGLE/MULTIPLE: Performed by: INTERNAL MEDICINE

## 2021-10-11 PROCEDURE — 3700000001 HC ADD 15 MINUTES (ANESTHESIA): Performed by: INTERNAL MEDICINE

## 2021-10-11 PROCEDURE — 6360000002 HC RX W HCPCS

## 2021-10-11 RX ORDER — LABETALOL HYDROCHLORIDE 5 MG/ML
5 INJECTION, SOLUTION INTRAVENOUS EVERY 10 MIN PRN
Status: DISCONTINUED | OUTPATIENT
Start: 2021-10-11 | End: 2021-10-11 | Stop reason: HOSPADM

## 2021-10-11 RX ORDER — CEPHALEXIN 250 MG/1
250 CAPSULE ORAL DAILY
COMMUNITY
End: 2022-02-09

## 2021-10-11 RX ORDER — PROMETHAZINE HYDROCHLORIDE 25 MG/ML
6.25 INJECTION, SOLUTION INTRAMUSCULAR; INTRAVENOUS
Status: DISCONTINUED | OUTPATIENT
Start: 2021-10-11 | End: 2021-10-11 | Stop reason: HOSPADM

## 2021-10-11 RX ORDER — NITROFURANTOIN MACROCRYSTALS 100 MG/1
100 CAPSULE ORAL NIGHTLY
COMMUNITY
End: 2022-03-24 | Stop reason: ALTCHOICE

## 2021-10-11 RX ORDER — PROPOFOL 10 MG/ML
INJECTION, EMULSION INTRAVENOUS PRN
Status: DISCONTINUED | OUTPATIENT
Start: 2021-10-11 | End: 2021-10-11 | Stop reason: SDUPTHER

## 2021-10-11 RX ORDER — LIDOCAINE HYDROCHLORIDE 20 MG/ML
INJECTION, SOLUTION EPIDURAL; INFILTRATION; INTRACAUDAL; PERINEURAL PRN
Status: DISCONTINUED | OUTPATIENT
Start: 2021-10-11 | End: 2021-10-11 | Stop reason: SDUPTHER

## 2021-10-11 RX ORDER — SODIUM CHLORIDE 0.9 % (FLUSH) 0.9 %
5-40 SYRINGE (ML) INJECTION PRN
Status: DISCONTINUED | OUTPATIENT
Start: 2021-10-11 | End: 2021-10-11 | Stop reason: HOSPADM

## 2021-10-11 RX ORDER — ONDANSETRON 2 MG/ML
4 INJECTION INTRAMUSCULAR; INTRAVENOUS
Status: DISCONTINUED | OUTPATIENT
Start: 2021-10-11 | End: 2021-10-11 | Stop reason: HOSPADM

## 2021-10-11 RX ORDER — SODIUM CHLORIDE 9 MG/ML
25 INJECTION, SOLUTION INTRAVENOUS PRN
Status: DISCONTINUED | OUTPATIENT
Start: 2021-10-11 | End: 2021-10-11 | Stop reason: HOSPADM

## 2021-10-11 RX ORDER — SODIUM CHLORIDE 0.9 % (FLUSH) 0.9 %
5-40 SYRINGE (ML) INJECTION EVERY 12 HOURS SCHEDULED
Status: DISCONTINUED | OUTPATIENT
Start: 2021-10-11 | End: 2021-10-11 | Stop reason: HOSPADM

## 2021-10-11 RX ORDER — LISINOPRIL 20 MG/1
TABLET ORAL
Qty: 90 TABLET | Refills: 0 | Status: SHIPPED | OUTPATIENT
Start: 2021-10-11 | End: 2022-01-28 | Stop reason: SDUPTHER

## 2021-10-11 RX ORDER — SODIUM CHLORIDE 9 MG/ML
INJECTION, SOLUTION INTRAVENOUS CONTINUOUS
Status: DISCONTINUED | OUTPATIENT
Start: 2021-10-11 | End: 2021-10-11 | Stop reason: HOSPADM

## 2021-10-11 RX ADMIN — PROPOFOL 70 MG: 10 INJECTION, EMULSION INTRAVENOUS at 12:09

## 2021-10-11 RX ADMIN — SODIUM CHLORIDE: 9 INJECTION, SOLUTION INTRAVENOUS at 11:45

## 2021-10-11 RX ADMIN — LIDOCAINE HYDROCHLORIDE 100 MG: 20 INJECTION, SOLUTION EPIDURAL; INFILTRATION; INTRACAUDAL; PERINEURAL at 12:09

## 2021-10-11 RX ADMIN — PROPOFOL 70 MG: 10 INJECTION, EMULSION INTRAVENOUS at 12:13

## 2021-10-11 ASSESSMENT — PAIN SCALES - GENERAL
PAINLEVEL_OUTOF10: 0

## 2021-10-11 ASSESSMENT — PAIN DESCRIPTION - DESCRIPTORS: DESCRIPTORS: SHARP

## 2021-10-11 ASSESSMENT — PAIN - FUNCTIONAL ASSESSMENT: PAIN_FUNCTIONAL_ASSESSMENT: 0-10

## 2021-10-11 NOTE — ANESTHESIA PRE PROCEDURE
[facility] Department of Anesthesiology  Pre-Anesthesia Evaluation/Consultation       Name:  Divine Hope  : 1960  Age:  57 y.o. MRN:  [id number]  Date: 10/11/2021           Surgeon: Surgeon(s):  Karma Mccoy MD    Procedure: Procedure(s):  EGD ESOPHAGOGASTRODUODENOSCOPY     Allergies   Allergen Reactions    Trazodone And Nefazodone Other (See Comments)     headache    Ambien [Zolpidem Tartrate] Other (See Comments)     Vivid dreams    Diamox [Acetazolamide] Rash    Lorazepam Other (See Comments)     confusion    Naproxen Rash    Reglan [Metoclopramide] Anxiety    Sulfa Antibiotics Rash     Also causes shaking     Patient Active Problem List   Diagnosis    DM (diabetes mellitus), type 2, uncontrolled with complications ([address]    Diabetic gastroparesis ([address]    Allergic rhinitis    Insomnia    Essential hypertension    Chronic knee pain    Headaches due to old head injury    Nephropathy, diabetic ([address]    Migraine    Hip pain, bilateral    Depression, major    Right knee DJD    Calcific Achilles tendonitis, right    Right sided sciatica    Lumbar spinal stenosis    TMJ (temporomandibular joint disorder)    Urge incontinence of urine    Personal history of breast cancer    Hyperlipidemia LDL goal <376    Metallic taste    Polyarthralgia    Severe obesity (BMI 35.0-39. 9) with comorbidity ([address]    Neck pain    Chronic low back pain    Intractable low back pain    Plantar fasciitis of right foot    Anxiety    History of colon polyps    Irritable bowel syndrome with both constipation and diarrhea    Trigger finger, acquired     Past Medical History:   Diagnosis Date    Allergic rhinitis 5/15/2014    Anxiety and depression     Arthritis     Breast cancer ([address] 2010    left breast    Cataract     bilateral    Chronic back pain     Diabetes mellitus ([address]     DM (diabetes mellitus), type 2, uncontrolled with complications Frequency Provider Last Rate Last Admin    0.9 % sodium chloride infusion   IntraVENous Continuous Georgia Mohamud MD        sodium chloride flush 0.9 % injection 5-40 mL  5-40 mL IntraVENous 2 times per day Georgia Mohamud MD        sodium chloride flush 0.9 % injection 5-40 mL  5-40 mL IntraVENous PRN Georgia Mohamud MD        0.9 % sodium chloride infusion  25 mL IntraVENous PRN Georgia Mohamud MD         Vital Signs (Current)   Vitals:    10/11/21 1139   BP: (!) 148/76   Pulse: 80   Resp: 18   Temp: 96.6 °F (35.9 °C)   SpO2: 96%     Vital Signs Statistics (for past 48 hrs)     Temp  Av.6 °F (35.9 °C)  Min: 96.6 °F (35.9 °C)   Min taken time: 10/11/21 1139  Max: 96.6 °F (35.9 °C)   Max taken time: 10/11/21 113  Pulse  Av  Min: [de-identified]   Min taken time: 10/11/21 113  Max: [de-identified]   Max taken time: 10/11/21 113  Resp  Av  Min: 25   Min taken time: 10/11/21 113  Max: 25   Max taken time: 10/11/21 1139  BP  Min: 148/76   Min taken time: 10/11/21 113  Max: 148/76   Max taken time: 10/11/21 113  SpO2  Av %  Min: 96 %   Min taken time: 10/11/21 1139  Max: 96 %   Max taken time: 10/11/21 1139    BP Readings from Last 3 Encounters:   10/11/21 (!) 148/76   21 124/82   21 (!) 108/58     BMI  Body mass index is 40.45 kg/m². Estimated body mass index is 40.45 kg/m² as calculated from the following:    Height as of this encounter: 5' 3\" (1.6 m). Weight as of this encounter: 228 lb 6 oz (103.6 kg).     CBC   Lab Results   Component Value Date    WBC 9.3 2021    RBC 4.22 2021    RBC 4.74 2015    HGB 12.5 2021    HCT 37.7 2021    MCV 89.3 2021    RDW 14.5 2021     2021     CMP    Lab Results   Component Value Date     2021    K 5.2 2021    K 5.1 2021     2021    CO2 24 2021    BUN 31 2021    CREATININE 1.3 2021    GFRAA 50 2021    GFRAA >60 04/10/2013    AGRATIO 1.2 2021 LABGLOM 41 08/05/2021    GLUCOSE 151 08/05/2021    GLUCOSE 380 12/04/2015    PROT 6.9 08/05/2021    PROT 6.9 12/04/2015    CALCIUM 9.6 08/05/2021    BILITOT <0.2 08/05/2021    ALKPHOS 135 08/05/2021    AST 33 08/05/2021    ALT 31 08/05/2021     BMP    Lab Results   Component Value Date     08/05/2021    K 5.2 08/05/2021    K 5.1 02/09/2021     08/05/2021    CO2 24 08/05/2021    BUN 31 08/05/2021    CREATININE 1.3 08/05/2021    CALCIUM 9.6 08/05/2021    GFRAA 50 08/05/2021    GFRAA >60 04/10/2013    LABGLOM 41 08/05/2021    GLUCOSE 151 08/05/2021    GLUCOSE 380 12/04/2015     POCGlucose  No results for input(s): GLUCOSE in the last 72 hours. Coags    Lab Results   Component Value Date    PROTIME 13.0 08/20/2017    INR 1.15 08/20/2017    APTT 30.4 35/78/9330     HCG (If Applicable)   Lab Results   Component Value Date    PREGTESTUR Negative 04/06/2014      ABGs No results found for: PHART, PO2ART, QDO2ONS, THR6VKQ, BEART, H9KBUMNG   Type & Screen (If Applicable)  No results found for: LABABO, LABRH                         BMI: Wt Readings from Last 3 Encounters:       NPO Status: 12 hours                          Anesthesia Evaluation  Patient summary reviewed no history of anesthetic complications:   Airway: Mallampati: III  TM distance: >3 FB   Neck ROM: full   Dental: normal exam         Pulmonary:Negative Pulmonary ROS and normal exam                               Cardiovascular:  Exercise tolerance: good (>4 METS),   (+) hypertension:,         Rhythm: regular  Rate: normal           Beta Blocker:  Dose within 24 Hrs         Neuro/Psych:   (+) neuromuscular disease:, headaches:, psychiatric history:depression/anxiety             GI/Hepatic/Renal:   (+) GERD: well controlled, morbid obesity          Endo/Other:    (+) DiabetesType II DM, using insulin, malignancy/cancer (Brst CA). Abdominal:   (+) obese,           Vascular: negative vascular ROS.          Other Findings:

## 2021-10-11 NOTE — ANESTHESIA POSTPROCEDURE EVALUATION
[facility] Department of Anesthesiology  Post-Anesthesia Note       Name:  Diya Curran                                  Age:  57 y.o. MRN:  [id number]     Last Vitals & Oxygen Saturation: /71   Pulse 65   Temp 96.4 °F (35.8 °C) (Temporal)   Resp 18   Ht 5' 3\" (1.6 m)   Wt 228 lb 6 oz (103.6 kg)   SpO2 100%   BMI 40.45 kg/m²   Patient Vitals for the past 4 hrs:   BP Temp Temp src Pulse Resp SpO2 Height Weight   10/11/21 1233 130/71 -- -- 65 18 100 % -- --   10/11/21 1223 130/71 96.4 °F (35.8 °C) Temporal 73 16 97 % -- --   10/11/21 1139 (!) 148/76 96.6 °F (35.9 °C) Temporal 80 18 96 % 5' 3\" (1.6 m) 228 lb 6 oz (103.6 kg)       Level of consciousness:  Awake, alert    Respiratory: Respirations easy, no distress. Stable. Cardiovascular: Hemodynamically stable. Hydration: Adequate. PONV: Adequately managed. Post-op pain: Adequately controlled. Post-op assessment: Tolerated anesthetic well without complication. Complications:  None.     Javier Agustin MD  October 11, 2021   12:50 PM

## 2021-10-11 NOTE — PROGRESS NOTES
Patient and responsible adult verbalized understanding of discharge instructions, sedation medication, and potential complications including pain. Patient instructed to call Doctor if complications occur.
Preoperative Screening for Elective Surgery/Invasive Procedures While COVID-19 present in the community     Have you tested positive or have been told to self-isolate for COVID-19 like symptoms within the past 28 days?  Do you currently have any of the following symptoms? o Fever >100.0 F or 99.9 F in immunocompromised patients? o New onset cough, shortness of breath or difficulty breathing?  o New onset sore throat, myalgia (muscle aches and pains), headache, loss of taste/smell or diarrhea?  Have you had a potential exposure to COVID-19 within the past 14 days by:  o Close contact with a confirmed case? o Close contact with a healthcare worker,  or essential infrastructure worker (grocery store, [employer], gas station, public utilities or transportation)? o Do you reside in a congregate setting such as; skilled nursing facility, adult home, correctional facility, homeless shelter or other institutional setting?  o Have you had recent travel to a known COVID-19 hotspot? Indicate if the patient has a positive screen by answering yes to one or more of the above questions. Patients who test positive or screen positive prior to surgery or on the day of surgery should be evaluated in conjunction with the surgeon/proceduralist/anesthesiologist to determine the urgency of the procedure. no to all above. Vaccinated proof in [address] [place] [address] Rd.
piercing's on the day of surgery. All jewelry must be removed. If you have dentures, they will be removed before going to operating room. For your convenience, we will provide you with a container. If you wear contact lenses or glasses, they will be removed, please bring a case for them. If you have a living will and a durable power of  for healthcare, please bring in a copy. As part of our patient safety program to minimize surgical site infections, we ask you to do the following:    · Please notify your surgeon if you develop any illness between         now and the  day of your surgery. · This includes a cough, cold, fever, sore throat, nausea,         or vomiting, and diarrhea, etc.  ·  Please notify your surgeon if you experience dizziness, shortness         of breath or blurred vision between now and the time of your surgery. Do not shave your operative site 96 hours prior to surgery. For face and neck surgery, men may use an electric razor 48 hours   prior to surgery. You may shower the night before surgery or the morning of   your surgery with an antibacterial soap. You will need to bring a photo ID and insurance card    [facility] has an onsite pharmacy, would you like to utilize our pharmacy     If you will be staying overnight and use a C-pap machine, please bring   your C-pap to hospital     Our goal is to provide you with excellent care, therefore, visitors will be limited to two(2) in the room at a time so that we may focus on providing this care for you. Please contact pre-admission testing if you have any further questions. [facility] phone number:  [phone]  Please note these are generalized instructions for all surgical cases, you may be provided with more specific instructions according to your surgery.

## 2021-10-11 NOTE — H&P
[facility]   Pre-operative History and Physical    Patient: Mona He  : 1960  Acct#: [de-identified]    History Obtained From: electronic medical record    HISTORY OF PRESENT ILLNESS  Procedure:EGD  Indications:dysphagia.   Past Medical History:        Diagnosis Date    Allergic rhinitis 5/15/2014    Anxiety and depression     Arthritis     Breast cancer ([facility] 2010    left breast    Cataract     bilateral    Chronic back pain     Diabetes mellitus ([facility]     DM (diabetes mellitus), type 2, uncontrolled with complications ([facility]     ESBL (extended spectrum beta-lactamase) producing bacteria infection 10/06/2018    urine    GERD (gastroesophageal reflux disease)     History of migraine headaches     HTN (hypertension) 5/15/2014    Hyperlipidemia     Irritable bowel syndrome (IBS)     Obesity     Wears glasses     reading     Past Surgical History:        Procedure Laterality Date    ACHILLES TENDON SURGERY Right     BACK SURGERY  2013    lumbar    BREAST LUMPECTOMY Left 2010    CARPAL TUNNEL RELEASE Bilateral      SECTION      x 1    CHOLECYSTECTOMY      COLONOSCOPY  10/16/2017    polypectomy- Dr Farmer Body, COLON, DIAGNOSTIC      ESOPHAGEAL DILATATION N/A 5/15/2019    ESOPHAGEAL DILATION Jennifer Henningen performed by Chelsea Drummond MD at [address] Left 2020    LEFT INDEX FINGER TRIGGER FINGER RELEASE performed by John Mckeon MD at [address] [facility] Right 2021    PHACOEMULSIFICATION WITH INTRAOCULAR LENS IMPLANT - RIGHT EYE performed by Lu Acevedo MD at [place] [address] Left 3/12/2021    PHACOEMULSIFICATION WITH INTRAOCULAR LENS IMPLANT - LEFT EYE performed by Lu Acevedo MD at [address] [facility] ENDOSCOPY N/A 2019    EGD BIOPSY performed by Chelsea Drummond MD at [address] UPPER GASTROINTESTINAL ENDOSCOPY N/A 5/15/2019    EGD BIOPSY performed by Coty Ahn MD at [facility]     Medications prior to admission:   Prior to Admission medications    Medication Sig Start Date End Date Taking? Authorizing Provider   nitrofurantoin (MACRODANTIN) 100 MG capsule Take 100 mg by mouth nightly   Yes Historical Provider, MD   cephALEXin (KEFLEX) 250 MG capsule Take 250 mg by mouth daily   Yes Historical Provider, MD   atorvastatin (LIPITOR) 80 MG tablet TAKE ONE TABLET BY MOUTH DAILY 9/30/21  Yes Ana Ley MD   QUEtiapine (SEROQUEL) 25 MG tablet TAKE TWO TABLETS BY MOUTH ONCE NIGHTLY 9/30/21  Yes Ana Ley MD   tiZANidine (ZANAFLEX) 4 MG tablet TAKE ONE TABLET BY MOUTH EVERY 8 HOURS AS NEEDED FOR MUSCLE SPASM 9/20/21  Yes Ana Ley MD   BASAGLAR KWIKPEN 100 UNIT/ML injection pen INJECT 100 UNITS UNDER THE SKIN ONCE NIGHTLY  Patient taking differently: Inject 80 Units into the skin nightly  9/13/21  Yes Ana Ley MD   gabapentin (NEURONTIN) 600 MG tablet Take 1 tablet by mouth 3 times daily. 8/5/21 8/5/22 Yes Ana Ley MD   lisinopril (PRINIVIL;ZESTRIL) 20 MG tablet TAKE ONE TABLET BY MOUTH DAILY 7/6/21  Yes Ana Ley MD   tolterodine (DETROL LA) 4 MG extended release capsule TAKE ONE CAPSULE BY MOUTH DAILY 7/6/21  Yes Ana Ley MD   metoprolol succinate (TOPROL XL) 50 MG extended release tablet TAKE ONE TABLET BY MOUTH DAILY 7/6/21  Yes Ana Ley MD   nortriptyline (PAMELOR) 25 MG capsule TAKE ONE TO TWO CAPSULES BY MOUTH ONCE NIGHTLY 6/8/21  Yes Ana Ley MD   amLODIPine (NORVASC) 5 MG tablet TAKE ONE TABLET BY MOUTH DAILY 4/29/21  Yes Ana Ley MD   Continuous Blood Gluc Sensor ([facility]) [facility] as needed 1/28/21  Yes Ana Ley MD   insulin aspart (NOVOLOG FLEXPEN) 100 UNIT/ML injection pen Before meal: Glucose < 100 no coverage; 100-180 take 10 units; 181-230 take 15 units; 231-280 take 20 units; > 281 take 25 units. 1/28/21  Yes Gonzalo Michael MD   blood glucose test strips (TRUE METRIX BLOOD GLUCOSE TEST) strip USE TO TEST TWO TIMES A DAY AND AS NEEDED FOR SYMPTOMS OF IRREGULAR BLOOD GLUCOSE 1/14/21  Yes Gonzalo Michael MD   Insulin Pen Needle (B-D UF III MINI PEN NEEDLES) 31G X 5 MM MISC 1 each by Does not apply route 3 times daily 9/28/20  Yes Gonzalo Michael MD   omeprazole (PRILOSEC) 40 MG delayed release capsule Take 40 mg by mouth daily  9/28/18  Yes Historical MD Bishop   venlafaxine (EFFEXOR XR) 75 MG extended release capsule TAKE THREE CAPSULES BY MOUTH DAILY 7/6/21   Gonzalo Michael MD   dicyclomine (BENTYL) 20 MG tablet Take 1 tablet by mouth every 6 hours as needed (cramping) 7/5/19   Gonzalo Michael MD     Allergies:   Trazodone and nefazodone, Ambien [zolpidem tartrate], Diamox [acetazolamide], Lorazepam, Naproxen, Reglan [metoclopramide], and Sulfa antibiotics    Social History     Socioeconomic History    Marital status:      Spouse name: Not on file    Number of children: Not on file    Years of education: Not on file    Highest education level: Not on file   Occupational History    Not on file   Tobacco Use    Smoking status: Never Smoker    Smokeless tobacco: Never Used    Tobacco comment: congrats   Vaping Use    Vaping Use: Never used   Substance and Sexual Activity    Alcohol use: No    Drug use: Never    Sexual activity: Yes     Partners: Male   Other Topics Concern    Not on file   Social History Narrative    Self-breast exams: yes    Lives with spouse and son.     Exercise:  rarely    Seatbelt use: Always    Living will: no,   additional information provided     Social Determinants of Health     Financial Resource Strain: Low Risk     Difficulty of Paying Living Expenses: Not hard at all   Food Insecurity: No Food Insecurity    Worried About Running Out of Food in the Last Year: Never true    Lisa of Food in the Last Year: Never true   Transportation Needs:     Lack of Transportation (Medical):  Lack of Transportation (Non-Medical):    Physical Activity:     Days of Exercise per Week:     Minutes of Exercise per Session:    Stress:     Feeling of Stress :    Social Connections:     Frequency of Communication with Friends and Family:     Frequency of Social Gatherings with Friends and Family:     Attends [employer] Services:     Active Member of Clubs or Organizations:     Attends Club or Organization Meetings:     Marital Status:    Intimate Partner Violence:     Fear of Current or Ex-Partner:     Emotionally Abused:     Physically Abused:     Sexually Abused:      Family History   Problem Relation Age of Onset    Cancer Mother         forehead soft tissue    Diabetes Mother     Arthritis Father     High Blood Pressure Father     High Cholesterol Father     Alzheimer's Disease Father     Diabetes Brother     Depression Sister          PHYSICAL EXAM:      BP (!) 148/76   Pulse 80   Temp 96.6 °F (35.9 °C) (Temporal)   Resp 18   Ht 5' 3\" (1.6 m)   Wt 228 lb 6 oz (103.6 kg)   SpO2 96%   BMI 40.45 kg/m²  I        Heart:normal    Lungs: normal    Abdomen: normal      ASA Grade:  See anesthesia note      ASSESSMENT AND PLAN:    1. Procedure options, risks and benefits reviewed with patient and expresses understanding.

## 2021-10-11 NOTE — PROCEDURES
[facility]  Endoscopy Note    Patient: Stevan Wilburn  : 1960  Acct#: [de-identified]    Procedure: Esophagogastroduodenoscopy with biopsy    Date:  10/11/2021     Surgeon:  Oziel Guadarrama MD, MD    Referring Physician:  Kasandra Garrido    Preoperative Diagnosis:  GERD    Postoperative Diagnosis:  Esophagitis and gastritis. Anesthesia: see anesthesia note. Indications: This is a 57y.o. year old female who presents today with New-onset dyspepsia. Description of Procedure:  Informed consent was obtained from the patient after explanation of indications, benefits and possible risks and complications of the procedure. The patient was then taken to the endoscopy suite, placed in the left lateral decubitus position and the above IV sedation was administrered. The Olympus videoendoscope was placed in the patient's mouth and under direct visualization passed into the esophagus. Visualization of the esophagus demonstrated esophagitis. The distal esophagus was biopsied. .     The scope was then advanced into the stomach. Visualization of the gastric body and antrum demonstrated gastritis. The antrum was biopsied. .  A retroflexed exam of the gastric cardia and fundus demonstrated normal..  The pylorus was patent and the scope was advanced into the duodenum. Visualization of the duodenal bulb demonstrated normal..  The second portion of the duodenum demonstrated normal..    The scope was then withdrawn back into the stomach, it was decompressed, and the scope was completely withdrawn. The patient tolerated the procedure well and was taken to the post anesthesia care unit in good condition. Estimated Blood loss:  Minimal.    Impression: Esophagitis and gastritis. Recommendations:Await pathology.     Oziel Guadarrama MD, MD  [facility]

## 2021-10-18 DIAGNOSIS — F33.1 MODERATE EPISODE OF RECURRENT MAJOR DEPRESSIVE DISORDER (HCC): ICD-10-CM

## 2021-10-18 RX ORDER — NITROFURANTOIN 25; 75 MG/1; MG/1
CAPSULE ORAL
Qty: 20 CAPSULE | Refills: 0 | OUTPATIENT
Start: 2021-10-18

## 2021-10-18 RX ORDER — VENLAFAXINE HYDROCHLORIDE 75 MG/1
CAPSULE, EXTENDED RELEASE ORAL
Qty: 270 CAPSULE | Refills: 0 | Status: SHIPPED | OUTPATIENT
Start: 2021-10-18 | End: 2022-02-09 | Stop reason: SDUPTHER

## 2021-11-04 RX ORDER — TOLTERODINE 4 MG/1
CAPSULE, EXTENDED RELEASE ORAL
Qty: 90 CAPSULE | Refills: 0 | Status: SHIPPED | OUTPATIENT
Start: 2021-11-04 | End: 2022-02-15

## 2021-11-04 RX ORDER — METOPROLOL SUCCINATE 50 MG/1
TABLET, EXTENDED RELEASE ORAL
Qty: 90 TABLET | Refills: 0 | Status: SHIPPED | OUTPATIENT
Start: 2021-11-04 | End: 2022-02-17

## 2021-11-12 DIAGNOSIS — I10 ESSENTIAL HYPERTENSION: ICD-10-CM

## 2021-11-12 RX ORDER — AMLODIPINE BESYLATE 5 MG/1
TABLET ORAL
Qty: 90 TABLET | Refills: 1 | Status: SHIPPED | OUTPATIENT
Start: 2021-11-12 | End: 2022-05-23

## 2021-12-27 RX ORDER — QUETIAPINE FUMARATE 25 MG/1
TABLET, FILM COATED ORAL
Qty: 180 TABLET | Refills: 1 | Status: SHIPPED | OUTPATIENT
Start: 2021-12-27 | End: 2022-03-18 | Stop reason: SDUPTHER

## 2022-01-28 DIAGNOSIS — I10 ESSENTIAL HYPERTENSION: ICD-10-CM

## 2022-01-28 RX ORDER — LISINOPRIL 20 MG/1
TABLET ORAL
Qty: 90 TABLET | Refills: 0 | Status: SHIPPED | OUTPATIENT
Start: 2022-01-28 | End: 2022-02-15 | Stop reason: SDUPTHER

## 2022-02-01 RX ORDER — INSULIN GLARGINE 100 [IU]/ML
80 INJECTION, SOLUTION SUBCUTANEOUS NIGHTLY
Qty: 15 ML | Refills: 1 | Status: SHIPPED | OUTPATIENT
Start: 2022-02-01 | End: 2022-02-22 | Stop reason: SDUPTHER

## 2022-02-02 ENCOUNTER — TELEPHONE (OUTPATIENT)
Dept: FAMILY MEDICINE CLINIC | Age: 62
End: 2022-02-02

## 2022-02-02 RX ORDER — NITROFURANTOIN 25; 75 MG/1; MG/1
CAPSULE ORAL
COMMUNITY
Start: 2022-01-24 | End: 2022-03-24 | Stop reason: ALTCHOICE

## 2022-02-02 NOTE — TELEPHONE ENCOUNTER
Patient returning [facility] phone call from yesterday regarding lab results   Please advise

## 2022-02-09 ENCOUNTER — OFFICE VISIT (OUTPATIENT)
Dept: FAMILY MEDICINE CLINIC | Age: 62
End: 2022-02-09
Payer: COMMERCIAL

## 2022-02-09 VITALS
WEIGHT: 239 LBS | HEIGHT: 63 IN | RESPIRATION RATE: 16 BRPM | OXYGEN SATURATION: 98 % | DIASTOLIC BLOOD PRESSURE: 82 MMHG | BODY MASS INDEX: 42.35 KG/M2 | SYSTOLIC BLOOD PRESSURE: 136 MMHG | HEART RATE: 78 BPM

## 2022-02-09 DIAGNOSIS — S16.1XXD STRAIN OF NECK MUSCLE, SUBSEQUENT ENCOUNTER: ICD-10-CM

## 2022-02-09 DIAGNOSIS — G89.29 CHRONIC BILATERAL LOW BACK PAIN WITH LEFT-SIDED SCIATICA: ICD-10-CM

## 2022-02-09 DIAGNOSIS — M25.50 POLYARTHRALGIA: ICD-10-CM

## 2022-02-09 DIAGNOSIS — N30.00 ACUTE CYSTITIS WITHOUT HEMATURIA: ICD-10-CM

## 2022-02-09 DIAGNOSIS — S93.401A SPRAIN OF RIGHT ANKLE, UNSPECIFIED LIGAMENT, INITIAL ENCOUNTER: ICD-10-CM

## 2022-02-09 DIAGNOSIS — F33.1 MODERATE EPISODE OF RECURRENT MAJOR DEPRESSIVE DISORDER (HCC): ICD-10-CM

## 2022-02-09 DIAGNOSIS — M54.42 CHRONIC BILATERAL LOW BACK PAIN WITH LEFT-SIDED SCIATICA: ICD-10-CM

## 2022-02-09 LAB — HBA1C MFR BLD: 8 %

## 2022-02-09 PROCEDURE — 3046F HEMOGLOBIN A1C LEVEL >9.0%: CPT | Performed by: FAMILY MEDICINE

## 2022-02-09 PROCEDURE — G8417 CALC BMI ABV UP PARAM F/U: HCPCS | Performed by: FAMILY MEDICINE

## 2022-02-09 PROCEDURE — 3017F COLORECTAL CA SCREEN DOC REV: CPT | Performed by: FAMILY MEDICINE

## 2022-02-09 PROCEDURE — 2022F DILAT RTA XM EVC RTNOPTHY: CPT | Performed by: FAMILY MEDICINE

## 2022-02-09 PROCEDURE — 83036 HEMOGLOBIN GLYCOSYLATED A1C: CPT | Performed by: FAMILY MEDICINE

## 2022-02-09 PROCEDURE — G8484 FLU IMMUNIZE NO ADMIN: HCPCS | Performed by: FAMILY MEDICINE

## 2022-02-09 PROCEDURE — 99214 OFFICE O/P EST MOD 30 MIN: CPT | Performed by: FAMILY MEDICINE

## 2022-02-09 PROCEDURE — G8427 DOCREV CUR MEDS BY ELIG CLIN: HCPCS | Performed by: FAMILY MEDICINE

## 2022-02-09 PROCEDURE — 1036F TOBACCO NON-USER: CPT | Performed by: FAMILY MEDICINE

## 2022-02-09 RX ORDER — TIZANIDINE 4 MG/1
TABLET ORAL
Qty: 30 TABLET | Refills: 1 | Status: SHIPPED | OUTPATIENT
Start: 2022-02-09 | End: 2022-02-25

## 2022-02-09 RX ORDER — GABAPENTIN 600 MG/1
TABLET ORAL
Qty: 270 TABLET | Refills: 1 | Status: SHIPPED | OUTPATIENT
Start: 2022-02-09 | End: 2022-03-18 | Stop reason: DRUGHIGH

## 2022-02-09 RX ORDER — CIPROFLOXACIN 500 MG/1
500 TABLET, FILM COATED ORAL 2 TIMES DAILY
Qty: 10 TABLET | Refills: 0 | Status: SHIPPED | OUTPATIENT
Start: 2022-02-09 | End: 2022-02-14

## 2022-02-09 RX ORDER — VENLAFAXINE HYDROCHLORIDE 75 MG/1
CAPSULE, EXTENDED RELEASE ORAL
Qty: 270 CAPSULE | Refills: 1 | Status: SHIPPED | OUTPATIENT
Start: 2022-02-09 | End: 2022-08-08

## 2022-02-09 NOTE — PATIENT INSTRUCTIONS
INSTRUCTIONS  NEXT APPOINTMENT: Please schedule annual complete physical (30 minutes) in 4 months. · PLEASE TAKE THIS FORM TO CHECK-OUT WINDOW TO SCHEDULE NEXT VISIT. PLEASE GET FASTING BLOODWORK DRAWN SOON. Lab is on first floor in [address]. Hours Monday to Friday 6:30 AM to 4 PM.   · Go back to ortho for continued neck pain and low back pain. · Use heat 20 minutes on painful joint/muscle (ankle). Then do stretches. May ice any sore spots or for swelling afterwards. Wear ankle brace as needed. See ortho if not better. · Reduce portion sizes. · Take cipro for 5 days and hold macrobid during that 5 days. · Get diagnostic mammogram soon. · DEXA scan in March per hem onc  Patient Education           Ankle Sprain Rehabilitation Exercises     As soon as you can tolerate pressure on the ball of your foot, begin stretching your ankle using the towel stretch. When this stretch is too easy, try the standing calf stretch and soleus stretch. Towel stretch: Sit on a hard surface with your injured leg stretched out in front of you. Loop a towel around the ball of your foot and pull the towel toward your body keeping your knee straight. Hold this position for 15 to 30 seconds then relax. Repeat 3 times. Standing calf stretch: Facing a wall, put your hands against the wall at about eye level. Keep the injured leg back, the uninjured leg forward, and the heel of your injured leg on the floor. Turn your injured foot slightly inward (as if you were pigeon-toed) as you slowly lean into the wall until you feel a stretch in the back of your calf. Hold for 15 to 30 seconds. Repeat 3 times. Do this exercise several times each day. Standing soleus stretch: Stand facing a wall with your hands at about chest level. With both knees slightly bent and the injured foot back, gently lean into the wall until you feel a stretch in your lower calf.  Once again, angle the toes of your injured foot slightly inward and keep your heel down on the floor. Hold this for 15 to 30 seconds. Return to the starting position. Repeat 3 times. You can do the next 5 exercises when your ankle swelling has stopped increasing. Ankle range of motion: Sitting or lying down with your legs straight and your knee toward the ceiling, move your ankle up and down, in and out, and in circles. Only move your ankle. Don't move your leg. Repeat 10 times in each direction. Push hard in all directions. Resisted dorsiflexion: Sit with your injured leg out straight and your foot facing a doorway. Tie a loop in one end of the tubing. Put your foot through the loop so that the tubing goes around the arch of your foot. Tie a knot in the other end of the tubing and shut the knot in the door. Move backward until there is tension in the tubing. Keeping your knee straight, pull your foot toward your body, stretching the tubing. Slowly return to the starting position. Do 3 sets of 10. Resisted plantar flexion: Sit with your leg outstretched and loop the middle section of the tubing around the ball of your foot. Hold the ends of the tubing in both hands. Gently press the ball of your foot down and point your toes, stretching the tubing. Return to the starting position. Do 3 sets of 10. Resisted inversion: Sit with your legs out straight and cross your uninjured leg over your injured ankle. Wrap the tubing around the ball of your injured foot and then loop it around your uninjured foot so that the tubing is anchored there at one end. Hold the other end of the tubing in your hand. Turn your injured foot inward and upward. This will stretch the tubing. Return to the starting position. Do 3 sets of 10. Resisted eversion: Sit with both legs stretched out in front of you, with your feet about a shoulder's width apart. Tie a loop in one end of the tubing.  Put your injured foot through the loop so that the tubing goes around the arch of that foot and wraps around the outside of the uninjured foot. Hold onto the other end of the tubing with your hand to provide tension. Turn your injured foot up and out. Make sure you keep your uninjured foot still so that it will allow the tubing to stretch as you move your injured foot. Return to the starting position. Do 3 sets of 10. You may do the rest of the exercises when you can stand on your injured ankle without pain. Heel raises: Balance yourself while standing behind a chair or counter. Raise your body up onto your toes and hold it for 5 seconds, then slowly lower yourself down. Repeat 10 times. Do 3 sets of 10. Step-up: Stand with the foot of your injured leg on a support (like a block of wood) 3 to 5 inches high. Keep your other foot flat on the floor. Shift your weight onto the injured leg and straighten the knee as the uninjured leg comes off the floor. Lower your uninjured leg to the floor slowly. Do 3 sets of 10. Static and dynamic balance exercises   1. Place a chair next to your non-injured leg and stand upright. (This will provide you with balance if needed.) Stand on your injured foot. Try to raise the arch of your foot while keeping your toes on the floor. Try to maintain this position and balance on your injured side for 30 seconds. This exercise can be made more difficult by doing it on a piece of foam or a pillow, or with your eyes closed. 2.  the same position as above. Keep your foot in this position and reach forward in front of you with your injured side's hand, allowing your knee to bend. Repeat this 10 times while maintaining the arch height. This exercise can be made more difficult by reaching farther in front of you. Do 2 sets. 3.  the same position as above. While maintaining your arch height, reach the injured side's hand across your body toward the chair. The farther you reach, the more challenging the exercise. Do 2 sets of 10.    Jump rope: Jump rope landing on both legs for 5 minutes, then on only the injured leg for 5 minutes.

## 2022-02-09 NOTE — PROGRESS NOTES
CARDIOVASCULAR VISIT NOTE   Subjective:   HPI CHRONIC:   Chief Complaint   Patient presents with    Diabetes     Pt is here for a diabetic follow up     Joint Pain     Pt said that she is having ankle pain that is coming up the side of her right leg. Pt said this started 2x weeks ago. Patient here for follow-up of multiple chronic conditions includin. DM (diabetes mellitus), type 2, uncontrolled with complications ([address]    2. Polyarthralgia    3. Moderate episode of recurrent major depressive disorder (HCC)    4. Strain of neck muscle, subsequent encounter    5. Sprain of right ankle, unspecified ligament, initial encounter    6. Acute cystitis without hematuria      Complaints: R medial ankle pain . Some swelling, x 3 wks, no known injury. · A1c 8.0 today  · Seeing nephrology, been on macrobid x months  · UCx  with Klebsiella with multiple resistances including macrobid. Has urinary frequency but no pain. HISTORY:  Patient's medications, allergies, past medical, and social histories were reviewed and updated as appropriate.      CHART REVIEW  Health Maintenance   Topic Date Due    DTaP/Tdap/Td vaccine (1 - Tdap) Never done    Shingles Vaccine (2 of 3) 2016    Breast cancer screen  2019    Diabetic foot exam  2020    A1C test (Diabetic or Prediabetic)  2021    Lipid screen  10/06/2021    Cervical cancer screen  2022    Depression Monitoring  2022    Colon cancer screen colonoscopy  10/16/2022    Diabetic retinal exam  2023    Potassium monitoring  2023    Creatinine monitoring  2023    Pneumococcal 0-64 years Vaccine (2 of 2 - PPSV23) 2024    Flu vaccine  Completed    COVID-19 Vaccine  Completed    Hepatitis C screen  Completed    HIV screen  Completed    Hepatitis A vaccine  Aged Out    Hib vaccine  Aged Out    Meningococcal (ACWY) vaccine  Aged Out     The 10-year ASCVD risk score (Kim Mckay., et al., 2013) is: 13.5%    Values used to calculate the score:      Age: 60 years      Sex: Female      Is Non- : No      Diabetic: Yes      Tobacco smoker: No      Systolic Blood Pressure: 052 mmHg      Is BP treated: Yes      HDL Cholesterol: 45 mg/dL      Total Cholesterol: 186 mg/dL  Prior to Visit Medications    Medication Sig Taking?  Authorizing Provider   gabapentin (NEURONTIN) 600 MG tablet TAKE ONE TABLET BY MOUTH THREE TIMES A DAY Yes Jason Barrera MD   venlafaxine (EFFEXOR XR) 75 MG extended release capsule TAKE THREE CAPSULES BY MOUTH DAILY Yes Jason Barrera MD   tiZANidine (ZANAFLEX) 4 MG tablet TAKE ONE TABLET BY MOUTH EVERY 8 HOURS AS NEEDED FOR MUSCLE SPASM Yes Jason Barrera MD   Elastic Bandages & Supports ([employer] BRACE[employer]) MISC As needed for pain Yes Jason Barrera MD   ciprofloxacin (CIPRO) 500 MG tablet Take 1 tablet by mouth 2 times daily for 5 days Yes Jason Barrera MD   nitrofurantoin, macrocrystal-monohydrate, (MACROBID) 100 MG capsule  Yes Historical Provider, MD   insulin glargine (BASAGLAR KWIKPEN) 100 UNIT/ML injection pen Inject 80 Units into the skin nightly Yes Jason Barrera MD   lisinopril (PRINIVIL;ZESTRIL) 20 MG tablet TAKE ONE TABLET BY MOUTH DAILY Yes Jason Barrera MD   blood glucose test strips (TRUE METRIX BLOOD GLUCOSE TEST) strip TEST AS IDRECTED Yes Jason Barrera MD   QUEtiapine (SEROQUEL) 25 MG tablet TAKE TWO TABLETS BY MOUTH ONCE NIGHTLY Yes Jason Barrera MD   amLODIPine (NORVASC) 5 MG tablet TAKE ONE TABLET BY MOUTH DAILY Yes Jason Barrera MD   metoprolol succinate (TOPROL XL) 50 MG extended release tablet TAKE ONE TABLET BY MOUTH DAILY Yes GLADIS Land CNP   tolterodine (DETROL LA) 4 MG extended release capsule TAKE ONE CAPSULE BY MOUTH DAILY Yes GLADIS Land CNP   nitrofurantoin (MACRODANTIN) 100 MG capsule Take 100 mg by mouth nightly Yes Historical Provider, MD   atorvastatin (LIPITOR) 80 MG tablet TAKE ONE TABLET BY MOUTH DAILY Yes Cecelia Yoon MD   nortriptyline (PAMELOR) 25 MG capsule TAKE ONE TO TWO CAPSULES BY MOUTH ONCE NIGHTLY Yes Cecelia Yoon MD   insulin aspart (NOVOLOG FLEXPEN) 100 UNIT/ML injection pen Before meal: Glucose < 100 no coverage; 100-180 take 10 units; 181-230 take 15 units; 231-280 take 20 units; > 281 take 25 units.  Yes Cecelia Yoon MD   Insulin Pen Needle (B-D UF III MINI PEN NEEDLES) 31G X 5 MM MISC 1 each by Does not apply route 3 times daily Yes Cecelia Yoon MD   dicyclomine (BENTYL) 20 MG tablet Take 1 tablet by mouth every 6 hours as needed (cramping) Yes Cecelia Yoon MD   omeprazole (PRILOSEC) 40 MG delayed release capsule Take 40 mg by mouth daily  Yes Historical Provider, MD   Continuous Blood Gluc Sensor ([address] [place] [address]) MISC as needed  Patient not taking: Reported on 2/9/2022  Cecelia Yoon MD      Family History   Problem Relation Age of Onset    Cancer Mother         forehead soft tissue    Diabetes Mother     Arthritis Father     High Blood Pressure Father     High Cholesterol Father     Alzheimer's Disease Father     Diabetes Brother     Depression Sister      Social History     Tobacco Use    Smoking status: Never Smoker    Smokeless tobacco: Never Used    Tobacco comment: congrats   Vaping Use    Vaping Use: Never used   Substance Use Topics    Alcohol use: No    Drug use: Never      LAST LABS  Cholesterol, Total   Date Value Ref Range Status   10/06/2020 186 0 - 199 mg/dL Final     LDL Calculated   Date Value Ref Range Status   10/06/2020 104 (H) <100 mg/dL Final     HDL   Date Value Ref Range Status   10/06/2020 45 40 - 60 mg/dL Final     Triglycerides   Date Value Ref Range Status   10/06/2020 186 (H) 0 - 150 mg/dL Final     Lab Results   Component Value Date    GLUCOSE 148 (H) 01/27/2022     Lab Results   Component Value Date     01/27/2022    K 5.2 (H) 01/27/2022    CREATININE 1.1 01/27/2022     Lab Results   Component Value Date    WBC 9.3 08/05/2021    HGB 12.5 08/05/2021    HCT 37.7 08/05/2021    MCV 89.3 08/05/2021     08/05/2021     Lab Results   Component Value Date    ALT 31 08/05/2021    AST 33 08/05/2021    ALKPHOS 135 (H) 08/05/2021    BILITOT <0.2 08/05/2021     TSH (uIU/mL)   Date Value   04/07/2018 1.30     Lab Results   Component Value Date    LABA1C 9.4 06/24/2021        Objective:   PHYSICAL EXAM   /82 (Site: Right Upper Arm, Position: Sitting, Cuff Size: Large Adult)   Pulse 78   Resp 16   Ht 5' 3\" (1.6 m)   Wt 239 lb (108.4 kg)   SpO2 98%   BMI 42.34 kg/m²   BP Readings from Last 5 Encounters:   02/09/22 136/82   10/11/21 (!) 143/83   10/11/21 130/71   09/21/21 124/82   08/05/21 (!) 108/58     Wt Readings from Last 5 Encounters:   02/09/22 239 lb (108.4 kg)   10/11/21 228 lb 6 oz (103.6 kg)   09/21/21 236 lb (107 kg)   08/05/21 234 lb 12.8 oz (106.5 kg)   06/28/21 225 lb (102.1 kg)      GENERAL:   · well-developed, well-nourished, alert, no distress. EYES:   · External findings: lids and lashes normal and conjunctivae and sclerae normal  LUNGS:    · Breathing unlabored  · clear to auscultation bilaterally and good air movement  CARDIOVASC:   · regular rate and rhythm, S1, S2 normal. No murmur, click, rub or gallop  · LEGS:  Lower extremity edema: none    SKIN: warm and dry  PSYCH:    · Alert and oriented  · Normal reasoning, insight good  · Facial expressions full, mood appropriate  · No memory disturbance noted     Assessment and Plan:      Diagnosis Orders   1. DM (diabetes mellitus), type 2, uncontrolled with complications (HCC)  POCT glycosylated hemoglobin (Hb A1C)    Lipid Panel   2. Polyarthralgia  gabapentin (NEURONTIN) 600 MG tablet   3. Moderate episode of recurrent major depressive disorder (HCC)  venlafaxine (EFFEXOR XR) 75 MG extended release capsule   4. Strain of neck muscle, subsequent encounter  tiZANidine (ZANAFLEX) 4 MG tablet    Ambulatory referral to Orthopedic Surgery   5.  Sprain of right ankle, unspecified ligament, initial encounter  Elastic Bandages & Supports ([employer] ANKLE BRACE/RGHT) MISC   6. Acute cystitis without hematuria  ciprofloxacin (CIPRO) 500 MG tablet   Stable. Continue current Tx plan except for changes marked below. INSTRUCTIONS  NEXT APPOINTMENT: Please schedule annual complete physical (30 minutes) in 4 months. · PLEASE TAKE THIS FORM TO CHECK-OUT WINDOW TO SCHEDULE NEXT VISIT. PLEASE GET FASTING BLOODWORK DRAWN SOON. Lab is on first floor in [address]. Hours Monday to Friday 6:30 AM to 4 PM.   · Go back to ortho for continued neck pain. · Use heat 20 minutes on painful joint/muscle (ankle). Then do stretches. May ice any sore spots or for swelling afterwards. Wear ankle brace as needed. See ortho if not better. · Reduce portion sizes. · Take cipro for 5 days and hold macrobid during that 5 days. · Get diagnostic mammogram soon.   · DEXA scan in March per hem onc

## 2022-02-15 ENCOUNTER — TELEPHONE (OUTPATIENT)
Dept: FAMILY MEDICINE CLINIC | Age: 62
End: 2022-02-15

## 2022-02-15 DIAGNOSIS — I10 ESSENTIAL HYPERTENSION: ICD-10-CM

## 2022-02-15 RX ORDER — TOLTERODINE 4 MG/1
CAPSULE, EXTENDED RELEASE ORAL
Qty: 90 CAPSULE | Refills: 0 | Status: SHIPPED | OUTPATIENT
Start: 2022-02-15 | End: 2022-05-13

## 2022-02-15 RX ORDER — CALCIUM CITRATE/VITAMIN D3 200MG-6.25
TABLET ORAL
Qty: 300 STRIP | Refills: 3 | Status: SHIPPED | OUTPATIENT
Start: 2022-02-15 | End: 2022-06-14

## 2022-02-15 RX ORDER — LISINOPRIL 20 MG/1
TABLET ORAL
Qty: 90 TABLET | Refills: 0 | Status: SHIPPED | OUTPATIENT
Start: 2022-02-15 | End: 2022-03-24 | Stop reason: ALTCHOICE

## 2022-02-16 ENCOUNTER — TELEPHONE (OUTPATIENT)
Dept: FAMILY MEDICINE CLINIC | Age: 62
End: 2022-02-16

## 2022-02-16 NOTE — TELEPHONE ENCOUNTER
----- Message from Alexandria Mason sent at 2/16/2022  9:11 AM EST -----  Subject: Message to Provider    QUESTIONS  Information for Provider? PATIENT CALLED IN STATING THAT HER INSURANCE   COMPANY STATED THEY NEEDED PRIOR AUTH FOR HER INSULIN - [address] FLEX PEN. INSURANCE STATED TO CALL THEM AT [phone] TO FOLLOW UP. PATIENT IS   LOW ON INSULIN   ---------------------------------------------------------------------------  --------------  CALL BACK INFO  What is the best way for the office to contact you? OK to leave message on   voicemail  Preferred Call Back Phone Number? [phone]  ---------------------------------------------------------------------------  --------------  SCRIPT ANSWERS  Relationship to Patient?  Self

## 2022-02-17 RX ORDER — METOPROLOL SUCCINATE 50 MG/1
TABLET, EXTENDED RELEASE ORAL
Qty: 90 TABLET | Refills: 0 | Status: SHIPPED | OUTPATIENT
Start: 2022-02-17 | End: 2022-05-23

## 2022-02-17 RX ORDER — NORTRIPTYLINE HYDROCHLORIDE 25 MG/1
CAPSULE ORAL
Qty: 180 CAPSULE | Refills: 0 | Status: SHIPPED | OUTPATIENT
Start: 2022-02-17 | End: 2022-03-18

## 2022-02-21 ENCOUNTER — TELEPHONE (OUTPATIENT)
Dept: FAMILY MEDICINE CLINIC | Age: 62
End: 2022-02-21

## 2022-02-21 LAB
CHOLESTEROL, TOTAL: 165 MG/DL (ref 0–199)
HDLC SERPL-MCNC: 35 MG/DL (ref 40–60)
LDL CHOLESTEROL CALCULATED: 93 MG/DL
TRIGL SERPL-MCNC: 187 MG/DL (ref 0–150)
VLDLC SERPL CALC-MCNC: 37 MG/DL

## 2022-02-21 NOTE — TELEPHONE ENCOUNTER
Abdi gómez from [employer]. Stated that pt has rx for Generic Insulin Aspart that would need a PA for insurance to cover. Stated that [employer] will cover Novolog Insulin. Stated that a new script could be sent to pharmacy or PA completed for the generic insulin.     Mary Kate López can be reached at [phone] pharmacy option

## 2022-02-22 ENCOUNTER — TELEPHONE (OUTPATIENT)
Dept: FAMILY MEDICINE CLINIC | Age: 62
End: 2022-02-22

## 2022-02-22 RX ORDER — INSULIN ASPART 100 [IU]/ML
INJECTION, SOLUTION INTRAVENOUS; SUBCUTANEOUS
Qty: 5 PEN | Refills: 3 | Status: SHIPPED | OUTPATIENT
Start: 2022-02-22 | End: 2022-02-24 | Stop reason: SDUPTHER

## 2022-02-22 RX ORDER — INSULIN GLARGINE 100 [IU]/ML
80 INJECTION, SOLUTION SUBCUTANEOUS NIGHTLY
Qty: 15 ML | Refills: 1 | Status: SHIPPED | OUTPATIENT
Start: 2022-02-22 | End: 2022-05-02

## 2022-02-22 NOTE — TELEPHONE ENCOUNTER
Novalog is not and alternative for basaglar but insulin aspartate. See message 2/21. Sending Novolog in for that. Is basaglar not covered? Mckinley Ruby Semlee?

## 2022-02-22 NOTE — TELEPHONE ENCOUNTER
Called and spoke to Gabriella said both novolog and basgular are both covered on her formulary   Called and spoke to pharmacy   Pharmacy said they do not need a PA, insurance will not cover the novolog until the 27 because its too early   The same goes for the basgular, the insurance said its too early and they will not pay for it till March 5th   Called and spoke to pt and let her know what the pharmacy said   Pt said she can try to make the novolog last for 5 more days and that is okay on the basuglar   Thank you   TRISTEN

## 2022-02-22 NOTE — TELEPHONE ENCOUNTER
Submitted PA for NovoLOG FlexPen 100UNIT/ML pen-injectors  Via CMM Key: [id number]. Per plan \"PA not needed\"    Camila advise patient. Thank you.

## 2022-02-23 NOTE — TELEPHONE ENCOUNTER
Called pt   Pt said that she's uses about 7-8 pens in a month  It depends on her blood sugars during the day and how much insulin she has to give   Please advise

## 2022-02-24 ENCOUNTER — TELEPHONE (OUTPATIENT)
Dept: FAMILY MEDICINE CLINIC | Age: 62
End: 2022-02-24

## 2022-02-24 RX ORDER — INSULIN ASPART 100 [IU]/ML
INJECTION, SOLUTION INTRAVENOUS; SUBCUTANEOUS
Qty: 10 PEN | Refills: 3 | Status: SHIPPED | OUTPATIENT
Start: 2022-02-24 | End: 2022-03-03

## 2022-02-24 RX ORDER — INSULIN ASPART 100 [IU]/ML
INJECTION, SOLUTION INTRAVENOUS; SUBCUTANEOUS
Qty: 10 PEN | Refills: 3 | Status: SHIPPED | OUTPATIENT
Start: 2022-02-24 | End: 2022-02-24 | Stop reason: SDUPTHER

## 2022-02-24 NOTE — TELEPHONE ENCOUNTER
Pharm calling stated that they scot a max amount of units that the pt uses of Novolog each day. Stated that they would need the amount to send to insurance.      Pharm can be reached at [phone]

## 2022-02-25 ENCOUNTER — OFFICE VISIT (OUTPATIENT)
Dept: FAMILY MEDICINE CLINIC | Age: 62
End: 2022-02-25
Payer: COMMERCIAL

## 2022-02-25 VITALS
HEIGHT: 63 IN | DIASTOLIC BLOOD PRESSURE: 72 MMHG | HEART RATE: 76 BPM | OXYGEN SATURATION: 96 % | SYSTOLIC BLOOD PRESSURE: 130 MMHG | TEMPERATURE: 98.4 F | RESPIRATION RATE: 16 BRPM | WEIGHT: 238.6 LBS | BODY MASS INDEX: 42.28 KG/M2

## 2022-02-25 DIAGNOSIS — S16.1XXD STRAIN OF NECK MUSCLE, SUBSEQUENT ENCOUNTER: ICD-10-CM

## 2022-02-25 DIAGNOSIS — R35.0 URINARY FREQUENCY: ICD-10-CM

## 2022-02-25 DIAGNOSIS — F41.9 ANXIETY: ICD-10-CM

## 2022-02-25 DIAGNOSIS — N39.0 RECURRENT UTI: Primary | ICD-10-CM

## 2022-02-25 DIAGNOSIS — F33.1 MODERATE EPISODE OF RECURRENT MAJOR DEPRESSIVE DISORDER (HCC): ICD-10-CM

## 2022-02-25 LAB
BILIRUBIN, POC: NORMAL
BLOOD URINE, POC: NORMAL
CLARITY, POC: CLEAR
COLOR, POC: YELLOW
GLUCOSE URINE, POC: 500
KETONES, POC: NORMAL
LEUKOCYTE EST, POC: NORMAL
NITRITE, POC: NORMAL
PH, POC: 5.5
PROTEIN, POC: 300
SPECIFIC GRAVITY, POC: 1.03
UROBILINOGEN, POC: 0.2

## 2022-02-25 PROCEDURE — 81002 URINALYSIS NONAUTO W/O SCOPE: CPT | Performed by: NURSE PRACTITIONER

## 2022-02-25 PROCEDURE — 99213 OFFICE O/P EST LOW 20 MIN: CPT | Performed by: NURSE PRACTITIONER

## 2022-02-25 PROCEDURE — G8417 CALC BMI ABV UP PARAM F/U: HCPCS | Performed by: NURSE PRACTITIONER

## 2022-02-25 PROCEDURE — G8427 DOCREV CUR MEDS BY ELIG CLIN: HCPCS | Performed by: NURSE PRACTITIONER

## 2022-02-25 PROCEDURE — 3017F COLORECTAL CA SCREEN DOC REV: CPT | Performed by: NURSE PRACTITIONER

## 2022-02-25 PROCEDURE — 1036F TOBACCO NON-USER: CPT | Performed by: NURSE PRACTITIONER

## 2022-02-25 PROCEDURE — G8484 FLU IMMUNIZE NO ADMIN: HCPCS | Performed by: NURSE PRACTITIONER

## 2022-02-25 RX ORDER — TIZANIDINE 4 MG/1
TABLET ORAL
Qty: 30 TABLET | Refills: 1 | Status: SHIPPED | OUTPATIENT
Start: 2022-02-25 | End: 2022-03-14

## 2022-02-25 NOTE — PROGRESS NOTES
2/25/2022    This is a 60 y.o. female   Chief Complaint   Patient presents with    Anxiety     still a lot of anxiety. trouble with sleep, depression    Urinary Frequency     has never gone away. no dysuria. low back pain   . HPI  Patient reports that she continues with anxiety. She takes venlafaxine 75 mg three tabs daily and seroquel 25 mg qhs. Medication list has nortriptyline, but she reports that she is not taking. She is unsure why she has not been taking. She has not completed counseling. This is something that she is interested in completing. Patient has history of recurrent UTIs. Urine culture from 1/27/22 showed klebsiella aerogenes with multiple resistances including macrobid and she was treated with cipro. She reports that she took for 3 days , but made her too jittery and had to stop. Takes macrobid daily for UTI prevention. Continues with urinary frequency. Denies dysuria, hematuria, pelvic pain, fever. Has not seen her urologist recently. Patient Active Problem List   Diagnosis    DM (diabetes mellitus), type 2, uncontrolled with complications ([address]    Diabetic gastroparesis ([address]    Allergic rhinitis    Insomnia    Essential hypertension    Chronic knee pain    Headaches due to old head injury    Nephropathy, diabetic ([address]    Migraine    Hip pain, bilateral    Depression, major    Right knee DJD    Calcific Achilles tendonitis, right    Right sided sciatica    Lumbar spinal stenosis    TMJ (temporomandibular joint disorder)    Urge incontinence of urine    Personal history of breast cancer    Hyperlipidemia LDL goal <358    Metallic taste    Polyarthralgia    Severe obesity (BMI 35.0-39. 9) with comorbidity (HCC)    Neck pain    Chronic low back pain    Intractable low back pain    Plantar fasciitis of right foot    Anxiety    History of colon polyps    Irritable bowel syndrome with both constipation and diarrhea    Trigger finger, acquired Current Outpatient Medications   Medication Sig Dispense Refill    tiZANidine (ZANAFLEX) 4 MG tablet TAKE 1 TABLET BY MOUTH EVERY 8 HOURS AS NEEDED FOR MUSCLE SPASM 30 tablet 1    NOVOLOG FLEXPEN 100 UNIT/ML injection pen Before meal: Glucose < 100 no coverage; 100-180 take 10 units; 181-230 take 15 units; 231-280 take 20 units; > 281 take 25 units. Max 75 U per day.  10 pen 3    insulin glargine (BASAGLAR KWIKPEN) 100 UNIT/ML injection pen Inject 80 Units into the skin nightly 15 mL 1    metoprolol succinate (TOPROL XL) 50 MG extended release tablet TAKE 1 TABLET BY MOUTH EVERY DAY 90 tablet 0    nortriptyline (PAMELOR) 25 MG capsule TAKE 1-2 CAPSULES BY MOUTH EVERY NIGHT 180 capsule 0    tolterodine (DETROL LA) 4 MG extended release capsule TAKE 1 CAPSULE BY MOUTH EVERY DAY 90 capsule 0    lisinopril (PRINIVIL;ZESTRIL) 20 MG tablet TAKE ONE TABLET BY MOUTH DAILY 90 tablet 0    blood glucose test strips (TRUE METRIX BLOOD GLUCOSE TEST) strip three times per day 300 strip 3    gabapentin (NEURONTIN) 600 MG tablet TAKE ONE TABLET BY MOUTH THREE TIMES A  tablet 1    venlafaxine (EFFEXOR XR) 75 MG extended release capsule TAKE THREE CAPSULES BY MOUTH DAILY 270 capsule 1    Elastic Bandages & Supports (AIRCAST SPORT ANKLE BRACE/RGHT) MISC As needed for pain 1 each 0    nitrofurantoin, macrocrystal-monohydrate, (MACROBID) 100 MG capsule       QUEtiapine (SEROQUEL) 25 MG tablet TAKE TWO TABLETS BY MOUTH ONCE NIGHTLY 180 tablet 1    amLODIPine (NORVASC) 5 MG tablet TAKE ONE TABLET BY MOUTH DAILY 90 tablet 1    nitrofurantoin (MACRODANTIN) 100 MG capsule Take 100 mg by mouth nightly      atorvastatin (LIPITOR) 80 MG tablet TAKE ONE TABLET BY MOUTH DAILY 90 tablet 1    Continuous Blood Gluc Sensor (BizimplyYLE KENDAL SENSOR SYSTEM) MISC as needed 2 each 5    Insulin Pen Needle (B-D UF III MINI PEN NEEDLES) 31G X 5 MM MISC 1 each by Does not apply route 3 times daily 300 each 3    dicyclomine (BENTYL) 20 MG tablet Take 1 tablet by mouth every 6 hours as needed (cramping) 60 tablet 3    omeprazole (PRILOSEC) 40 MG delayed release capsule Take 40 mg by mouth daily        No current facility-administered medications for this visit. Allergies   Allergen Reactions    Trazodone And Nefazodone Other (See Comments)     headache    Cipro [Ciprofloxacin Hcl] Other (See Comments)     Makes her jittery.  Ambien [Zolpidem Tartrate] Other (See Comments)     Vivid dreams    Diamox [Acetazolamide] Rash    Lorazepam Other (See Comments)     confusion    Naproxen Rash    Reglan [Metoclopramide] Anxiety    Sulfa Antibiotics Rash     Also causes shaking       Review of Systems   Constitutional: Negative for activity change and fever. Respiratory: Negative for cough and shortness of breath. Cardiovascular: Negative for chest pain. Genitourinary: Positive for frequency. Negative for difficulty urinating, dysuria, hematuria, pelvic pain and vaginal discharge. Psychiatric/Behavioral: Positive for dysphoric mood and sleep disturbance. Negative for suicidal ideas. The patient is nervous/anxious. Vitals:    02/25/22 1410   BP: 130/72   Site: Right Upper Arm   Position: Sitting   Cuff Size: Large Adult   Pulse: 76   Resp: 16   Temp: 98.4 °F (36.9 °C)   TempSrc: Oral   SpO2: 96%   Weight: 238 lb 9.6 oz (108.2 kg)   Height: 5' 3\" (1.6 m)       Body mass index is 42.27 kg/m². Wt Readings from Last 3 Encounters:   02/25/22 238 lb 9.6 oz (108.2 kg)   02/09/22 239 lb (108.4 kg)   10/11/21 228 lb 6 oz (103.6 kg)       BP Readings from Last 3 Encounters:   02/25/22 130/72   02/09/22 136/82   10/11/21 (!) 143/83       Physical Exam  Vitals and nursing note reviewed. Constitutional:       General: She is not in acute distress. Appearance: She is well-developed. HENT:      Head: Normocephalic and atraumatic. Cardiovascular:      Rate and Rhythm: Normal rate and regular rhythm.       Heart sounds: Normal heart sounds. No murmur heard. No friction rub. No gallop. Pulmonary:      Effort: Pulmonary effort is normal. No respiratory distress. Breath sounds: Normal breath sounds. Abdominal:      Palpations: Abdomen is soft. Tenderness: There is no abdominal tenderness. There is no right CVA tenderness, left CVA tenderness, guarding or rebound. Musculoskeletal:      Cervical back: Neck supple. Skin:     General: Skin is warm and dry. Neurological:      Mental Status: She is alert and oriented to person, place, and time. Psychiatric:         Behavior: Behavior normal.         Thought Content: Thought content normal.         Judgment: Judgment normal.         Assessmentand Plan  Boston Carroll was seen today for anxiety and urinary frequency. Diagnoses and all orders for this visit:    Recurrent UTI/ Urinary frequency  -     POCT Urinalysis no Micro  -     Culture, Urine  On macrobid for UTI prevention. Advised to f/u with her urologist Dr. Grisel Brown     Moderate episode of recurrent major depressive disorder (HCC)/ Anxiety  Stable. On venlafaxine 75 mg TID, seroquel 25 mg qhs  She can restart her nortriptyline 25 mg qhs. Advised to start counseling. She will schedule with Zelda in office. Return in 5 months (on 8/10/2022), or if symptoms worsen or fail to improve, for chronic conditions, with Dr. Bernie Heredia.

## 2022-02-27 LAB — URINE CULTURE, ROUTINE: NORMAL

## 2022-02-28 ASSESSMENT — ENCOUNTER SYMPTOMS
COUGH: 0
SHORTNESS OF BREATH: 0

## 2022-03-01 ENCOUNTER — OFFICE VISIT (OUTPATIENT)
Dept: ORTHOPEDIC SURGERY | Age: 62
End: 2022-03-01
Payer: COMMERCIAL

## 2022-03-01 VITALS — HEIGHT: 63 IN | WEIGHT: 238 LBS | BODY MASS INDEX: 42.17 KG/M2

## 2022-03-01 DIAGNOSIS — M50.30 DDD (DEGENERATIVE DISC DISEASE), CERVICAL: ICD-10-CM

## 2022-03-01 DIAGNOSIS — M47.812 CERVICAL SPONDYLOSIS: ICD-10-CM

## 2022-03-01 DIAGNOSIS — M54.2 CERVICAL PAIN: Primary | ICD-10-CM

## 2022-03-01 PROCEDURE — 1036F TOBACCO NON-USER: CPT | Performed by: ORTHOPAEDIC SURGERY

## 2022-03-01 PROCEDURE — G8427 DOCREV CUR MEDS BY ELIG CLIN: HCPCS | Performed by: ORTHOPAEDIC SURGERY

## 2022-03-01 PROCEDURE — G8417 CALC BMI ABV UP PARAM F/U: HCPCS | Performed by: ORTHOPAEDIC SURGERY

## 2022-03-01 PROCEDURE — G8484 FLU IMMUNIZE NO ADMIN: HCPCS | Performed by: ORTHOPAEDIC SURGERY

## 2022-03-01 PROCEDURE — 3017F COLORECTAL CA SCREEN DOC REV: CPT | Performed by: ORTHOPAEDIC SURGERY

## 2022-03-01 PROCEDURE — 99204 OFFICE O/P NEW MOD 45 MIN: CPT | Performed by: ORTHOPAEDIC SURGERY
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
## 2022-03-03 RX ORDER — INSULIN ASPART 100 [IU]/ML
INJECTION, SOLUTION INTRAVENOUS; SUBCUTANEOUS
Qty: 15 ML | Refills: 5 | Status: SHIPPED | OUTPATIENT
Start: 2022-03-03 | End: 2022-08-16 | Stop reason: SDUPTHER

## 2022-03-08 ENCOUNTER — HOSPITAL ENCOUNTER (OUTPATIENT)
Dept: PHYSICAL THERAPY | Age: 62
Setting detail: THERAPIES SERIES
Discharge: HOME | End: 2022-03-08
Payer: COMMERCIAL

## 2022-03-08 PROCEDURE — 97161 PT EVAL LOW COMPLEX 20 MIN: CPT

## 2022-03-08 PROCEDURE — 97110 THERAPEUTIC EXERCISES: CPT
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
## 2022-03-14 ENCOUNTER — HOSPITAL ENCOUNTER (OUTPATIENT)
Dept: PHYSICAL THERAPY | Age: 62
Setting detail: THERAPIES SERIES
End: 2022-03-14
Payer: COMMERCIAL

## 2022-03-14 DIAGNOSIS — S16.1XXD STRAIN OF NECK MUSCLE, SUBSEQUENT ENCOUNTER: ICD-10-CM

## 2022-03-14 RX ORDER — TIZANIDINE 4 MG/1
TABLET ORAL
Qty: 30 TABLET | Refills: 1 | Status: SHIPPED | OUTPATIENT
Start: 2022-03-14 | End: 2022-04-01

## 2022-03-15 ENCOUNTER — HOSPITAL ENCOUNTER (OUTPATIENT)
Dept: WOMENS IMAGING | Age: 62
Discharge: HOME | End: 2022-03-15
Payer: COMMERCIAL

## 2022-03-15 DIAGNOSIS — Z78.0 POSTMENOPAUSAL STATUS: ICD-10-CM

## 2022-03-15 PROCEDURE — 3052F HG A1C>EQUAL 8.0%<EQUAL 9.0%: CPT | Performed by: FAMILY MEDICINE

## 2022-03-15 PROCEDURE — 77080 DXA BONE DENSITY AXIAL: CPT

## 2022-03-16 ENCOUNTER — OFFICE VISIT (OUTPATIENT)
Dept: ORTHOPEDIC SURGERY | Age: 62
End: 2022-03-16
Payer: COMMERCIAL

## 2022-03-16 ENCOUNTER — TELEPHONE (OUTPATIENT)
Dept: FAMILY MEDICINE CLINIC | Age: 62
End: 2022-03-16

## 2022-03-16 VITALS — BODY MASS INDEX: 43.45 KG/M2 | WEIGHT: 245.2 LBS | HEIGHT: 63 IN

## 2022-03-16 DIAGNOSIS — M65.28 CALCIFIC ACHILLES TENDINITIS OF BOTH LOWER EXTREMITIES: ICD-10-CM

## 2022-03-16 DIAGNOSIS — G89.29 CHRONIC PAIN OF BOTH ANKLES: Primary | ICD-10-CM

## 2022-03-16 DIAGNOSIS — R60.0 LEG EDEMA: ICD-10-CM

## 2022-03-16 DIAGNOSIS — M25.572 CHRONIC PAIN OF BOTH ANKLES: Primary | ICD-10-CM

## 2022-03-16 DIAGNOSIS — M25.571 CHRONIC PAIN OF BOTH ANKLES: Primary | ICD-10-CM

## 2022-03-16 PROBLEM — M76.61 ACHILLES TENDINITIS OF BOTH LOWER EXTREMITIES: Status: ACTIVE | Noted: 2022-03-16

## 2022-03-16 PROBLEM — M76.62 ACHILLES TENDINITIS OF BOTH LOWER EXTREMITIES: Status: ACTIVE | Noted: 2022-03-16

## 2022-03-16 PROCEDURE — G8417 CALC BMI ABV UP PARAM F/U: HCPCS | Performed by: ORTHOPAEDIC SURGERY

## 2022-03-16 PROCEDURE — G8427 DOCREV CUR MEDS BY ELIG CLIN: HCPCS | Performed by: ORTHOPAEDIC SURGERY

## 2022-03-16 PROCEDURE — 3017F COLORECTAL CA SCREEN DOC REV: CPT | Performed by: ORTHOPAEDIC SURGERY

## 2022-03-16 PROCEDURE — 99213 OFFICE O/P EST LOW 20 MIN: CPT | Performed by: ORTHOPAEDIC SURGERY

## 2022-03-16 PROCEDURE — 1036F TOBACCO NON-USER: CPT | Performed by: ORTHOPAEDIC SURGERY

## 2022-03-16 PROCEDURE — G8484 FLU IMMUNIZE NO ADMIN: HCPCS | Performed by: ORTHOPAEDIC SURGERY

## 2022-03-16 RX ORDER — MELOXICAM 7.5 MG/1
7.5 TABLET ORAL DAILY
Qty: 30 TABLET | Refills: 0 | Status: SHIPPED | OUTPATIENT
Start: 2022-03-16 | End: 2022-03-18

## 2022-03-16 RX ORDER — PEN NEEDLE, DIABETIC 31 GX5/16"
NEEDLE, DISPOSABLE MISCELLANEOUS
Qty: 200 EACH | Refills: 3 | Status: SHIPPED | OUTPATIENT
Start: 2022-03-16

## 2022-03-16 NOTE — TELEPHONE ENCOUNTER
Pt saw Dr Sylvia Mitchell pt has swelling in feet and ankles pt was was advised to get on a water pill to help with swelling pt is hoping for a prescription sent to [facility]

## 2022-03-16 NOTE — PROGRESS NOTES
CHIEF COMPLAINT:   1- Bilateral posterior heel pain/Kuldeep's deformity with insertinal Achillis tendenosis. 2- Bilateral leg edema. HISTORY:  Ms. Oleg Nicholson 60 y.o.  female presents today for the first visit for evaluation of bilateral posterior heel pain which started to get worse .  She is complaining of sharp pain 9/10. Pain is increase with standing and walking and shoe wear. Pain is sharp early in the morning with first few steps, dull achy pain by the end of the day. No radiation and no numbness and tingling sensation. No other complaint. I saw her in  for the right heel and right knee arthritis.     Past Medical History:   Diagnosis Date    Allergic rhinitis 5/15/2014    Anxiety and depression     Arthritis     Breast cancer ([facility] [address] 2010    left breast    Cataract     bilateral    Chronic back pain     Diabetes mellitus ([facility] [address]     DM (diabetes mellitus), type 2, uncontrolled with complications ([facility] [address] 2534    ESBL (extended spectrum beta-lactamase) producing bacteria infection 10/06/2018    urine    GERD (gastroesophageal reflux disease)     History of migraine headaches     HTN (hypertension) 5/15/2014    Hyperlipidemia     Irritable bowel syndrome (IBS)     Obesity     Wears glasses     reading       Past Surgical History:   Procedure Laterality Date    ACHILLES TENDON SURGERY Right     BACK SURGERY  2013    lumbar    BREAST LUMPECTOMY Left 2010    CARPAL TUNNEL RELEASE Bilateral      SECTION      x 1    CHOLECYSTECTOMY      COLONOSCOPY  10/16/2017    polypectomy- Dr Kay Parsons, COLON, DIAGNOSTIC      ESOPHAGEAL DILATATION N/A 5/15/2019    ESOPHAGEAL DILATION LYNN performed by Anel Kaiser MD at [address] Left 2020    LEFT INDEX FINGER TRIGGER FINGER RELEASE performed by Molina Vera MD at [address] Right 2021    PHACOEMULSIFICATION WITH INTRAOCULAR LENS IMPLANT - RIGHT EYE performed by Aviva Costa MD at [address] EXTRACTION Left 3/12/2021    PHACOEMULSIFICATION WITH INTRAOCULAR LENS IMPLANT - LEFT EYE performed by Aviva Costa MD at [address] [facility] ENDOSCOPY N/A 4/23/2019    EGD BIOPSY performed by Christoph Plata MD at [address] [facility] ENDOSCOPY N/A 5/15/2019    EGD BIOPSY performed by Christoph Plata MD at [address] [facility] N/A 10/11/2021    EGD BIOPSY performed by Christoph Plata MD at [address] [facility] History     Socioeconomic History    Marital status:      Spouse name: Not on file    Number of children: Not on file    Years of education: Not on file    Highest education level: Not on file   Occupational History    Not on file   Tobacco Use    Smoking status: Never Smoker    Smokeless tobacco: Never Used    Tobacco comment: congrats   Vaping Use    Vaping Use: Never used   Substance and Sexual Activity    Alcohol use: No    Drug use: Never    Sexual activity: Yes     Partners: Male   Other Topics Concern    Not on file   Social History Narrative    Self-breast exams: yes    Lives with spouse and son. Exercise:  rarely    Seatbelt use: Always    Living will: no,   additional information provided     Social Determinants of Health     Financial Resource Strain: Low Risk     Difficulty of Paying Living Expenses: Not hard at all   Food Insecurity: No Food Insecurity    Worried About Running Out of Food in the Last Year: Never true    Lisa of Food in the Last Year: Never true   Transportation Needs:     Lack of Transportation (Medical): Not on file    Lack of Transportation (Non-Medical):  Not on file   Physical Activity:     Days of Exercise per Week: Not on file    Minutes of Exercise per Session: Not on file   Stress:     Feeling of Stress : Not on file   Social Connections:     Frequency of Communication with Friends and Family: Not on file    Frequency of Social Gatherings with Friends and Family: Not on file    Attends Congregational Services: Not on file    Active Member of Clubs or Organizations: Not on file    Attends Club or Organization Meetings: Not on file    Marital Status: Not on file   Intimate Partner Violence:     Fear of Current or Ex-Partner: Not on file    Emotionally Abused: Not on file    Physically Abused: Not on file    Sexually Abused: Not on file   Housing Stability:     Unable to Pay for Housing in the Last Year: Not on file    Number of Places Lived in the Last Year: Not on file    Unstable Housing in the Last Year: Not on file       Family History   Problem Relation Age of Onset    Cancer Mother         forehead soft tissue    Diabetes Mother     Arthritis Father     High Blood Pressure Father     High Cholesterol Father     Alzheimer's Disease Father     Diabetes Brother     Depression Sister        Current Outpatient Medications on File Prior to Visit   Medication Sig Dispense Refill    B-D UF III MINI PEN NEEDLES 31G X 5 MM MISC USE ON PEN THREE TIMES DAILY 200 each 3    tiZANidine (ZANAFLEX) 4 MG tablet TAKE 1 TABLET BY MOUTH EVERY 8 HOURS AS NEEDED FOR MUSCLE SPASM 30 tablet 1    Insulin Aspart FlexPen 100 UNIT/ML SOPN INJECT INTO SKIN WITH DINNER AS DIRECTED.<100=NONE, 100-180=10 UNITS, 181-230=15U, 231-280-20U,>281=25U 15 mL 5    insulin glargine (BASAGLAR KWIKPEN) 100 UNIT/ML injection pen Inject 80 Units into the skin nightly 15 mL 1    metoprolol succinate (TOPROL XL) 50 MG extended release tablet TAKE 1 TABLET BY MOUTH EVERY DAY 90 tablet 0    nortriptyline (PAMELOR) 25 MG capsule TAKE 1-2 CAPSULES BY MOUTH EVERY NIGHT 180 capsule 0    tolterodine (DETROL LA) 4 MG extended release capsule TAKE 1 CAPSULE BY MOUTH EVERY DAY 90 capsule 0    lisinopril (PRINIVIL;ZESTRIL) 20 MG tablet TAKE ONE TABLET BY MOUTH DAILY 90 tablet 0    blood glucose test strips (TRUE METRIX BLOOD GLUCOSE TEST) strip three times per day 300 strip 3    gabapentin (NEURONTIN) 600 MG tablet TAKE ONE TABLET BY MOUTH THREE TIMES A  tablet 1    venlafaxine (EFFEXOR XR) 75 MG extended release capsule TAKE THREE CAPSULES BY MOUTH DAILY 270 capsule 1    Elastic Bandages & Supports (AIRCAST SPORT ANKLE BRACE/RGHT) MISC As needed for pain 1 each 0    nitrofurantoin, macrocrystal-monohydrate, (MACROBID) 100 MG capsule       QUEtiapine (SEROQUEL) 25 MG tablet TAKE TWO TABLETS BY MOUTH ONCE NIGHTLY 180 tablet 1    amLODIPine (NORVASC) 5 MG tablet TAKE ONE TABLET BY MOUTH DAILY 90 tablet 1    nitrofurantoin (MACRODANTIN) 100 MG capsule Take 100 mg by mouth nightly      atorvastatin (LIPITOR) 80 MG tablet TAKE ONE TABLET BY MOUTH DAILY 90 tablet 1    Continuous Blood Gluc Sensor (FREESTYLE KENDAL SENSOR SYSTEM) MISC as needed 2 each 5    dicyclomine (BENTYL) 20 MG tablet Take 1 tablet by mouth every 6 hours as needed (cramping) 60 tablet 3    omeprazole (PRILOSEC) 40 MG delayed release capsule Take 40 mg by mouth daily        No current facility-administered medications on file prior to visit. Pertinent items are noted in HPI  Review of systems reviewed from Patient History Form and available in the patient's chart under the Media tab. No change noted. PHYSICAL EXAMINATION:  Ms. Francheska Galvan is a very pleasant 60 y.o.  female who presents today in no acute distress, awake, alert, and oriented. She is well dressed, nourished and  groomed. Patient with normal affect. Height is  5' 2.99\" (1.6 m), weight is 245 lb 3.2 oz (111.2 kg), Body mass index is 43.45 kg/m². Resting respiratory rate is 16.      Examination of the gait, showed that the patient walks heel-toe with a non-antalgic gait and no limp.  Examination of both ankles showing a good range of motion.  She has dorsiflexion to about 5 degrees bilaterally, which increased with knee flexion. She has intact sensation and good pedal pulses.  She has good strength in all four planes, including eversion, and has tenderness on deep palpation over the bilateral posterior calcaneal tubercle, with prominent Kuldeep's and bilateral leg edema.  The ankles are stable to drawer test bilaterally, equally.      IMAGING:  Xray's were reviewed, 3 views of the bilateral ankle taken in office today, and showed no acute fracture. Kuldeep's deformity with calcific insertinal Achillis tendenosis    IMPRESSION:   1- Bilateral posterior heel pain/Kuldeep's deformity with insertinal Achillis tendenosis. 2- Bilateral leg edema. PLAN: I discussed with the patient the treatment options. We recommended stretching exercises of the calf which was taught to the patient today. She will take NSAIDS Mobic. Use backless shoes. Compressive stocking for the leg edema. F/u in 6 weeks, PT if needed. She understands that this may need surgery if the pain did not to resolve.        Camila Hagan MD

## 2022-03-18 ENCOUNTER — HOSPITAL ENCOUNTER (OUTPATIENT)
Dept: PHYSICAL THERAPY | Age: 62
Setting detail: THERAPIES SERIES
Discharge: HOME | End: 2022-03-18
Payer: COMMERCIAL

## 2022-03-18 ENCOUNTER — OFFICE VISIT (OUTPATIENT)
Dept: FAMILY MEDICINE CLINIC | Age: 62
End: 2022-03-18
Payer: COMMERCIAL

## 2022-03-18 VITALS
TEMPERATURE: 98.1 F | SYSTOLIC BLOOD PRESSURE: 100 MMHG | HEIGHT: 63 IN | WEIGHT: 248.6 LBS | BODY MASS INDEX: 44.05 KG/M2 | DIASTOLIC BLOOD PRESSURE: 54 MMHG | OXYGEN SATURATION: 93 % | HEART RATE: 87 BPM | RESPIRATION RATE: 16 BRPM

## 2022-03-18 DIAGNOSIS — N39.0 RECURRENT UTI: ICD-10-CM

## 2022-03-18 DIAGNOSIS — R60.0 LEG EDEMA: ICD-10-CM

## 2022-03-18 DIAGNOSIS — F33.1 MODERATE EPISODE OF RECURRENT MAJOR DEPRESSIVE DISORDER (HCC): Primary | ICD-10-CM

## 2022-03-18 DIAGNOSIS — M25.50 POLYARTHRALGIA: ICD-10-CM

## 2022-03-18 LAB
BILIRUBIN, POC: NORMAL
BLOOD URINE, POC: NORMAL
CLARITY, POC: CLEAR
COLOR, POC: YELLOW
GLUCOSE URINE, POC: NORMAL
KETONES, POC: NORMAL
LEUKOCYTE EST, POC: NORMAL
NITRITE, POC: NORMAL
PH, POC: 5.5
PROTEIN, POC: 300
SPECIFIC GRAVITY, POC: 1.03
UROBILINOGEN, POC: 0.2

## 2022-03-18 PROCEDURE — G8484 FLU IMMUNIZE NO ADMIN: HCPCS | Performed by: FAMILY MEDICINE

## 2022-03-18 PROCEDURE — G8417 CALC BMI ABV UP PARAM F/U: HCPCS | Performed by: FAMILY MEDICINE

## 2022-03-18 PROCEDURE — 1036F TOBACCO NON-USER: CPT | Performed by: FAMILY MEDICINE

## 2022-03-18 PROCEDURE — 3017F COLORECTAL CA SCREEN DOC REV: CPT | Performed by: FAMILY MEDICINE

## 2022-03-18 PROCEDURE — G8427 DOCREV CUR MEDS BY ELIG CLIN: HCPCS | Performed by: FAMILY MEDICINE

## 2022-03-18 PROCEDURE — 99214 OFFICE O/P EST MOD 30 MIN: CPT | Performed by: FAMILY MEDICINE

## 2022-03-18 PROCEDURE — 81002 URINALYSIS NONAUTO W/O SCOPE: CPT | Performed by: FAMILY MEDICINE

## 2022-03-18 RX ORDER — GABAPENTIN 600 MG/1
TABLET ORAL
Qty: 180 TABLET | Refills: 1 | Status: SHIPPED
Start: 2022-03-18 | End: 2022-09-01 | Stop reason: SDUPTHER

## 2022-03-18 RX ORDER — QUETIAPINE FUMARATE 25 MG/1
TABLET, FILM COATED ORAL
Qty: 150 TABLET | Refills: 1 | Status: SHIPPED | OUTPATIENT
Start: 2022-03-18 | End: 2022-05-31

## 2022-03-18 NOTE — PATIENT INSTRUCTIONS
INSTRUCTIONS  NEXT APPOINTMENT: Please schedule check-up in 6 weeks with Jorge Langley (Nurse Practitioner). · Dr. Katrin Boykin and Jorge Langley (Nurse Practitioner) are using a team approach for managing diabetics in our practice. Office visits will alternate between these providers while we continue to maintain high quality of care. · PLEASE TAKE THIS FORM TO CHECK-OUT WINDOW TO SCHEDULE NEXT VISIT. · Call to see Zelda for counselling here. · Call to schedule check up with urology. · Will call next week with urine culture results. · Bring pill bottles in next visit. · STOP meloxicam and Nortyptyline. · Prefer tylenol over the ibuprofen if possible. · INCREASE quietapine to 100 mg total at bedtime for sleep and mood.

## 2022-03-18 NOTE — PROGRESS NOTES
PROBLEM VISIT NOTE     Subjective:     Chief Complaint   Patient presents with    Leg Swelling     x2 weeks. both feet and legs. rt ankle is painful. no sob    Other     no urinary symptoms but home test was positive    Depression     questions about liliane Quintana is a 60 y.o. female who presents     · Sleep and mood down at times, some irritable, some withdrawing enema with TCA added back. · No UTI symptoms. On macrobid. · Ankles - Swell x 2 months. Not go down overnight. Started compression stockings 2 days ago. Health Maintenance Due   Topic Date Due    DTaP/Tdap/Td vaccine (1 - Tdap) Never done    Shingles Vaccine (2 of 3) 11/02/2016    Breast cancer screen  08/06/2019    Diabetic foot exam  12/26/2020    Cervical cancer screen  02/18/2022     CHART REVIEW   reports that she has never smoked. She has never used smokeless tobacco.  Health Maintenance Due   Topic Date Due    DTaP/Tdap/Td vaccine (1 - Tdap) Never done    Shingles Vaccine (2 of 3) 11/02/2016    Breast cancer screen  08/06/2019    Diabetic foot exam  12/26/2020    Cervical cancer screen  02/18/2022     Current Outpatient Medications   Medication Instructions    amLODIPine (NORVASC) 5 MG tablet TAKE ONE TABLET BY MOUTH DAILY    atorvastatin (LIPITOR) 80 MG tablet TAKE ONE TABLET BY MOUTH DAILY    B-D UF III MINI PEN NEEDLES 31G X 5 MM MISC USE ON PEN THREE TIMES DAILY    Basaglar KwikPen 80 Units, SubCUTAneous, NIGHTLY    blood glucose test strips (TRUE METRIX BLOOD GLUCOSE TEST) strip three times per day    Continuous Blood Gluc Sensor ([address] [place] [address]) MISC as needed    dicyclomine (BENTYL) 20 mg, Oral, EVERY 6 HOURS PRN    Elastic Bandages & Supports (AIRCAST SPORT ANKLE BRACE/RGHT) MISC As needed for pain    gabapentin (NEURONTIN) 600 MG tablet TAKE 2 tabs at bedtime.     Insulin Aspart FlexPen 100 UNIT/ML SOPN INJECT INTO SKIN WITH DINNER AS DIRECTED.<100=NONE, 100-180=10 UNITS, 181-230=15U, 231-280-20U,>281=25U    lisinopril (PRINIVIL;ZESTRIL) 20 MG tablet TAKE ONE TABLET BY MOUTH DAILY    metoprolol succinate (TOPROL XL) 50 MG extended release tablet TAKE 1 TABLET BY MOUTH EVERY DAY    nitrofurantoin (MACRODANTIN) 100 mg, Oral, NIGHTLY    nitrofurantoin, macrocrystal-monohydrate, (MACROBID) 100 MG capsule No dose, route, or frequency recorded.     omeprazole (PRILOSEC) 40 mg, Oral, DAILY    QUEtiapine (SEROQUEL) 25 MG tablet Take 1 in AM and 4 at bedtime    tiZANidine (ZANAFLEX) 4 MG tablet TAKE 1 TABLET BY MOUTH EVERY 8 HOURS AS NEEDED FOR MUSCLE SPASM    tolterodine (DETROL LA) 4 MG extended release capsule TAKE 1 CAPSULE BY MOUTH EVERY DAY    venlafaxine (EFFEXOR XR) 75 MG extended release capsule TAKE THREE CAPSULES BY MOUTH DAILY     LAST LABS  LDL Calculated   Date Value Ref Range Status   02/21/2022 93 <100 mg/dL Final     Lab Results   Component Value Date    HDL 35 (L) 02/21/2022     Lab Results   Component Value Date    TRIG 187 (H) 02/21/2022     Lab Results   Component Value Date     01/27/2022    K 5.2 (H) 01/27/2022    CREATININE 1.1 01/27/2022     Lab Results   Component Value Date    WBC 9.3 08/05/2021    HGB 12.5 08/05/2021     08/05/2021     Lab Results   Component Value Date    ALT 31 08/05/2021    AST 33 08/05/2021    ALKPHOS 135 (H) 08/05/2021    BILITOT <0.2 08/05/2021     TSH (uIU/mL)   Date Value   04/07/2018 1.30     Lab Results   Component Value Date    LABA1C 8.0 02/09/2022    LABA1C 9.4 06/24/2021    LABA1C 9.5 01/28/2021     Objective:   PHYSICAL EXAM   BP (!) 100/54 (Site: Right Upper Arm, Position: Sitting, Cuff Size: Large Adult)   Pulse 87   Temp 98.1 °F (36.7 °C) (Oral)   Resp 16   Ht 5' 3\" (1.6 m)   Wt 248 lb 9.6 oz (112.8 kg)   SpO2 93%   BMI 44.04 kg/m²   BP Readings from Last 5 Encounters:   03/18/22 (!) 100/54   02/25/22 130/72   02/09/22 136/82   10/11/21 (!) 143/83   10/11/21 130/71     Wt Readings from Last 5 Encounters:   03/18/22 248 lb 9.6 oz (112.8 kg)   03/16/22 245 lb 3.2 oz (111.2 kg)   03/01/22 238 lb (108 kg)   02/25/22 238 lb 9.6 oz (108.2 kg)   02/09/22 239 lb (108.4 kg)      GENERAL:   · well-developed, well-nourished, alert, no distress. LUNGS:    · Breathing unlabored  · clear to auscultation bilaterally and good air movement  CARDIOVASC:   · regular rate and rhythm  · LEGS:  Lower extremity edema: 3+ pitting to mid calf    SKIN: warm and dry     Assessment and Plan:      Diagnosis Orders   1. Moderate episode of recurrent major depressive diso  QUEtiapine (SEROQUEL) 25 MG tablet   2. Leg edema     3. Recurrent UTI  Culture, Urine    POCT Urinalysis no Micro   4. Polyarthralgia  gabapentin (NEURONTIN) 600 MG tablet   If mood not improving, consider change venlafaxine to pristiq. INSTRUCTIONS  NEXT APPOINTMENT: Please schedule check-up in 6 weeks with Madie Griggs (Nurse Practitioner). · Dr. Cyndi Chavarria and Madie Griggs (Nurse Practitioner) are using a team approach for managing diabetics in our practice. Office visits will alternate between these providers while we continue to maintain high quality of care. · PLEASE TAKE THIS FORM TO CHECK-OUT WINDOW TO SCHEDULE NEXT VISIT. · Call to see Zelda for counselling here. · Call to schedule check up with urology. · Will call next week with urine culture results. · Bring pill bottles in next visit. · STOP meloxicam and Nortyptyline. · Prefer tylenol over the ibuprofen if possible. · INCREASE quietapine to 1 (25 mg) in  mg (4 tabs) total at bedtime for sleep and mood. · Would avoid water pills given renal function.

## 2022-03-20 LAB — URINE CULTURE, ROUTINE: NORMAL

## 2022-03-22 ENCOUNTER — HOSPITAL ENCOUNTER (OUTPATIENT)
Dept: PHYSICAL THERAPY | Age: 62
Setting detail: THERAPIES SERIES
Discharge: HOME | End: 2022-03-22
Payer: COMMERCIAL

## 2022-03-22 ENCOUNTER — TELEPHONE (OUTPATIENT)
Dept: FAMILY MEDICINE CLINIC | Age: 62
End: 2022-03-22

## 2022-03-22 NOTE — FLOWSHEET NOTE
[address] [facility]       Physical Therapy   Phone: [phone]   Fax: [phone]      Physical Therapy  Cancellation/No-show Note  Patient Name:  Humphrey Seen  :  1960   Date:  3/22/2022  Cancelled visits to date: 2  No-shows to date: 0    For today's appointment patient:  [x]  Cancelled  []  Rescheduled appointment  []  No-show     Reason given by patient:  [x]  Patient ill- not feeling well, her feet are swelling  []  Conflicting appointment  []  No transportation    []  Conflict with work  []  No reason given  []  Other:     Comments:      Electronically signed by:  Luis Schmitz PT, PT

## 2022-03-22 NOTE — TELEPHONE ENCOUNTER
Pt returning call for lab results  Pt can be reached at [phone]    Stated that they can leave message with results if she does not answer.

## 2022-03-24 ENCOUNTER — TELEPHONE (OUTPATIENT)
Dept: FAMILY MEDICINE CLINIC | Age: 62
End: 2022-03-24

## 2022-03-24 ENCOUNTER — OFFICE VISIT (OUTPATIENT)
Dept: FAMILY MEDICINE CLINIC | Age: 62
End: 2022-03-24
Payer: COMMERCIAL

## 2022-03-24 VITALS
WEIGHT: 232 LBS | TEMPERATURE: 97.1 F | HEART RATE: 92 BPM | OXYGEN SATURATION: 98 % | DIASTOLIC BLOOD PRESSURE: 90 MMHG | BODY MASS INDEX: 41.11 KG/M2 | HEIGHT: 63 IN | SYSTOLIC BLOOD PRESSURE: 160 MMHG

## 2022-03-24 DIAGNOSIS — R60.0 BILATERAL EDEMA OF LOWER EXTREMITY: Primary | ICD-10-CM

## 2022-03-24 DIAGNOSIS — R60.0 BILATERAL EDEMA OF LOWER EXTREMITY: ICD-10-CM

## 2022-03-24 LAB — TSH SERPL DL<=0.05 MIU/L-ACNC: 2.13 UIU/ML (ref 0.27–4.2)

## 2022-03-24 PROCEDURE — 99214 OFFICE O/P EST MOD 30 MIN: CPT | Performed by: FAMILY MEDICINE

## 2022-03-24 PROCEDURE — G8484 FLU IMMUNIZE NO ADMIN: HCPCS | Performed by: FAMILY MEDICINE

## 2022-03-24 PROCEDURE — G8417 CALC BMI ABV UP PARAM F/U: HCPCS | Performed by: FAMILY MEDICINE

## 2022-03-24 PROCEDURE — 1036F TOBACCO NON-USER: CPT | Performed by: FAMILY MEDICINE

## 2022-03-24 PROCEDURE — 3017F COLORECTAL CA SCREEN DOC REV: CPT | Performed by: FAMILY MEDICINE

## 2022-03-24 PROCEDURE — G8427 DOCREV CUR MEDS BY ELIG CLIN: HCPCS | Performed by: FAMILY MEDICINE

## 2022-03-24 RX ORDER — LISINOPRIL AND HYDROCHLOROTHIAZIDE 20; 12.5 MG/1; MG/1
1 TABLET ORAL DAILY
Qty: 90 TABLET | Refills: 1 | Status: SHIPPED | OUTPATIENT
Start: 2022-03-24 | End: 2022-09-16

## 2022-03-24 RX ORDER — LISINOPRIL AND HYDROCHLOROTHIAZIDE 20; 12.5 MG/1; MG/1
1 TABLET ORAL DAILY
Qty: 30 TABLET | Refills: 1 | Status: SHIPPED | OUTPATIENT
Start: 2022-03-24 | End: 2022-03-24

## 2022-03-24 RX ORDER — TRIAMCINOLONE ACETONIDE 1 MG/G
CREAM TOPICAL
Qty: 30 G | Refills: 0 | Status: SHIPPED | OUTPATIENT
Start: 2022-03-24 | End: 2022-04-19

## 2022-03-24 NOTE — PROGRESS NOTES
([facility] [address]    Migraine    Hip pain, bilateral    Depression, major    Right knee DJD    Calcific Achilles tendonitis, right    Right sided sciatica    Lumbar spinal stenosis    TMJ (temporomandibular joint disorder)    Urge incontinence of urine    Personal history of breast cancer    Hyperlipidemia LDL goal <362    Metallic taste    Polyarthralgia    Severe obesity (BMI 35.0-39. 9) with comorbidity (HCC)    Neck pain    Chronic low back pain    Intractable low back pain    Plantar fasciitis of right foot    Anxiety    History of colon polyps    Irritable bowel syndrome with both constipation and diarrhea    Trigger finger, acquired    Leg edema    Achilles tendinitis of both lower extremities        Body mass index is 41.1 kg/m². Wt Readings from Last 3 Encounters:   03/24/22 232 lb (105.2 kg)   03/18/22 248 lb 9.6 oz (112.8 kg)   03/16/22 245 lb 3.2 oz (111.2 kg)       BP Readings from Last 3 Encounters:   03/24/22 (!) 150/100   03/18/22 (!) 100/54   02/25/22 130/72       Allergies   Allergen Reactions    Trazodone And Nefazodone Other (See Comments)     headache    Cipro [Ciprofloxacin Hcl] Other (See Comments)     Makes her jittery.  Ambien [Zolpidem Tartrate] Other (See Comments)     Vivid dreams    Diamox [Acetazolamide] Rash    Lorazepam Other (See Comments)     confusion    Naproxen Rash    Reglan [Metoclopramide] Anxiety    Sulfa Antibiotics Rash     Also causes shaking       Prior to Visit Medications    Medication Sig Taking? Authorizing Provider   gabapentin (NEURONTIN) 600 MG tablet TAKE 2 tabs at bedtime.  Yes Bridget Jacques MD   QUEtiapine (SEROQUEL) 25 MG tablet Take 1 in AM and 4 at bedtime Yes Bridget Jacques MD   B-D UF III MINI PEN NEEDLES 31G X 5 MM MISC USE ON PEN THREE TIMES DAILY Yes Bridget Jacques MD   tiZANidine (ZANAFLEX) 4 MG tablet TAKE 1 TABLET BY MOUTH EVERY 8 HOURS AS NEEDED FOR MUSCLE SPASM Yes Bridget Jacques MD   Insulin Aspart FlexPen 100 UNIT/ML SOPN rate and regular rhythm. Pulses: Normal pulses. Heart sounds: Normal heart sounds. No murmur heard. Pulmonary:      Effort: Pulmonary effort is normal. No respiratory distress. Breath sounds: Normal breath sounds. No wheezing or rales. Musculoskeletal:         General: Normal range of motion. Cervical back: Normal range of motion. Right lower leg: Edema present. Left lower leg: Edema present. Comments: bilat LE's 2+ pitting edema to calf.  + excoriations and mild erythema. Skin:     General: Skin is warm and dry. Neurological:      General: No focal deficit present. Mental Status: She is alert and oriented to person, place, and time. Mental status is at baseline. Psychiatric:         Mood and Affect: Mood normal.         Behavior: Behavior normal.         Thought Content: Thought content normal.         Judgment: Judgment normal.         ASSESSMENT/PLAN:    1. Bilateral edema of lower extremity  - cause not totally clear. Likely multifactorial.  Check echo and thyroid:  - ECHO Complete 2D W Doppler W Color; Future  - TSH; Future    - compression hose (see [employer]. com (for good selection and prices on compression stockings)   - try to elevate legs at night  - need to re-address pain issues: need to stop advil completely as this may be driving some of her edema. Gabapentin also. Consider seeing pain management. - due to CKD 3 a diuretic can be considered but should be monitored carefully. We discussed this and she would like to try adding diuretic. Will change her to lisinopril hctz 20/12.5 and she will get a BMP done in 10 days. Fu/ with pcp in 2 wks. An  FlexElignature was used to authenticate this note.     --Sulma Epps MD on 3/24/2022 at 2:39 PM

## 2022-03-24 NOTE — TELEPHONE ENCOUNTER
Pain where? Please describe. For pain, may take OTC tylenol arthritis (acetaminophen 8 hour) 2 tabs three times per day    Urine culture was negative for infection. Did she schedule to see urology? Does she have new symptoms.     More info on headache  Duration   Associated with   Denies   Worse with   Better with   Tried

## 2022-03-24 NOTE — TELEPHONE ENCOUNTER
Pt saw Dr Fernnada Medina and was advised to see Dr Pretty Barcenas  pt is having issues with swelling and her feet are swollen ankle swollen it hurts all over  she saw Dr Pretty Barcenas on 03/18/2022 pt was prescribed nothing   Pt was taking advil   Pt wants something for the pain   Pt was tested for UTI and that came back ok   Pt is having pressure behind her eyes and sinus issues, head hurts bad feels like it is going to explode     Pt wants to know what to do?  Pt does not want another appointment   Pt did change up her meds per Dr Pretty Barcenas   There is a same day open with Dr Kaylynn Cramer at 220 pm     Please advise

## 2022-03-24 NOTE — TELEPHONE ENCOUNTER
Called ot   Pt is scheuled to been seen today @ 2:20 by    Pt said she I shaving rk bad pain in her neck and head and it started awhile ago but has gotten worse the last couple of days   Pt said that her feet are swelling at the top of her foot   Pt thinks the headache and neck pain and sinus are all related and that's why it hurts   FYI

## 2022-03-25 ENCOUNTER — TELEPHONE (OUTPATIENT)
Dept: FAMILY MEDICINE CLINIC | Age: 62
End: 2022-03-25

## 2022-03-25 RX ORDER — NITROFURANTOIN 25; 75 MG/1; MG/1
100 CAPSULE ORAL DAILY
COMMUNITY
Start: 2021-09-29

## 2022-03-25 NOTE — TELEPHONE ENCOUNTER
----- Message from [place] sent at 3/25/2022 12:42 PM EDT -----  Subject: Message to Provider    QUESTIONS  Information for Provider? Was recently place on medication for blood   pressure and it included a water pill. Wants to know if she can take both   prescriptions together or should she stop taking one of the medications. Wants to know if she needs to stop antibiotics. Pt would like to receive a   phone call.   ---------------------------------------------------------------------------  --------------  [address]  What is the best way for the office to contact you? OK to leave message on   voicemail  Preferred Call Back Phone Number? [phone]  ---------------------------------------------------------------------------  --------------  SCRIPT ANSWERS  Relationship to Patient?  Self

## 2022-03-25 NOTE — TELEPHONE ENCOUNTER
Rio pt and relayed message   Pt agrees with plan   Pt says she is taking macrobid and wants to know if its okay to take with new meds  Please advise

## 2022-03-25 NOTE — TELEPHONE ENCOUNTER
There is no contraindication to using an antibiotic with the lisinopril/hctz (both macrobid and keflex are safe to use if another doctor has prescribed those for her.)

## 2022-03-25 NOTE — TELEPHONE ENCOUNTER
Please notify Kenneth Tracy that she is to stop lisinopril 20 mg and start lisinopril/hctz 20/12.5 daily (in am). With regards to the antibiotic- she was not on an antibiotic when I saw her and I did not start one. It might be good to talk to her  also to make sure they both understand the game plan. He seemed to track the instructions better yesterday. Thanks.

## 2022-03-29 ENCOUNTER — HOSPITAL ENCOUNTER (OUTPATIENT)
Dept: PHYSICAL THERAPY | Age: 62
Setting detail: THERAPIES SERIES
Discharge: HOME | End: 2022-03-29
Payer: COMMERCIAL

## 2022-03-29 NOTE — FLOWSHEET NOTE
[address] [facility]       Physical Therapy   Phone: [phone]   Fax: [phone]      Physical Therapy  Cancellation/No-show Note  Patient Name:  Lucy Eisenberg  :  1960   Date:  3/29/2022  Cancelled visits to date: 3  No-shows to date: 0    For today's appointment patient:  [x]  Cancelled  []  Rescheduled appointment  []  No-show     Reason given by patient:  []  Patient ill  []  Conflicting appointment  []  No transportation    []  Conflict with work  []  No reason given  [x]  Other:     Comments:   Death in family; her flight out is tonight so cannot make it to PT    Electronically signed by:  Benita Salas, PT, PT

## 2022-04-01 DIAGNOSIS — S16.1XXD STRAIN OF NECK MUSCLE, SUBSEQUENT ENCOUNTER: ICD-10-CM

## 2022-04-01 RX ORDER — TIZANIDINE 4 MG/1
TABLET ORAL
Qty: 30 TABLET | Refills: 0 | Status: SHIPPED | OUTPATIENT
Start: 2022-04-01 | End: 2022-09-02 | Stop reason: SDUPTHER

## 2022-04-05 ENCOUNTER — HOSPITAL ENCOUNTER (OUTPATIENT)
Dept: PHYSICAL THERAPY | Age: 62
Setting detail: THERAPIES SERIES
Discharge: HOME | End: 2022-04-05
Payer: COMMERCIAL

## 2022-04-05 PROCEDURE — 97110 THERAPEUTIC EXERCISES: CPT | Performed by: PHYSICAL THERAPIST

## 2022-04-05 PROCEDURE — 97140 MANUAL THERAPY 1/> REGIONS: CPT | Performed by: PHYSICAL THERAPIST

## 2022-04-05 NOTE — FLOWSHEET NOTE
day. She has tried exercises every day to every other day. She does say it hurts to do them. OBJECTIVE:    Observation:    Test measurements:    CERV ROM       Cervical Flexion 45, pain lower neck posterior     Cervical Extension 25, no pain       Left Right   Cervical SB 30, L neck pain 30   Cervical rotation 40, pain L neck 55    UE ROM Left Right   Shoulder Flex 160 160   Shoulder Abd 170 170   UE Strength  Left Right   Shoulder Flex 4/5 4/5   Shoulder ABd (C5 Axillary) 4/5 4/5         RESTRICTIONS/PRECAUTIONS: PMH breast CA, fibromyalgia    Exercises/Interventions:   Therapeutic Ex (19919)  Min:13 Resistance/Repetitions Notes   Roll ball on table  Flexion  Scaption     T-slide  Rows  Lat pull     Marissa flexion               Isometric cervical SB     Cervical side bend     Cervical flexion 2 x 20 secs    Cervical rotation 5 x 5 secs B    UT stretch 2 x 20 secs B    LS stretch 2 x 20 secs B    Manual Intervention (89773)  Min: 23 mins     Cerv mobs/manip     Thoracic mobs/manip Grade II-IV mobs A/P pt prone, 5'    CT manip     Rib mobilizations     STM B Mid traps/rhomboids prone, 3'  B UTs, LS, suboccipitals pt supine 8' Pt educated on improving physical activity level and basics of pain centralization and pain sensitivity   Gentle cervical traction with suboccipital release 7'    NMR re-education (85932)  Min: 9     Chin tucks 10x 5 secs sitting    Shrugs  Pinches   10x 5 secs hold    Therapeutic Activity (00961)  Min:               Modalities  Min:                  Other Therapeutic Activities:  Pt was educated on PT POC, Diagnosis, Prognosis, pathomechanics as well as frequency and duration of scheduling future physical therapy appointments. Time was also taken on this day to answer all patient questions and participation in PT. Reviewed appointment policy in detail with patient and patient verbalized understanding. Access Code: [email or web address]  URL: [email or web address]  Date: 04/05/2022  Prepared by: Alejandra Joie    Exercises  Seated Upper Trapezius Stretch - 2 x daily - 7 x weekly - 2-3 sets - 1 reps - 20 hold  Gentle Levator Scapulae Stretch - 2 x daily - 7 x weekly - 2-3 sets - 1 reps - 20 hold  Neck Flexion Stretch - 2 x daily - 7 x weekly - 2-3 sets - 1 reps - 20 hold  Seated Cervical Rotation AROM - 2 x daily - 7 x weekly - 10 reps - 5 hold  Standing Scapular Retraction - 2 x daily - 7 x weekly - 10 reps - 5 hold  Seated Cervical Retraction - 2 x daily - 7 x weekly - 10 reps - 5 hold      Therapeutic Exercise and NMR EXR  [x] (09999) Provided verbal/tactile cueing for activities related to strengthening, flexibility, endurance, ROM  for improvements in cervical, postural, scapular, scapulothoracic and UE control with self care, reaching, carrying, lifting, house/yardwork, driving/computer work.    [] (47620) Provided verbal/tactile cueing for activities related to improving balance, coordination, kinesthetic sense, posture, motor skill, proprioception  to assist with cervical, scapular, scapulothoracic and UE control with self care, reaching, carrying, lifting, house/yardwork, driving/computer work. Therapeutic Activities:    [x] (07515 or 93400) Provided verbal/tactile cueing for activities related to improving balance, coordination, kinesthetic sense, posture, motor skill, proprioception and motor activation to allow for proper function of cervical, scapular, scapulothoracic and UE control with self care, carrying, lifting, driving/computer work.      Home Exercise Program:    [x] (01198) Reviewed/Progressed HEP activities related to strengthening, flexibility, endurance, ROM of cervical, scapular, scapulothoracic and UE control with self care, reaching, carrying, lifting, house/yardwork, driving/computer work  [] (56517) Reviewed/Progressed HEP activities related to improving balance, coordination, kinesthetic sense, posture, motor skill, proprioception of cervical, scapular, scapulothoracic and UE control with self care, reaching, carrying, lifting, house/yardwork, driving/computer work      Manual Treatments:  PROM / STM / Oscillations-Mobs:  G-I, II, III, IV (Jonny, Inf., Post.)  [x] (51689) Provided manual therapy to mobilize soft tissue/joints of cervical/CT, scapular GHJ and UE for the purpose of decreasing headache, modulating pain, promoting relaxation,  increasing ROM, reducing/eliminating soft tissue swelling/inflammation/restriction, improving soft tissue extensibility and allowing for proper ROM for normal function with self care, reaching, carrying, lifting, house/yardwork, driving/computer work        Approval Dates:  CPT Code Units Approved Units Used  Date Updated:                     Charges:  Timed Code Treatment Minutes: 45   Total Treatment Minutes: 45     [] EVAL (LOW) 71829 (typically 20 minutes face-to-face)  [] EVAL (MOD) 49222 (typically 30 minutes face-to-face)  [] EVAL (HIGH) 32037 (typically 45 minutes face-to-face)  [] RE-EVAL     [x] CA(16160) x   1  [] Dry needle 1 or 2 Muscles (97184)  [] NMR (40506) x     [] Dry needle 3+ Muscles (15981)  [x] Manual (59885) x  2 [] Ultrasound (58429) x  [] TA (23548) x     [] Mech Traction (58647)  [] ES(attended) (90990)     [] ES (un) (81921):   [] Vasopump (26954) [] Ionto (58311)   [] Other:    GOALS:  Patient stated goal: \"not let it hurt, doing grocery shopping, dishes, preparing food. I want to be pain free\"  []? Progressing: []? Met: []? Not Met: []? Adjusted     Therapist goals for Patient:   Short Term Goals: To be achieved in: 2 weeks  1. Independent in HEP and progression per patient tolerance, in order to prevent re-injury. []? Progressing: []? Met: []? Not Met: []? Adjusted  2. Patient will have a decrease in pain to facilitate improvement in movement, function, and ADLs as indicated by Functional Deficits. []? Progressing: []? Met: []? Not Met: []? Adjusted     Long Term Goals: To be achieved in: 6 weeks  1.  Disability index score of 20% or less for the NDI to assist with reaching prior level of function. []? Progressing: []? Met: []? Not Met: []? Adjusted  2. Patient will demonstrate increased AROM to [facility] of cervical/thoracic spine to allow for proper joint functioning as indicated by patients Functional Deficits. []? Progressing: []? Met: []? Not Met: []? Adjusted  3. Patient will demonstrate an increase in postural awareness and control and activation of  Deep cervical stabilizers to allow for proper functional mobility as indicated by patients Functional Deficits. []? Progressing: []? Met: []? Not Met: []? Adjusted  4. Patient will return to preparing food for 30 minutes without increased symptoms or restriction. []? Progressing: []? Met: []? Not Met: []? Adjusted  5. Patient will be able to grocery shop without aggravating neck pain. []? Progressing: []? Met: []? Not Met: []? Adjusted         ASSESSMENT:  See eval    Treatment/Activity Tolerance:  [x] Patient tolerated treatment well [] Patient limited by fatique  [] Patient limited by pain  [] Patient limited by other medical complications  [x] Other:  Pt was educated on importance of correcting posture throughout day and to do her exercises throughout day to help improve posture. Pt also educated on importance of doing neck stretches to improve ROM and flexibility. Pt tolerated manual therapy very well today and felt good during and better afterwards. She was given updated HEP. She was also educated on trying to increase activity level as able and benefits of cardio exercise for chronic low back and neck pain. Overall Progression Towards Functional goals/ Treatment Progress Update:  [] Patient is progressing as expected towards functional goals listed. [] Progression is slowed due to complexities/Impairments listed. [] Progression has been slowed due to co-morbidities.   [x] Plan just implemented, too soon to assess goals progression <30days   [] Goals require adjustment due to lack of progress  [] Patient is not progressing as expected and requires additional follow up with physician  [] Other    Prognosis for POC: [x] Good [] Fair  [] Poor    Patient requires continued skilled intervention: [x] Yes  [] No        PLAN: See eval. Recommend progress HEP for scapular stabilization, cervical ROM and cervical stabilization as tolerated. Continue with manual. Pt currently is allowed 6 PT visits by her insurance, and initial PT eval was the first of the 6. [x] Continue per plan of care [] Alter current plan (see comments)  [] Plan of care initiated [] Hold pending MD visit [] Discharge    Electronically signed by: Juana Barraza, PT    Note: If patient does not return for scheduled/recommended follow up visits, this note will serve as a discharge from care along with the most recent update on progress.

## 2022-04-07 ENCOUNTER — OFFICE VISIT (OUTPATIENT)
Dept: FAMILY MEDICINE CLINIC | Age: 62
End: 2022-04-07
Payer: COMMERCIAL

## 2022-04-07 VITALS
HEIGHT: 63 IN | BODY MASS INDEX: 40.4 KG/M2 | SYSTOLIC BLOOD PRESSURE: 146 MMHG | DIASTOLIC BLOOD PRESSURE: 70 MMHG | HEART RATE: 104 BPM | WEIGHT: 228 LBS | OXYGEN SATURATION: 95 %

## 2022-04-07 DIAGNOSIS — R60.0 BILATERAL LEG EDEMA: Primary | ICD-10-CM

## 2022-04-07 DIAGNOSIS — I10 ESSENTIAL HYPERTENSION: ICD-10-CM

## 2022-04-07 PROCEDURE — G8427 DOCREV CUR MEDS BY ELIG CLIN: HCPCS | Performed by: FAMILY MEDICINE

## 2022-04-07 PROCEDURE — G8417 CALC BMI ABV UP PARAM F/U: HCPCS | Performed by: FAMILY MEDICINE

## 2022-04-07 PROCEDURE — 99214 OFFICE O/P EST MOD 30 MIN: CPT | Performed by: FAMILY MEDICINE

## 2022-04-07 PROCEDURE — 3017F COLORECTAL CA SCREEN DOC REV: CPT | Performed by: FAMILY MEDICINE

## 2022-04-07 PROCEDURE — 1036F TOBACCO NON-USER: CPT | Performed by: FAMILY MEDICINE
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
## 2022-04-08 ENCOUNTER — HOSPITAL ENCOUNTER (OUTPATIENT)
Dept: PHYSICAL THERAPY | Age: 62
Setting detail: THERAPIES SERIES
Discharge: HOME | End: 2022-04-08
Payer: COMMERCIAL

## 2022-04-08 DIAGNOSIS — S16.1XXD STRAIN OF NECK MUSCLE, SUBSEQUENT ENCOUNTER: ICD-10-CM

## 2022-04-08 RX ORDER — TIZANIDINE 4 MG/1
TABLET ORAL
Qty: 30 TABLET | Refills: 0 | OUTPATIENT
Start: 2022-04-08

## 2022-04-13 ENCOUNTER — HOSPITAL ENCOUNTER (OUTPATIENT)
Dept: ULTRASOUND IMAGING | Age: 62
Discharge: HOME | End: 2022-04-13
Payer: COMMERCIAL

## 2022-04-13 ENCOUNTER — HOSPITAL ENCOUNTER (OUTPATIENT)
Dept: WOMENS IMAGING | Age: 62
Discharge: HOME | End: 2022-04-13
Payer: COMMERCIAL

## 2022-04-13 DIAGNOSIS — M65.28 CALCIFIC ACHILLES TENDINITIS OF BOTH LOWER EXTREMITIES: ICD-10-CM

## 2022-04-13 DIAGNOSIS — Z85.3 PERSONAL HISTORY OF MALIGNANT NEOPLASM OF BREAST: ICD-10-CM

## 2022-04-13 DIAGNOSIS — R60.0 LEG EDEMA: ICD-10-CM

## 2022-04-13 DIAGNOSIS — R52 PAIN: ICD-10-CM

## 2022-04-13 PROCEDURE — 76642 ULTRASOUND BREAST LIMITED: CPT

## 2022-04-13 PROCEDURE — G0279 TOMOSYNTHESIS, MAMMO: HCPCS

## 2022-04-13 RX ORDER — MELOXICAM 7.5 MG/1
7.5 TABLET ORAL DAILY
Qty: 30 TABLET | Refills: 0 | OUTPATIENT
Start: 2022-04-13 | End: 2022-05-13

## 2022-04-19 ENCOUNTER — HOSPITAL ENCOUNTER (OUTPATIENT)
Dept: PHYSICAL THERAPY | Age: 62
Setting detail: THERAPIES SERIES
Discharge: HOME | End: 2022-04-19
Payer: COMMERCIAL

## 2022-04-19 RX ORDER — TRIAMCINOLONE ACETONIDE 1 MG/G
CREAM TOPICAL
Qty: 30 G | Refills: 0 | Status: SHIPPED | OUTPATIENT
Start: 2022-04-19 | End: 2022-08-18

## 2022-04-19 NOTE — FLOWSHEET NOTE
[facility], [facility] [address] [address]  Phone: [phone]   Fax:     [phone]     Physical Therapy  Cancellation/No-show Note  Patient Name:  Rene Overton  :  1960   Date:  2022  Cancelled visits to date: 4- same day, 7 total  No-shows to date: 1    Patient status for today's appointment patient:  [x]  Cancelled  []  Rescheduled appointment  [x]  No-show     Reason given by patient:  []  Patient ill  []  Conflicting appointment  []  No transportation    []  Conflict with work  []  No reason given  [x]  Other:   toothache   Comments:  Due to high number of cancelled appts/no shows pt now d/c and must get new referral to return to PT    Phone call information:   []  Phone call made today to patient at _ time at number provided:      []  Patient answered, conversation as follows:    []  Patient did not answer, message left as follows: Notified of missed appt and next appt date/time.  Instructed to call if unable to attend  []  Phone call not made today    Electronically signed by:  Gianluca Murillo, PT

## 2022-04-22 ENCOUNTER — APPOINTMENT (OUTPATIENT)
Dept: PHYSICAL THERAPY | Age: 62
End: 2022-04-22
Payer: COMMERCIAL

## 2022-04-26 ENCOUNTER — APPOINTMENT (OUTPATIENT)
Dept: PHYSICAL THERAPY | Age: 62
End: 2022-04-26
Payer: COMMERCIAL

## 2022-04-27 ENCOUNTER — TELEPHONE (OUTPATIENT)
Dept: ORTHOPEDIC SURGERY | Age: 62
End: 2022-04-27

## 2022-04-27 DIAGNOSIS — M47.812 CERVICAL SPONDYLOSIS: Primary | ICD-10-CM

## 2022-04-27 NOTE — TELEPHONE ENCOUNTER
The patient is needing a new referral for PT. She would like to go to the [place] location. Please call.

## 2022-04-29 ENCOUNTER — APPOINTMENT (OUTPATIENT)
Dept: PHYSICAL THERAPY | Age: 62
End: 2022-04-29
Payer: COMMERCIAL

## 2022-05-02 RX ORDER — INSULIN GLARGINE 100 [IU]/ML
INJECTION, SOLUTION SUBCUTANEOUS
Qty: 15 ML | Refills: 1 | Status: SHIPPED | OUTPATIENT
Start: 2022-05-02 | End: 2022-06-02

## 2022-05-13 RX ORDER — TOLTERODINE 4 MG/1
CAPSULE, EXTENDED RELEASE ORAL
Qty: 90 CAPSULE | Refills: 0 | Status: SHIPPED | OUTPATIENT
Start: 2022-05-13 | End: 2022-08-15

## 2022-05-23 DIAGNOSIS — E78.5 HYPERLIPIDEMIA LDL GOAL <100: ICD-10-CM

## 2022-05-23 DIAGNOSIS — I10 ESSENTIAL HYPERTENSION: ICD-10-CM

## 2022-05-23 RX ORDER — METOPROLOL SUCCINATE 50 MG/1
TABLET, EXTENDED RELEASE ORAL
Qty: 90 TABLET | Refills: 0 | Status: SHIPPED | OUTPATIENT
Start: 2022-05-23 | End: 2022-08-29

## 2022-05-23 RX ORDER — NORTRIPTYLINE HYDROCHLORIDE 25 MG/1
CAPSULE ORAL
Qty: 180 CAPSULE | Refills: 0 | Status: SHIPPED | OUTPATIENT
Start: 2022-05-23 | End: 2022-08-29

## 2022-05-23 RX ORDER — AMLODIPINE BESYLATE 5 MG/1
TABLET ORAL
Qty: 90 TABLET | Refills: 1 | Status: SHIPPED | OUTPATIENT
Start: 2022-05-23

## 2022-05-23 RX ORDER — ATORVASTATIN CALCIUM 80 MG/1
TABLET, FILM COATED ORAL
Qty: 90 TABLET | Refills: 1 | Status: SHIPPED | OUTPATIENT
Start: 2022-05-23

## 2022-05-29 DIAGNOSIS — F33.1 MODERATE EPISODE OF RECURRENT MAJOR DEPRESSIVE DISORDER (HCC): ICD-10-CM

## 2022-05-31 ENCOUNTER — HOSPITAL ENCOUNTER (OUTPATIENT)
Dept: PHYSICAL THERAPY | Age: 62
Setting detail: THERAPIES SERIES
Discharge: HOME | End: 2022-05-31

## 2022-05-31 RX ORDER — QUETIAPINE FUMARATE 25 MG/1
TABLET, FILM COATED ORAL
Qty: 150 TABLET | Refills: 1 | Status: SHIPPED | OUTPATIENT
Start: 2022-05-31 | End: 2022-07-29

## 2022-05-31 NOTE — FLOWSHEET NOTE
[facility] [address] [address]  Phone: [phone]   Fax:     [phone]     Physical Therapy  Cancellation/No-show Note  Patient Name:  Eros Chanel  :  1960   Date:  2022  Cancelled visits to date: 4- same day, 7 total  No-shows to date: 2    Patient status for today's appointment patient:  [x]  Cancelled  []  Rescheduled appointment  [x]  No-show     Reason given by patient:  []  Patient ill  []  Conflicting appointment  []  No transportation    []  Conflict with work  [x]  No reason given  []  Other:      Comments:  Due to high number of cancelled appts/no shows and pt no showing appt after given another attempt to attend PT, pt will no longer be scheduled at this office     Phone call information:   []  Phone call made today to patient at _ time at number provided:      []  Patient answered, conversation as follows:    []  Patient did not answer, message left as follows: Notified of missed appt and next appt date/time.  Instructed to call if unable to attend  [x]  Phone call not made today    Electronically signed by:  Nina Hilliard PT

## 2022-06-02 RX ORDER — INSULIN GLARGINE 100 [IU]/ML
INJECTION, SOLUTION SUBCUTANEOUS
Qty: 15 ML | Refills: 1 | Status: SHIPPED | OUTPATIENT
Start: 2022-06-02 | End: 2022-07-05

## 2022-06-14 RX ORDER — CALCIUM CITRATE/VITAMIN D3 200MG-6.25
TABLET ORAL
Qty: 300 STRIP | Refills: 3 | Status: SHIPPED | OUTPATIENT
Start: 2022-06-14

## 2022-06-21 ENCOUNTER — OFFICE VISIT (OUTPATIENT)
Dept: FAMILY MEDICINE CLINIC | Age: 62
End: 2022-06-21
Payer: COMMERCIAL

## 2022-06-21 VITALS
WEIGHT: 225 LBS | OXYGEN SATURATION: 96 % | HEIGHT: 63 IN | HEART RATE: 78 BPM | SYSTOLIC BLOOD PRESSURE: 130 MMHG | DIASTOLIC BLOOD PRESSURE: 76 MMHG | BODY MASS INDEX: 39.87 KG/M2

## 2022-06-21 DIAGNOSIS — R10.9 FLANK PAIN: ICD-10-CM

## 2022-06-21 DIAGNOSIS — R39.15 URINARY URGENCY: Primary | ICD-10-CM

## 2022-06-21 DIAGNOSIS — B37.31 VAGINAL YEAST INFECTION: ICD-10-CM

## 2022-06-21 LAB
BILIRUBIN, POC: ABNORMAL
BLOOD URINE, POC: ABNORMAL
CLARITY, POC: CLEAR
COLOR, POC: YELLOW
GLUCOSE URINE, POC: ABNORMAL
KETONES, POC: ABNORMAL
LEUKOCYTE EST, POC: ABNORMAL
NITRITE, POC: ABNORMAL
PH, POC: 5.5
PROTEIN, POC: ABNORMAL
SPECIFIC GRAVITY, POC: 1.03
UROBILINOGEN, POC: 0.2

## 2022-06-21 PROCEDURE — 99213 OFFICE O/P EST LOW 20 MIN: CPT

## 2022-06-21 PROCEDURE — 81002 URINALYSIS NONAUTO W/O SCOPE: CPT

## 2022-06-21 RX ORDER — FLUCONAZOLE 150 MG/1
150 TABLET ORAL
Qty: 2 TABLET | Refills: 0 | Status: SHIPPED | OUTPATIENT
Start: 2022-06-21 | End: 2022-06-27

## 2022-06-21 ASSESSMENT — PATIENT HEALTH QUESTIONNAIRE - PHQ9
8. MOVING OR SPEAKING SO SLOWLY THAT OTHER PEOPLE COULD HAVE NOTICED. OR THE OPPOSITE, BEING SO FIGETY OR RESTLESS THAT YOU HAVE BEEN MOVING AROUND A LOT MORE THAN USUAL: 0
SUM OF ALL RESPONSES TO PHQ QUESTIONS 1-9: 4
1. LITTLE INTEREST OR PLEASURE IN DOING THINGS: 0
3. TROUBLE FALLING OR STAYING ASLEEP: 0
SUM OF ALL RESPONSES TO PHQ QUESTIONS 1-9: 4
SUM OF ALL RESPONSES TO PHQ9 QUESTIONS 1 & 2: 1
SUM OF ALL RESPONSES TO PHQ QUESTIONS 1-9: 4
9. THOUGHTS THAT YOU WOULD BE BETTER OFF DEAD, OR OF HURTING YOURSELF: 0
6. FEELING BAD ABOUT YOURSELF - OR THAT YOU ARE A FAILURE OR HAVE LET YOURSELF OR YOUR FAMILY DOWN: 1
SUM OF ALL RESPONSES TO PHQ QUESTIONS 1-9: 4
4. FEELING TIRED OR HAVING LITTLE ENERGY: 1
7. TROUBLE CONCENTRATING ON THINGS, SUCH AS READING THE NEWSPAPER OR WATCHING TELEVISION: 0
5. POOR APPETITE OR OVEREATING: 1
2. FEELING DOWN, DEPRESSED OR HOPELESS: 1
10. IF YOU CHECKED OFF ANY PROBLEMS, HOW DIFFICULT HAVE THESE PROBLEMS MADE IT FOR YOU TO DO YOUR WORK, TAKE CARE OF THINGS AT HOME, OR GET ALONG WITH OTHER PEOPLE: 2

## 2022-06-21 ASSESSMENT — ENCOUNTER SYMPTOMS
ABDOMINAL PAIN: 0
SHORTNESS OF BREATH: 0

## 2022-06-21 NOTE — PROGRESS NOTES
6/21/2022    This is a 60 y.o. female   Chief Complaint   Patient presents with    Other     urinary urgency, pain in left side and back. Erika Chou is here today for urinary urgency and left sided flank pain that started yesterday. Denies fevers, hematuria, difficulty urinating, nausea or vomiting. On daily macrobid for chronic UTI- followed by Dr. Juan Miguel Hadley. Taking advil PRN    Has constipation- sees Dr. Herber Cobb. Patient Active Problem List   Diagnosis    DM (diabetes mellitus), type 2, uncontrolled with complications ([address]    Diabetic gastroparesis ([address]    Allergic rhinitis    Insomnia    Essential hypertension    Chronic knee pain    Headaches due to old head injury    Nephropathy, diabetic ([address]    Migraine    Hip pain, bilateral    Depression, major    Right knee DJD    Calcific Achilles tendonitis, right    Right sided sciatica    Lumbar spinal stenosis    TMJ (temporomandibular joint disorder)    Urge incontinence of urine    Personal history of breast cancer    Hyperlipidemia LDL goal <128    Metallic taste    Polyarthralgia    Severe obesity (BMI 35.0-39. 9) with comorbidity (HCC)    Neck pain    Chronic low back pain    Intractable low back pain    Plantar fasciitis of right foot    Anxiety    History of colon polyps    Irritable bowel syndrome with both constipation and diarrhea    Trigger finger, acquired    Bilateral edema of lower extremity    Achilles tendinitis of both lower extremities          Current Outpatient Medications   Medication Sig Dispense Refill    fluconazole (DIFLUCAN) 150 MG tablet Take 1 tablet by mouth every 72 hours for 6 days 2 tablet 0    blood glucose test strips (TRUE METRIX BLOOD GLUCOSE TEST) strip USE THREE TIMES DAILY 300 strip 3    BASAGLAR KWIKPEN 100 UNIT/ML injection pen ADMINISTER 80 UNITS UNDER THE SKIN EVERY NIGHT 15 mL 1    QUEtiapine (SEROQUEL) 25 MG tablet TAKE 1 TABLET BY MOUTH EVERY MORNING AND 4 TABLETS EVERY NIGHT AT BEDTIME 150 tablet 1    nortriptyline (PAMELOR) 25 MG capsule TAKE 1 TO 2 CAPSULES BY MOUTH EVERY NIGHT 180 capsule 0    metoprolol succinate (TOPROL XL) 50 MG extended release tablet TAKE 1 TABLET BY MOUTH EVERY DAY 90 tablet 0    atorvastatin (LIPITOR) 80 MG tablet TAKE 1 TABLET BY MOUTH EVERY DAY 90 tablet 1    amLODIPine (NORVASC) 5 MG tablet TAKE 1 TABLET BY MOUTH DAILY 90 tablet 1    tolterodine (DETROL LA) 4 MG extended release capsule TAKE 1 CAPSULE BY MOUTH EVERY DAY 90 capsule 0    tiZANidine (ZANAFLEX) 4 MG tablet TAKE 1 TABLET BY MOUTH EVERY 8 HOURS AS NEEDED FOR MUSCLE SPASM 30 tablet 0    nitrofurantoin, macrocrystal-monohydrate, (MACROBID) 100 MG capsule Take 100 mg by mouth daily      lisinopril-hydroCHLOROthiazide (PRINZIDE;ZESTORETIC) 20-12.5 MG per tablet TAKE 1 TABLET BY MOUTH DAILY 90 tablet 1    gabapentin (NEURONTIN) 600 MG tablet TAKE 2 tabs at bedtime. 180 tablet 1    B-D UF III MINI PEN NEEDLES 31G X 5 MM MISC USE ON PEN THREE TIMES DAILY 200 each 3    Insulin Aspart FlexPen 100 UNIT/ML SOPN INJECT INTO SKIN WITH DINNER AS DIRECTED.<100=NONE, 100-180=10 UNITS, 181-230=15U, 231-280-20U,>281=25U 15 mL 5    venlafaxine (EFFEXOR XR) 75 MG extended release capsule TAKE THREE CAPSULES BY MOUTH DAILY 270 capsule 1    Continuous Blood Gluc Sensor (FREESTYLE KENDAL SENSOR SYSTEM) MISC as needed 2 each 5    omeprazole (PRILOSEC) 40 MG delayed release capsule Take 40 mg by mouth daily       triamcinolone (KENALOG) 0.1 % cream APPLY TOPICALLY TO THE AFFECTED AREA TWICE DAILY (Patient not taking: Reported on 6/21/2022) 30 g 0     No current facility-administered medications for this visit. Allergies   Allergen Reactions    Trazodone And Nefazodone Other (See Comments)     headache    Cipro [Ciprofloxacin Hcl] Other (See Comments)     Makes her jittery.     Ambien [Zolpidem Tartrate] Other (See Comments)     Vivid dreams    Diamox [Acetazolamide] Rash    Lorazepam Other (See Comments)     confusion    Naproxen Rash    Reglan [Metoclopramide] Anxiety    Sulfa Antibiotics Rash     Also causes shaking       Review of Systems   Constitutional: Negative for activity change and fever. Respiratory: Negative for shortness of breath. Cardiovascular: Negative for chest pain, palpitations and leg swelling. Gastrointestinal: Negative for abdominal pain. Genitourinary: Positive for dysuria, flank pain, urgency and vaginal pain (itching). Negative for decreased urine volume, difficulty urinating, frequency, hematuria, pelvic pain, vaginal bleeding and vaginal discharge. Neurological: Negative for dizziness and headaches. Vitals:    06/21/22 1604   BP: 130/76   Site: Right Upper Arm   Position: Sitting   Cuff Size: Large Adult   Pulse: 78   SpO2: 96%   Weight: 225 lb (102.1 kg)   Height: 5' 3\" (1.6 m)       Body mass index is 39.86 kg/m². Wt Readings from Last 3 Encounters:   06/21/22 225 lb (102.1 kg)   04/07/22 228 lb (103.4 kg)   03/24/22 232 lb (105.2 kg)       BP Readings from Last 3 Encounters:   06/21/22 130/76   04/07/22 (!) 146/70   03/24/22 (!) 160/90       Physical Exam  Vitals reviewed. Constitutional:       General: She is not in acute distress. Appearance: Normal appearance. HENT:      Head: Normocephalic and atraumatic. Cardiovascular:      Rate and Rhythm: Normal rate and regular rhythm. Heart sounds: Normal heart sounds. No murmur heard. No friction rub. No gallop. Pulmonary:      Effort: Pulmonary effort is normal. No respiratory distress. Breath sounds: Normal breath sounds. Abdominal:      Palpations: Abdomen is soft. Tenderness: There is abdominal tenderness. There is left CVA tenderness. There is no right CVA tenderness. Musculoskeletal:         General: Normal range of motion. Right lower leg: No edema. Left lower leg: No edema. Skin:     General: Skin is warm and dry.    Neurological:      Mental Status: She is oriented to person, place, and time. Psychiatric:         Behavior: Behavior normal.         Thought Content: Thought content normal.         Judgment: Judgment normal.         Assessmentand Plan  Abdirashid Stokes was seen today for other. Diagnoses and all orders for this visit:    Urinary urgency  -     POCT Urinalysis no Micro  -     Culture, Urine    Flank pain  -     POCT Urinalysis no Micro  -     Culture, Urine    Vaginal yeast infection  -     fluconazole (DIFLUCAN) 150 MG tablet; Take 1 tablet by mouth every 72 hours for 6 days    Urine dipstick shows negative for all components, positive for red blood cells. Will send urine for culture and prescribe antibiotics accordingly. She has follow up with nephrologist on Thursday 6/23/2022- advised to discuss yeast infections with taking daily macrobid  Will order 2 dose course of fluconazole for current yeast infection. Advised to hydrate with water. She is due for diabetes follow up. Advised to schedule with Dr. Tayler Mcdonald soon. Provided with GLP-1 agents, she is interested in trying and will check with insurance company to determine which, if any, is covered. Return in about 1 month (around 7/21/2022) for diabetes with Dr. Tayler Mcdonald.

## 2022-06-22 LAB — URINE CULTURE, ROUTINE: NORMAL

## 2022-06-24 ENCOUNTER — TELEPHONE (OUTPATIENT)
Dept: FAMILY MEDICINE CLINIC | Age: 62
End: 2022-06-24

## 2022-06-24 NOTE — TELEPHONE ENCOUNTER
Left voice message that it is not clear from her chart that she is having a kidney stone. Possibly a stone, but could be pyelo, fungal pyelo or obstructed pyelo. I need more info. Advised to go to ED if she is in a lot of pain to better determine what is going on prior to starting treatment. Call back asap if she has more information from her visit with Dr Keila Joel, where, presumably, a renal ultrasound was done.

## 2022-06-24 NOTE — TELEPHONE ENCOUNTER
----- Message from Bridger Robles sent at 6/24/2022  2:32 PM EDT -----  Subject: Message to Provider    QUESTIONS  Information for Provider? Patient has a kidney stone and is wanting pain   medication for it. She tried Advil and its not helping. Please send to   [facility]  [address] [facility], [facility].  ---------------------------------------------------------------------------  --------------  CALL BACK INFO  What is the best way for the office to contact you? OK to leave message on   voicemail  Preferred Call Back Phone Number? [phone]  ---------------------------------------------------------------------------  --------------  SCRIPT ANSWERS  Relationship to Patient?  Self

## 2022-06-27 ENCOUNTER — HOSPITAL ENCOUNTER (OUTPATIENT)
Dept: ULTRASOUND IMAGING | Age: 62
Discharge: HOME | End: 2022-06-27
Payer: COMMERCIAL

## 2022-06-27 DIAGNOSIS — N18.9 CHRONIC KIDNEY DISEASE, UNSPECIFIED CKD STAGE: ICD-10-CM

## 2022-06-27 PROCEDURE — 76770 US EXAM ABDO BACK WALL COMP: CPT

## 2022-07-05 RX ORDER — INSULIN GLARGINE 100 [IU]/ML
INJECTION, SOLUTION SUBCUTANEOUS
Qty: 15 ML | Refills: 1 | Status: SHIPPED | OUTPATIENT
Start: 2022-07-05 | End: 2022-08-10

## 2022-07-29 DIAGNOSIS — F33.1 MODERATE EPISODE OF RECURRENT MAJOR DEPRESSIVE DISORDER (HCC): ICD-10-CM

## 2022-07-29 RX ORDER — QUETIAPINE FUMARATE 25 MG/1
TABLET, FILM COATED ORAL
Qty: 150 TABLET | Refills: 0 | Status: SHIPPED | OUTPATIENT
Start: 2022-07-29 | End: 2022-09-01

## 2022-08-01 ENCOUNTER — OFFICE VISIT (OUTPATIENT)
Dept: ORTHOPEDIC SURGERY | Age: 62
End: 2022-08-01
Payer: COMMERCIAL

## 2022-08-01 DIAGNOSIS — M65.30 TRIGGER FINGER, ACQUIRED: Primary | ICD-10-CM

## 2022-08-01 PROCEDURE — 20550 NJX 1 TENDON SHEATH/LIGAMENT: CPT | Performed by: ORTHOPAEDIC SURGERY

## 2022-08-01 RX ORDER — TRIAMCINOLONE ACETONIDE 40 MG/ML
20 INJECTION, SUSPENSION INTRA-ARTICULAR; INTRAMUSCULAR ONCE
Status: COMPLETED | OUTPATIENT
Start: 2022-08-01 | End: 2022-08-01

## 2022-08-01 RX ORDER — LIDOCAINE HYDROCHLORIDE 10 MG/ML
0.5 INJECTION, SOLUTION INFILTRATION; PERINEURAL ONCE
Status: COMPLETED | OUTPATIENT
Start: 2022-08-01 | End: 2022-08-01

## 2022-08-01 RX ADMIN — LIDOCAINE HYDROCHLORIDE 0.5 ML: 10 INJECTION, SOLUTION INFILTRATION; PERINEURAL at 15:44

## 2022-08-01 RX ADMIN — TRIAMCINOLONE ACETONIDE 20 MG: 40 INJECTION, SUSPENSION INTRA-ARTICULAR; INTRAMUSCULAR at 15:44

## 2022-08-01 NOTE — PROGRESS NOTES
of 1% lidocaine and 20 mg.of triamcinalone, with good filling. The patient is advised regarding the expected response and possible reactions from the injection. Acetaminophen or ibuprofen are prescribed for any significant post injection pain. The patient is asked to call me if full, painless function has not returned within 4 weeks. The possibility of recurrence of the problem is discussed.

## 2022-08-06 DIAGNOSIS — F33.1 MODERATE EPISODE OF RECURRENT MAJOR DEPRESSIVE DISORDER (HCC): ICD-10-CM

## 2022-08-08 RX ORDER — VENLAFAXINE HYDROCHLORIDE 75 MG/1
CAPSULE, EXTENDED RELEASE ORAL
Qty: 270 CAPSULE | Refills: 1 | Status: SHIPPED | OUTPATIENT
Start: 2022-08-08

## 2022-08-10 ENCOUNTER — OFFICE VISIT (OUTPATIENT)
Dept: ORTHOPEDIC SURGERY | Age: 62
End: 2022-08-10
Payer: COMMERCIAL

## 2022-08-10 VITALS — HEIGHT: 63 IN | WEIGHT: 225 LBS | BODY MASS INDEX: 39.87 KG/M2

## 2022-08-10 DIAGNOSIS — M16.12 ARTHRITIS OF LEFT HIP: ICD-10-CM

## 2022-08-10 DIAGNOSIS — M51.36 DDD (DEGENERATIVE DISC DISEASE), LUMBAR: Primary | ICD-10-CM

## 2022-08-10 DIAGNOSIS — M47.816 LUMBAR FACET ARTHROPATHY: ICD-10-CM

## 2022-08-10 PROBLEM — M51.369 DDD (DEGENERATIVE DISC DISEASE), LUMBAR: Status: ACTIVE | Noted: 2022-08-10

## 2022-08-10 PROCEDURE — G8428 CUR MEDS NOT DOCUMENT: HCPCS | Performed by: PHYSICIAN ASSISTANT

## 2022-08-10 PROCEDURE — 3017F COLORECTAL CA SCREEN DOC REV: CPT | Performed by: PHYSICIAN ASSISTANT

## 2022-08-10 PROCEDURE — 1036F TOBACCO NON-USER: CPT | Performed by: PHYSICIAN ASSISTANT

## 2022-08-10 PROCEDURE — G8417 CALC BMI ABV UP PARAM F/U: HCPCS | Performed by: PHYSICIAN ASSISTANT

## 2022-08-10 PROCEDURE — 99214 OFFICE O/P EST MOD 30 MIN: CPT | Performed by: PHYSICIAN ASSISTANT

## 2022-08-10 RX ORDER — INSULIN GLARGINE 100 [IU]/ML
INJECTION, SOLUTION SUBCUTANEOUS
Qty: 15 ML | Refills: 1 | Status: SHIPPED | OUTPATIENT
Start: 2022-08-10 | End: 2022-09-29 | Stop reason: SDUPTHER

## 2022-08-10 RX ORDER — CEPHALEXIN 250 MG/1
250 CAPSULE ORAL DAILY
COMMUNITY
Start: 2022-03-14 | End: 2022-10-07
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
## 2022-08-15 RX ORDER — TOLTERODINE 4 MG/1
CAPSULE, EXTENDED RELEASE ORAL
Qty: 90 CAPSULE | Refills: 0 | Status: SHIPPED | OUTPATIENT
Start: 2022-08-15 | End: 2022-08-18

## 2022-08-16 RX ORDER — INSULIN ASPART 100 [IU]/ML
INJECTION, SOLUTION INTRAVENOUS; SUBCUTANEOUS
Qty: 15 ML | Refills: 5 | Status: SHIPPED | OUTPATIENT
Start: 2022-08-16

## 2022-08-16 NOTE — TELEPHONE ENCOUNTER
----- Message from Marta Adamson sent at 8/16/2022  8:29 AM EDT -----  Subject: Message to Provider    QUESTIONS  Information for Provider? Pharmacy is calling to get a refill for   NovologFlex pen. This is the [facility] [phone]  ---------------------------------------------------------------------------  --------------  Jazz Salcido INFO  [phone]; OK to leave message on voicemail  ---------------------------------------------------------------------------  --------------  SCRIPT ANSWERS  Relationship to Patient? Third Party  Third Party Type? Pharmacy? Representative Name?  Pia Peña

## 2022-08-18 ENCOUNTER — TELEPHONE (OUTPATIENT)
Dept: ORTHOPEDIC SURGERY | Age: 62
End: 2022-08-18

## 2022-08-18 ENCOUNTER — OFFICE VISIT (OUTPATIENT)
Dept: FAMILY MEDICINE CLINIC | Age: 62
End: 2022-08-18
Payer: COMMERCIAL

## 2022-08-18 VITALS
SYSTOLIC BLOOD PRESSURE: 130 MMHG | BODY MASS INDEX: 39.87 KG/M2 | OXYGEN SATURATION: 96 % | DIASTOLIC BLOOD PRESSURE: 70 MMHG | RESPIRATION RATE: 18 BRPM | WEIGHT: 225 LBS | HEART RATE: 69 BPM | HEIGHT: 63 IN

## 2022-08-18 DIAGNOSIS — R74.8 SERUM LIPASE ELEVATION: ICD-10-CM

## 2022-08-18 LAB — HBA1C MFR BLD: 8.5 %

## 2022-08-18 PROCEDURE — 83036 HEMOGLOBIN GLYCOSYLATED A1C: CPT | Performed by: FAMILY MEDICINE

## 2022-08-18 PROCEDURE — G8427 DOCREV CUR MEDS BY ELIG CLIN: HCPCS | Performed by: FAMILY MEDICINE

## 2022-08-18 PROCEDURE — 3017F COLORECTAL CA SCREEN DOC REV: CPT | Performed by: FAMILY MEDICINE

## 2022-08-18 PROCEDURE — 2022F DILAT RTA XM EVC RTNOPTHY: CPT | Performed by: FAMILY MEDICINE

## 2022-08-18 PROCEDURE — 1036F TOBACCO NON-USER: CPT | Performed by: FAMILY MEDICINE

## 2022-08-18 PROCEDURE — 99214 OFFICE O/P EST MOD 30 MIN: CPT | Performed by: FAMILY MEDICINE

## 2022-08-18 PROCEDURE — G8417 CALC BMI ABV UP PARAM F/U: HCPCS | Performed by: FAMILY MEDICINE

## 2022-08-18 PROCEDURE — 3052F HG A1C>EQUAL 8.0%<EQUAL 9.0%: CPT | Performed by: FAMILY MEDICINE

## 2022-08-18 RX ORDER — SEMAGLUTIDE 1.34 MG/ML
0.25 INJECTION, SOLUTION SUBCUTANEOUS WEEKLY
Qty: 1.5 ML | Refills: 1 | Status: SHIPPED | OUTPATIENT
Start: 2022-08-18 | End: 2022-09-29

## 2022-08-18 SDOH — ECONOMIC STABILITY: FOOD INSECURITY: WITHIN THE PAST 12 MONTHS, THE FOOD YOU BOUGHT JUST DIDN'T LAST AND YOU DIDN'T HAVE MONEY TO GET MORE.: NEVER TRUE

## 2022-08-18 SDOH — ECONOMIC STABILITY: FOOD INSECURITY: WITHIN THE PAST 12 MONTHS, YOU WORRIED THAT YOUR FOOD WOULD RUN OUT BEFORE YOU GOT MONEY TO BUY MORE.: NEVER TRUE

## 2022-08-18 ASSESSMENT — SOCIAL DETERMINANTS OF HEALTH (SDOH): HOW HARD IS IT FOR YOU TO PAY FOR THE VERY BASICS LIKE FOOD, HOUSING, MEDICAL CARE, AND HEATING?: NOT HARD AT ALL

## 2022-08-18 NOTE — PROGRESS NOTES
2022    Blood pressure 130/70, pulse 69, resp. rate 18, height 5' 3\" (1.6 m), weight 225 lb (102.1 kg), SpO2 96 %, not currently breastfeeding. Mona He (:  1960) is a 60 y.o. female, here for evaluation of the following medical concerns:    Chief Complaint   Patient presents with    Diabetes     Pt is here for a 3 month follow up on diabetes. Here for routine follow up of DM with her . Complications: microalbuminuria  Last a1c was   Lab Results   Component Value Date/Time    LABA1C 8.0 2022 03:57 PM    LABA1C 9.4 2021 11:01 AM    LABA1C 9.5 2021 05:22 PM      Basaglar dose is 80 HS, fasting glucoses are typically 140-160. Sometimes lower, sometimes higher. Aspart with meals: \"I guess\" based on pre-meal sugars. But does not consistently take sugar prior to meals. Usual dose is 15 to 20 units. They have a sliding scale they have used in the past, but not really using now as it was too aggressive and caused hypoglycemia. Hx of hypos: not recently. (None in past 10 months). About a year ago dropped to 27 and had to call EMS. Gave dose of insulin and did not eat. does not tolerate metformin. Prior DKA  Hx of elevated lipase , but not pancreatitis (CT neg). Then normal  and . A1c today is 8.5. She says there is a lot of variation in how she eats, in terms of carb content. She has some understanding of the carb content of various foods. She is interested in Dexcom. Last renal function test:   Lab Results   Component Value Date/Time     2022 02:59 PM    K 4.5 2022 02:59 PM    K 5.1 2021 12:18 PM    BUN 27 2022 02:59 PM    CREATININE 1.2 2022 02:59 PM     Estimated Creatinine Clearance: 55 mL/min (based on SCr of 1.2 mg/dL).      Last lipid test:  Lab Results   Component Value Date    CHOL 165 2022    TRIG 187 (H) 2022    HDL 35 (L) 2022    LDLCALC 93 2022    LDLDIRECT 168 (H) 01/21/2016     Lab Results   Component Value Date    ALT 31 08/05/2021    AST 33 08/05/2021         Patient Active Problem List   Diagnosis    DM (diabetes mellitus), type 2, uncontrolled with complications ([facility]    Diabetic gastroparesis ([facility]    Allergic rhinitis    Insomnia    Essential hypertension    Chronic knee pain    Headaches due to old head injury    Nephropathy, diabetic (HCC)    Migraine    Hip pain, bilateral    Depression, major    Right knee DJD    Calcific Achilles tendonitis, right    Right sided sciatica    Lumbar spinal stenosis    TMJ (temporomandibular joint disorder)    Urge incontinence of urine    Personal history of breast cancer    Hyperlipidemia LDL goal <661    Metallic taste    Polyarthralgia    Severe obesity (BMI 35.0-39. 9) with comorbidity (HCC)    Neck pain    Chronic low back pain    Intractable low back pain    Plantar fasciitis of right foot    Anxiety    History of colon polyps    Irritable bowel syndrome with both constipation and diarrhea    Trigger finger, acquired    Bilateral edema of lower extremity    Achilles tendinitis of both lower extremities    Arthritis of left hip    Lumbar facet arthropathy    DDD (degenerative disc disease), lumbar        Body mass index is 39.86 kg/m². Wt Readings from Last 3 Encounters:   08/18/22 225 lb (102.1 kg)   08/10/22 225 lb (102.1 kg)   06/21/22 225 lb (102.1 kg)       BP Readings from Last 3 Encounters:   08/18/22 130/70   06/21/22 130/76   04/07/22 (!) 146/70       Allergies   Allergen Reactions    Trazodone And Nefazodone Other (See Comments)     headache    Cipro [Ciprofloxacin Hcl] Other (See Comments)     Makes her jittery.     Ambien [Zolpidem Tartrate] Other (See Comments)     Vivid dreams    Diamox [Acetazolamide] Rash    Lorazepam Other (See Comments)     confusion    Naproxen Rash    Reglan [Metoclopramide] Anxiety    Sulfa Antibiotics Rash     Also causes shaking       Prior to Visit Medications    Medication Sig Taking? Authorizing Provider   Insulin Aspart FlexPen 100 UNIT/ML SOPN INJECT INTO SKIN WITH DINNER AS DIRECTED.<100=NONE, 100-180=10 UNITS, 181-230=15U, 231-280-20U,>281=25U Yes MD COBY Doll KWIKPEN 100 UNIT/ML injection pen ADMINISTER 80 UNITS UNDER THE SKIN EVERY NIGHT Yes Jeanie Gonzalez MD   cephALEXin (KEFLEX) 250 MG capsule Take 250 mg by mouth in the morning. Yes Historical Provider, MD   venlafaxine (EFFEXOR XR) 75 MG extended release capsule TAKE 3 CAPSULES BY MOUTH DAILY Yes Jeanie Gonzalez MD   QUEtiapine (SEROQUEL) 25 MG tablet TAKE 1 TABLET BY MOUTH EVERY MORNING AND 4 TABLETS EVERY EVENING Yes Jeanie Gonzalez MD   blood glucose test strips (TRUE METRIX BLOOD GLUCOSE TEST) strip USE THREE TIMES DAILY Yes GLADIS Tomas CNP   nortriptyline (PAMELOR) 25 MG capsule TAKE 1 TO 2 CAPSULES BY MOUTH EVERY NIGHT Yes Jeanie Gonzalez MD   metoprolol succinate (TOPROL XL) 50 MG extended release tablet TAKE 1 TABLET BY MOUTH EVERY DAY Yes Jeanie Gonzalez MD   atorvastatin (LIPITOR) 80 MG tablet TAKE 1 TABLET BY MOUTH EVERY DAY Yes Jeanie Gonzalez MD   amLODIPine (NORVASC) 5 MG tablet TAKE 1 TABLET BY MOUTH DAILY Yes Jeanie Gonzalez MD   tiZANidine (ZANAFLEX) 4 MG tablet TAKE 1 TABLET BY MOUTH EVERY 8 HOURS AS NEEDED FOR MUSCLE SPASM Yes GLADIS Dumont - CNP   nitrofurantoin, macrocrystal-monohydrate, (MACROBID) 100 MG capsule Take 100 mg by mouth daily Yes Historical Provider, MD   lisinopril-hydroCHLOROthiazide (PRINZIDE;ZESTORETIC) 20-12.5 MG per tablet TAKE 1 TABLET BY MOUTH DAILY Yes GLADIS Tomas - CNP   gabapentin (NEURONTIN) 600 MG tablet TAKE 2 tabs at bedtime.  Yes Marcus Callaway MD   B-D UF III MINI PEN NEEDLES 31G X 5 MM MISC USE ON PEN THREE TIMES DAILY Yes Marcus Callaway MD   omeprazole (PRILOSEC) 40 MG delayed release capsule Take 40 mg by mouth daily  Yes Historical Provider, MD   tolterodine (DETROL LA) 4 MG extended release capsule TAKE 1 CAPSULE BY MOUTH EVERY DAY  Patient not taking: Reported on 8/18/2022  Gerard Restrepo MD   triamcinolone (KENALOG) 0.1 % cream APPLY TOPICALLY TO THE AFFECTED AREA TWICE DAILY  Patient not taking: Reported on 8/18/2022  Gerard Restrepo MD   Continuous Blood Gluc Sensor ([address] [place] [address]) MISC as needed  Patient not taking: Reported on 8/18/2022  Julia Watson MD        Social History     Tobacco Use    Smoking status: Never    Smokeless tobacco: Never    Tobacco comments:     congrats   Vaping Use    Vaping Use: Never used   Substance Use Topics    Alcohol use: No    Drug use: Never       Review of Systems    Physical Exam  Constitutional:       General: She is not in acute distress. Appearance: Normal appearance. She is not ill-appearing. Pulmonary:      Effort: Pulmonary effort is normal.   Musculoskeletal:      Comments: Feet: no lesions or significant deformity. Sensation intact to SW monofilament and vibratory sense bilaterally; intact pedal pulses and tissue perfusion. Neurological:      General: No focal deficit present. Mental Status: She is alert and oriented to person, place, and time. Mental status is at baseline. Psychiatric:         Mood and Affect: Mood normal.         Behavior: Behavior normal.         Thought Content: Thought content normal.         Judgment: Judgment normal.       ASSESSMENT/PLAN:    1. DM (diabetes mellitus), type 2, uncontrolled with complications ([address]  - suboptimal control- a1c 8.5.  carb awareness and matching to meal -time insulin is not optimized and may be difficult to achieve. We discussed alternatives that may improve glucemic control with less insulin use and less need for monitoring- GLP-1 agonists. Check lipase to verify normal prior to starting, then periodically throughout. Start ozempic 2.5 weekly and incr to 5 mg in a month.   - POCT glycosylated hemoglobin (Hb A1C)  -  DIABETES FOOT

## 2022-08-18 NOTE — TELEPHONE ENCOUNTER
HIP INJ CANCELED FOR 8-18, CALLED CENTRAL SCHEDULING AND IT WAS NOT DOCUMENTED WHO CALLED IT IN AS A CANCEL TO RADIOLOGY BUT IT WAS DONE.

## 2022-08-19 ENCOUNTER — HOSPITAL ENCOUNTER (OUTPATIENT)
Dept: CARDIOLOGY | Age: 62
Discharge: HOME | End: 2022-08-19
Payer: COMMERCIAL

## 2022-08-19 DIAGNOSIS — R60.0 BILATERAL EDEMA OF LOWER EXTREMITY: ICD-10-CM

## 2022-08-19 LAB
LV EF: 58 %
LVEF MODALITY: NORMAL

## 2022-08-19 PROCEDURE — 93306 TTE W/DOPPLER COMPLETE: CPT

## 2022-08-24 ENCOUNTER — TELEPHONE (OUTPATIENT)
Dept: ORTHOPEDIC SURGERY | Age: 62
End: 2022-08-24

## 2022-08-24 NOTE — TELEPHONE ENCOUNTER
Other PATIENT IS CALLING TO RESCHEDULE HIP INJECTION. [facility] [phone]

## 2022-08-29 RX ORDER — NORTRIPTYLINE HYDROCHLORIDE 25 MG/1
CAPSULE ORAL
Qty: 180 CAPSULE | Refills: 0 | Status: SHIPPED | OUTPATIENT
Start: 2022-08-29 | End: 2022-10-07

## 2022-08-29 RX ORDER — METOPROLOL SUCCINATE 50 MG/1
TABLET, EXTENDED RELEASE ORAL
Qty: 90 TABLET | Refills: 0 | Status: SHIPPED | OUTPATIENT
Start: 2022-08-29

## 2022-08-30 DIAGNOSIS — R74.8 SERUM LIPASE ELEVATION: ICD-10-CM

## 2022-08-30 DIAGNOSIS — I10 ESSENTIAL HYPERTENSION: ICD-10-CM

## 2022-08-30 LAB
ANION GAP SERPL CALCULATED.3IONS-SCNC: 12 MMOL/L (ref 3–16)
BUN BLDV-MCNC: 23 MG/DL (ref 7–20)
CALCIUM SERPL-MCNC: 9.8 MG/DL (ref 8.3–10.6)
CHLORIDE BLD-SCNC: 101 MMOL/L (ref 99–110)
CO2: 24 MMOL/L (ref 21–32)
CREAT SERPL-MCNC: 1.3 MG/DL (ref 0.6–1.2)
GFR AFRICAN AMERICAN: 50
GFR NON-AFRICAN AMERICAN: 41
GLUCOSE BLD-MCNC: 290 MG/DL (ref 70–99)
LIPASE: 52 U/L (ref 13–60)
POTASSIUM SERPL-SCNC: 4.4 MMOL/L (ref 3.5–5.1)
SODIUM BLD-SCNC: 137 MMOL/L (ref 136–145)

## 2022-09-01 ENCOUNTER — TELEPHONE (OUTPATIENT)
Dept: FAMILY MEDICINE CLINIC | Age: 62
End: 2022-09-01

## 2022-09-01 DIAGNOSIS — M25.50 POLYARTHRALGIA: ICD-10-CM

## 2022-09-01 DIAGNOSIS — S16.1XXD STRAIN OF NECK MUSCLE, SUBSEQUENT ENCOUNTER: ICD-10-CM

## 2022-09-01 DIAGNOSIS — F33.1 MODERATE EPISODE OF RECURRENT MAJOR DEPRESSIVE DISORDER (HCC): ICD-10-CM

## 2022-09-01 RX ORDER — GABAPENTIN 600 MG/1
TABLET ORAL
Qty: 180 TABLET | Refills: 1 | Status: SHIPPED | OUTPATIENT
Start: 2022-09-01 | End: 2023-02-28

## 2022-09-01 RX ORDER — QUETIAPINE FUMARATE 25 MG/1
TABLET, FILM COATED ORAL
Qty: 150 TABLET | Refills: 0 | Status: SHIPPED | OUTPATIENT
Start: 2022-09-01 | End: 2022-09-28

## 2022-09-01 NOTE — TELEPHONE ENCOUNTER
Patient called in to get her lab results and to know if she should start her [address]    Please Advise

## 2022-09-01 NOTE — TELEPHONE ENCOUNTER
See letter sent to her yesterday - OK to start ozempic. Since she does not use [employer] to view results consistently, I did not want to send her a [employer].

## 2022-09-02 RX ORDER — CEPHALEXIN 250 MG/1
250 CAPSULE ORAL DAILY
Status: CANCELLED | OUTPATIENT
Start: 2022-09-02

## 2022-09-02 RX ORDER — TIZANIDINE 4 MG/1
TABLET ORAL
Qty: 30 TABLET | Refills: 1 | Status: SHIPPED | OUTPATIENT
Start: 2022-09-02 | End: 2022-09-19

## 2022-09-02 NOTE — TELEPHONE ENCOUNTER
Please notify [facility] that a daily antibiotic for UTI prevention often becomes less effective over time due to bacterial resistance to the antibiotic. So I would advise that she stop this antibiotic at this time. If recurrent UTI's become a problem again, she can follow up with her urologist to discuss a treatment plan (restart this medicine or do something different). She saw Dr Keya Martinez.

## 2022-09-02 NOTE — TELEPHONE ENCOUNTER
It is not clear to me from prior conversations and after reviewing her chart why she takes Keflex every day. Can you ask her about this prescription? Also, we have never prescribed it for her.

## 2022-09-02 NOTE — TELEPHONE ENCOUNTER
Called pt   Pt said that she thinks she takes this daily , but it comes from a different doctor   Pt said she takes it for chronic UTI's  Pt said that she could not remember the name of the doctor  Pt said that she thinks it may have been a urologist  I looked in pts chart and do not see that she has seen one   Please advise

## 2022-09-07 NOTE — TELEPHONE ENCOUNTER
Called pt and relayed message   Pt said she is going to call her urologist first and get their option as well   Advised pt to call us with any issues   Thank you

## 2022-09-08 ENCOUNTER — HOSPITAL ENCOUNTER (OUTPATIENT)
Dept: GENERAL RADIOLOGY | Age: 62
Discharge: HOME | End: 2022-09-08
Payer: COMMERCIAL

## 2022-09-08 ENCOUNTER — OFFICE VISIT (OUTPATIENT)
Dept: ORTHOPEDIC SURGERY | Age: 62
End: 2022-09-08
Payer: COMMERCIAL

## 2022-09-08 DIAGNOSIS — M16.12 ARTHRITIS OF LEFT HIP: Primary | ICD-10-CM

## 2022-09-08 DIAGNOSIS — M16.12 ARTHRITIS OF LEFT HIP: ICD-10-CM

## 2022-09-08 PROCEDURE — 2500000003 HC RX 250 WO HCPCS

## 2022-09-08 PROCEDURE — 99999 PR OFFICE/OUTPT VISIT,PROCEDURE ONLY: CPT | Performed by: ORTHOPAEDIC SURGERY

## 2022-09-08 PROCEDURE — 20610 DRAIN/INJ JOINT/BURSA W/O US: CPT

## 2022-09-08 PROCEDURE — 77002 NEEDLE LOCALIZATION BY XRAY: CPT | Performed by: ORTHOPAEDIC SURGERY

## 2022-09-08 PROCEDURE — 20610 DRAIN/INJ JOINT/BURSA W/O US: CPT | Performed by: ORTHOPAEDIC SURGERY

## 2022-09-08 PROCEDURE — 6360000002 HC RX W HCPCS

## 2022-09-16 RX ORDER — LISINOPRIL AND HYDROCHLOROTHIAZIDE 20; 12.5 MG/1; MG/1
1 TABLET ORAL DAILY
Qty: 90 TABLET | Refills: 1 | Status: SHIPPED | OUTPATIENT
Start: 2022-09-16

## 2022-09-19 DIAGNOSIS — S16.1XXD STRAIN OF NECK MUSCLE, SUBSEQUENT ENCOUNTER: ICD-10-CM

## 2022-09-19 RX ORDER — TIZANIDINE 4 MG/1
TABLET ORAL
Qty: 30 TABLET | Refills: 1 | Status: SHIPPED | OUTPATIENT
Start: 2022-09-19

## 2022-09-28 DIAGNOSIS — F33.1 MODERATE EPISODE OF RECURRENT MAJOR DEPRESSIVE DISORDER (HCC): ICD-10-CM

## 2022-09-28 RX ORDER — QUETIAPINE FUMARATE 25 MG/1
TABLET, FILM COATED ORAL
Qty: 150 TABLET | Refills: 0 | Status: SHIPPED | OUTPATIENT
Start: 2022-09-28 | End: 2022-10-27

## 2022-09-29 ENCOUNTER — OFFICE VISIT (OUTPATIENT)
Dept: FAMILY MEDICINE CLINIC | Age: 62
End: 2022-09-29
Payer: COMMERCIAL

## 2022-09-29 VITALS
OXYGEN SATURATION: 97 % | DIASTOLIC BLOOD PRESSURE: 64 MMHG | TEMPERATURE: 98.5 F | BODY MASS INDEX: 40.47 KG/M2 | RESPIRATION RATE: 16 BRPM | HEART RATE: 81 BPM | HEIGHT: 63 IN | SYSTOLIC BLOOD PRESSURE: 112 MMHG | WEIGHT: 228.4 LBS

## 2022-09-29 PROCEDURE — 3017F COLORECTAL CA SCREEN DOC REV: CPT | Performed by: FAMILY MEDICINE

## 2022-09-29 PROCEDURE — G8417 CALC BMI ABV UP PARAM F/U: HCPCS | Performed by: FAMILY MEDICINE

## 2022-09-29 PROCEDURE — 2022F DILAT RTA XM EVC RTNOPTHY: CPT | Performed by: FAMILY MEDICINE

## 2022-09-29 PROCEDURE — G8427 DOCREV CUR MEDS BY ELIG CLIN: HCPCS | Performed by: FAMILY MEDICINE

## 2022-09-29 PROCEDURE — 3052F HG A1C>EQUAL 8.0%<EQUAL 9.0%: CPT | Performed by: FAMILY MEDICINE

## 2022-09-29 PROCEDURE — 1036F TOBACCO NON-USER: CPT | Performed by: FAMILY MEDICINE

## 2022-09-29 PROCEDURE — 99214 OFFICE O/P EST MOD 30 MIN: CPT | Performed by: FAMILY MEDICINE

## 2022-09-29 RX ORDER — INSULIN GLARGINE 100 [IU]/ML
INJECTION, SOLUTION SUBCUTANEOUS
Qty: 30 ML | Refills: 5 | Status: SHIPPED | OUTPATIENT
Start: 2022-09-29

## 2022-09-29 RX ORDER — SEMAGLUTIDE 1.34 MG/ML
0.5 INJECTION, SOLUTION SUBCUTANEOUS WEEKLY
Qty: 1.5 ML | Refills: 3 | Status: SHIPPED | OUTPATIENT
Start: 2022-09-29

## 2022-09-29 RX ORDER — INSULIN GLARGINE 100 [IU]/ML
INJECTION, SOLUTION SUBCUTANEOUS
Qty: 15 ML | Refills: 5 | Status: SHIPPED | OUTPATIENT
Start: 2022-09-29 | End: 2022-09-29 | Stop reason: SDUPTHER

## 2022-09-29 NOTE — PROGRESS NOTES
100 UNIT/ML SOPN INJECT INTO SKIN WITH DINNER AS DIRECTED.<100=NONE, 100-180=10 UNITS, 181-230=15U, 231-280-20U,>281=25U Yes MD COBY Mitchell KWIKPEN 100 UNIT/ML injection pen ADMINISTER 80 UNITS UNDER THE SKIN EVERY NIGHT Yes Amelia Carranza MD   cephALEXin (KEFLEX) 250 MG capsule Take 250 mg by mouth in the morning. Yes Historical Provider, MD   venlafaxine (EFFEXOR XR) 75 MG extended release capsule TAKE 3 CAPSULES BY MOUTH DAILY Yes Amelia Carranza MD   blood glucose test strips (TRUE METRIX BLOOD GLUCOSE TEST) strip USE THREE TIMES DAILY Yes GLADIS Elder CNP   atorvastatin (LIPITOR) 80 MG tablet TAKE 1 TABLET BY MOUTH EVERY DAY Yes Amelia Carranza MD   amLODIPine (NORVASC) 5 MG tablet TAKE 1 TABLET BY MOUTH DAILY Yes Amelia Carranza MD   nitrofurantoin, macrocrystal-monohydrate, (MACROBID) 100 MG capsule Take 100 mg by mouth daily Yes Historical Provider, MD BRYANT UF III MINI PEN NEEDLES 31G X 5 MM MISC USE ON PEN THREE TIMES DAILY Yes Reg Mcdermott MD   omeprazole (PRILOSEC) 40 MG delayed release capsule Take 40 mg by mouth daily  Yes Historical Provider, MD   nortriptyline (PAMELOR) 25 MG capsule TAKE 1 TO 2 CAPSULES BY MOUTH EVERY NIGHT  Patient not taking: Reported on 9/29/2022  Amelia Carranza MD        Social History     Tobacco Use    Smoking status: Never    Smokeless tobacco: Never    Tobacco comments:     congrats   Vaping Use    Vaping Use: Never used   Substance Use Topics    Alcohol use: No    Drug use: Never       Review of Systems As above     Physical Exam  Constitutional:       General: She is not in acute distress. Appearance: Normal appearance. She is not ill-appearing. Pulmonary:      Effort: Pulmonary effort is normal.   Neurological:      General: No focal deficit present. Mental Status: She is alert and oriented to person, place, and time. Mental status is at baseline.    Psychiatric:         Mood and Affect: Mood normal.         Behavior: Behavior normal.         Thought Content: Thought content normal.         Judgment: Judgment normal.       ASSESSMENT/PLAN:    1. Uncontrolled type 2 diabetes mellitus with complication, with long-term current use of insulin  - glycemic control not to goal.  - incr ozempic to 0.5 mg weekly  - discussed and reviewed the purposes of basal and prandial insulins as well as how to give each. Specifically she needs to ONLY use aspart insulin at the time of a meal to avoid hypoglycemia.  - discussed that it is hoped that at therapeutic dose of ozempic she can achieve good control without the use of prandial insulin. - insulin glargine (BASAGLAR KWIKPEN) 100 UNIT/ML injection pen; ADMINISTER 80 UNITS UNDER THE SKIN EVERY NIGHT  Dispense: 30 mL; Refill: 5      Return in about 2 months (around 11/29/2022). An  [employer]ature was used to authenticate this note.     --Cristhian Mosqueda MD on 9/29/2022 at 3:55 PM

## 2022-10-03 ENCOUNTER — TELEPHONE (OUTPATIENT)
Dept: FAMILY MEDICINE CLINIC | Age: 62
End: 2022-10-03

## 2022-10-03 NOTE — TELEPHONE ENCOUNTER
She would like to go back to just Lisinopril. The hctz makes her have to pee all the time.   Her swelling has not been a problem

## 2022-10-03 NOTE — TELEPHONE ENCOUNTER
Hmm... she has been on this since March. Has not reported this side effect before. I guess I would be concerned that there is something else going on like a bladder infection. The hctz was doing a couple other things to help her, including reducing leg edema and balancing her potassium level. So it is a bit complicated to stop it without knowing for sure if it is the cause of her symptoms.

## 2022-10-03 NOTE — TELEPHONE ENCOUNTER
----- Message from [facility] sent at 10/3/2022  2:15 PM EDT -----  Subject: Medication Problem    Medication: lisinopril-hydroCHLOROthiazide (PRINZIDE;ZESTORETIC) 20-12.5   MG per tablet  Dosage: TAKE 1 TABLET BY MOUTH DAILY  Ordering Provider: Carmen Pineda day    Question/Problem: pt would like to know if she can go off this and go back   to her normal bp meds that don't have the water pill in it. [place] advise  Additional Information for Provider:     Pharmacy: [id number], [address] [place] [address] [phone]    ---------------------------------------------------------------------------  --------------  [id number] [employer]  [phone]; OK to leave message on voicemail  ---------------------------------------------------------------------------  --------------    SCRIPT ANSWERS  Relationship to Patient: Self

## 2022-10-07 ENCOUNTER — OFFICE VISIT (OUTPATIENT)
Dept: FAMILY MEDICINE CLINIC | Age: 62
End: 2022-10-07
Payer: COMMERCIAL

## 2022-10-07 VITALS
WEIGHT: 229 LBS | SYSTOLIC BLOOD PRESSURE: 110 MMHG | RESPIRATION RATE: 12 BRPM | OXYGEN SATURATION: 95 % | TEMPERATURE: 98.5 F | DIASTOLIC BLOOD PRESSURE: 64 MMHG | BODY MASS INDEX: 40.57 KG/M2 | HEART RATE: 94 BPM

## 2022-10-07 DIAGNOSIS — K31.84 DIABETIC GASTROPARESIS (HCC): ICD-10-CM

## 2022-10-07 DIAGNOSIS — R10.84 GENERALIZED ABDOMINAL PAIN: ICD-10-CM

## 2022-10-07 DIAGNOSIS — R35.0 URINARY FREQUENCY: Primary | ICD-10-CM

## 2022-10-07 DIAGNOSIS — E11.43 DIABETIC GASTROPARESIS (HCC): ICD-10-CM

## 2022-10-07 LAB
BILIRUBIN, POC: ABNORMAL
BLOOD URINE, POC: ABNORMAL
CLARITY, POC: CLEAR
COLOR, POC: YELLOW
GLUCOSE URINE, POC: ABNORMAL
KETONES, POC: ABNORMAL
LEUKOCYTE EST, POC: ABNORMAL
NITRITE, POC: ABNORMAL
PH, POC: 5
PROTEIN, POC: ABNORMAL
SPECIFIC GRAVITY, POC: 1.03
UROBILINOGEN, POC: 0.2

## 2022-10-07 PROCEDURE — 2022F DILAT RTA XM EVC RTNOPTHY: CPT | Performed by: FAMILY MEDICINE

## 2022-10-07 PROCEDURE — G8482 FLU IMMUNIZE ORDER/ADMIN: HCPCS | Performed by: FAMILY MEDICINE

## 2022-10-07 PROCEDURE — 1036F TOBACCO NON-USER: CPT | Performed by: FAMILY MEDICINE

## 2022-10-07 PROCEDURE — 99214 OFFICE O/P EST MOD 30 MIN: CPT | Performed by: FAMILY MEDICINE

## 2022-10-07 PROCEDURE — 3017F COLORECTAL CA SCREEN DOC REV: CPT | Performed by: FAMILY MEDICINE

## 2022-10-07 PROCEDURE — G8417 CALC BMI ABV UP PARAM F/U: HCPCS | Performed by: FAMILY MEDICINE

## 2022-10-07 PROCEDURE — G0008 ADMIN INFLUENZA VIRUS VAC: HCPCS | Performed by: FAMILY MEDICINE

## 2022-10-07 PROCEDURE — 3052F HG A1C>EQUAL 8.0%<EQUAL 9.0%: CPT | Performed by: FAMILY MEDICINE

## 2022-10-07 PROCEDURE — G8427 DOCREV CUR MEDS BY ELIG CLIN: HCPCS | Performed by: FAMILY MEDICINE

## 2022-10-07 PROCEDURE — 90674 CCIIV4 VAC NO PRSV 0.5 ML IM: CPT | Performed by: FAMILY MEDICINE

## 2022-10-07 PROCEDURE — 81002 URINALYSIS NONAUTO W/O SCOPE: CPT | Performed by: FAMILY MEDICINE

## 2022-10-07 RX ORDER — METOCLOPRAMIDE 5 MG/1
5 TABLET ORAL
Qty: 30 TABLET | Refills: 3 | Status: SHIPPED | OUTPATIENT
Start: 2022-10-07

## 2022-10-07 NOTE — PROGRESS NOTES
10/7/2022    Blood pressure 110/64, pulse 94, temperature 98.5 °F (36.9 °C), temperature source Oral, resp. rate 12, weight 229 lb (103.9 kg), SpO2 95 %, not currently breastfeeding. Thelma Irvin (:  1960) is a 60 y.o. female, here for evaluation of the following medical concerns:    Chief Complaint   Patient presents with    Urinary Frequency     Here with  for urine symptoms. Urine symptoms began about a week ago. Feeling pressure in lower pelvis, frequency, urgency. No dysuria  No flank pain  No f/c  She sees DR Danish Montanez for CKD 3 and recurrent uti; she has been on macrobid for prevention. This has been more or less effective. Her symptoms have worsened over the week. Her ua in the office shows no blood or nitrites or wbc's. Does have proteinuria. Also is bothered by nausea and bloating with eating. Has known diabetic gastroparesis and reflux. She has been on ozempic for the past few months for DM-2. She does not feel her nausea has worsened since starting it. Gastroparesis dx made by DR Rosalva Can. Had emptying study years ago. She is still taking omeprazole 40 mg, but only once a day. Stools every 1-2 days, not hard to pass. Patient Active Problem List   Diagnosis    DM (diabetes mellitus), type 2, uncontrolled with complications    Diabetic gastroparesis (HCC)    Allergic rhinitis    Insomnia    Essential hypertension    Headaches due to old head injury    Nephropathy, diabetic ([address]    Migraine    Depression, major    Right knee DJD    Calcific Achilles tendonitis, right    Right sided sciatica    Lumbar spinal stenosis    TMJ (temporomandibular joint disorder)    Urge incontinence of urine    Personal history of breast cancer    Hyperlipidemia LDL goal <845    Metallic taste    Polyarthralgia    Severe obesity (BMI 35.0-39. 9) with comorbidity (HCC)    Neck pain    Plantar fasciitis of right foot    Anxiety    History of colon polyps    Irritable bowel syndrome with both constipation and diarrhea    Trigger finger, acquired    Bilateral edema of lower extremity    Achilles tendinitis of both lower extremities    Arthritis of left hip    Lumbar facet arthropathy    DDD (degenerative disc disease), lumbar        Body mass index is 40.57 kg/m². Wt Readings from Last 3 Encounters:   10/07/22 229 lb (103.9 kg)   09/29/22 228 lb 6.4 oz (103.6 kg)   08/18/22 225 lb (102.1 kg)       BP Readings from Last 3 Encounters:   10/07/22 110/64   09/29/22 112/64   08/18/22 130/70       Allergies   Allergen Reactions    Trazodone And Nefazodone Other (See Comments)     headache    Cipro [Ciprofloxacin Hcl] Other (See Comments)     Makes her jittery. Ambien [Zolpidem Tartrate] Other (See Comments)     Vivid dreams    Diamox [Acetazolamide] Rash    Lorazepam Other (See Comments)     confusion    Naproxen Rash    Reglan [Metoclopramide] Anxiety    Sulfa Antibiotics Rash     Also causes shaking       Prior to Visit Medications    Medication Sig Taking? Authorizing Provider   Semaglutide,0.25 or 0.5MG/DOS, (OZEMPIC, 0.25 OR 0.5 MG/DOSE,) 2 MG/1.5ML SOPN Inject 0.5 mg into the skin once a week Yes Greta Ruiz MD   insulin glargine (BASAGLAR KWIKPEN) 100 UNIT/ML injection pen ADMINISTER 80 UNITS UNDER THE SKIN EVERY NIGHT Yes Greta Ruiz MD   QUEtiapine (SEROQUEL) 25 MG tablet TAKE 1 TABLET BY MOUTH EVERY MORNING AND 4 TABLETS EVERY EVENING Yes Greta Ruiz MD   tiZANidine (ZANAFLEX) 4 MG tablet TAKE 1 TABLET BY MOUTH EVERY 8 HOURS AS NEEDED FOR MUSCLE SPASM Yes Greta Ruiz MD   lisinopril-hydroCHLOROthiazide (PRINZIDE;ZESTORETIC) 20-12.5 MG per tablet TAKE 1 TABLET BY MOUTH DAILY Yes Malvin Harper MD   gabapentin (NEURONTIN) 600 MG tablet TAKE 2 tabs at bedtime.  Yes Greta Ruiz MD   metoprolol succinate (TOPROL XL) 50 MG extended release tablet TAKE 1 TABLET BY MOUTH EVERY DAY Yes Greta Ruiz MD   Insulin Aspart FlexPen 100 UNIT/ML SOPN INJECT INTO SKIN WITH DINNER AS DIRECTED.<100=NONE, 100-180=10 UNITS, 181-230=15U, 231-280-20U,>281=25U Yes Jose Francisco Manzanares MD   venlafaxine (EFFEXOR XR) 75 MG extended release capsule TAKE 3 CAPSULES BY MOUTH DAILY Yes Jose Francisco Manzanares MD   blood glucose test strips (TRUE METRIX BLOOD GLUCOSE TEST) strip USE THREE TIMES DAILY Yes Emile Fuller APRN - CNP   atorvastatin (LIPITOR) 80 MG tablet TAKE 1 TABLET BY MOUTH EVERY DAY Yes Jose Francisco Manzanares MD   amLODIPine (NORVASC) 5 MG tablet TAKE 1 TABLET BY MOUTH DAILY Yes Jose Francisco Manzanares MD   nitrofurantoin, macrocrystal-monohydrate, (MACROBID) 100 MG capsule Take 100 mg by mouth daily Yes Historical Provider, MD BRYANT UF III MINI PEN NEEDLES 31G X 5 MM MISC USE ON PEN THREE TIMES DAILY Yes Livia Ruiz MD   omeprazole (PRILOSEC) 40 MG delayed release capsule Take 40 mg by mouth daily  Yes Historical Provider, MD   nortriptyline (PAMELOR) 25 MG capsule TAKE 1 TO 2 CAPSULES BY MOUTH EVERY NIGHT  Patient not taking: No sig reported  Jose Francisco Manzanares MD        Social History     Tobacco Use    Smoking status: Never    Smokeless tobacco: Never    Tobacco comments:     congrats   Vaping Use    Vaping Use: Never used   Substance Use Topics    Alcohol use: No    Drug use: Never       Review of Systems As above     Physical Exam  Constitutional:       General: She is not in acute distress. Appearance: Normal appearance. She is not ill-appearing. Cardiovascular:      Rate and Rhythm: Normal rate and regular rhythm. Pulmonary:      Effort: Pulmonary effort is normal. No respiratory distress. Breath sounds: Normal breath sounds. No wheezing. Abdominal:      General: There is no distension. Palpations: Abdomen is soft. Tenderness: There is abdominal tenderness (left sided). Neurological:      General: No focal deficit present. Mental Status: She is alert and oriented to person, place, and time.  Mental status is at baseline. Psychiatric:         Mood and Affect: Mood normal.         Behavior: Behavior normal.         Thought Content: Thought content normal.         Judgment: Judgment normal.       ASSESSMENT/PLAN:    1. Urinary frequency  - ua is neg for infection; check culture also. - POCT Urinalysis no Micro  - Culture, Urine    2. Diabetic gastroparesis ([address]  - try reglan 5 mg before meals (this med is in her allergy list as causing anxiety, but that was a long time ago and we will try the 5 mg dose). She has hx ibs also, which can also cause postprandial bloating. 3. Generalized abdominal pain  - has hx of ibs and exam is suggestive of stool in descending colon. try probiotics/metamucil      Return in about 2 weeks (around 10/21/2022) for fu abd pain. nausea. An  [employer]ature was used to authenticate this note.     --Candelaria Gonzalez MD on 10/7/2022 at 12:25 PM

## 2022-10-07 NOTE — Clinical Note
Metamucil (or generic psyllium husk caps) 3-4 caps daily with full glass of water. Daily probiotic, such as Align or Culturelle for at least a month- longer if needed or desired.

## 2022-10-09 LAB — URINE CULTURE, ROUTINE: NORMAL

## 2022-10-27 DIAGNOSIS — F33.1 MODERATE EPISODE OF RECURRENT MAJOR DEPRESSIVE DISORDER (HCC): ICD-10-CM

## 2022-10-27 RX ORDER — QUETIAPINE FUMARATE 25 MG/1
TABLET, FILM COATED ORAL
Qty: 150 TABLET | Refills: 3 | Status: SHIPPED | OUTPATIENT
Start: 2022-10-27

## 2022-11-10 ENCOUNTER — OFFICE VISIT (OUTPATIENT)
Dept: ORTHOPEDIC SURGERY | Age: 62
End: 2022-11-10
Payer: COMMERCIAL

## 2022-11-10 VITALS — WEIGHT: 230 LBS | BODY MASS INDEX: 40.75 KG/M2 | HEIGHT: 63 IN

## 2022-11-10 DIAGNOSIS — M51.36 DDD (DEGENERATIVE DISC DISEASE), LUMBAR: Primary | ICD-10-CM

## 2022-11-10 DIAGNOSIS — M16.12 ARTHRITIS OF LEFT HIP: ICD-10-CM

## 2022-11-10 PROCEDURE — G8417 CALC BMI ABV UP PARAM F/U: HCPCS | Performed by: ORTHOPAEDIC SURGERY

## 2022-11-10 PROCEDURE — G8482 FLU IMMUNIZE ORDER/ADMIN: HCPCS | Performed by: ORTHOPAEDIC SURGERY

## 2022-11-10 PROCEDURE — 99213 OFFICE O/P EST LOW 20 MIN: CPT | Performed by: ORTHOPAEDIC SURGERY

## 2022-11-10 PROCEDURE — G8427 DOCREV CUR MEDS BY ELIG CLIN: HCPCS | Performed by: ORTHOPAEDIC SURGERY

## 2022-11-10 PROCEDURE — 1036F TOBACCO NON-USER: CPT | Performed by: ORTHOPAEDIC SURGERY

## 2022-11-10 PROCEDURE — 3017F COLORECTAL CA SCREEN DOC REV: CPT | Performed by: ORTHOPAEDIC SURGERY

## 2022-11-10 NOTE — PROGRESS NOTES
[facility]  Office Visit    Chief Complaint: Left hip pain    HPI:  Maribel Jj is a 60 y.o. who is here in follow-up of left hip pain. She underwent left hip steroid injection on September 8, 2022. She reports a 1 to 2-day duration of relief of symptoms. The pain is generally in her lateral and anterior hip. She has a history of low back pain and has seen specialist in the past for this. She reports the pain is worse with walking. She does not have issues getting into or out of cars or putting on socks, shoes, pants. She rates the pain as 7/10. Patient Active Problem List   Diagnosis    DM (diabetes mellitus), type 2, uncontrolled with complications    Diabetic gastroparesis (HCC)    Allergic rhinitis    Insomnia    Essential hypertension    Headaches due to old head injury    Nephropathy, diabetic ([address]    Migraine    Depression, major    Right knee DJD    Calcific Achilles tendonitis, right    Right sided sciatica    Lumbar spinal stenosis    TMJ (temporomandibular joint disorder)    Urge incontinence of urine    Personal history of breast cancer    Hyperlipidemia LDL goal <633    Metallic taste    Polyarthralgia    Severe obesity (BMI 35.0-39. 9) with comorbidity (HCC)    Neck pain    Plantar fasciitis of right foot    Anxiety    History of colon polyps    Irritable bowel syndrome with both constipation and diarrhea    Trigger finger, acquired    Bilateral edema of lower extremity    Achilles tendinitis of both lower extremities    Arthritis of left hip    Lumbar facet arthropathy    DDD (degenerative disc disease), lumbar       ROS:  Constitutional: denies fever, chills, weight loss  MSK: denies pain in other joints, muscle aches  Neurological: denies numbness, tingling, weakness    Exam:  Height 5' 3\" (1.6 m), weight 230 lb (104.3 kg)    Appearance: sitting in exam room chair, appears to be in no acute distress, awake and alert  Resp: unlabored breathing on room air  Skin: warm, dry and intact with out erythema or significant increased temperature  Neuro: grossly intact both lower extremities. Intact sensation to light touch. Motor exam 4+ to 5/5 in all major motor groups. LLE: Examination demonstrates negative logroll and mild pain with Stinchfield. There is brisk capillary refill. Strength is 5/5 in hamstrings, quads, hip flexors. Imaging:  Prior left hip radiographs reviewed. She has generally maintained joint spaces with a small femoral head osteophytes. Prior lumbar spine radiographs also reviewed and significant for lumbar spine degenerative disc disease. Overall alignment is maintained. Assessment:  Mild left hip osteoarthritis  Lumbar spine degenerative disc disease    Plan:  We discussed the diagnosis and treatment options. She had very minimal relief of symptoms from her left hip injection 2 months ago. I would hesitate to treat her hip further without first further investigating her lumbar spine as a source of her pain. I referred her to Dr. Gretchen Hernandez for further work-up and management with possible injections in her low back to help with her pain. She may return here if these injections are not helpful and she continues to have left hip pain. Total time spent on today's encounter was at least 24 minutes. This time included reviewing prior notes, radiographs, and lab results when available, reviewing history obtained by medical assistant, performing history and physical exam, reviewing tests/radiographs with the patient, counseling the patient, ordering medications or tests, documentation in the electronic health record, and coordination of care. This dictation was done with Dragon dictation and may contain mechanical errors related to translation.

## 2022-11-15 ENCOUNTER — TELEPHONE (OUTPATIENT)
Dept: ORTHOPEDIC SURGERY | Age: 62
End: 2022-11-15

## 2022-11-15 NOTE — TELEPHONE ENCOUNTER
General Question     Subject: PATIENT IS REQUESTING ALTERNATIVE PAIN MANAGEMENT HER INSURANCE DOES NOT COVER THE DOCTOR SHE WAS PROVIDED. PLEASE ADVISE.    Patient Brett Gray  Contact Number: [phone]

## 2022-11-15 NOTE — TELEPHONE ENCOUNTER
Spoke with the patient and told her to call the number on the back of her insurance card and find out who is covered under her insurance. She had already done this, once  Was a Endocrinologist and the other I was unable to find. She is going to call her insurance back and then call us.

## 2022-11-26 DIAGNOSIS — E78.5 HYPERLIPIDEMIA LDL GOAL <100: ICD-10-CM

## 2022-11-26 DIAGNOSIS — I10 ESSENTIAL HYPERTENSION: ICD-10-CM

## 2022-11-26 DIAGNOSIS — S16.1XXD STRAIN OF NECK MUSCLE, SUBSEQUENT ENCOUNTER: ICD-10-CM

## 2022-11-28 RX ORDER — METOPROLOL SUCCINATE 50 MG/1
TABLET, EXTENDED RELEASE ORAL
Qty: 90 TABLET | Refills: 0 | Status: SHIPPED | OUTPATIENT
Start: 2022-11-28

## 2022-11-28 RX ORDER — ATORVASTATIN CALCIUM 80 MG/1
TABLET, FILM COATED ORAL
Qty: 90 TABLET | Refills: 1 | Status: SHIPPED | OUTPATIENT
Start: 2022-11-28

## 2022-11-28 RX ORDER — NORTRIPTYLINE HYDROCHLORIDE 25 MG/1
CAPSULE ORAL
Qty: 180 CAPSULE | Refills: 0 | OUTPATIENT
Start: 2022-11-28

## 2022-11-28 RX ORDER — TIZANIDINE 4 MG/1
TABLET ORAL
Qty: 30 TABLET | Refills: 1 | Status: SHIPPED | OUTPATIENT
Start: 2022-11-28 | End: 2022-12-14

## 2022-11-28 RX ORDER — AMLODIPINE BESYLATE 5 MG/1
TABLET ORAL
Qty: 90 TABLET | Refills: 1 | Status: SHIPPED | OUTPATIENT
Start: 2022-11-28

## 2022-11-28 RX ORDER — TOLTERODINE 4 MG/1
CAPSULE, EXTENDED RELEASE ORAL
Qty: 90 CAPSULE | Refills: 0 | OUTPATIENT
Start: 2022-11-28

## 2022-11-29 ENCOUNTER — OFFICE VISIT (OUTPATIENT)
Dept: FAMILY MEDICINE CLINIC | Age: 62
End: 2022-11-29
Payer: COMMERCIAL

## 2022-11-29 VITALS
OXYGEN SATURATION: 96 % | BODY MASS INDEX: 40.75 KG/M2 | HEIGHT: 63 IN | RESPIRATION RATE: 16 BRPM | HEART RATE: 86 BPM | SYSTOLIC BLOOD PRESSURE: 160 MMHG | DIASTOLIC BLOOD PRESSURE: 96 MMHG | WEIGHT: 230 LBS

## 2022-11-29 DIAGNOSIS — R10.9 CHRONIC ABDOMINAL PAIN: ICD-10-CM

## 2022-11-29 DIAGNOSIS — E11.43 DIABETIC GASTROPARESIS (HCC): ICD-10-CM

## 2022-11-29 DIAGNOSIS — G89.29 CHRONIC ABDOMINAL PAIN: ICD-10-CM

## 2022-11-29 DIAGNOSIS — M51.36 DDD (DEGENERATIVE DISC DISEASE), LUMBAR: ICD-10-CM

## 2022-11-29 DIAGNOSIS — I10 ESSENTIAL HYPERTENSION: ICD-10-CM

## 2022-11-29 DIAGNOSIS — E11.9 TYPE 2 DIABETES MELLITUS WITHOUT COMPLICATION, WITH LONG-TERM CURRENT USE OF INSULIN (HCC): Primary | ICD-10-CM

## 2022-11-29 DIAGNOSIS — Z79.4 TYPE 2 DIABETES MELLITUS WITHOUT COMPLICATION, WITH LONG-TERM CURRENT USE OF INSULIN (HCC): Primary | ICD-10-CM

## 2022-11-29 DIAGNOSIS — K31.84 DIABETIC GASTROPARESIS (HCC): ICD-10-CM

## 2022-11-29 LAB — HBA1C MFR BLD: 7.8 %

## 2022-11-29 PROCEDURE — 3078F DIAST BP <80 MM HG: CPT | Performed by: FAMILY MEDICINE

## 2022-11-29 PROCEDURE — 3074F SYST BP LT 130 MM HG: CPT | Performed by: FAMILY MEDICINE

## 2022-11-29 PROCEDURE — 2022F DILAT RTA XM EVC RTNOPTHY: CPT | Performed by: FAMILY MEDICINE

## 2022-11-29 PROCEDURE — G8427 DOCREV CUR MEDS BY ELIG CLIN: HCPCS | Performed by: FAMILY MEDICINE

## 2022-11-29 PROCEDURE — 3017F COLORECTAL CA SCREEN DOC REV: CPT | Performed by: FAMILY MEDICINE

## 2022-11-29 PROCEDURE — G8417 CALC BMI ABV UP PARAM F/U: HCPCS | Performed by: FAMILY MEDICINE

## 2022-11-29 PROCEDURE — 99214 OFFICE O/P EST MOD 30 MIN: CPT | Performed by: FAMILY MEDICINE

## 2022-11-29 PROCEDURE — 83036 HEMOGLOBIN GLYCOSYLATED A1C: CPT | Performed by: FAMILY MEDICINE

## 2022-11-29 PROCEDURE — G8482 FLU IMMUNIZE ORDER/ADMIN: HCPCS | Performed by: FAMILY MEDICINE

## 2022-11-29 PROCEDURE — 3051F HG A1C>EQUAL 7.0%<8.0%: CPT | Performed by: FAMILY MEDICINE

## 2022-11-29 PROCEDURE — 1036F TOBACCO NON-USER: CPT | Performed by: FAMILY MEDICINE

## 2022-11-29 RX ORDER — TOLTERODINE 4 MG/1
CAPSULE, EXTENDED RELEASE ORAL
Qty: 90 CAPSULE | Refills: 1 | Status: SHIPPED | OUTPATIENT
Start: 2022-11-29

## 2022-11-29 RX ORDER — POLYETHYLENE GLYCOL 3350 17 G/17G
17 POWDER, FOR SOLUTION ORAL DAILY
Qty: 510 G | Refills: 5 | Status: SHIPPED | OUTPATIENT
Start: 2022-11-29 | End: 2022-12-29

## 2022-11-29 RX ORDER — ONDANSETRON 4 MG/1
4 TABLET, ORALLY DISINTEGRATING ORAL 3 TIMES DAILY PRN
Qty: 30 TABLET | Refills: 3 | Status: SHIPPED | OUTPATIENT
Start: 2022-11-29

## 2022-11-29 RX ORDER — METOCLOPRAMIDE 10 MG/1
10 TABLET ORAL
Qty: 90 TABLET | Refills: 3 | Status: SHIPPED | OUTPATIENT
Start: 2022-11-29

## 2022-11-29 NOTE — PATIENT INSTRUCTIONS
Increase ozempic dose to 1 mg once you run out of your current supply of 0.5 mg pens. Once you get to 1 mg of ozempic, you can try stopping mealtime insulin. Continue Basaglar 80    Increase REGLAN (metoclopramide) to 10mg, 20-30 min prior to meals.

## 2022-11-29 NOTE — PROGRESS NOTES
2022    Blood pressure (!) 160/96, pulse 86, resp. rate 16, height 5' 3\" (1.6 m), weight 230 lb (104.3 kg), SpO2 96 %, not currently breastfeeding. Evelyn Guerrier (:  1960) is a 60 y.o. female, here for evaluation of the following medical concerns:    Chief Complaint   Patient presents with    Diabetes     Follow-up visit. Checking sugars at home they have been running normal. Has questions regarding Nortipttyline and Toltefodine. Brian Carter thinks her glycemic control is improving. Now on ozempic 0.5    DM meds: basaglar 80, aspart 20 (SS) (but is not using aspart much anymore)  ozempic 0.5. AM sugars are usually under 130. Tests before meals: doesn't have sugar log book, but says it is normally 120-140 before meals. But use of insulin with meals is not consistent. She snacks on ice cream often-  does not test or treat then usually     Has hx gastroparesis, thinks reglan has helped a little. But has a lot of nausea and pain with eating. Does not feel it has worsened since starting Ozempic  Ran out of reglan and has not used it recently. Feels bloated. Worse with large meals. Planning to see Dr Lola Maher soon for further eval.  Remains on prilosec 40 mg QD  Has used zofran for nausea and has noted some benefit from that. A1c 7.8 today    Lab Results   Component Value Date/Time    LABA1C 8.5 2022 12:45 PM    LABA1C 8.0 2022 03:57 PM    LABA1C 9.4 2021 11:01 AM      Sleeping OK on Seroquel. She did not take bp meds yet today, but normally takes every morning  Toprol 50, norvasc 5, prinzide 20/12.5    She says she is still taking tolterodine daily for OAB/incontinence. But not pamelor.     Patient Active Problem List   Diagnosis    DM (diabetes mellitus), type 2, uncontrolled with complications    Diabetic gastroparesis (HCC)    Allergic rhinitis    Insomnia    Essential hypertension    Headaches due to old head injury    Nephropathy, diabetic ([address]    Migraine Depression, major    Right knee DJD    Calcific Achilles tendonitis, right    Right sided sciatica    Lumbar spinal stenosis    TMJ (temporomandibular joint disorder)    Urge incontinence of urine    Personal history of breast cancer    Hyperlipidemia LDL goal <016    Metallic taste    Polyarthralgia    Severe obesity (BMI 35.0-39. 9) with comorbidity (HCC)    Neck pain    Plantar fasciitis of right foot    Anxiety    History of colon polyps    Irritable bowel syndrome with both constipation and diarrhea    Trigger finger, acquired    Bilateral edema of lower extremity    Achilles tendinitis of both lower extremities    Arthritis of left hip    Lumbar facet arthropathy    DDD (degenerative disc disease), lumbar        Body mass index is 40.74 kg/m². Wt Readings from Last 3 Encounters:   11/29/22 230 lb (104.3 kg)   11/10/22 230 lb (104.3 kg)   10/07/22 229 lb (103.9 kg)       BP Readings from Last 3 Encounters:   11/29/22 (!) 160/96   10/07/22 110/64   09/29/22 112/64       Allergies   Allergen Reactions    Trazodone And Nefazodone Other (See Comments)     headache    Cipro [Ciprofloxacin Hcl] Other (See Comments)     Makes her jittery. Ambien [Zolpidem Tartrate] Other (See Comments)     Vivid dreams    Diamox [Acetazolamide] Rash    Lorazepam Other (See Comments)     confusion    Naproxen Rash    Reglan [Metoclopramide] Anxiety    Sulfa Antibiotics Rash     Also causes shaking       Prior to Visit Medications    Medication Sig Taking?  Authorizing Provider   atorvastatin (LIPITOR) 80 MG tablet TAKE 1 TABLET BY MOUTH EVERY DAY Yes Jj Rogel MD   metoprolol succinate (TOPROL XL) 50 MG extended release tablet TAKE 1 TABLET BY MOUTH EVERY DAY Yes Jj Rogel MD   amLODIPine (NORVASC) 5 MG tablet TAKE 1 TABLET BY MOUTH DAILY Yes Jj Rogel MD   tiZANidine (ZANAFLEX) 4 MG tablet TAKE 1 TABLET BY MOUTH EVERY 8 HOURS AS NEEDED FOR MUSCLE SPASM Yes Jj Rogel MD   QUEtiapine (SEROQUEL) 25 MG tablet TAKE 1 TABLET BY MOUTH EVERY MORNING AND 4 EVERY EVENING Yes Uyen Vogel MD   metoclopramide (REGLAN) 5 MG tablet Take 1 tablet by mouth 3 times daily (before meals) Yes Uyen Vogel MD   Semaglutide,0.25 or 0.5MG/DOS, (OZEMPIC, 0.25 OR 0.5 MG/DOSE,) 2 MG/1.5ML SOPN Inject 0.5 mg into the skin once a week Yes Uyen Vogel MD   insulin glargine (BASAGLAR KWIKPEN) 100 UNIT/ML injection pen ADMINISTER 80 UNITS UNDER THE SKIN EVERY NIGHT Yes Uyen Vogel MD   lisinopril-hydroCHLOROthiazide (PRINZIDE;ZESTORETIC) 20-12.5 MG per tablet TAKE 1 TABLET BY MOUTH DAILY Yes Alex Harper MD   gabapentin (NEURONTIN) 600 MG tablet TAKE 2 tabs at bedtime. Yes Uyen Vogel MD   Insulin Aspart FlexPen 100 UNIT/ML SOPN INJECT INTO SKIN WITH DINNER AS DIRECTED.<100=NONE, 100-180=10 UNITS, 181-230=15U, 231-280-20U,>281=25U Yes Uyen Vogel MD   venlafaxine (EFFEXOR XR) 75 MG extended release capsule TAKE 3 CAPSULES BY MOUTH DAILY Yes Uyen Vogel MD   blood glucose test strips (TRUE METRIX BLOOD GLUCOSE TEST) strip USE THREE TIMES DAILY Yes GLADIS Griggs - CNP   nitrofurantoin, macrocrystal-monohydrate, (MACROBID) 100 MG capsule Take 100 mg by mouth daily Yes Historical Provider, MD   BJUANI UF III MINI PEN NEEDLES 31G X 5 MM MISC USE ON PEN THREE TIMES DAILY Yes Maliha aGndara MD   omeprazole (PRILOSEC) 40 MG delayed release capsule Take 40 mg by mouth daily  Yes Historical Provider, MD        Social History     Tobacco Use    Smoking status: Never    Smokeless tobacco: Never    Tobacco comments:     congrats   Vaping Use    Vaping Use: Never used   Substance Use Topics    Alcohol use: No    Drug use: Never       Review of Systems    Physical Exam  Vitals and nursing note reviewed. Constitutional:       Appearance: Normal appearance. She is well-developed. Neck:      Thyroid: No thyromegaly.    Cardiovascular:      Rate and Rhythm: Normal rate and regular rhythm. Pulses: Normal pulses. Heart sounds: Normal heart sounds. No murmur heard. Comments: Repeat bp 156/80  Pulmonary:      Effort: Pulmonary effort is normal. No respiratory distress. Breath sounds: Normal breath sounds. No wheezing or rales. Abdominal:      General: Abdomen is flat. Bowel sounds are normal.      Palpations: Abdomen is soft. Tenderness: There is abdominal tenderness (mild diffuse tenderness. ). Musculoskeletal:         General: Normal range of motion. Cervical back: Normal range of motion. Skin:     General: Skin is warm and dry. Neurological:      General: No focal deficit present. Mental Status: She is alert and oriented to person, place, and time. Mental status is at baseline. Psychiatric:         Mood and Affect: Mood normal.         Behavior: Behavior normal.         Thought Content: Thought content normal.         Judgment: Judgment normal.       ASSESSMENT/PLAN:    1. Type 2 diabetes mellitus without complication, with long-term current use of insulin (HCC)  - a1c is improving! Pt and  congratulated on efforts. - plan to increase ozempic to 1 mg (once they are done with 0.5 mg pens) and try stopping meal-time insulin. - continue basaglar 80  - continue efforts to control carb intake and snacks. - continue to test glucose at least 2x a day. - POCT glycosylated hemoglobin (Hb A1C)    2. Diabetic gastroparesis ([address]  - hopefully increasing ozempic does not worsen her symptoms  - I am suspicious that she is having more problems from constipation than gastroparesis,  but will increase reglan to 10 mg as a trial.  Prn zofran if needed for postprandial nausea. 3. Essential hypertension  - bp high (retest also high). Likely from missing meds today. (Usually well controlled). No med adjustment at this visit, but continue to monitor closely. 4. Chronic abdominal pain  - check labs. Likely IBS-C.   Restart miralax and take daily to produce soft stool at least every other day. - Comprehensive Metabolic Panel; Future  - CBC with Auto Differential; Future  - Lipase; Future    5. DDD (degenerative disc disease), lumbar  - her ortho wanted her to see PMR/pain, but DR Becky Snider not in her network. Referred instead to dr Kiara Peña MD, Physical Medicine and Rehabilitation, [facility]      Return in about 3 months (around 2/28/2023) for follow up diabetes, follow up HTN. An  Watch-Sitesignature was used to authenticate this note.     --Greta Ruiz MD on 11/29/2022 at 4:05 PM

## 2022-12-14 DIAGNOSIS — S16.1XXD STRAIN OF NECK MUSCLE, SUBSEQUENT ENCOUNTER: ICD-10-CM

## 2022-12-14 RX ORDER — TIZANIDINE 4 MG/1
TABLET ORAL
Qty: 30 TABLET | Refills: 1 | Status: SHIPPED | OUTPATIENT
Start: 2022-12-14

## 2022-12-28 DIAGNOSIS — M25.50 POLYARTHRALGIA: ICD-10-CM

## 2022-12-29 ENCOUNTER — TELEPHONE (OUTPATIENT)
Dept: ORTHOPEDIC SURGERY | Age: 62
End: 2022-12-29

## 2022-12-29 DIAGNOSIS — M51.36 DDD (DEGENERATIVE DISC DISEASE), LUMBAR: Primary | ICD-10-CM

## 2022-12-29 RX ORDER — GABAPENTIN 600 MG/1
TABLET ORAL
Qty: 180 TABLET | Refills: 1 | OUTPATIENT
Start: 2022-12-29

## 2022-12-29 RX ORDER — GABAPENTIN 600 MG/1
TABLET ORAL
Qty: 180 TABLET | Refills: 1 | Status: SHIPPED | OUTPATIENT
Start: 2022-12-29 | End: 2022-12-30 | Stop reason: DRUGHIGH

## 2022-12-29 NOTE — TELEPHONE ENCOUNTER
General Question     Subject: NEEDS A DIFFERENT DR REFERRAL  Patient and /or Facility Request: Sheng Bell  Contact Number: [phone]      DR Lianet Owusu REFERRED THE PT TO SEE DR Osorio Shown, BUT HER INSURANCE DOESN'T COVER HIM, SO SHE WOULD LIKE DR Lianet Owusu TO REFER HER TO SOMEONE ELSE.   SHE CAN BE REACHED AT THE ABOVE PHONE #

## 2022-12-30 ENCOUNTER — OFFICE VISIT (OUTPATIENT)
Dept: FAMILY MEDICINE CLINIC | Age: 62
End: 2022-12-30
Payer: COMMERCIAL

## 2022-12-30 VITALS
DIASTOLIC BLOOD PRESSURE: 80 MMHG | RESPIRATION RATE: 14 BRPM | HEART RATE: 88 BPM | BODY MASS INDEX: 41.1 KG/M2 | WEIGHT: 232 LBS | OXYGEN SATURATION: 93 % | SYSTOLIC BLOOD PRESSURE: 130 MMHG

## 2022-12-30 DIAGNOSIS — N18.30 CKD STAGE 3 DUE TO TYPE 2 DIABETES MELLITUS (HCC): ICD-10-CM

## 2022-12-30 DIAGNOSIS — M79.7 FIBROMYALGIA: ICD-10-CM

## 2022-12-30 DIAGNOSIS — R80.9 TYPE 2 DIABETES MELLITUS WITH MICROALBUMINURIA, WITH LONG-TERM CURRENT USE OF INSULIN (HCC): Primary | ICD-10-CM

## 2022-12-30 DIAGNOSIS — E11.43 DIABETIC GASTROPARESIS (HCC): ICD-10-CM

## 2022-12-30 DIAGNOSIS — G89.29 CHRONIC ABDOMINAL PAIN: ICD-10-CM

## 2022-12-30 DIAGNOSIS — E11.22 CKD STAGE 3 DUE TO TYPE 2 DIABETES MELLITUS (HCC): ICD-10-CM

## 2022-12-30 DIAGNOSIS — K31.84 DIABETIC GASTROPARESIS (HCC): ICD-10-CM

## 2022-12-30 DIAGNOSIS — E11.29 TYPE 2 DIABETES MELLITUS WITH MICROALBUMINURIA, WITH LONG-TERM CURRENT USE OF INSULIN (HCC): Primary | ICD-10-CM

## 2022-12-30 DIAGNOSIS — Z79.4 TYPE 2 DIABETES MELLITUS WITH MICROALBUMINURIA, WITH LONG-TERM CURRENT USE OF INSULIN (HCC): Primary | ICD-10-CM

## 2022-12-30 DIAGNOSIS — R10.9 CHRONIC ABDOMINAL PAIN: ICD-10-CM

## 2022-12-30 LAB
A/G RATIO: 1.2 (ref 1.1–2.2)
ALBUMIN SERPL-MCNC: 3.7 G/DL (ref 3.4–5)
ALP BLD-CCNC: 106 U/L (ref 40–129)
ALT SERPL-CCNC: 25 U/L (ref 10–40)
ANION GAP SERPL CALCULATED.3IONS-SCNC: 13 MMOL/L (ref 3–16)
AST SERPL-CCNC: 29 U/L (ref 15–37)
BASOPHILS ABSOLUTE: 0.1 K/UL (ref 0–0.2)
BASOPHILS RELATIVE PERCENT: 1.1 %
BILIRUB SERPL-MCNC: <0.2 MG/DL (ref 0–1)
BUN BLDV-MCNC: 15 MG/DL (ref 7–20)
CALCIUM SERPL-MCNC: 9.9 MG/DL (ref 8.3–10.6)
CHLORIDE BLD-SCNC: 104 MMOL/L (ref 99–110)
CO2: 24 MMOL/L (ref 21–32)
CREAT SERPL-MCNC: 1.1 MG/DL (ref 0.6–1.2)
EOSINOPHILS ABSOLUTE: 0.2 K/UL (ref 0–0.6)
EOSINOPHILS RELATIVE PERCENT: 3.1 %
GFR SERPL CREATININE-BSD FRML MDRD: 56 ML/MIN/{1.73_M2}
GLUCOSE BLD-MCNC: 116 MG/DL (ref 70–99)
HCT VFR BLD CALC: 39 % (ref 36–48)
HEMOGLOBIN: 12.7 G/DL (ref 12–16)
LIPASE: 47 U/L (ref 13–60)
LYMPHOCYTES ABSOLUTE: 2.2 K/UL (ref 1–5.1)
LYMPHOCYTES RELATIVE PERCENT: 29.7 %
MCH RBC QN AUTO: 29.3 PG (ref 26–34)
MCHC RBC AUTO-ENTMCNC: 32.7 G/DL (ref 31–36)
MCV RBC AUTO: 89.8 FL (ref 80–100)
MONOCYTES ABSOLUTE: 0.6 K/UL (ref 0–1.3)
MONOCYTES RELATIVE PERCENT: 8.6 %
NEUTROPHILS ABSOLUTE: 4.3 K/UL (ref 1.7–7.7)
NEUTROPHILS RELATIVE PERCENT: 57.5 %
PDW BLD-RTO: 14.2 % (ref 12.4–15.4)
PLATELET # BLD: 256 K/UL (ref 135–450)
PMV BLD AUTO: 9.6 FL (ref 5–10.5)
POTASSIUM SERPL-SCNC: 4.7 MMOL/L (ref 3.5–5.1)
RBC # BLD: 4.34 M/UL (ref 4–5.2)
SODIUM BLD-SCNC: 141 MMOL/L (ref 136–145)
TOTAL PROTEIN: 6.8 G/DL (ref 6.4–8.2)
WBC # BLD: 7.5 K/UL (ref 4–11)

## 2022-12-30 PROCEDURE — 99214 OFFICE O/P EST MOD 30 MIN: CPT | Performed by: FAMILY MEDICINE

## 2022-12-30 PROCEDURE — 2022F DILAT RTA XM EVC RTNOPTHY: CPT | Performed by: FAMILY MEDICINE

## 2022-12-30 PROCEDURE — 3078F DIAST BP <80 MM HG: CPT | Performed by: FAMILY MEDICINE

## 2022-12-30 PROCEDURE — 3017F COLORECTAL CA SCREEN DOC REV: CPT | Performed by: FAMILY MEDICINE

## 2022-12-30 PROCEDURE — G8417 CALC BMI ABV UP PARAM F/U: HCPCS | Performed by: FAMILY MEDICINE

## 2022-12-30 PROCEDURE — G8427 DOCREV CUR MEDS BY ELIG CLIN: HCPCS | Performed by: FAMILY MEDICINE

## 2022-12-30 PROCEDURE — 3074F SYST BP LT 130 MM HG: CPT | Performed by: FAMILY MEDICINE

## 2022-12-30 PROCEDURE — 1036F TOBACCO NON-USER: CPT | Performed by: FAMILY MEDICINE

## 2022-12-30 PROCEDURE — G8482 FLU IMMUNIZE ORDER/ADMIN: HCPCS | Performed by: FAMILY MEDICINE

## 2022-12-30 RX ORDER — GABAPENTIN 800 MG/1
800 TABLET ORAL 3 TIMES DAILY
Qty: 90 TABLET | Refills: 2 | Status: SHIPPED | OUTPATIENT
Start: 2022-12-30 | End: 2023-03-30

## 2022-12-30 NOTE — PROGRESS NOTES
2022    Blood pressure 130/80, pulse 88, resp. rate 14, weight 232 lb (105.2 kg), SpO2 93 %, not currently breastfeeding. Radha Cota (:  1960) is a 60 y.o. female, here for evaluation of the following medical concerns:    Chief Complaint   Patient presents with    Abdominal Pain     Abd pain and nausea, has scope scheduled next week    Pain     Polyalthralgia, pain has been worse, taking more Gabapentin     Here for routine follow up of DM-2 with hx nephropathy and gastroparesis as complications. DM meds: basaglar 80, aspart 20 (SS).  ozempic 1  SE's to ozempic?-  thinks her nausea is worse since increasing dose of ozempic to 1 mg. Lexie Gorman does not. He thinks she did better at the 0.5 mg dose. (They have some old 0.5 mg ozempic at home). Does have some nausea when she eats- known gastroparesis. Using Reglan 10 mg 30 min prior to meals, but this helps only minimally. Has not tried a liquid diet. She plans to have upper and lower endoscopy by Dr Cassi Sanders next week. Improving a1c:  Lab Results   Component Value Date/Time    LABA1C 7.8 2022 04:46 PM    LABA1C 8.5 2022 12:45 PM    LABA1C 8.0 2022 03:57 PM      She has hx of fibromyalgia. Has been on gabapentin for a long time for this and DPN (2016)  Current dose 600mg qhs. But has started to add daytime doses. Takes 1200-1800mg also during the day. This has helped. On higher dose for past couple months. No SE's noted. Would like to continue on a higher dose. No other pain medicine in use. Last renal function test: last GFR 41  Lab Results   Component Value Date/Time     2022 03:41 PM    K 4.4 2022 03:41 PM    K 5.1 2021 12:18 PM    BUN 23 2022 03:41 PM    CREATININE 1.3 2022 03:41 PM     Estimated Creatinine Clearance: 51 mL/min (A) (based on SCr of 1.3 mg/dL (H)).        Patient Active Problem List   Diagnosis    Diabetic gastroparesis (HCC)    Allergic rhinitis Insomnia    Essential hypertension    Headaches due to old head injury    Type 2 diabetes mellitus with microalbuminuria, with long-term current use of insulin (HCC)    Migraine    Depression, major    Right knee DJD    Calcific Achilles tendonitis, right    Right sided sciatica    Lumbar spinal stenosis    TMJ (temporomandibular joint disorder)    Urge incontinence of urine    Personal history of breast cancer    Hyperlipidemia LDL goal <090    Metallic taste    Polyarthralgia    Severe obesity (BMI 35.0-39. 9) with comorbidity (HCC)    Neck pain    Plantar fasciitis of right foot    Anxiety    History of colon polyps    Irritable bowel syndrome with both constipation and diarrhea    Trigger finger, acquired    Bilateral edema of lower extremity    Achilles tendinitis of both lower extremities    Arthritis of left hip    Lumbar facet arthropathy    DDD (degenerative disc disease), lumbar    CKD stage 3 due to type 2 diabetes mellitus ([facility]        Body mass index is 41.1 kg/m². Wt Readings from Last 3 Encounters:   12/30/22 232 lb (105.2 kg)   11/29/22 230 lb (104.3 kg)   11/10/22 230 lb (104.3 kg)       BP Readings from Last 3 Encounters:   12/30/22 130/80   11/29/22 (!) 160/96   10/07/22 110/64       Allergies   Allergen Reactions    Trazodone And Nefazodone Other (See Comments)     headache    Cipro [Ciprofloxacin Hcl] Other (See Comments)     Makes her jittery. Ambien [Zolpidem Tartrate] Other (See Comments)     Vivid dreams    Diamox [Acetazolamide] Rash    Lorazepam Other (See Comments)     confusion    Naproxen Rash    Reglan [Metoclopramide] Anxiety    Sulfa Antibiotics Rash     Also causes shaking       Prior to Visit Medications    Medication Sig Taking? Authorizing Provider   gabapentin (NEURONTIN) 600 MG tablet TAKE 2 tabs at bedtime.  Yes Becky Mercado MD   tiZANidine (ZANAFLEX) 4 MG tablet TAKE 1 TABLET BY MOUTH EVERY 8 HOURS AS NEEDED FOR MUSCLE SPASM Yes Becky Mercado MD tolterodine (DETROL LA) 4 MG extended release capsule TAKE 1 CAPSULE BY MOUTH EVERY DAY Yes Judge Renato MD   Semaglutide, 1 MG/DOSE, 4 MG/3ML SOPN Inject 1 mg into the skin once a week Yes Judge Renato MD   metoclopramide (REGLAN) 10 MG tablet Take 1 tablet by mouth 3 times daily (before meals) Yes Judge Renato MD   ondansetron (ZOFRAN-ODT) 4 MG disintegrating tablet Take 1 tablet by mouth 3 times daily as needed for Nausea or Vomiting Yes Judge Renato MD   atorvastatin (LIPITOR) 80 MG tablet TAKE 1 TABLET BY MOUTH EVERY DAY Yes Judge Renato MD   metoprolol succinate (TOPROL XL) 50 MG extended release tablet TAKE 1 TABLET BY MOUTH EVERY DAY Yes Judge Renato MD   amLODIPine (NORVASC) 5 MG tablet TAKE 1 TABLET BY MOUTH DAILY Yes Judge Renato MD   QUEtiapine (SEROQUEL) 25 MG tablet TAKE 1 TABLET BY MOUTH EVERY MORNING AND 4 EVERY EVENING Yes Judge Renato MD   insulin glargine (BASAGLAR KWIKPEN) 100 UNIT/ML injection pen ADMINISTER 80 UNITS UNDER THE SKIN EVERY NIGHT Yes Judge Renato MD   lisinopril-hydroCHLOROthiazide (PRINZIDE;ZESTORETIC) 20-12.5 MG per tablet TAKE 1 TABLET BY MOUTH DAILY Yes Romana Harper MD   Insulin Aspart FlexPen 100 UNIT/ML SOPN INJECT INTO SKIN WITH DINNER AS DIRECTED.<100=NONE, 100-180=10 UNITS, 181-230=15U, 231-280-20U,>281=25U Yes Judge Renato MD   venlafaxine (EFFEXOR XR) 75 MG extended release capsule TAKE 3 CAPSULES BY MOUTH DAILY Yes Judge Renato MD   blood glucose test strips (TRUE METRIX BLOOD GLUCOSE TEST) strip USE THREE TIMES DAILY Yes Vicente Purpura, APRN - CNP   nitrofurantoin, macrocrystal-monohydrate, (MACROBID) 100 MG capsule Take 100 mg by mouth daily Yes Historical Provider, MD BRYANT UF III MINI PEN NEEDLES 31G X 5 MM MISC USE ON PEN THREE TIMES DAILY Yes Maya Jones MD   omeprazole (PRILOSEC) 40 MG delayed release capsule Take 40 mg by mouth daily  Yes Historical Provider, MD        Social History     Tobacco Use    Smoking status: Never    Smokeless tobacco: Never    Tobacco comments:     congrats   Vaping Use    Vaping Use: Never used   Substance Use Topics    Alcohol use: No    Drug use: Never       Review of Systems As above     Physical Exam  Vitals and nursing note reviewed. Constitutional:       Appearance: Normal appearance. She is well-developed. Neck:      Thyroid: No thyromegaly. Cardiovascular:      Rate and Rhythm: Normal rate and regular rhythm. Pulses: Normal pulses. Heart sounds: Normal heart sounds. No murmur heard. Pulmonary:      Effort: Pulmonary effort is normal. No respiratory distress. Breath sounds: Normal breath sounds. No wheezing or rales. Musculoskeletal:         General: Normal range of motion. Cervical back: Normal range of motion. Skin:     General: Skin is warm and dry. Neurological:      General: No focal deficit present. Mental Status: She is alert and oriented to person, place, and time. Mental status is at baseline. Psychiatric:         Mood and Affect: Mood normal.         Behavior: Behavior normal.         Thought Content: Thought content normal.         Judgment: Judgment normal.       ASSESSMENT/PLAN:    1. Type 2 diabetes mellitus with microalbuminuria, with long-term current use of insulin ([facility])  - A1c improving. No hypoglycemia on insulin. May need to reduce GLP-1 dose due to gastroparetic nausea    2. Diabetic gastroparesis ([address]  - they will try reducing the dose of ozempic back to 0.5 mg week (they have enough for a month at this dose) and report back if it helps her nausea. Her upcoming EGD may shed light on her nausea cause. 3. CKD stage 3 due to type 2 diabetes mellitus ([address]  - on ACE; bp stable, glycemic control improving. Continue to monitor- has order for metabolic panel she will complete after this visit. 4. Fibromyalgia  - increase dose of gabapentin to 800 TID.  This is above the recommended dose for her renal function, but lower than the dose she has been taking on her own. Return in about 2 months (around 2/28/2023) for follow up diabetes. An  electronicsignature was used to authenticate this note.     --Faustina De Jesus MD on 12/30/2022 at 3:20 PM

## 2023-01-03 PROBLEM — E11.22 CKD STAGE 3 DUE TO TYPE 2 DIABETES MELLITUS (HCC): Status: ACTIVE | Noted: 2023-01-03

## 2023-01-03 PROBLEM — M72.2 PLANTAR FASCIITIS OF RIGHT FOOT: Status: RESOLVED | Noted: 2017-09-28 | Resolved: 2023-01-03

## 2023-01-03 PROBLEM — M79.7 FIBROMYALGIA: Status: ACTIVE | Noted: 2023-01-03

## 2023-01-03 PROBLEM — N18.30 CKD STAGE 3 DUE TO TYPE 2 DIABETES MELLITUS (HCC): Status: ACTIVE | Noted: 2023-01-03

## 2023-01-03 NOTE — TELEPHONE ENCOUNTER
Ongoing SW/CM Assessment/Plan of Care Note     See SW/CM flowsheets for goals and other objective data.    Progress note:  On 2L NC, IVABX. Started on comadin w/ lovenox bridge. Can not do eliquis/xarelto d/t dilantin interaction.     Current Status  Physical Therapy: Pending.  Occupational Therapy: Not ordered.  Current Mental Status: Cooperative, Pleasant    Barriers to discharge:   Medical clearance. Therapeutic INR    Discharge plan:  Return back to [facility] [phone]. Floor RN to call w/ report and transfer pt before 3:30pm.      CM/SW team to continue to follow for discharge needs.     Can you put in a ref for kidney

## 2023-01-04 DIAGNOSIS — S16.1XXD STRAIN OF NECK MUSCLE, SUBSEQUENT ENCOUNTER: ICD-10-CM

## 2023-01-04 RX ORDER — TIZANIDINE 4 MG/1
TABLET ORAL
Qty: 30 TABLET | Refills: 1 | Status: SHIPPED | OUTPATIENT
Start: 2023-01-04

## 2023-01-27 ENCOUNTER — OFFICE VISIT (OUTPATIENT)
Dept: ORTHOPEDIC SURGERY | Age: 63
End: 2023-01-27

## 2023-01-27 VITALS — HEIGHT: 63 IN | WEIGHT: 230 LBS | BODY MASS INDEX: 40.75 KG/M2 | RESPIRATION RATE: 16 BRPM

## 2023-01-27 DIAGNOSIS — M25.531 RIGHT WRIST PAIN: Primary | ICD-10-CM

## 2023-01-27 NOTE — Clinical Note
Dear  Doris Sanders MD,  Thank you very much for your referral or Ms. Lili Velez to me for evaluation and treatment of her Hand & Wrist condition. I appreciate your confidence in me and thank you for allowing me the opportunity to care for your patients. If I can be of any further assistance to you on this or any other patient, please do not hesitate to contact me. Sincerely,  Denzel Councilman.  Lacy Carreno MD

## 2023-01-27 NOTE — PROGRESS NOTES
Ms. Stevan Wilburn is a 58 y.o. right handed woman  who is seen today in Hand Surgical Consultation at the request of Saumya Vasquez MD.    She presents today regarding right Wrist symptoms which have been present for approximately 2 years. A history of antecedent trauma or injury is Absent. She reports symptoms to include  marked pain located in the Ulnar aspect of the wrist, no tenderness of the remaining fingers, hand, or elbow. She notes today, no neurologic symptoms in the Whole Hand. Symptoms show no change over time. Previous treatment has included conservative measures. She does not claim relation of her symptoms to her required work activities. She has not undergone any testing. I have today reviewed with Stevan Wilburn the clinically relevant, past medical history, medications, allergies,  family history, social history, and Review Of Systems & I have documented any details relevant to today's presenting complaints in my history above. Ms. Rashad Anns self-reported past medical history, medications, allergies,  family history, social history, and Review Of Systems have been scanned into the chart under the \"Media\" tab. Physical Exam:  Ms. Rashad Castro's most recent vitals:  Vitals  Resp: 16  Height: 5' 3\" (160 cm)  Weight: 230 lb (104.3 kg)    She is well nourished, oriented to person, place & time. She demonstrates appropriate mood and affect as well as normal gait and station. Skin: Normal in appearance, Normal Color, and Free of Lesions Bilaterally   Digital range of motion is without significant limitation bilaterally  Wrist range of motion is limited by pain on the Right, greater than Left  Sensation is subjectively present in the Whole Hand. All other digits are normally sensate bilaterally  Vascular examination reveals normal and good capillary refill bilaterally. There is no acute ecchymosis.   Swelling is mild in the Ulnar aspect of the wrist.  No other digit or hand bilaterally shows sign of swelling. There is no evidence of gross joint instability bilaterally. Muscular strength is clinically appropriate bilaterally. The base of the hand & wrist are mildly tender to palpation. There is not clinical evidence of skeletal deformity or mal-rotation. Maximal pain is elicited with palpation of the Ulnar aspect of the wrist. Specifically, the Ulnar Styloid is not tender to palpation, the dorsal margin of the TFCC is moderately tender to palpation, the DRUJ is mildly tender to palpation and without effusion or instability, the Ulnar Intra-Carpal joints are not tender to palpation and without instability, the ECU tendon is not tender to palpation and is not subluxing from it's sub-sheath. Radiographic Evaluation:  Radiographs, taken In My Office were Personally Reviewed & Interpreted by myself today (3 views of the right wrist were obtained in [address] They demonstrate no evidence of an acute fracture of the distal radius, ulna, or carpal bones (specifically the scaphoid appears normal). The CMC joints & bases of the  Metacarpals do not show displacement, acute injury, or acute change. The Distal Radio-Ulnar Joint reveals mild evidence of degenerative change. The Ulna shows 3 mm negative variance, unchanged on Maximal  View. There is no evidence of cystic change of the proximal ulnar base of the Lunate. There is not evidence of other static carpal instability of mal-position. There is moderate radiodense accumulation in the TFCC, the SL joint and to some degree about the Radial Styloid, all consistent with Calcium Pyrophosphate Deposition Disease       Impression:  Ms. Radha Cota presents with right Ulnar Sided Wrist pain, the underlying etiology of which appears to be a systemic inflammatory condition. Her symptoms are currently exacerbated, and she presents requesting further treatment. Plan:  I have had a thorough discussion with Ms. Kyra HARPER Angelo Daugherty regarding the treatment options available for her initially presenting Wrist PseudoGout, which is causing her significant symptoms and difficulty. I have outlined for Ms. Carlee Olivares the risk, benefits and consequences of the various treatment modalities, including a reasonable expectation for the long term success of each. We have discussed the possibility that further, more aggressive treatment may be required for her current presenting condition. Based upon our current discussion and a reasonable understating of the options available to her, Ms. Carlee Olivares has selected to proceed with a conservative plan of treatment consisting of: the use of protective splints as needed, activity modification, and the judicious use of over-the-counter anti-inflammatory medications if allowed by her primary care physician. Instructions were given regarding splint use and wear as well as suggestions for use of the other modalities were discussed. We also discussed that appropriate Medical Management by her Primary Care Physician is the mainstay of treatment for PseudoGout. I have clearly explained to her that the above outlined treatment plan should not be expected to 'cure' her medical condition, but we are rather treating the symptoms with which she presents. She has understood that in order to achieve more durable relief of her symptoms and to prevent future worsening or further damage, that appropriate Medical Management would be required. Ms. Carlee Olivares  voiced an appropriate understanding of our discussion, the options available to her, and of the expectations of her selected  treatment. I have referred Ms. Carlee Olivares back to Liza Sahu MD for further evaluation and treatment of her systemic medical condition as is medically appropriate. I have also discussed with Ms. Carlee Olivares  the other treatment options available to her  for this condition.   We have today selected to proceed with conservative management. She and I have agreed that if our current course of conservative treatment does not prove to be effective over the short term future, that she will schedule a follow-up appointment to discuss and select an alternate course of therapy including possibly injection or surgical treatment. I have asked Ms. Darcella Pallas  to feel free to contact me or schedule a follow-up appointment at any time that she feels the need for any further evaluation or treatment for her upper extremitiy condition. If she feels that she continues to be feeling and functioning well, she may choose not to seek any further follow-up or treatment at her discretion. I will remain available to continue her care at any time in the future.

## 2023-02-08 ENCOUNTER — TELEPHONE (OUTPATIENT)
Dept: ORTHOPEDIC SURGERY | Age: 63
End: 2023-02-08

## 2023-02-10 ENCOUNTER — APPOINTMENT (OUTPATIENT)
Dept: CT IMAGING | Age: 63
DRG: 690 | End: 2023-02-10
Payer: COMMERCIAL

## 2023-02-10 ENCOUNTER — APPOINTMENT (OUTPATIENT)
Dept: GENERAL RADIOLOGY | Age: 63
DRG: 690 | End: 2023-02-10
Payer: COMMERCIAL

## 2023-02-10 ENCOUNTER — TELEPHONE (OUTPATIENT)
Dept: ORTHOPEDIC SURGERY | Age: 63
End: 2023-02-10

## 2023-02-10 ENCOUNTER — HOSPITAL ENCOUNTER (INPATIENT)
Age: 63
LOS: 2 days | Discharge: HOME | DRG: 690 | End: 2023-02-13
Attending: EMERGENCY MEDICINE | Admitting: STUDENT IN AN ORGANIZED HEALTH CARE EDUCATION/TRAINING PROGRAM
Payer: COMMERCIAL

## 2023-02-10 ENCOUNTER — HOSPITAL ENCOUNTER (EMERGENCY)
Age: 63
Discharge: HOME | DRG: 690 | End: 2023-02-10
Attending: EMERGENCY MEDICINE
Payer: COMMERCIAL

## 2023-02-10 VITALS
RESPIRATION RATE: 16 BRPM | DIASTOLIC BLOOD PRESSURE: 70 MMHG | TEMPERATURE: 98.5 F | HEART RATE: 98 BPM | BODY MASS INDEX: 42.27 KG/M2 | OXYGEN SATURATION: 92 % | SYSTOLIC BLOOD PRESSURE: 130 MMHG | HEIGHT: 63 IN | WEIGHT: 238.54 LBS

## 2023-02-10 DIAGNOSIS — N17.9 AKI (ACUTE KIDNEY INJURY) (HCC): ICD-10-CM

## 2023-02-10 DIAGNOSIS — E11.29 TYPE 2 DIABETES MELLITUS WITH MICROALBUMINURIA, WITH LONG-TERM CURRENT USE OF INSULIN (HCC): ICD-10-CM

## 2023-02-10 DIAGNOSIS — S16.1XXA ACUTE CERVICAL MYOFASCIAL STRAIN, INITIAL ENCOUNTER: ICD-10-CM

## 2023-02-10 DIAGNOSIS — R80.9 TYPE 2 DIABETES MELLITUS WITH MICROALBUMINURIA, WITH LONG-TERM CURRENT USE OF INSULIN (HCC): ICD-10-CM

## 2023-02-10 DIAGNOSIS — Z79.4 TYPE 2 DIABETES MELLITUS WITH MICROALBUMINURIA, WITH LONG-TERM CURRENT USE OF INSULIN (HCC): ICD-10-CM

## 2023-02-10 DIAGNOSIS — S20.211A CHEST WALL CONTUSION, RIGHT, INITIAL ENCOUNTER: ICD-10-CM

## 2023-02-10 DIAGNOSIS — S70.02XA CONTUSION OF LEFT HIP, INITIAL ENCOUNTER: ICD-10-CM

## 2023-02-10 DIAGNOSIS — S63.501A SPRAIN OF RIGHT WRIST, INITIAL ENCOUNTER: ICD-10-CM

## 2023-02-10 DIAGNOSIS — N10 ACUTE PYELONEPHRITIS: Primary | ICD-10-CM

## 2023-02-10 DIAGNOSIS — S00.93XA CONTUSION OF HEAD, UNSPECIFIED PART OF HEAD, INITIAL ENCOUNTER: Primary | ICD-10-CM

## 2023-02-10 PROCEDURE — 85025 COMPLETE CBC W/AUTO DIFF WBC: CPT

## 2023-02-10 PROCEDURE — 70450 CT HEAD/BRAIN W/O DYE: CPT

## 2023-02-10 PROCEDURE — 72125 CT NECK SPINE W/O DYE: CPT

## 2023-02-10 PROCEDURE — 99285 EMERGENCY DEPT VISIT HI MDM: CPT

## 2023-02-10 PROCEDURE — 96375 TX/PRO/DX INJ NEW DRUG ADDON: CPT

## 2023-02-10 PROCEDURE — 74176 CT ABD & PELVIS W/O CONTRAST: CPT

## 2023-02-10 PROCEDURE — 96372 THER/PROPH/DIAG INJ SC/IM: CPT

## 2023-02-10 PROCEDURE — 96365 THER/PROPH/DIAG IV INF INIT: CPT

## 2023-02-10 PROCEDURE — 73110 X-RAY EXAM OF WRIST: CPT

## 2023-02-10 PROCEDURE — 83690 ASSAY OF LIPASE: CPT

## 2023-02-10 PROCEDURE — 82077 ASSAY SPEC XCP UR&BREATH IA: CPT

## 2023-02-10 PROCEDURE — 99284 EMERGENCY DEPT VISIT MOD MDM: CPT

## 2023-02-10 PROCEDURE — 73080 X-RAY EXAM OF ELBOW: CPT

## 2023-02-10 PROCEDURE — 71250 CT THORAX DX C-: CPT

## 2023-02-10 PROCEDURE — 80048 BASIC METABOLIC PNL TOTAL CA: CPT

## 2023-02-10 PROCEDURE — 80076 HEPATIC FUNCTION PANEL: CPT

## 2023-02-10 RX ORDER — CEFTRIAXONE 1 G/1
1000 INJECTION, POWDER, FOR SOLUTION INTRAMUSCULAR; INTRAVENOUS ONCE
Status: COMPLETED | OUTPATIENT
Start: 2023-02-11 | End: 2023-02-11

## 2023-02-10 ASSESSMENT — LIFESTYLE VARIABLES
HOW MANY STANDARD DRINKS CONTAINING ALCOHOL DO YOU HAVE ON A TYPICAL DAY: PATIENT DOES NOT DRINK
HOW OFTEN DO YOU HAVE A DRINK CONTAINING ALCOHOL: NEVER

## 2023-02-10 ASSESSMENT — PAIN DESCRIPTION - PAIN TYPE
TYPE: ACUTE PAIN
TYPE: ACUTE PAIN

## 2023-02-10 ASSESSMENT — PAIN DESCRIPTION - DESCRIPTORS
DESCRIPTORS: ACHING
DESCRIPTORS: ACHING

## 2023-02-10 ASSESSMENT — PAIN DESCRIPTION - ORIENTATION
ORIENTATION: RIGHT
ORIENTATION: RIGHT

## 2023-02-10 ASSESSMENT — PAIN - FUNCTIONAL ASSESSMENT
PAIN_FUNCTIONAL_ASSESSMENT: NONE - DENIES PAIN
PAIN_FUNCTIONAL_ASSESSMENT: 0-10

## 2023-02-10 ASSESSMENT — PAIN DESCRIPTION - LOCATION
LOCATION: BACK;ARM
LOCATION: ARM;BACK

## 2023-02-10 ASSESSMENT — PAIN SCALES - GENERAL
PAINLEVEL_OUTOF10: 8
PAINLEVEL_OUTOF10: 7

## 2023-02-10 NOTE — TELEPHONE ENCOUNTER
Did speak with patient regarding ED ref for a f/u appt. She will call back after she checks with her husbands schedule. Upon return call please anu with Dr. Warren.

## 2023-02-10 NOTE — ED PROVIDER NOTES
[address]      Pt Name: Harper Campbell  MRN: [id number]  [place] 1960  Date of evaluation: 2/10/2023  Provider: Michelle Valenzuela, [address] [place] [address]       Chief Complaint   Patient presents with    Jorge Cake out of bed last night. C/o right arm and back pain          HISTORY OF PRESENT ILLNESS   (Location/Symptom, Timing/Onset, Context/Setting, Quality, Duration, Modifying Factors, Severity)  Note limiting factors. Harper Campbell is a 58 y.o. female who presents to the emergency department with a complaint of injury sustained when she fell out of the bed today at 3:15 AM.  She states that she rolled over on the right side of the bed and landed on her left hip. She struck her head on the nightstand. She is unsure if she lost consciousness but does not believe that she did. She complains of some mild neck pain, right elbow pain, right wrist pain, and bilateral hip pain. She denies any chest pain shortness of breath or abdominal pain. She denies any nausea vomiting or diarrhea. She does not take any anticoagulants. She is able to ambulate without difficulty. She denies any back pain. No nausea or vomiting. No recent illness. No preceding dizziness or lightheadedness. No vertigo. No vision change, speech change, focal weakness or numbness. Nursing Notes were reviewed. HPI        REVIEW OF SYSTEMS    (2-9 systems for level 4, 10 or more for level 5)       Constitutional: Negative for fever or chills. HENT: Negative for rhinorrhea and sore throat. Eyes: Negative for redness or drainage. Respiratory: Negative for shortness of breath or dyspnea on exertion. Cardiovascular: Negative for chest pain. Gastrointestinal: Negative for abdominal pain. Negative for vomiting or diarrhea. Genitourinary: Negative for flank pain. Negative for dysuria. Negative for hematuria. Neurological: Negative for headache.   Musculoskeletal: Negative edema. Hematological: Negative for adenopathy. All systems are reviewed and are negative except for those listed above in the history of present illness and ROS.         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 ([facility]     GERD (gastroesophageal reflux disease)     History of migraine headaches     HTN (hypertension) 05/15/2014    Hyperlipidemia     Irritable bowel syndrome (IBS)     Obesity     Wears glasses     reading         SURGICAL HISTORY       Past Surgical History:   Procedure Laterality Date    ACHILLES TENDON SURGERY Right     BACK SURGERY  2013    lumbar    BREAST LUMPECTOMY Left 2010    CARPAL TUNNEL RELEASE Bilateral      SECTION      x 1    CHOLECYSTECTOMY      COLONOSCOPY  10/16/2017    polypectomy- Dr Katlyn Ayon, DIAGNOSTIC      ESOPHAGEAL DILATATION N/A 5/15/2019    ESOPHAGEAL DILATION [place] performed by Jayce Jensen MD at [facility] Left 2020    LEFT INDEX FINGER TRIGGER FINGER RELEASE performed by Cherelle Brand MD at [address] Right 2021    PHACOEMULSIFICATION WITH INTRAOCULAR LENS IMPLANT - RIGHT EYE performed by Inge Farley MD at [address] Left 3/12/2021    PHACOEMULSIFICATION WITH INTRAOCULAR LENS IMPLANT - LEFT EYE performed by Inge Farley MD at [address] 2019    EGD BIOPSY performed by Jayce Jensen MD at [facility] ENDOSCOPY    UPPER GASTROINTESTINAL ENDOSCOPY N/A 5/15/2019    EGD BIOPSY performed by David Mckinney MD at [address] [facility] 10/11/2021    EGD BIOPSY performed by David Mckinney MD at [address]       Previous Medications    AMLODIPINE (NORVASC) 5 MG TABLET    TAKE 1 TABLET BY MOUTH DAILY    ATORVASTATIN (LIPITOR) 80 MG TABLET    TAKE 1 TABLET BY MOUTH EVERY DAY    B-D UF III MINI PEN NEEDLES 31G X 5 MM MISC    USE ON PEN THREE TIMES DAILY    BLOOD GLUCOSE TEST STRIPS (TRUE METRIX BLOOD GLUCOSE TEST) STRIP    USE THREE TIMES DAILY    GABAPENTIN (NEURONTIN) 800 MG TABLET    Take 1 tablet by mouth 3 times daily for 90 days.     INSULIN ASPART FLEXPEN 100 UNIT/ML SOPN    INJECT INTO SKIN WITH DINNER AS DIRECTED.<100=NONE, 100-180=10 UNITS, 181-230=15U, 231-280-20U,>281=25U    INSULIN GLARGINE (BASAGLAR KWIKPEN) 100 UNIT/ML INJECTION PEN    ADMINISTER 80 UNITS UNDER THE SKIN EVERY NIGHT    LISINOPRIL-HYDROCHLOROTHIAZIDE (PRINZIDE;ZESTORETIC) 20-12.5 MG PER TABLET    TAKE 1 TABLET BY MOUTH DAILY    METOCLOPRAMIDE (REGLAN) 10 MG TABLET    Take 1 tablet by mouth 3 times daily (before meals)    METOPROLOL SUCCINATE (TOPROL XL) 50 MG EXTENDED RELEASE TABLET    TAKE 1 TABLET BY MOUTH EVERY DAY    NITROFURANTOIN, MACROCRYSTAL-MONOHYDRATE, (MACROBID) 100 MG CAPSULE    Take 100 mg by mouth daily    OMEPRAZOLE (PRILOSEC) 40 MG DELAYED RELEASE CAPSULE    Take 40 mg by mouth daily     ONDANSETRON (ZOFRAN-ODT) 4 MG DISINTEGRATING TABLET    Take 1 tablet by mouth 3 times daily as needed for Nausea or Vomiting    QUETIAPINE (SEROQUEL) 25 MG TABLET    TAKE 1 TABLET BY MOUTH EVERY MORNING AND 4 EVERY EVENING    SEMAGLUTIDE, 1 MG/DOSE, 4 MG/3ML SOPN    Inject 1 mg into the skin once a week    TIZANIDINE (ZANAFLEX) 4 MG TABLET    TAKE 1 TABLET BY MOUTH EVERY 8 HOURS AS NEEDED FOR MUSCLE SPASM    TOLTERODINE (DETROL LA) 4 MG EXTENDED RELEASE CAPSULE    TAKE 1 CAPSULE BY MOUTH EVERY DAY    VENLAFAXINE (EFFEXOR XR) 75 MG EXTENDED RELEASE CAPSULE    TAKE 3 CAPSULES BY MOUTH DAILY       ALLERGIES     Trazodone and nefazodone, Cipro [ciprofloxacin hcl], Ambien [zolpidem tartrate], Diamox [acetazolamide], Lorazepam, Naproxen, Reglan [metoclopramide], and Sulfa antibiotics    FAMILY HISTORY       Family History   Problem Relation Age of Onset    Cancer Mother         forehead soft tissue    Diabetes Mother     Arthritis Father     High Blood Pressure Father     High Cholesterol Father     Alzheimer's Disease Father     Depression Sister     Diabetes Brother     Colon Cancer Paternal Grandmother           SOCIAL HISTORY       Social History     Socioeconomic History    Marital status:      Spouse name: None    Number of children: None    Years of education: None    Highest education level: None   Tobacco Use    Smoking status: Never    Smokeless tobacco: Never    Tobacco comments:     congrats   Vaping Use    Vaping Use: Never used   Substance and Sexual Activity    Alcohol use: No    Drug use: Never    Sexual activity: Yes     Partners: Male   Social History Narrative    Self-breast exams: yes    Lives with spouse and son.     Exercise:  rarely    Seatbelt use: Always    Living will: no,   additional information provided     Social Determinants of Health     Financial Resource Strain: Low Risk     Difficulty of Paying Living Expenses: Not hard at all   Food Insecurity: No Food Insecurity    Worried About [employer] in the Last Year: Never true    [facility] in the Last Year: Never true       SCREENINGS    [place] Coma Scale  Eye Opening: Spontaneous  Best Verbal Response: Oriented  Best Motor Response: Obeys commands  [place] Coma Scale Score: 15        PHYSICAL EXAM    (up to 7 for level 4, 8 or more for level 5)     ED Triage Vitals [02/10/23 1119]   BP Temp Temp src Heart Rate Resp SpO2 Height Weight   131/73 98.5 °F (36.9 °C) -- (!) 108 16 93 % 5' 3\" (1.6 m) 238 lb 8.6 oz (108.2 kg)         Physical Exam   Constitutional: Awake and alert. Mild discomfort. Head: No visible evidence of trauma. Normocephalic. Eyes: Pupils equal and reactive. No photophobia. Conjunctiva normal.    HENT: Oral mucosa moist.  Airway patent. No intraoral or intranasal injury. Neck:  Soft and supple. No point or axial tenderness. Discomfort with range of motion. Paravertebral muscle tenderness noted in the mid cervical spine. Heart:  Regular rate and rhythm. Lungs:  No conversational dyspnea or accessory muscle use. Chest: Chest wall non-tender. No evidence of trauma. Abdomen:  Soft, nondistended, bowel sounds present. Mild tenderness noted in the right lower lateral ribs. No crepitus deformity or step-off. No guarding rigidity or rebound. No masses. Musculoskeletal: Pelvis stable and nontender. Pain noted with range of motion in both hips. No bony point tenderness. No deformity. Able to ambulate. Knees and ankles were nontender reformed motion. There was pain with range of motion of the right elbow and right wrist.  Mild soft tissue swelling to the dorsal aspect of the right wrist but no deformity. Radial median and ulnar nerve are fully intact. Skin was intact. Right shoulder nontender reformed motion. Left upper extremity was nontender reformed motion. Strong radial pulses noted bilaterally. All other extremities non-tender with full range of motion. Radial and dorsalis pedis pulses were intact. No calf tenderness erythema or edema. Neurological: Alert and oriented x 3. Speech clear. GCS 15. Cranial nerves II-XII intact. No facial droop. No acute focal motor or sensory deficits. Skin: Skin is warm and dry. No rash. Psychiatric: Normal mood and affect.  Behavior is normal.         DIAGNOSTIC RESULTS     EKG: All EKG's are interpreted by me, the Emergency Department Physician, who either signs or Co-signs this chart in the absence of a cardiologist.        RADIOLOGY:   Non-plain film images such as CT, Ultrasound and MRI are read by the radiologist. Plain radiographic images are visualized and preliminarily interpreted by the emergency physician with the below findings:        Interpretation per the Radiologist below, if available at the time of this note:    CT CHEST ABDOMEN PELVIS WO CONTRAST Additional Contrast? None   Final Result      1. Fat containing umbilical ventral hernia. 2.  Otherwise no acute pathology or significant change noted. CT CERVICAL SPINE WO CONTRAST   Final Result      No evidence of fracture with degenerative changes as described. CT HEAD WO CONTRAST   Final Result      1. No evidence of acute intracranial process. 2.  Findings of presumed mild small vessel ischemic deep white matter disease. XR WRIST RIGHT (MIN 3 VIEWS)   Final Result   No acute findings         XR ELBOW RIGHT (MIN 3 VIEWS)   Final Result   No acute findings               ED BEDSIDE ULTRASOUND:   Performed by ED Physician - none    LABS:  Labs Reviewed - No data to display    All other labs were within normal range or not returned as of this dictation. EMERGENCY DEPARTMENT COURSE and DIFFERENTIAL DIAGNOSIS/ MEDICAL DECISION MAKING:   Vitals:    Vitals:    02/10/23 1119   BP: 131/73   Pulse: (!) 108   Resp: 16   Temp: 98.5 °F (36.9 °C)   SpO2: 93%   Weight: 238 lb 8.6 oz (108.2 kg)   Height: 5' 3\" (1.6 m)         MDM      The patient presents with injuries sustained when she rolled out of bed today at 3:15 AM.  She complains of head, neck, right wrist, right elbow and bilateral hip pain. There is also some tenderness to the right lateral ribs. CT head without contrast was obtained as well as CT cervical spine. CT chest abdomen and pelvis was obtained as well. Right wrist, right elbow x-rays were also obtained. She is neurologically intact and hemodynamically stable. Initial heart rate was 108.   At the time of my examination her heart rate is 95. Is this patient to be included in the SEP-1 Core Measure due to severe sepsis or septic shock? No   Exclusion criteria - the patient is NOT to be included for SEP-1 Core Measure due to: Infection is not suspected      REASSESSMENT/ MEDICAL DECISION MAKING          1:45 PM: CT head and cervical spine were reviewed. No acute findings. No fracture. Right wrist x-rays reveal no evidence of fracture. Right elbow x-ray is negative. CT chest abdomen pelvis was reviewed. There is a fat-containing umbilical hernia but there is otherwise no acute pathology. No evidence of rib fracture. No evidence of any umbilical tenderness. No evidence of incarceration. The patient is stable for discharge and outpatient management. Advised her to follow-up with a primary care physician in 1 to 2 days for reexamination. If condition worsens or new symptoms develop, return immediately to the emergency department. Use ice to the affected areas. Avoid heat or heating pads. Recommend Tylenol as directed for pain. She will be given head injury instructions. I am the primary attending of record. CRITICAL CARE TIME   Total Critical Care time was 0 minutes, excluding separately reportable procedures. There was a high probability of clinically significant/life threatening deterioration in the patient's condition which required my urgent intervention. CONSULTS:  None    PROCEDURES:  Unless otherwise noted below, none     Procedures        FINAL IMPRESSION      1. Contusion of head, unspecified part of head, initial encounter    2. Acute cervical myofascial strain, initial encounter    3. Chest wall contusion, right, initial encounter    4. Contusion of left hip, initial encounter    5.  Sprain of right wrist, initial encounter          DISPOSITION/PLAN   DISPOSITION Decision To Discharge 02/10/2023 01:48:18 PM      PATIENT REFERRED TO:  Jf Del Valle, [address] [place] [address] [address]  [address] [facility] [place] [address]  [phone]    Call today      Briana Jose, [address] [facility] [address]  [address] [facility] [address]  [phone]    Call today      DISCHARGE MEDICATIONS:  New Prescriptions    No medications on file     Controlled Substances Monitoring:     RX Monitoring 11/6/2019   Attestation -   Periodic Controlled Substance Monitoring No signs of potential drug abuse or diversion identified. (Please note that portions of this note were completed with a voice recognition program.  Efforts were made to edit the dictations but occasionally words are mis-transcribed. )    [address] Garth Valenzuela DO (electronically signed)  Attending Emergency Physician           Guanako Roca DO  02/10/23 8881

## 2023-02-10 NOTE — DISCHARGE INSTRUCTIONS
Use ice to the affected areas. Avoid heat or heating pads. Follow-up with your primary care physician in 1 to 2 days for reexamination. Recommend Tylenol as directed for any pain. May also take ibuprofen. If condition worsens or new symptoms develop, return immediately to the emergency department.

## 2023-02-10 NOTE — ED NOTES
Velcro brace to right wrist     Discharge and education instructions reviewed with patient. Teach-back successful. Pt verbalized understanding and signed d/c papers. Pt denied questions at this time. No acute distress noted. Patient instructed to follow-up as noted - return to emergency department if symptoms worsen. Patient verbalized understanding. Discharged per EDMD with discharge instructions. Pt discharged to private vehicle. Patient stable upon departure. Thanked patient for choosing [facility]) for care.           Hansel Santiago RN  02/10/23 9524

## 2023-02-11 PROBLEM — R41.82 AMS (ALTERED MENTAL STATUS): Status: ACTIVE | Noted: 2023-02-11

## 2023-02-11 PROBLEM — N17.9 ACUTE KIDNEY INJURY SUPERIMPOSED ON CKD (HCC): Status: ACTIVE | Noted: 2023-02-11

## 2023-02-11 PROBLEM — N12 PYELONEPHRITIS: Status: ACTIVE | Noted: 2023-02-11

## 2023-02-11 PROBLEM — N18.9 ACUTE KIDNEY INJURY SUPERIMPOSED ON CKD (HCC): Status: ACTIVE | Noted: 2023-02-11

## 2023-02-11 LAB
ALBUMIN SERPL-MCNC: 3.5 G/DL (ref 3.4–5)
ALP BLD-CCNC: 102 U/L (ref 40–129)
ALT SERPL-CCNC: 23 U/L (ref 10–40)
ANION GAP SERPL CALCULATED.3IONS-SCNC: 21 MMOL/L (ref 3–16)
AST SERPL-CCNC: 35 U/L (ref 15–37)
BASOPHILS ABSOLUTE: 0.1 K/UL (ref 0–0.2)
BASOPHILS RELATIVE PERCENT: 1 %
BILIRUB SERPL-MCNC: 0.3 MG/DL (ref 0–1)
BILIRUBIN DIRECT: <0.2 MG/DL (ref 0–0.3)
BILIRUBIN, INDIRECT: NORMAL MG/DL (ref 0–1)
BUN BLDV-MCNC: 42 MG/DL (ref 7–20)
CALCIUM SERPL-MCNC: 9.4 MG/DL (ref 8.3–10.6)
CHLORIDE BLD-SCNC: 107 MMOL/L (ref 99–110)
CO2: 19 MMOL/L (ref 21–32)
CREAT SERPL-MCNC: 3.4 MG/DL (ref 0.6–1.2)
EOSINOPHILS ABSOLUTE: 0.2 K/UL (ref 0–0.6)
EOSINOPHILS RELATIVE PERCENT: 2.5 %
ETHANOL: NORMAL MG/DL (ref 0–0.08)
GFR SERPL CREATININE-BSD FRML MDRD: 14 ML/MIN/{1.73_M2}
GLUCOSE BLD-MCNC: 126 MG/DL (ref 70–99)
GLUCOSE BLD-MCNC: 137 MG/DL (ref 70–99)
GLUCOSE BLD-MCNC: 66 MG/DL (ref 70–99)
GLUCOSE BLD-MCNC: 68 MG/DL (ref 70–99)
GLUCOSE BLD-MCNC: 76 MG/DL (ref 70–99)
GLUCOSE BLD-MCNC: 88 MG/DL (ref 70–99)
GLUCOSE BLD-MCNC: 90 MG/DL (ref 70–99)
HCT VFR BLD CALC: 36.8 % (ref 36–48)
HEMOGLOBIN: 11.7 G/DL (ref 12–16)
LIPASE: 21 U/L (ref 13–60)
LYMPHOCYTES ABSOLUTE: 3.1 K/UL (ref 1–5.1)
LYMPHOCYTES RELATIVE PERCENT: 34.7 %
MCH RBC QN AUTO: 28.8 PG (ref 26–34)
MCHC RBC AUTO-ENTMCNC: 31.7 G/DL (ref 31–36)
MCV RBC AUTO: 90.9 FL (ref 80–100)
MONOCYTES ABSOLUTE: 0.8 K/UL (ref 0–1.3)
MONOCYTES RELATIVE PERCENT: 9.1 %
NEUTROPHILS ABSOLUTE: 4.7 K/UL (ref 1.7–7.7)
NEUTROPHILS RELATIVE PERCENT: 52.7 %
PDW BLD-RTO: 14 % (ref 12.4–15.4)
PERFORMED ON: ABNORMAL
PERFORMED ON: NORMAL
PLATELET # BLD: 235 K/UL (ref 135–450)
PMV BLD AUTO: 9.7 FL (ref 5–10.5)
POTASSIUM REFLEX MAGNESIUM: 3.9 MMOL/L (ref 3.5–5.1)
RBC # BLD: 4.05 M/UL (ref 4–5.2)
SODIUM BLD-SCNC: 147 MMOL/L (ref 136–145)
TOTAL PROTEIN: 6.8 G/DL (ref 6.4–8.2)
WBC # BLD: 8.9 K/UL (ref 4–11)

## 2023-02-11 PROCEDURE — 2500000003 HC RX 250 WO HCPCS: Performed by: EMERGENCY MEDICINE

## 2023-02-11 PROCEDURE — 6360000002 HC RX W HCPCS: Performed by: STUDENT IN AN ORGANIZED HEALTH CARE EDUCATION/TRAINING PROGRAM

## 2023-02-11 PROCEDURE — 6360000002 HC RX W HCPCS: Performed by: EMERGENCY MEDICINE

## 2023-02-11 PROCEDURE — 6370000000 HC RX 637 (ALT 250 FOR IP): Performed by: STUDENT IN AN ORGANIZED HEALTH CARE EDUCATION/TRAINING PROGRAM

## 2023-02-11 PROCEDURE — 2580000003 HC RX 258: Performed by: STUDENT IN AN ORGANIZED HEALTH CARE EDUCATION/TRAINING PROGRAM

## 2023-02-11 PROCEDURE — 2580000003 HC RX 258: Performed by: EMERGENCY MEDICINE

## 2023-02-11 PROCEDURE — 94760 N-INVAS EAR/PLS OXIMETRY 1: CPT

## 2023-02-11 PROCEDURE — 2060000000 HC ICU INTERMEDIATE R&B

## 2023-02-11 RX ORDER — METOPROLOL SUCCINATE 50 MG/1
50 TABLET, EXTENDED RELEASE ORAL DAILY
Status: DISCONTINUED | OUTPATIENT
Start: 2023-02-11 | End: 2023-02-13 | Stop reason: HOSPADM

## 2023-02-11 RX ORDER — ACETAMINOPHEN 325 MG/1
650 TABLET ORAL EVERY 6 HOURS PRN
Status: DISCONTINUED | OUTPATIENT
Start: 2023-02-11 | End: 2023-02-13 | Stop reason: HOSPADM

## 2023-02-11 RX ORDER — DEXTROSE AND SODIUM CHLORIDE 5; .9 G/100ML; G/100ML
INJECTION, SOLUTION INTRAVENOUS CONTINUOUS
Status: DISCONTINUED | OUTPATIENT
Start: 2023-02-11 | End: 2023-02-12

## 2023-02-11 RX ORDER — 0.9 % SODIUM CHLORIDE 0.9 %
1000 INTRAVENOUS SOLUTION INTRAVENOUS ONCE
Status: COMPLETED | OUTPATIENT
Start: 2023-02-11 | End: 2023-02-11

## 2023-02-11 RX ORDER — POLYETHYLENE GLYCOL 3350 17 G/17G
17 POWDER, FOR SOLUTION ORAL DAILY PRN
Status: DISCONTINUED | OUTPATIENT
Start: 2023-02-11 | End: 2023-02-13 | Stop reason: HOSPADM

## 2023-02-11 RX ORDER — ATORVASTATIN CALCIUM 80 MG/1
80 TABLET, FILM COATED ORAL DAILY
Status: DISCONTINUED | OUTPATIENT
Start: 2023-02-11 | End: 2023-02-13 | Stop reason: HOSPADM

## 2023-02-11 RX ORDER — QUETIAPINE FUMARATE 25 MG/1
25 TABLET, FILM COATED ORAL NIGHTLY
Status: DISCONTINUED | OUTPATIENT
Start: 2023-02-11 | End: 2023-02-13 | Stop reason: HOSPADM

## 2023-02-11 RX ORDER — INSULIN LISPRO 100 [IU]/ML
0-4 INJECTION, SOLUTION INTRAVENOUS; SUBCUTANEOUS NIGHTLY
Status: DISCONTINUED | OUTPATIENT
Start: 2023-02-11 | End: 2023-02-13 | Stop reason: HOSPADM

## 2023-02-11 RX ORDER — INSULIN LISPRO 100 [IU]/ML
0-4 INJECTION, SOLUTION INTRAVENOUS; SUBCUTANEOUS EVERY 4 HOURS
Status: DISCONTINUED | OUTPATIENT
Start: 2023-02-11 | End: 2023-02-12

## 2023-02-11 RX ORDER — ACETAMINOPHEN 650 MG/1
650 SUPPOSITORY RECTAL EVERY 6 HOURS PRN
Status: DISCONTINUED | OUTPATIENT
Start: 2023-02-11 | End: 2023-02-13 | Stop reason: HOSPADM

## 2023-02-11 RX ORDER — SODIUM CHLORIDE 0.9 % (FLUSH) 0.9 %
5-40 SYRINGE (ML) INJECTION EVERY 12 HOURS SCHEDULED
Status: DISCONTINUED | OUTPATIENT
Start: 2023-02-11 | End: 2023-02-13 | Stop reason: HOSPADM

## 2023-02-11 RX ORDER — ONDANSETRON 2 MG/ML
4 INJECTION INTRAMUSCULAR; INTRAVENOUS EVERY 6 HOURS PRN
Status: DISCONTINUED | OUTPATIENT
Start: 2023-02-11 | End: 2023-02-13 | Stop reason: HOSPADM

## 2023-02-11 RX ORDER — GABAPENTIN 400 MG/1
400 CAPSULE ORAL 3 TIMES DAILY
Status: DISCONTINUED | OUTPATIENT
Start: 2023-02-11 | End: 2023-02-13 | Stop reason: HOSPADM

## 2023-02-11 RX ORDER — SODIUM CHLORIDE 9 MG/ML
INJECTION, SOLUTION INTRAVENOUS PRN
Status: DISCONTINUED | OUTPATIENT
Start: 2023-02-11 | End: 2023-02-13 | Stop reason: HOSPADM

## 2023-02-11 RX ORDER — SODIUM CHLORIDE 0.9 % (FLUSH) 0.9 %
5-40 SYRINGE (ML) INJECTION PRN
Status: DISCONTINUED | OUTPATIENT
Start: 2023-02-11 | End: 2023-02-13 | Stop reason: HOSPADM

## 2023-02-11 RX ORDER — VENLAFAXINE HYDROCHLORIDE 75 MG/1
75 CAPSULE, EXTENDED RELEASE ORAL
Status: DISCONTINUED | OUTPATIENT
Start: 2023-02-11 | End: 2023-02-13 | Stop reason: HOSPADM

## 2023-02-11 RX ORDER — DEXTROSE MONOHYDRATE 25 G/50ML
25 INJECTION, SOLUTION INTRAVENOUS ONCE
Status: COMPLETED | OUTPATIENT
Start: 2023-02-11 | End: 2023-02-11

## 2023-02-11 RX ORDER — DEXTROSE MONOHYDRATE 100 MG/ML
INJECTION, SOLUTION INTRAVENOUS CONTINUOUS PRN
Status: DISCONTINUED | OUTPATIENT
Start: 2023-02-11 | End: 2023-02-13 | Stop reason: HOSPADM

## 2023-02-11 RX ORDER — PANTOPRAZOLE SODIUM 40 MG/1
40 TABLET, DELAYED RELEASE ORAL
Status: DISCONTINUED | OUTPATIENT
Start: 2023-02-11 | End: 2023-02-13 | Stop reason: HOSPADM

## 2023-02-11 RX ORDER — ENOXAPARIN SODIUM 100 MG/ML
30 INJECTION SUBCUTANEOUS DAILY
Status: DISCONTINUED | OUTPATIENT
Start: 2023-02-11 | End: 2023-02-13 | Stop reason: HOSPADM

## 2023-02-11 RX ORDER — INSULIN LISPRO 100 [IU]/ML
0-8 INJECTION, SOLUTION INTRAVENOUS; SUBCUTANEOUS
Status: DISCONTINUED | OUTPATIENT
Start: 2023-02-11 | End: 2023-02-13 | Stop reason: HOSPADM

## 2023-02-11 RX ORDER — 0.9 % SODIUM CHLORIDE 0.9 %
30 INTRAVENOUS SOLUTION INTRAVENOUS ONCE
Status: DISCONTINUED | OUTPATIENT
Start: 2023-02-11 | End: 2023-02-11

## 2023-02-11 RX ORDER — MORPHINE SULFATE 4 MG/ML
4 INJECTION, SOLUTION INTRAMUSCULAR; INTRAVENOUS ONCE
Status: COMPLETED | OUTPATIENT
Start: 2023-02-11 | End: 2023-02-11

## 2023-02-11 RX ADMIN — CEFTRIAXONE 1000 MG: 1 INJECTION, POWDER, FOR SOLUTION INTRAMUSCULAR; INTRAVENOUS at 00:17

## 2023-02-11 RX ADMIN — DEXTROSE AND SODIUM CHLORIDE: 5; 900 INJECTION, SOLUTION INTRAVENOUS at 04:18

## 2023-02-11 RX ADMIN — PANTOPRAZOLE SODIUM 40 MG: 40 TABLET, DELAYED RELEASE ORAL at 06:26

## 2023-02-11 RX ADMIN — GABAPENTIN 400 MG: 400 CAPSULE ORAL at 09:52

## 2023-02-11 RX ADMIN — MORPHINE SULFATE 4 MG: 4 INJECTION, SOLUTION INTRAMUSCULAR; INTRAVENOUS at 03:17

## 2023-02-11 RX ADMIN — METOPROLOL SUCCINATE 50 MG: 50 TABLET, EXTENDED RELEASE ORAL at 09:52

## 2023-02-11 RX ADMIN — MEROPENEM 500 MG: 500 INJECTION, POWDER, FOR SOLUTION INTRAVENOUS at 18:39

## 2023-02-11 RX ADMIN — GABAPENTIN 400 MG: 400 CAPSULE ORAL at 20:44

## 2023-02-11 RX ADMIN — DEXTROSE MONOHYDRATE 25 G: 25 INJECTION, SOLUTION INTRAVENOUS at 02:37

## 2023-02-11 RX ADMIN — SODIUM CHLORIDE 1000 ML: 9 INJECTION, SOLUTION INTRAVENOUS at 02:38

## 2023-02-11 RX ADMIN — GABAPENTIN 400 MG: 400 CAPSULE ORAL at 15:21

## 2023-02-11 RX ADMIN — QUETIAPINE FUMARATE 25 MG: 25 TABLET ORAL at 20:44

## 2023-02-11 RX ADMIN — MEROPENEM 1000 MG: 1 INJECTION, POWDER, FOR SOLUTION INTRAVENOUS at 02:39

## 2023-02-11 RX ADMIN — ATORVASTATIN CALCIUM 80 MG: 80 TABLET, FILM COATED ORAL at 09:52

## 2023-02-11 RX ADMIN — VENLAFAXINE HYDROCHLORIDE 75 MG: 75 CAPSULE, EXTENDED RELEASE ORAL at 09:52

## 2023-02-11 RX ADMIN — Medication 10 ML: at 09:53

## 2023-02-11 RX ADMIN — ENOXAPARIN SODIUM 30 MG: 100 INJECTION SUBCUTANEOUS at 09:52

## 2023-02-11 ASSESSMENT — ENCOUNTER SYMPTOMS
COUGH: 0
VOMITING: 0
EYE ITCHING: 0
EYE DISCHARGE: 0
COLOR CHANGE: 0
CONSTIPATION: 0
SHORTNESS OF BREATH: 0
ABDOMINAL PAIN: 1

## 2023-02-11 NOTE — ED TRIAGE NOTES
Pt is not sure why her  called EMS again tonight. Pt was here earlier in the day and DX with UTI. Pt lethargic now, admits taking 2-3 Gabapentin, muscle relaxer, and a \"sleep medication. \" Pt easily wakes to verbal stimuli and able to maintain own airway.

## 2023-02-11 NOTE — ED NOTES
Report called to 5N RN. Opportunity to answer questions. Nurse made aware of arousal state and Bps. VSS on 2L NC. IV infusing with 0 s/s of infiltration.       Veronica Voss RN  02/11/23 6061

## 2023-02-11 NOTE — PROGRESS NOTES
Pt arrived to floor via stretcher from ED and transfered to bed. Telemetry activated and confirmed with CMU. Patient oriented to room and use of call light. Call light and personal items within reach. Admission and assessment initiated. POC and education initiated and reviewed with patient. Will continue to monitor.

## 2023-02-11 NOTE — PROGRESS NOTES
4 Eyes Skin Assessment     NAME:  Nick Ocampo  YOB: 1960  MEDICAL RECORD NUMBER:  [id number]    The patient is being assessed for  Admission    I agree that One RN has performed a thorough Head to Toe Skin Assessment on the patient. ALL assessment sites listed below have been assessed. Areas assessed by both nurses:    Head, Face, Ears, Shoulders, Back, Chest, Arms, Elbows, Hands, Sacrum. Buttock, Coccyx, Ischium, and Legs. Feet and Heels        Does the Patient have a Wound?  No noted wound(s)       Alec Prevention initiated by RN: NA   Wound Care Orders initiated by RN: NA    Pressure Injury (Stage 3,4, Unstageable, DTI, NWPT, and Complex wounds) if present, place referral order by RN under : NA    New and Established Ostomies, if present place, referral order under : NA      Nurse 1 eSignature: Electronically signed by Rambo Ahn RN on 2/11/23 at 5:24 AM EST    **SHARE this note so that the co-signing nurse can place an eSignature**    Nurse 2 eSignature: Electronically signed by Rebecca Simpson RN on 2/11/23 at 6:00 AM EST

## 2023-02-11 NOTE — ED PROVIDER NOTES
I PERSONALLY SAW THE PATIENT AND PERFORMED A SUBSTANTIVE PORTION OF THE VISIT INCLUDING ALL ASPECTS OF THE MEDICAL DECISION MAKING PROCESS. [facility]      Pt Name: Abdirahman Logan  MRN: [id number]  [place] 1960  Date of evaluation: 2/10/2023  Provider: Valencia Haas MD    CHIEF COMPLAINT       Chief Complaint   Patient presents with    Back Pain     Lower right back pain that radiates to right flank. HISTORY OF PRESENT ILLNESS    Abdirahman Logan is a 58 y.o. female who presents to the emergency department with abdominal pain and flank pain. Patient presents with abdominal pain and flank pain. Was here earlier this afternoon. Had a reassuring imaging and labs. States she went home and felt worse. Unable to tolerate p.o. Positive for UTI. Has not taken any of her antibiotics yet. Pain currently 7 out of 10 achy nature. Worse with movement. Better with rest.  No weakness, saddle numbness, loss of bowel bladder function. No other associated symptoms    Nursing Notes were reviewed. Including nursing noted for FM, Surgical History, Past Medical History, Social History, vitals, and allergies; agree with all. REVIEW OF SYSTEMS       Review of Systems   Constitutional:  Negative for diaphoresis and unexpected weight change. HENT:  Negative for congestion and dental problem. Eyes:  Negative for discharge and itching. Respiratory:  Negative for cough and shortness of breath. Cardiovascular:  Negative for chest pain and leg swelling. Gastrointestinal:  Positive for abdominal pain. Negative for constipation and vomiting. Endocrine: Negative for cold intolerance and heat intolerance. Genitourinary:  Positive for flank pain. Negative for vaginal bleeding, vaginal discharge and vaginal pain. Musculoskeletal:  Negative for neck pain and neck stiffness. Skin:  Negative for color change and pallor.    Neurological: Negative for tremors and weakness. Psychiatric/Behavioral:  Negative for agitation and behavioral problems. Except as noted above the remainder of the review of systems was reviewed and negative.      PAST MEDICAL HISTORY     Past Medical History:   Diagnosis Date    Allergic rhinitis 05/15/2014    Anxiety and depression     Arthritis     Breast cancer ([facility] [address] 2010    left breast    Cataract     bilateral    Chronic back pain     Diabetes mellitus ([facility] [address]     DM (diabetes mellitus), type 2, uncontrolled with complications     ESBL (extended spectrum beta-lactamase) producing bacteria infection 10/06/2018    urine    Gastroparalysis due to secondary diabetes ([facility] [address]     GERD (gastroesophageal reflux disease)     History of migraine headaches     HTN (hypertension) 05/15/2014    Hyperlipidemia     Irritable bowel syndrome (IBS)     Obesity     Wears glasses     reading       SURGICAL HISTORY       Past Surgical History:   Procedure Laterality Date    ACHILLES TENDON SURGERY Right     BACK SURGERY  2013    lumbar    BREAST LUMPECTOMY Left 2010    CARPAL TUNNEL RELEASE Bilateral      SECTION      x 1    CHOLECYSTECTOMY      COLONOSCOPY  10/16/2017    polypectomy- Dr Katlyn tSroud, DIAGNOSTIC      ESOPHAGEAL DILATATION N/A 5/15/2019    ESOPHAGEAL DILATION Garmia Dickinson performed by Ronaldo Glez MD at [address] [facility] Left 2020    LEFT INDEX FINGER TRIGGER FINGER RELEASE performed by Althea Lerma MD at [address] [facility] Right 2021    PHACOEMULSIFICATION WITH INTRAOCULAR LENS IMPLANT - RIGHT EYE performed by Marlin Dobbins MD at [address] Left 3/12/2021    PHACOEMULSIFICATION WITH INTRAOCULAR LENS IMPLANT - LEFT EYE performed by Marlin Dobbins MD at [address] 2019    EGD BIOPSY performed by Ronaldo Glez MD at [facility]    UPPER GASTROINTESTINAL ENDOSCOPY N/A 5/15/2019    EGD BIOPSY performed by Jazz Gardner MD at [address] [place] [address] 10/11/2021    EGD BIOPSY performed by Jazz Gardner MD at [facility]       Previous Medications    AMLODIPINE (NORVASC) 5 MG TABLET    TAKE 1 TABLET BY MOUTH DAILY    ATORVASTATIN (LIPITOR) 80 MG TABLET    TAKE 1 TABLET BY MOUTH EVERY DAY    B-D UF III MINI PEN NEEDLES 31G X 5 MM MISC    USE ON PEN THREE TIMES DAILY    BLOOD GLUCOSE TEST STRIPS (TRUE METRIX BLOOD GLUCOSE TEST) STRIP    USE THREE TIMES DAILY    GABAPENTIN (NEURONTIN) 800 MG TABLET    Take 1 tablet by mouth 3 times daily for 90 days.     INSULIN ASPART FLEXPEN 100 UNIT/ML SOPN    INJECT INTO SKIN WITH DINNER AS DIRECTED.<100=NONE, 100-180=10 UNITS, 181-230=15U, 231-280-20U,>281=25U    INSULIN GLARGINE (BASAGLAR KWIKPEN) 100 UNIT/ML INJECTION PEN    ADMINISTER 80 UNITS UNDER THE SKIN EVERY NIGHT    LISINOPRIL-HYDROCHLOROTHIAZIDE (PRINZIDE;ZESTORETIC) 20-12.5 MG PER TABLET    TAKE 1 TABLET BY MOUTH DAILY    METOCLOPRAMIDE (REGLAN) 10 MG TABLET    Take 1 tablet by mouth 3 times daily (before meals)    METOPROLOL SUCCINATE (TOPROL XL) 50 MG EXTENDED RELEASE TABLET    TAKE 1 TABLET BY MOUTH EVERY DAY    NITROFURANTOIN, MACROCRYSTAL-MONOHYDRATE, (MACROBID) 100 MG CAPSULE    Take 100 mg by mouth daily    OMEPRAZOLE (PRILOSEC) 40 MG DELAYED RELEASE CAPSULE    Take 40 mg by mouth daily     ONDANSETRON (ZOFRAN-ODT) 4 MG DISINTEGRATING TABLET    Take 1 tablet by mouth 3 times daily as needed for Nausea or Vomiting    QUETIAPINE (SEROQUEL) 25 MG TABLET    TAKE 1 TABLET BY MOUTH EVERY MORNING AND 4 EVERY EVENING    SEMAGLUTIDE, 1 MG/DOSE, 4 MG/3ML SOPN    Inject 1 mg into the skin once a week    TIZANIDINE (ZANAFLEX) 4 MG TABLET    TAKE 1 TABLET BY MOUTH EVERY 8 HOURS AS NEEDED FOR MUSCLE SPASM    TOLTERODINE (DETROL LA) 4 MG EXTENDED RELEASE CAPSULE    TAKE 1 CAPSULE BY MOUTH EVERY DAY    VENLAFAXINE (EFFEXOR XR) 75 MG EXTENDED RELEASE CAPSULE    TAKE 3 CAPSULES BY MOUTH DAILY       ALLERGIES     Trazodone and nefazodone, Cipro [ciprofloxacin hcl], Ambien [zolpidem tartrate], Diamox [acetazolamide], Lorazepam, Naproxen, Reglan [metoclopramide], and Sulfa antibiotics    FAMILY HISTORY        Family History   Problem Relation Age of Onset    Cancer Mother         forehead soft tissue    Diabetes Mother     Arthritis Father     High Blood Pressure Father     High Cholesterol Father     Alzheimer's Disease Father     Depression Sister     Diabetes Brother     Colon Cancer Paternal Grandmother        SOCIAL HISTORY       Social History     Socioeconomic History    Marital status:    Tobacco Use    Smoking status: Never    Smokeless tobacco: Never    Tobacco comments:     congrats   Vaping Use    Vaping Use: Never used   Substance and Sexual Activity    Alcohol use: No    Drug use: Never    Sexual activity: Yes     Partners: Male   Social History Narrative    Self-breast exams: yes    Lives with spouse and son. Exercise:  rarely    Seatbelt use: Always    Living will: no,   additional information provided     Social Determinants of Health     Financial Resource Strain: Low Risk     Difficulty of Paying Living Expenses: Not hard at all   Food Insecurity: No Food Insecurity    Worried About [employer] [employer] in the Last Year: Never true    Ran Out of Food in the Last Year: Never true       PHYSICAL EXAM       ED Triage Vitals   BP Temp Temp Source Heart Rate Resp SpO2 Height Weight   02/10/23 2349 02/10/23 2349 02/10/23 2349 02/10/23 2349 02/10/23 2349 02/10/23 2349 02/10/23 2347 02/10/23 2347   96/60 98.2 °F (36.8 °C) Oral 84 16 94 % 5' 3\" (1.6 m) 238 lb (108 kg)       Physical Exam  Vitals and nursing note reviewed. Constitutional:       General: She is not in acute distress. Appearance: She is well-developed. She is ill-appearing.  She is not toxic-appearing or diaphoretic. HENT:      Head: Normocephalic and atraumatic. Right Ear: External ear normal.      Left Ear: External ear normal.   Eyes:      General:         Right eye: No discharge. Left eye: No discharge. Conjunctiva/sclera: Conjunctivae normal.      Pupils: Pupils are equal, round, and reactive to light. Cardiovascular:      Rate and Rhythm: Normal rate and regular rhythm. Heart sounds: No murmur heard. Pulmonary:      Effort: Pulmonary effort is normal. No respiratory distress. Breath sounds: Normal breath sounds. No wheezing or rales. Abdominal:      General: Bowel sounds are normal. There is no distension. Palpations: Abdomen is soft. There is no mass. Tenderness: There is no abdominal tenderness. There is no guarding or rebound. Genitourinary:     Comments: Deferred  Musculoskeletal:         General: No deformity. Normal range of motion. Cervical back: Normal range of motion and neck supple. Skin:     General: Skin is warm. Findings: No erythema or rash. Neurological:      Mental Status: She is alert and oriented to person, place, and time. She is not disoriented. Cranial Nerves: No cranial nerve deficit. Motor: No atrophy or abnormal muscle tone. Coordination: Coordination normal.   Psychiatric:         Behavior: Behavior normal.         Thought Content:  Thought content normal.       DIAGNOSTIC RESULTS     EKG: All EKG's are interpreted by the Emergency Department Physician who either signs or Co-signs this chart in the absence of acardiologist.    Interpreted by myself     RADIOLOGY:   Non-plain film images such as CT, Ultrasoundand MRI are read by the radiologist. Plain radiographic images are visualized and preliminarily interpreted by the emergency physician with the below findings:    CT no acute pathology    ED BEDSIDE ULTRASOUND:   Performed by ED Physician - none    LABS:  Labs Reviewed   CBC WITH AUTO DIFFERENTIAL - Abnormal; Notable for the following components:       Result Value    Hemoglobin 11.7 (*)     All other components within normal limits   BASIC METABOLIC PANEL W/ REFLEX TO MG FOR LOW K - Abnormal; Notable for the following components:    Sodium 147 (*)     CO2 19 (*)     Anion Gap 21 (*)     Glucose 66 (*)     BUN 42 (*)     Creatinine 3.4 (*)     Est, Glom Filt Rate 14 (*)     All other components within normal limits   LIPASE   HEPATIC FUNCTION PANEL   ETHANOL       All other labs were withinnormal range or not returned as of this dictation. EMERGENCY DEPARTMENT COURSE and DIFFERENTIAL DIAGNOSIS/MDM:     PMH, Surgical Hx, FH, Social Hx reviewed by myself (ETOH usage, Tobacco usage, Drug usage reviewed by myself, no pertinent Hx)- No Pertinent Hx     Old records were reviewed by me     MDM 26-year-old with ASHOK and pyelonephritis. Patient was here earlier this afternoon. Diagnosed with UTI. Put on antibiotics. States she went home and felt worse. Labs consistent with prerenal ASHOK. Fluids given. Patient has history of MDRO and ESBL. Meropenem started. Patient be admitted for ASHOK and most likely ESBL UTI/pyelonephritis.     DDX-pyelonephritis/ASHOK/sepsis  Social Determinants (Poverty, Education, uninsured) -unknown  Prior notes-PMD notes reviewed patient was just here had a normal CT as well as reassuring labs  Name and route all medications-   Orders Placed This Encounter   Medications    DISCONTD: cefTRIAXone (ROCEPHIN) 1,000 mg in sodium chloride 0.9 % 50 mL IVPB (mini-bag)     Order Specific Question:   Antimicrobial Indications     Answer:   Urinary Tract Infection    cefTRIAXone (ROCEPHIN) injection 1,000 mg     Order Specific Question:   Antimicrobial Indications     Answer:   Urinary Tract Infection    meropenem (MERREM) 1,000 mg in sodium chloride 0.9 % 100 mL IVPB (mini-bag)     Order Specific Question:   Antimicrobial Indications     Answer:   COPD Exacerbation    DISCONTD: 0.9 % sodium chloride bolus    0.9 % sodium chloride bolus    dextrose 50 % IV solution    morphine (PF) injection 4 mg    dextrose 5 % and 0.9 % sodium chloride infusion       Charting interpretations all by myself-imaging as above  Diagnosis descriptions-mild SAHOK  Consults-hospitalist  Disposition- Considered -admission    I PERSONALLY SAW THE PATIENT AND PERFORMED A SUBSTANTIVE PORTION OF THE VISIT INCLUDING ALL ASPECTS OF THE MEDICAL DECISION MAKING PROCESS. The primary clinician of record [employer]   Total Critical Caretime was 39 minutes, excluding separately reportable procedures. There was a high probability of clinically significant/life threatening deterioration in the patient's condition which required my urgent intervention. CRITICAL CARE  I personally saw the patient and independently provided 39 minutes of non-concurrent critical care out of the total shared critical care time provided. This excludes seperately billable procedures. Critical care time was provided for patient as above that required close evaluation and/or intervention with concern for potential patient decompensation. PROCEDURES:  Unlessotherwise noted below, none    FINAL IMPRESSION      1. Acute pyelonephritis    2. ASHOK (acute kidney injury) [facility])          DISPOSITION/PLAN   DISPOSITION Decision To Admit 02/11/2023 03:29:51 AM    PATIENT REFERRED TO:  No follow-up provider specified.     DISCHARGE MEDICATIONS:  New Prescriptions    No medications on file          (Please note that portions ofthis note were completed with a voice recognition program.  Efforts were made to edit the dictations but occasionally words are mis-transcribed.)    Hood Little MD(electronically signed)  Attending Emergency Physician        Hood Little MD  02/11/23 [id number]

## 2023-02-11 NOTE — PROGRESS NOTES
Medication Reconciliation    List of medications patient is currently taking is complete. Source of information: 1. Conversation with patient at bedside                                      2. EPIC records      Allergies  Trazodone and nefazodone, Cipro [ciprofloxacin hcl], Ambien [zolpidem tartrate], Diamox [acetazolamide], Lorazepam, Naproxen, Reglan [metoclopramide], and Sulfa antibiotics     Notes regarding home medications:   1. Patient reports taking medications yesterday PTA  2.  Patient takes Macrobid 100mg daily for UTI prophylaxis      Kai Astudillo, [facility]   2/11/2023  10:28 AM

## 2023-02-11 NOTE — PLAN OF CARE
Problem: Discharge Planning  Goal: Discharge to home or other facility with appropriate resources  Outcome: Progressing  Flowsheets (Taken 2/11/2023 0509)  Discharge to home or other facility with appropriate resources:   Identify barriers to discharge with patient and caregiver   Arrange for needed discharge resources and transportation as appropriate   Identify discharge learning needs (meds, wound care, etc)     Problem: Safety - Adult  Goal: Free from fall injury  Outcome: Progressing  Flowsheets (Taken 2/11/2023 0611)  Free From Fall Injury: Instruct family/caregiver on patient safety     Problem: ABCDS Injury Assessment  Goal: Absence of physical injury  Outcome: Progressing  Flowsheets (Taken 2/11/2023 7836)  Absence of Physical Injury: Implement safety measures based on patient assessment

## 2023-02-11 NOTE — PROGRESS NOTES
Patient seen and examined  Lab work reviewed  Neurology is consulted  We will follow patient in a.m.

## 2023-02-11 NOTE — PROGRESS NOTES
Physician Progress Note      John Fisher  CSN #:                  [id number]  :                       1960  ADMIT DATE:       2/10/2023 11:39 PM  [address] DATE:  RESPONDING  PROVIDER #:        Jasen Chang MD        QUERY TEXT:    Stage of Chronic Kidney Disease: Please provide further specificity, if known. Clinical indicators include: bun, creatinine, ckd  Options provided:  -- Chronic kidney disease stage 1  -- Chronic kidney disease stage 2  -- Chronic kidney disease stage 3  -- Chronic kidney disease stage 3a  -- Chronic kidney disease stage 3b  -- Chronic kidney disease stage 4  -- Chronic kidney disease stage 5  -- Chronic kidney disease stage 5, requiring dialysis  -- End stage renal disease  -- Other - I will add my own diagnosis  -- Disagree - Not applicable / Not valid  -- Disagree - Clinically Unable to determine / Unknown        PROVIDER RESPONSE TEXT:    The patient has chronic kidney disease stage 3b.       Electronically signed by:  Jasen Chang MD 2023 11:23 AM

## 2023-02-11 NOTE — CONSULTS
Neurology Consult Note    Dr Bib Courtney MD asked me to see [facility] in consultation   for evaluation of altered mental status. HPI:  57 yo woman admitted after 2nd presentation to ER for abdominal and flank pain (treatment for UTI in progress); we are consulted because of altered mental status with suspicion of role of polypharmacy. Pt says she has memory of yesterday but not this morning, and  says she wasn't making any sense. She feels much better now and is back to baseline. Has been struggling with abdominal pain and intermittent N/V. Known dx of gastroparesis. Plans to have scope from above (EGD) and below (colonoscopy) on Tuesday. At the time of our encounter, pt reports she is not nauseated. She reports she has fallen 4-5x in the last two weeks, associated with loss of balance but no loss of consciousness. These events are typically just before HS. PMHx:  HTN, DM, CKD,   FHx:     HTN, DM, HLD, cancer, AD  SocHx:  Never smoker. No EtOH use. Meds: Reviewed, includes . .. Gabapentin recorded as 800mg tid, but pt says she takes two 800mg tabs at HS. Seroquel 25mg bid   Effexor 75mg, three tabs qAM   Tizanidine 4mg q8h prn muscle spasm   Others    Allergies: Allergies   Allergen Reactions    Trazodone And Nefazodone Other (See Comments)     headache    Cipro [Ciprofloxacin Hcl] Other (See Comments)     Makes her jittery.     Ambien [Zolpidem Tartrate] Other (See Comments)     Vivid dreams    Diamox [Acetazolamide] Rash    Lorazepam Other (See Comments)     confusion    Naproxen Rash    Reglan [Metoclopramide] Anxiety    Sulfa Antibiotics Rash     Also causes shaking       Review of Systems:  Constitutional: no fever  Musculoskeletal: sore R hip after fall in ER yesterday, no fx  Behavioral: see above  HEENT: denies trouble with vision, hearing, speech, or swallow  CV/Resp: no CP or SOB  GI: see above  : no dysuria  Heme: no unusual bruising or bleeding  Endocrine: DM as noted  Integument: no sores or wounds. Vitals:  /63   Pulse 83   Temp 97.9 °F (36.6 °C) (Oral)   Resp 13   Ht 5' 3\" (1.6 m)   Wt 236 lb 15.9 oz (107.5 kg)   SpO2 92%   BMI 41.98 kg/m²     Exam:    Constitutional: sitting on edge of bed, feeding self dinner. HEENT: no retinal abnormalities identified. Cardiovascular: RRR, normal pulses, no edema. Musculoskeletal: no abnormalities identified. Mental status: alert, oriented. Normal attention. Fluent, provides details with appropriate precision. No language deficit identified. Memory OK. Fund of knowledge OK. Cranial nerves: visual fields full. pupils equal, round, reactive. eye movements intact in all directions, without nystagmus. facial sensation symmetric and normal. facial expression symmetric and normal. no hearing loss identified. speech normal (no dysarthria). tongue and palate are in midline. head in neutral posture, full range of motion. Motor: normal strength. no pronator drift. Tone: normal.   Coordination: normal.  Reflexes: hypoactive. toes downgoing. Sensory: no abnormalities identified. Gait: normal arise, can pivot in place, no spont instability, bears weight normally. Lab and Imaging Data:  CT head: no acute intracranial process. Impression:  Transient encephalopathy, appears now to have recovered to baseline. Role of UTI suspected. Agree with possibility of effects of medications. No focal deficits identified. Recommendations:    Would encourage pt to separate the doses of gabapentin so that she is not taking all of it at once in the evening. Whether 800mg (or a lower unit dose) is appropriate is an issue to be worked out in an outpatient setting with her prescribing physician. Management of UTI in progress. Evaluation of gastroparesis, in outpatient setting, is in progress. No objection to discharge when ready.     ___  Neal Crouch MD PhD

## 2023-02-11 NOTE — PROGRESS NOTES
Pt much more alert this shift. Patients diet advanced per nursing communication. Lost IV on patient. 3 nurses tried X2 to start I with no success, waiting on IV ultrasound nurse to place IV.

## 2023-02-11 NOTE — H&P
Hospital Medicine History & Physical      PCP: Hiral Carnes MD    Date of Admission: 2/10/2023    Date of Service: Pt seen/examined on 2/11/2023 and admitted to inpatient    Chief Complaint: Back pain and flank pain, altered mental status      History Of Present Illness: The patient is a 58 y.o. female with past medical history as below who presents to [facility] after initially presenting to the ED for the abdominal and flank pain but had been sent home due to reassuring lab work but felt worse and came in again. Apparently patient was altered upon presentation may be suspected that it is due to her use of multiple neurologic sedating medications that she takes at home and likely prior to coming to the ED again. Currently patient was feeling worse and was having nausea and vomiting. Patient is currently not able to give me full details but is alert and not lethargic. She does not remember full details.  who is initially here has gone home.   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 ([address]     GERD (gastroesophageal reflux disease)     History of migraine headaches     HTN (hypertension) 05/15/2014    Hyperlipidemia     Irritable bowel syndrome (IBS)     Obesity     Wears glasses     reading       Past Surgical History:        Procedure Laterality Date    ACHILLES TENDON SURGERY Right BACK SURGERY  2013    lumbar    BREAST LUMPECTOMY Left 2010    CARPAL TUNNEL RELEASE Bilateral      SECTION      x 1    CHOLECYSTECTOMY      COLONOSCOPY  10/16/2017    polypectomy- Dr Micaela Wick, COLON, DIAGNOSTIC      ESOPHAGEAL DILATATION N/A 5/15/2019    ESOPHAGEAL DILATION Ann Marie Coco performed by Johanna Perez MD at [address] [facility] Left 2020    LEFT INDEX FINGER TRIGGER FINGER RELEASE performed by Zachary Villalobos MD at [address] Right 2021    PHACOEMULSIFICATION WITH INTRAOCULAR LENS IMPLANT - RIGHT EYE performed by Nayan Sparks MD at [address] Left 3/12/2021    PHACOEMULSIFICATION WITH INTRAOCULAR LENS IMPLANT - LEFT EYE performed by Nayan Sparks MD at [address] N/A 2019    EGD BIOPSY performed by Johanna Perez MD at [address] [place] [address] 5/15/2019    EGD BIOPSY performed by Johanna Perez MD at [address] [place] [address] 10/11/2021    EGD BIOPSY performed by Johanna Perez MD at [place] [address]       Medications Prior to Admission:    Prior to Admission medications    Medication Sig Start Date End Date Taking? Authorizing Provider   tiZANidine (ZANAFLEX) 4 MG tablet TAKE 1 TABLET BY MOUTH EVERY 8 HOURS AS NEEDED FOR MUSCLE SPASM 23   Cristhian Mosqueda MD   gabapentin (NEURONTIN) 800 MG tablet Take 1 tablet by mouth 3 times daily for 90 days.  12/30/22 3/30/23  Cristhian Mosqueda MD   tolterodine (DETROL LA) 4 MG extended release capsule TAKE 1 CAPSULE BY MOUTH EVERY DAY 22   Cristhian Mosqueda MD   Semaglutide, 1 MG/DOSE, 4 MG/3ML SOPN Inject 1 mg into the skin once a week 22   Cristhian Mosqueda MD   metoclopramide (REGLAN) 10 MG tablet Take 1 tablet by mouth 3 times daily (before meals) 22   [facility] Micaela Martines MD   ondansetron (ZOFRAN-ODT) 4 MG disintegrating tablet Take 1 tablet by mouth 3 times daily as needed for Nausea or Vomiting 11/29/22   Liza Sahu MD   atorvastatin (LIPITOR) 80 MG tablet TAKE 1 TABLET BY MOUTH EVERY DAY 11/28/22   [facility] MD Adelina   metoprolol succinate (TOPROL XL) 50 MG extended release tablet TAKE 1 TABLET BY MOUTH EVERY DAY 11/28/22   [facility] MD Adelina   amLODIPine (NORVASC) 5 MG tablet TAKE 1 TABLET BY MOUTH DAILY 11/28/22   [facility] MD Adelina   QUEtiapine (SEROQUEL) 25 MG tablet TAKE 1 TABLET BY MOUTH EVERY MORNING AND 4 EVERY EVENING 10/27/22   [facility] MD Adelina   insulin glargine [facility]) 100 UNIT/ML injection pen ADMINISTER 80 UNITS UNDER THE SKIN EVERY NIGHT 9/29/22   [facility] MD Adelina   lisinopril-hydroCHLOROthiazide RGIMALDO [facility]) 20-12.5 MG per tablet TAKE 1 TABLET BY MOUTH DAILY 9/16/22   Wilson Harper MD   Insulin Aspart FlexPen 100 UNIT/ML SOPN INJECT INTO SKIN WITH DINNER AS DIRECTED.<100=NONE, 100-180=10 UNITS, 181-230=15U, 231-280-20U,>281=25U 8/16/22   Liza Sahu MD   venlafaxine (EFFEXOR XR) 75 MG extended release capsule TAKE 3 CAPSULES BY MOUTH DAILY 8/8/22   Liza Sahu MD   blood glucose test strips (TRUE METRIX BLOOD GLUCOSE TEST) strip USE THREE TIMES DAILY 6/14/22   GLADIS Bedolal - CNP   nitrofurantoin, macrocrystal-monohydrate, (MACROBID) 100 MG capsule Take 100 mg by mouth daily  Patient not taking: Reported on 2/10/2023 9/29/21   Historical Provider, MD BRYANT UF III MINI PEN NEEDLES 31G X 5 MM MISC USE ON PEN THREE TIMES DAILY 3/16/22   Francisco Pickering MD   omeprazole (PRILOSEC) 40 MG delayed release capsule Take 40 mg by mouth daily  9/28/18   Historical Provider, MD       Allergies:  Trazodone and nefazodone, Cipro [ciprofloxacin hcl], Ambien [zolpidem tartrate], Diamox [acetazolamide], Lorazepam, Naproxen, Reglan [metoclopramide], and Sulfa antibiotics    Social History:  The patient currently lives home    TOBACCO:   reports that she has never smoked. She has never used smokeless tobacco.  ETOH:   reports no history of alcohol use. Family History:  Reviewed in detail and negative for DM, Early CAD, Cancer, CVA. Positive as follows:        Problem Relation Age of Onset    Cancer Mother         forehead soft tissue    Diabetes Mother     Arthritis Father     High Blood Pressure Father     High Cholesterol Father     Alzheimer's Disease Father     Depression Sister     Diabetes Brother     Colon Cancer Paternal Grandmother        REVIEW OF SYSTEMS:    and as noted in the HPI. All other systems reviewed and negative. PHYSICAL EXAM:    /60   Pulse 81   Temp 97.9 °F (36.6 °C) (Oral)   Resp 11   Ht 5' 3\" (1.6 m)   Wt 236 lb 15.9 oz (107.5 kg)   SpO2 98%   BMI 41.98 kg/m²     General appearance: Currently no acute respiratory distress, alert but not entirely oriented, fatigued appearing  HEENT Normal cephalic, atraumatic without obvious deformity. Pupils equal, round, and reactive to light. Extra ocular muscles intact. Dry mucous membranes, anicteric sclera  Neck: Supple, no JVD  Lungs: Diminished breath sounds bilaterally  Heart: Regular Rate and rhythm, no murmurs  Abdomen: Soft, nontender, nondistended, active bowel sounds  Extremities: Trace bilateral lower extremity nonpitting edema symmetrically  Skin: No rashes  Neurologic: Unable to fully evaluate due to mental status  Mental status: Alert but not entirely oriented  Capillary Refill: Acceptable  < 3 seconds  Peripheral Pulses: +3 Easily felt, not easily obliterated with pressure    CT abdomen pelvis without contrast at 2359:  No obstructive uropathy. Bilateral renal cortical scarring. Mild bilateral perinephric stranding is   seen, which can be chronic though if acute could indicate pyelonephritis;   correlate with urinalysis.        Fat containing paraumbilical hernia.       91/07/91 1898  CT CHEST ABDOMEN PELVIS WO CONTRAST Additional Contrast? None   Performed: 02/10/23 1304  Final        Impression:  1. Fat containing umbilical ventral hernia. 2. Otherwise no acute pathology or significant change noted. 02/10/23 1333  CT CERVICAL SPINE WO CONTRAST   Performed: 02/10/23 1304  Final        Impression:  No evidence of fracture with degenerative changes as described. 02/10/23 1330  CT HEAD WO CONTRAST   Performed: 02/10/23 1303  Final        Impression:  1. No evidence of acute intracranial process. 2. Findings of presumed mild small vessel ischemic deep white matter disease. 02/10/23 1258  XR WRIST RIGHT (MIN 3 VIEWS)   Performed: 02/10/23 1219  Final        Impression: No acute findings       02/10/23 1252  XR ELBOW RIGHT (MIN 3 VIEWS)   Performed: 02/10/23 1219  Final        Impression: No acute findings           CBC   Recent Labs     02/10/23  2357   WBC 8.9   HGB 11.7*   HCT 36.8         RENAL  Recent Labs     02/10/23  2357   *   K 3.9      CO2 19*   BUN 42*   CREATININE 3.4*     LFT'S  Recent Labs     02/10/23  2357   AST 35   ALT 23   BILIDIR <0.2   BILITOT 0.3   ALKPHOS 102     COAG  No results for input(s): INR in the last 72 hours. CARDIAC ENZYMES  No results for input(s): CKTOTAL, CKMB, CKMBINDEX, TROPONINI in the last 72 hours.     U/A:    Lab Results   Component Value Date/Time    NITRITE neg 10/07/2022 12:29 PM    COLORU Yellow 01/27/2023 03:00 PM    WBCUA 218 01/27/2023 03:00 PM    RBCUA 8 01/27/2023 03:00 PM    MUCUS 1+ 07/12/2018 12:55 AM    BACTERIA 4+ 01/27/2023 03:00 PM    CLARITYU CLOUDY 01/27/2023 03:00 PM    SPECGRAV 1.029 01/27/2023 03:00 PM    LEUKOCYTESUR MODERATE 01/27/2023 03:00 PM    BLOODU MODERATE 01/27/2023 03:00 PM    GLUCOSEU 100 01/27/2023 03:00 PM    GLUCOSEU NEGATIVE 07/19/2010 06:26 PM       ABG  No results found for: QSX1ZHS, BEART, V7QOEZBQ, PHART, THGBART, NBU8LGF, PO2ART, [facility] CHILDREN Problems Diagnosis Date Noted    Acute kidney injury superimposed on CKD ([facility] [address] [N17.9, N18.9] 02/11/2023     Priority: Medium    Pyelonephritis [N12] 02/11/2023     Priority: Medium    AMS (altered mental status) [R41.82] 02/11/2023     Priority: Medium    Type 2 diabetes mellitus with microalbuminuria, with long-term current use of insulin (HCC) [E11.29, R80.9, Z79.4] 05/20/2014    Essential hypertension [I10] 05/15/2014   Polypharmacy      PHYSICIANS CERTIFICATION:    I certify that Nick Ocampo is expected to be hospitalized for greater than than 2 midnights based on the following assessment and plan:      ASSESSMENT/PLAN:  Altered mental status  Pyelonephritis  ASHOK on CKD  Polypharmacy  Type 2 diabetes  Hypertension    Plan:  Suspect some component that patient does have polypharmacy causing some altered mental status in addition to ASHOK and possible infectious process  Start patient on meropenem for urinary tract infection  Continue patient on IV fluid hydration with D5 normal saline, consider switching IV fluids if patient is becoming hyperglycemic  We will restart some of patient's home medications including her Seroquel at 25 mg at night instead of 100 mg at night and 25 the day, gabapentin at 400 3 times daily rather than 800 3 times daily, and decreased Effexor to 75 mg daily  Neurology consult for suspected polypharmacy causing altered mental status  Repeat labs daily  Restart patient's other home medications of possible  N.p.o. and advance diet as tolerated    DVT Prophylaxis: Lovenox  Diet: Diet NPO Exceptions are: Ice Chips, Sips of Water with Meds  Code Status: Full Code  PT/OT Eval Status: Ambulatory    Dispo -pending clinical course       Yoni Douglas DO    Thank you Nick Starks MD for the opportunity to be involved in this patient's care. If you have any questions or concerns please feel free to contact me at [phone].

## 2023-02-12 LAB
ALBUMIN SERPL-MCNC: 3.1 G/DL (ref 3.4–5)
ANION GAP SERPL CALCULATED.3IONS-SCNC: 14 MMOL/L (ref 3–16)
BACTERIA: ABNORMAL /HPF
BASOPHILS ABSOLUTE: 0.1 K/UL (ref 0–0.2)
BASOPHILS RELATIVE PERCENT: 1.6 %
BILIRUBIN URINE: NEGATIVE
BLOOD, URINE: NEGATIVE
BUN BLDV-MCNC: 34 MG/DL (ref 7–20)
CALCIUM SERPL-MCNC: 8.8 MG/DL (ref 8.3–10.6)
CHLORIDE BLD-SCNC: 111 MMOL/L (ref 99–110)
CLARITY: CLEAR
CO2: 20 MMOL/L (ref 21–32)
COLOR: YELLOW
CREAT SERPL-MCNC: 1.8 MG/DL (ref 0.6–1.2)
EOSINOPHILS ABSOLUTE: 0.3 K/UL (ref 0–0.6)
EOSINOPHILS RELATIVE PERCENT: 3.7 %
EPITHELIAL CELLS, UA: 1 /HPF (ref 0–5)
GFR SERPL CREATININE-BSD FRML MDRD: 31 ML/MIN/{1.73_M2}
GLUCOSE BLD-MCNC: 133 MG/DL (ref 70–99)
GLUCOSE BLD-MCNC: 177 MG/DL (ref 70–99)
GLUCOSE BLD-MCNC: 177 MG/DL (ref 70–99)
GLUCOSE BLD-MCNC: 212 MG/DL (ref 70–99)
GLUCOSE BLD-MCNC: 223 MG/DL (ref 70–99)
GLUCOSE BLD-MCNC: 69 MG/DL (ref 70–99)
GLUCOSE BLD-MCNC: 88 MG/DL (ref 70–99)
GLUCOSE URINE: NEGATIVE MG/DL
HCT VFR BLD CALC: 35 % (ref 36–48)
HEMOGLOBIN: 11.6 G/DL (ref 12–16)
HYALINE CASTS: 1 /LPF (ref 0–8)
KETONES, URINE: NEGATIVE MG/DL
LEUKOCYTE ESTERASE, URINE: ABNORMAL
LYMPHOCYTES ABSOLUTE: 2.7 K/UL (ref 1–5.1)
LYMPHOCYTES RELATIVE PERCENT: 36.9 %
MAGNESIUM: 1.4 MG/DL (ref 1.8–2.4)
MCH RBC QN AUTO: 29.4 PG (ref 26–34)
MCHC RBC AUTO-ENTMCNC: 33 G/DL (ref 31–36)
MCV RBC AUTO: 88.9 FL (ref 80–100)
MICROSCOPIC EXAMINATION: YES
MONOCYTES ABSOLUTE: 0.7 K/UL (ref 0–1.3)
MONOCYTES RELATIVE PERCENT: 9.1 %
NEUTROPHILS ABSOLUTE: 3.6 K/UL (ref 1.7–7.7)
NEUTROPHILS RELATIVE PERCENT: 48.7 %
NITRITE, URINE: NEGATIVE
PDW BLD-RTO: 14 % (ref 12.4–15.4)
PERFORMED ON: ABNORMAL
PH UA: 5 (ref 5–8)
PHOSPHORUS: 2.6 MG/DL (ref 2.5–4.9)
PLATELET # BLD: 216 K/UL (ref 135–450)
PMV BLD AUTO: 9.5 FL (ref 5–10.5)
POTASSIUM SERPL-SCNC: 3.9 MMOL/L (ref 3.5–5.1)
PROCALCITONIN: 0.09 NG/ML (ref 0–0.15)
PROTEIN UA: 100 MG/DL
RBC # BLD: 3.94 M/UL (ref 4–5.2)
RBC UA: 0 /HPF (ref 0–4)
SODIUM BLD-SCNC: 145 MMOL/L (ref 136–145)
SPECIFIC GRAVITY UA: 1.01 (ref 1–1.03)
URINE TYPE: ABNORMAL
UROBILINOGEN, URINE: 0.2 E.U./DL
WBC # BLD: 7.4 K/UL (ref 4–11)
WBC UA: 6 /HPF (ref 0–5)

## 2023-02-12 PROCEDURE — 6360000002 HC RX W HCPCS: Performed by: STUDENT IN AN ORGANIZED HEALTH CARE EDUCATION/TRAINING PROGRAM

## 2023-02-12 PROCEDURE — 81001 URINALYSIS AUTO W/SCOPE: CPT

## 2023-02-12 PROCEDURE — 6360000002 HC RX W HCPCS: Performed by: HOSPITALIST

## 2023-02-12 PROCEDURE — 83735 ASSAY OF MAGNESIUM: CPT

## 2023-02-12 PROCEDURE — 2580000003 HC RX 258: Performed by: INTERNAL MEDICINE

## 2023-02-12 PROCEDURE — 94760 N-INVAS EAR/PLS OXIMETRY 1: CPT

## 2023-02-12 PROCEDURE — 36415 COLL VENOUS BLD VENIPUNCTURE: CPT

## 2023-02-12 PROCEDURE — 2580000003 HC RX 258: Performed by: STUDENT IN AN ORGANIZED HEALTH CARE EDUCATION/TRAINING PROGRAM

## 2023-02-12 PROCEDURE — 6360000002 HC RX W HCPCS: Performed by: NURSE PRACTITIONER

## 2023-02-12 PROCEDURE — 6370000000 HC RX 637 (ALT 250 FOR IP): Performed by: STUDENT IN AN ORGANIZED HEALTH CARE EDUCATION/TRAINING PROGRAM

## 2023-02-12 PROCEDURE — 2580000003 HC RX 258: Performed by: HOSPITALIST

## 2023-02-12 PROCEDURE — 80069 RENAL FUNCTION PANEL: CPT

## 2023-02-12 PROCEDURE — 2500000003 HC RX 250 WO HCPCS: Performed by: NURSE PRACTITIONER

## 2023-02-12 PROCEDURE — 6370000000 HC RX 637 (ALT 250 FOR IP): Performed by: HOSPITALIST

## 2023-02-12 PROCEDURE — 85025 COMPLETE CBC W/AUTO DIFF WBC: CPT

## 2023-02-12 PROCEDURE — 2060000000 HC ICU INTERMEDIATE R&B

## 2023-02-12 PROCEDURE — 2500000003 HC RX 250 WO HCPCS: Performed by: INTERNAL MEDICINE

## 2023-02-12 PROCEDURE — 84145 PROCALCITONIN (PCT): CPT

## 2023-02-12 RX ORDER — DIPHENHYDRAMINE HYDROCHLORIDE 50 MG/ML
12.5 INJECTION INTRAMUSCULAR; INTRAVENOUS ONCE
Status: COMPLETED | OUTPATIENT
Start: 2023-02-12 | End: 2023-02-12

## 2023-02-12 RX ORDER — DROPERIDOL 2.5 MG/ML
1.25 INJECTION, SOLUTION INTRAMUSCULAR; INTRAVENOUS ONCE
Status: COMPLETED | OUTPATIENT
Start: 2023-02-12 | End: 2023-02-12

## 2023-02-12 RX ORDER — LABETALOL HYDROCHLORIDE 5 MG/ML
10 INJECTION, SOLUTION INTRAVENOUS ONCE
Status: COMPLETED | OUTPATIENT
Start: 2023-02-12 | End: 2023-02-12

## 2023-02-12 RX ORDER — AMLODIPINE BESYLATE 10 MG/1
10 TABLET ORAL DAILY
Status: DISCONTINUED | OUTPATIENT
Start: 2023-02-12 | End: 2023-02-13 | Stop reason: HOSPADM

## 2023-02-12 RX ORDER — HYDRALAZINE HYDROCHLORIDE 20 MG/ML
10 INJECTION INTRAMUSCULAR; INTRAVENOUS EVERY 4 HOURS PRN
Status: DISCONTINUED | OUTPATIENT
Start: 2023-02-12 | End: 2023-02-13 | Stop reason: HOSPADM

## 2023-02-12 RX ORDER — INSULIN LISPRO 100 [IU]/ML
5 INJECTION, SOLUTION INTRAVENOUS; SUBCUTANEOUS
Status: DISCONTINUED | OUTPATIENT
Start: 2023-02-12 | End: 2023-02-12

## 2023-02-12 RX ORDER — SODIUM CHLORIDE 9 MG/ML
INJECTION, SOLUTION INTRAVENOUS CONTINUOUS
Status: DISCONTINUED | OUTPATIENT
Start: 2023-02-12 | End: 2023-02-12

## 2023-02-12 RX ADMIN — DROPERIDOL 1.25 MG: 2.5 INJECTION, SOLUTION INTRAMUSCULAR; INTRAVENOUS at 22:09

## 2023-02-12 RX ADMIN — LABETALOL HYDROCHLORIDE 10 MG: 5 INJECTION INTRAVENOUS at 21:49

## 2023-02-12 RX ADMIN — ACETAMINOPHEN 325MG 650 MG: 325 TABLET ORAL at 09:34

## 2023-02-12 RX ADMIN — GABAPENTIN 400 MG: 400 CAPSULE ORAL at 20:57

## 2023-02-12 RX ADMIN — ACETAMINOPHEN 325MG 650 MG: 325 TABLET ORAL at 20:57

## 2023-02-12 RX ADMIN — VENLAFAXINE HYDROCHLORIDE 75 MG: 75 CAPSULE, EXTENDED RELEASE ORAL at 09:34

## 2023-02-12 RX ADMIN — GABAPENTIN 400 MG: 400 CAPSULE ORAL at 14:20

## 2023-02-12 RX ADMIN — HYDRALAZINE HYDROCHLORIDE 10 MG: 20 INJECTION INTRAMUSCULAR; INTRAVENOUS at 12:46

## 2023-02-12 RX ADMIN — SODIUM BICARBONATE: 84 INJECTION, SOLUTION INTRAVENOUS at 14:17

## 2023-02-12 RX ADMIN — INSULIN LISPRO 2 UNITS: 100 INJECTION, SOLUTION INTRAVENOUS; SUBCUTANEOUS at 18:16

## 2023-02-12 RX ADMIN — MEROPENEM 500 MG: 500 INJECTION, POWDER, FOR SOLUTION INTRAVENOUS at 06:05

## 2023-02-12 RX ADMIN — Medication 10 ML: at 20:58

## 2023-02-12 RX ADMIN — CEFTRIAXONE 2000 MG: 2 INJECTION, POWDER, FOR SOLUTION INTRAMUSCULAR; INTRAVENOUS at 13:24

## 2023-02-12 RX ADMIN — HYDRALAZINE HYDROCHLORIDE 10 MG: 20 INJECTION INTRAMUSCULAR; INTRAVENOUS at 20:57

## 2023-02-12 RX ADMIN — PANTOPRAZOLE SODIUM 40 MG: 40 TABLET, DELAYED RELEASE ORAL at 06:04

## 2023-02-12 RX ADMIN — GABAPENTIN 400 MG: 400 CAPSULE ORAL at 09:34

## 2023-02-12 RX ADMIN — DIPHENHYDRAMINE HYDROCHLORIDE 12.5 MG: 50 INJECTION, SOLUTION INTRAMUSCULAR; INTRAVENOUS at 21:49

## 2023-02-12 RX ADMIN — ATORVASTATIN CALCIUM 80 MG: 80 TABLET, FILM COATED ORAL at 09:34

## 2023-02-12 RX ADMIN — DEXTROSE AND SODIUM CHLORIDE: 5; 900 INJECTION, SOLUTION INTRAVENOUS at 11:47

## 2023-02-12 RX ADMIN — INSULIN LISPRO 5 UNITS: 100 INJECTION, SOLUTION INTRAVENOUS; SUBCUTANEOUS at 18:15

## 2023-02-12 RX ADMIN — Medication 10 ML: at 09:46

## 2023-02-12 RX ADMIN — METOPROLOL SUCCINATE 50 MG: 50 TABLET, EXTENDED RELEASE ORAL at 09:34

## 2023-02-12 RX ADMIN — AMLODIPINE BESYLATE 10 MG: 10 TABLET ORAL at 11:48

## 2023-02-12 RX ADMIN — HYDRALAZINE HYDROCHLORIDE 10 MG: 20 INJECTION INTRAMUSCULAR; INTRAVENOUS at 17:11

## 2023-02-12 RX ADMIN — ENOXAPARIN SODIUM 30 MG: 100 INJECTION SUBCUTANEOUS at 09:37

## 2023-02-12 RX ADMIN — QUETIAPINE FUMARATE 25 MG: 25 TABLET ORAL at 20:57

## 2023-02-12 ASSESSMENT — PAIN DESCRIPTION - LOCATION
LOCATION: HEAD
LOCATION: MOUTH;HEAD

## 2023-02-12 ASSESSMENT — PAIN DESCRIPTION - DESCRIPTORS: DESCRIPTORS: ACHING;DISCOMFORT

## 2023-02-12 ASSESSMENT — PAIN DESCRIPTION - ORIENTATION: ORIENTATION: MID

## 2023-02-12 ASSESSMENT — PAIN SCALES - GENERAL
PAINLEVEL_OUTOF10: 5
PAINLEVEL_OUTOF10: 7

## 2023-02-12 ASSESSMENT — PAIN - FUNCTIONAL ASSESSMENT: PAIN_FUNCTIONAL_ASSESSMENT: ACTIVITIES ARE NOT PREVENTED

## 2023-02-12 NOTE — PROGRESS NOTES
Hospitalist Progress Note  2/12/2023 9:40 AM    PCP: Faustina De Jesus MD    [phone]     Date of Admission: 2/10/2023                                                                                                                     HOSPITAL COURSE    Patient demographics:  The patient  Mariella Rhodes is a 58 y.o. female     Significant past medical history:   Patient Active Problem List   Diagnosis    Diabetic gastroparesis ([facility] [address]    Allergic rhinitis    Insomnia    Essential hypertension    Headaches due to old head injury    Type 2 diabetes mellitus with microalbuminuria, with long-term current use of insulin ([facility])    Migraine    Depression, major    Right knee DJD    Calcific Achilles tendonitis, right    Lumbar spinal stenosis    Urge incontinence of urine    Personal history of breast cancer    Hyperlipidemia LDL goal <793    Metallic taste    Severe obesity (BMI 35.0-39. 9) with comorbidity ([facility]    Anxiety    History of colon polyps    Irritable bowel syndrome with both constipation and diarrhea    Trigger finger, acquired    Bilateral edema of lower extremity    Achilles tendinitis of both lower extremities    Arthritis of left hip    Lumbar facet arthropathy    DDD (degenerative disc disease), lumbar    CKD stage 3 due to type 2 diabetes mellitus ([facility])    Fibromyalgia    Acute kidney injury superimposed on CKD ([facility])    Pyelonephritis    AMS (altered mental status)         Presenting symptoms:      Diagnostic workup:      CONSULTS DURING ADMISSION :   IP CONSULT TO NEUROLOGY      Patient was diagnosed with:  Altered mental status  Pyelonephritis  ASHOK on CKD  Polypharmacy  Type 2 diabetes  Hypertension         Treatment while inpatient:                                                                                         ----------------------------------------------------------      SUBJECTIVE COMPLAINTS-     Diet: ADULT DIET; Regular; 4 carb choices (60 gm/meal);  No Added Salt (3-4 gm)      OBJECTIVE:   Patient Active Problem List   Diagnosis    Diabetic gastroparesis (HCC)    Allergic rhinitis    Insomnia    Essential hypertension    Headaches due to old head injury    Type 2 diabetes mellitus with microalbuminuria, with long-term current use of insulin ([facility])    Migraine    Depression, major    Right knee DJD    Calcific Achilles tendonitis, right    Lumbar spinal stenosis    Urge incontinence of urine    Personal history of breast cancer    Hyperlipidemia LDL goal <290    Metallic taste    Severe obesity (BMI 35.0-39. 9) with comorbidity ([facility] [address]    Anxiety    History of colon polyps    Irritable bowel syndrome with both constipation and diarrhea    Trigger finger, acquired    Bilateral edema of lower extremity    Achilles tendinitis of both lower extremities    Arthritis of left hip    Lumbar facet arthropathy    DDD (degenerative disc disease), lumbar    CKD stage 3 due to type 2 diabetes mellitus ([facility])    Fibromyalgia    Acute kidney injury superimposed on CKD ([facility])    Pyelonephritis    AMS (altered mental status)       Allergies  Trazodone and nefazodone, Cipro [ciprofloxacin hcl], Ambien [zolpidem tartrate], Diamox [acetazolamide], Lorazepam, Naproxen, Reglan [metoclopramide], and Sulfa antibiotics    Medications    Scheduled Meds:   atorvastatin  80 mg Oral Daily    gabapentin  400 mg Oral TID    metoprolol succinate  50 mg Oral Daily    pantoprazole  40 mg Oral QAM AC    QUEtiapine  25 mg Oral Nightly    venlafaxine  75 mg Oral Daily with breakfast    sodium chloride flush  5-40 mL IntraVENous 2 times per day    enoxaparin  30 mg SubCUTAneous Daily    insulin lispro  0-4 Units SubCUTAneous Q4H    meropenem  500 mg IntraVENous Q12H    insulin lispro  0-8 Units SubCUTAneous TID WC    insulin lispro  0-4 Units SubCUTAneous Nightly     Continuous Infusions:   dextrose 5 % and 0.9 % NaCl Stopped (02/11/23 1330)    sodium chloride      dextrose       PRN Meds:  sodium chloride flush, sodium chloride, acetaminophen **OR** acetaminophen, polyethylene glycol, glucose, dextrose bolus **OR** dextrose bolus, glucagon (rDNA), dextrose, ondansetron    Vitals   Vitals /wt Patient Vitals for the past 8 hrs:   BP Temp Temp src Pulse Resp SpO2 Weight   02/12/23 0924 (!) 198/88 98.7 °F (37.1 °C) Oral 99 18 94 % --   02/12/23 0525 -- -- -- -- -- -- 243 lb 9.7 oz (110.5 kg)   02/12/23 0341 (!) 167/73 98.3 °F (36.8 °C) Oral 92 15 94 % --        72HR INTAKE/OUTPUT:    Intake/Output Summary (Last 24 hours) at 2/12/2023 0940  Last data filed at 2/11/2023 1730  Gross per 24 hour   Intake 981.37 ml   Output --   Net 981.37 ml       Exam:    Gen:   Alert and oriented ×3    Eyes: PERRL. No sclera icterus. No conjunctival injection. ENT: No discharge. Pharynx clear. External appearance of ears and nose normal.  Neck: Trachea midline. No obvious mass. Resp: No accessory muscle use. No crackles. No wheezes. No rhonchi. CV: Regular rate. Regular rhythm. No murmur or rub. No edema. GI: Non-tender. Non-distended. No hernia. Skin: Warm, dry, normal texture and turgor. Lymph: No cervical LAD. No supraclavicular LAD. M/S: / Ext. No cyanosis. No clubbing. No joint deformity. Neuro: CN 2-12 are intact,  no neurologic deficits noted. PT/INR: No results for input(s): PROTIME, INR in the last 72 hours. APTT: No results for input(s): APTT in the last 72 hours. CBC:   Recent Labs     02/10/23  2357 02/12/23  0559   WBC 8.9 7.4   HGB 11.7* 11.6*   HCT 36.8 35.0*   MCV 90.9 88.9    216       BMP:   Recent Labs     02/10/23  2357 02/12/23  0559   * 145   K 3.9 3.9    111*   CO2 19* 20*   PHOS  --  2.6   BUN 42* 34*   CREATININE 3.4* 1.8*       LIVER PROFILE:   Recent Labs     02/10/23  2357   ALKPHOS 102   AST 35   ALT 23   BILIDIR <0.2   BILITOT 0.3     No results for input(s): AMYLASE in the last 72 hours.     Recent Labs     02/10/23  2357   LIPASE 21.0       UA:No results for input(s): NITRITE, LABCAST, WBCUA, RBCUA, MUCUS in the last 72 hours. TROPONIN: No results for input(s): Curlie Lat in the last 72 hours. Lab Results   Component Value Date/Time    URRFLXCULT Yes 02/09/2021 11:43 AM       No results for input(s): TSHREFLEX in the last 72 hours. No components found for: [id number]  POC GLUCOSE:    Recent Labs     02/11/23  1700 02/11/23  1942 02/11/23  2359 02/12/23  0342 02/12/23  0835   POCGLU 126* 137* 177* 133* 69*     No results for input(s): LABA1C in the last 72 hours. Lab Results   Component Value Date/Time    LABA1C 7.8 11/29/2022 04:46 PM         ASSESSMENT AND PLAN  Altered mental status  Likely due to hypoglycemia  Patient is on high dose of Lantus and underwhelmed acute on chronic renal failure that in turn called hypoglycemia      Pyelonephritis  Change meropenem to ceftriaxone  Procalcitonin is not elevated  WBC count in the urine is 6      ASHOK on CKD  Consult to nephrology for further evaluation    Polypharmacy  We will restart some of patient's home medications including her Seroquel at 25 mg at night instead of 100 mg at night and 25 the day, gabapentin at 400 3 times daily rather than 800 3 times daily, and decreased Effexor to 75 mg daily      Type 2 diabetes  Patient's Lantus dose needs to be decreased      Hypertension  Uncontrolled hypertension  Add amlodipine and hydralazine as needed  Continue home Toprol-XL                   Code Status: Full Code        Dispo - cc        The patient and / or the family were informed of the results of any tests, a time was given to answer questions, a plan was proposed and they agreed with plan. Nan Gibson MD    This note was transcribed using [id number]. Please disregard any translational errors.

## 2023-02-12 NOTE — CONSULTS
Patient seen and examined, consult note dictated. Assessment and Plan:    1- A/CKD: The patient has chronic kidney disease with a baseline creatinine of 1.3 to 1.5 mg/dl. Her ASHOK is likely secondary to a pre-renal component, although a non oliguric ATN in the setting of hypotension cannot be ruled out. Her urinary output is well maintained and her serum creatinine is slowly trending down. - Continue volume expansion.  - Avoid all nephrotoxic agents at this time. - Maintain systolic blood pressure > 120 mmHg. 2- No significant electrolytes disorders noted. 3- HTN: Blood pressure within acceptable range. 4- Anemia: Stable hemoglobin, monitor.   5- Pyelonephritis: Currently on IV antibiotics per primary team.

## 2023-02-12 NOTE — PROGRESS NOTES
Pharmacy has adjusted the Rocephin dose per hospital policy  Pt  weight  437 kg    Rocephin 2000 mg ivpb  q  24 h

## 2023-02-13 VITALS
OXYGEN SATURATION: 97 % | WEIGHT: 233.91 LBS | HEART RATE: 108 BPM | HEIGHT: 63 IN | TEMPERATURE: 98.1 F | RESPIRATION RATE: 16 BRPM | BODY MASS INDEX: 41.45 KG/M2 | SYSTOLIC BLOOD PRESSURE: 192 MMHG | DIASTOLIC BLOOD PRESSURE: 93 MMHG

## 2023-02-13 LAB
ALBUMIN SERPL-MCNC: 3.3 G/DL (ref 3.4–5)
ANION GAP SERPL CALCULATED.3IONS-SCNC: 16 MMOL/L (ref 3–16)
BASOPHILS ABSOLUTE: 0.1 K/UL (ref 0–0.2)
BASOPHILS RELATIVE PERCENT: 1.1 %
BUN BLDV-MCNC: 22 MG/DL (ref 7–20)
CALCIUM SERPL-MCNC: 9.3 MG/DL (ref 8.3–10.6)
CHLORIDE BLD-SCNC: 98 MMOL/L (ref 99–110)
CO2: 20 MMOL/L (ref 21–32)
CREAT SERPL-MCNC: 1.1 MG/DL (ref 0.6–1.2)
EOSINOPHILS ABSOLUTE: 0 K/UL (ref 0–0.6)
EOSINOPHILS RELATIVE PERCENT: 0.5 %
GFR SERPL CREATININE-BSD FRML MDRD: 56 ML/MIN/{1.73_M2}
GLUCOSE BLD-MCNC: 244 MG/DL (ref 70–99)
GLUCOSE BLD-MCNC: 253 MG/DL (ref 70–99)
GLUCOSE BLD-MCNC: 254 MG/DL (ref 70–99)
GLUCOSE BLD-MCNC: 270 MG/DL (ref 70–99)
HCT VFR BLD CALC: 40.4 % (ref 36–48)
HEMOGLOBIN: 13 G/DL (ref 12–16)
LYMPHOCYTES ABSOLUTE: 2.1 K/UL (ref 1–5.1)
LYMPHOCYTES RELATIVE PERCENT: 24.2 %
MAGNESIUM: 1 MG/DL (ref 1.8–2.4)
MCH RBC QN AUTO: 28.6 PG (ref 26–34)
MCHC RBC AUTO-ENTMCNC: 32.2 G/DL (ref 31–36)
MCV RBC AUTO: 88.9 FL (ref 80–100)
MONOCYTES ABSOLUTE: 0.7 K/UL (ref 0–1.3)
MONOCYTES RELATIVE PERCENT: 8 %
NEUTROPHILS ABSOLUTE: 5.8 K/UL (ref 1.7–7.7)
NEUTROPHILS RELATIVE PERCENT: 66.2 %
PDW BLD-RTO: 14.3 % (ref 12.4–15.4)
PERFORMED ON: ABNORMAL
PHOSPHORUS: 2.5 MG/DL (ref 2.5–4.9)
PLATELET # BLD: 246 K/UL (ref 135–450)
PMV BLD AUTO: 10.1 FL (ref 5–10.5)
POTASSIUM SERPL-SCNC: 5.6 MMOL/L (ref 3.5–5.1)
RBC # BLD: 4.55 M/UL (ref 4–5.2)
REASON FOR REJECTION: NORMAL
REJECTED TEST: NORMAL
SODIUM BLD-SCNC: 134 MMOL/L (ref 136–145)
WBC # BLD: 8.7 K/UL (ref 4–11)

## 2023-02-13 PROCEDURE — 83735 ASSAY OF MAGNESIUM: CPT

## 2023-02-13 PROCEDURE — 6370000000 HC RX 637 (ALT 250 FOR IP): Performed by: STUDENT IN AN ORGANIZED HEALTH CARE EDUCATION/TRAINING PROGRAM

## 2023-02-13 PROCEDURE — 94760 N-INVAS EAR/PLS OXIMETRY 1: CPT

## 2023-02-13 PROCEDURE — 80069 RENAL FUNCTION PANEL: CPT

## 2023-02-13 PROCEDURE — 85025 COMPLETE CBC W/AUTO DIFF WBC: CPT

## 2023-02-13 PROCEDURE — 6360000002 HC RX W HCPCS: Performed by: INTERNAL MEDICINE

## 2023-02-13 PROCEDURE — 6360000002 HC RX W HCPCS: Performed by: HOSPITALIST

## 2023-02-13 PROCEDURE — 2580000003 HC RX 258: Performed by: INTERNAL MEDICINE

## 2023-02-13 PROCEDURE — 6360000002 HC RX W HCPCS: Performed by: STUDENT IN AN ORGANIZED HEALTH CARE EDUCATION/TRAINING PROGRAM

## 2023-02-13 PROCEDURE — 2500000003 HC RX 250 WO HCPCS: Performed by: INTERNAL MEDICINE

## 2023-02-13 PROCEDURE — 6370000000 HC RX 637 (ALT 250 FOR IP): Performed by: INTERNAL MEDICINE

## 2023-02-13 PROCEDURE — 2580000003 HC RX 258: Performed by: STUDENT IN AN ORGANIZED HEALTH CARE EDUCATION/TRAINING PROGRAM

## 2023-02-13 PROCEDURE — 36415 COLL VENOUS BLD VENIPUNCTURE: CPT

## 2023-02-13 PROCEDURE — 6370000000 HC RX 637 (ALT 250 FOR IP): Performed by: HOSPITALIST

## 2023-02-13 PROCEDURE — 2580000003 HC RX 258: Performed by: HOSPITALIST

## 2023-02-13 RX ORDER — INSULIN GLARGINE 100 [IU]/ML
20 INJECTION, SOLUTION SUBCUTANEOUS NIGHTLY
Status: DISCONTINUED | OUTPATIENT
Start: 2023-02-13 | End: 2023-02-13 | Stop reason: HOSPADM

## 2023-02-13 RX ORDER — HYDROXYZINE PAMOATE 25 MG/1
50 CAPSULE ORAL ONCE
Status: COMPLETED | OUTPATIENT
Start: 2023-02-13 | End: 2023-02-13

## 2023-02-13 RX ORDER — HYDRALAZINE HYDROCHLORIDE 50 MG/1
50 TABLET, FILM COATED ORAL EVERY 8 HOURS SCHEDULED
Qty: 90 TABLET | Refills: 3 | Status: SHIPPED | OUTPATIENT
Start: 2023-02-13

## 2023-02-13 RX ORDER — INSULIN LISPRO 100 [IU]/ML
5 INJECTION, SOLUTION INTRAVENOUS; SUBCUTANEOUS
Status: DISCONTINUED | OUTPATIENT
Start: 2023-02-13 | End: 2023-02-13 | Stop reason: HOSPADM

## 2023-02-13 RX ORDER — HYDRALAZINE HYDROCHLORIDE 50 MG/1
50 TABLET, FILM COATED ORAL EVERY 8 HOURS SCHEDULED
Status: DISCONTINUED | OUTPATIENT
Start: 2023-02-13 | End: 2023-02-13 | Stop reason: HOSPADM

## 2023-02-13 RX ORDER — GABAPENTIN 400 MG/1
400 CAPSULE ORAL 3 TIMES DAILY
Qty: 90 CAPSULE | Refills: 3 | Status: SHIPPED | OUTPATIENT
Start: 2023-02-13 | End: 2023-03-15

## 2023-02-13 RX ORDER — INSULIN GLARGINE 100 [IU]/ML
10 INJECTION, SOLUTION SUBCUTANEOUS ONCE
Status: COMPLETED | OUTPATIENT
Start: 2023-02-13 | End: 2023-02-13

## 2023-02-13 RX ORDER — MAGNESIUM SULFATE IN WATER 40 MG/ML
2000 INJECTION, SOLUTION INTRAVENOUS ONCE
Status: COMPLETED | OUTPATIENT
Start: 2023-02-13 | End: 2023-02-13

## 2023-02-13 RX ORDER — INSULIN GLARGINE 100 [IU]/ML
20 INJECTION, SOLUTION SUBCUTANEOUS NIGHTLY
Qty: 10 ML | Refills: 3 | Status: SHIPPED | OUTPATIENT
Start: 2023-02-13

## 2023-02-13 RX ORDER — QUETIAPINE FUMARATE 25 MG/1
TABLET, FILM COATED ORAL
Qty: 60 TABLET | Refills: 3 | Status: SHIPPED | OUTPATIENT
Start: 2023-02-13

## 2023-02-13 RX ORDER — DIAZEPAM 2 MG/1
2 TABLET ORAL ONCE
Status: COMPLETED | OUTPATIENT
Start: 2023-02-13 | End: 2023-02-13

## 2023-02-13 RX ADMIN — SODIUM BICARBONATE: 84 INJECTION, SOLUTION INTRAVENOUS at 05:33

## 2023-02-13 RX ADMIN — GABAPENTIN 400 MG: 400 CAPSULE ORAL at 14:00

## 2023-02-13 RX ADMIN — ENOXAPARIN SODIUM 30 MG: 100 INJECTION SUBCUTANEOUS at 08:43

## 2023-02-13 RX ADMIN — HYDROXYZINE PAMOATE 50 MG: 25 CAPSULE ORAL at 04:20

## 2023-02-13 RX ADMIN — INSULIN GLARGINE 10 UNITS: 100 INJECTION, SOLUTION SUBCUTANEOUS at 14:04

## 2023-02-13 RX ADMIN — INSULIN LISPRO 2 UNITS: 100 INJECTION, SOLUTION INTRAVENOUS; SUBCUTANEOUS at 14:02

## 2023-02-13 RX ADMIN — AMLODIPINE BESYLATE 10 MG: 10 TABLET ORAL at 08:41

## 2023-02-13 RX ADMIN — HYDRALAZINE HYDROCHLORIDE 50 MG: 50 TABLET, FILM COATED ORAL at 14:00

## 2023-02-13 RX ADMIN — INSULIN LISPRO 4 UNITS: 100 INJECTION, SOLUTION INTRAVENOUS; SUBCUTANEOUS at 10:09

## 2023-02-13 RX ADMIN — HYDRALAZINE HYDROCHLORIDE 10 MG: 20 INJECTION INTRAMUSCULAR; INTRAVENOUS at 02:54

## 2023-02-13 RX ADMIN — Medication 10 ML: at 08:43

## 2023-02-13 RX ADMIN — SODIUM ZIRCONIUM CYCLOSILICATE 10 G: 10 POWDER, FOR SUSPENSION ORAL at 11:41

## 2023-02-13 RX ADMIN — MAGNESIUM SULFATE HEPTAHYDRATE 2000 MG: 40 INJECTION, SOLUTION INTRAVENOUS at 11:40

## 2023-02-13 RX ADMIN — ATORVASTATIN CALCIUM 80 MG: 80 TABLET, FILM COATED ORAL at 08:41

## 2023-02-13 RX ADMIN — SODIUM BICARBONATE: 84 INJECTION, SOLUTION INTRAVENOUS at 13:30

## 2023-02-13 RX ADMIN — ONDANSETRON 4 MG: 2 INJECTION INTRAMUSCULAR; INTRAVENOUS at 04:49

## 2023-02-13 RX ADMIN — METOPROLOL SUCCINATE 50 MG: 50 TABLET, EXTENDED RELEASE ORAL at 08:41

## 2023-02-13 RX ADMIN — GABAPENTIN 400 MG: 400 CAPSULE ORAL at 08:41

## 2023-02-13 RX ADMIN — PANTOPRAZOLE SODIUM 40 MG: 40 TABLET, DELAYED RELEASE ORAL at 05:40

## 2023-02-13 RX ADMIN — VENLAFAXINE HYDROCHLORIDE 75 MG: 75 CAPSULE, EXTENDED RELEASE ORAL at 08:41

## 2023-02-13 RX ADMIN — DIAZEPAM 2 MG: 2 TABLET ORAL at 06:02

## 2023-02-13 RX ADMIN — CEFTRIAXONE 2000 MG: 2 INJECTION, POWDER, FOR SOLUTION INTRAMUSCULAR; INTRAVENOUS at 14:00

## 2023-02-13 ASSESSMENT — PAIN SCALES - GENERAL: PAINLEVEL_OUTOF10: 8

## 2023-02-13 ASSESSMENT — PAIN DESCRIPTION - LOCATION: LOCATION: HEAD

## 2023-02-13 NOTE — CONSULTS
[address] [place] [address]                                  CONSULTATION    PATIENT NAME: Allison Kent                      :        1960  MED REC NO:   [id number]                          ROOM:       [id number]  ACCOUNT NO:   [de-identified]                           ADMIT DATE: 02/10/2023  PROVIDER:     Laura Conner MD    CONSULT DATE:  2023    REASON FOR CONSULTATION:  Acute-on-chronic kidney injury. HISTORY OF PRESENT ILLNESS:  This is a 78-year-old  female  patient with a past medical history significant for chronic kidney  disease and the baseline creatinine range between 1.3 and 1.5 mg/dL. The patient presented to [facility] [place] complaining of bilateral  flank and back pain. Upon presentation, she was noted to have an  acute-on-chronic kidney injury with a serum creatinine of 3.4 mg/dL in  the setting of hypotension with a blood pressure of 96/56 mmHg. The  patient underwent a CT scan of the abdomen, which revealed the signs of  bilateral pyelonephritis. She was started on IV antibiotics and  Nephrology was consulted for further management of her worsening renal  function. PAST MEDICAL HISTORY:  1. Chronic kidney disease. 2.  Breast cancer. 3.  Anxiety. 4.  Depression. 5.  Diabetes mellitus. 6.  Chronic back pain. 7.  Hyperlipidemia. 8.  Hypertension. PAST SURGICAL HISTORY:  1.  section. 2.  Cholecystectomy. 3.  Colonoscopy. 4.  Bilateral carpal tunnel release. 5.  Left breast lumpectomy. 6.  EGD. 7.  Tonsillectomy. ALLERGIES:  The patient is allergic to TRAZODONE, NAPROXEN, SULFA DRUGS,  AMBIEN, and DIAMOX. SOCIAL HISTORY:  The patient does not smoke or drink alcohol. FAMILY HISTORY:  Significant for diabetes mellitus and malignancies in  her mother, hypertension and hyperlipidemia in her father.     REVIEW OF SYSTEMS:  The patient denied any fever, chills, cough or  expectorations. Otherwise, a 10-point of review of systems was  relatively unremarkable. PHYSICAL EXAMINATION:  VITAL SIGNS:  Blood pressure 167/73. Heart rate 92. Respirations 15. Temperature 98.3 degrees Fahrenheit. The patient is saturating 94% on  room air. GENERAL APPEARANCE:  The patient is alert and oriented x3, not in acute  distress. HEENT:  Eyes revealed normal conjunctiva and reactive pupils. NECK:  Midline trachea, nonpalpable thyroid. LUNGS:  Clear to auscultation bilaterally. Nonlabored breathing. CARDIOVASCULAR:  S1 and S2.  Regular rate and rhythm. No arrhythmias,  murmurs, and rubs. No peripheral edema. ABDOMEN:  Soft abdomen. No organomegaly. SKIN:  No lesion or rash, warm to touch. PSYCHIATRIC:  Good judgment and insight. LYMPHATIC:  No cervical or axillary adenopathy. DIAGNOSES AND PLAN:  Acute-on-chronic kidney injury. The patient has  chronic kidney disease with a baseline creatinine of 1.3 to 1.5 mg/dL. Her acute kidney injury is likely secondary to a prerenal component;  although, a nonoliguric ATN injury in the setting of hypotension cannot  be ruled out. Her urine output is well maintained and her serum  creatinine is totally trending down. RECOMMENDATIONS:  1. Continue volume expansion. 2.  Avoid all nephrotoxic agents at this time. 3.  Maintain systolic blood pressure above 120 mmHg. 4.  No significant electrolytes disorders noted. 5.  Hypertension. Blood pressure is within acceptable range. 6.  Anemia. Stable hemoglobin monitored.   7.  Pyelonephritis, currently on IV antibiotics per Primary Team.        Mary Jo Arguello MD    D: 02/12/2023 15:57:31       T: 02/12/2023 21:06:59     [id number]  Job#: [id number]     Doc#: [id number]

## 2023-02-13 NOTE — PLAN OF CARE
Problem: Discharge Planning  Goal: Discharge to home or other facility with appropriate resources  Outcome: Progressing  Flowsheets (Taken 2/12/2023 1941)  Discharge to home or other facility with appropriate resources:   Identify barriers to discharge with patient and caregiver   Arrange for needed discharge resources and transportation as appropriate   Identify discharge learning needs (meds, wound care, etc)     Problem: Safety - Adult  Goal: Free from fall injury  Outcome: Progressing  Flowsheets (Taken 2/13/2023 0706)  Free From Fall Injury: Instruct family/caregiver on patient safety     Problem: ABCDS Injury Assessment  Goal: Absence of physical injury  Outcome: Progressing  Flowsheets (Taken 2/13/2023 0706)  Absence of Physical Injury: Implement safety measures based on patient assessment     Problem: Pain  Goal: Verbalizes/displays adequate comfort level or baseline comfort level  Outcome: Progressing  Flowsheets (Taken 2/13/2023 0706)  Verbalizes/displays adequate comfort level or baseline comfort level:   Encourage patient to monitor pain and request assistance   Assess pain using appropriate pain scale

## 2023-02-13 NOTE — PROGRESS NOTES
Patient blood pressure remained elevated at shift change. Hydralazine given but continued to be elevated. Patient also complaining of a headache and requesting medication stating that Tylenol will not help. Perfect serve message sent to Phuong Shelby NP regarding both of these issues. New orders were received (check Emar). Patient blood pressure still remaining elevated. PRN Hydralazine being given PRN every 4 hours, when meeting requirement.

## 2023-02-13 NOTE — PROGRESS NOTES
[facility]  Phone: [phone]  Fax: [phone]  [email or web address]       We are following the patient for ASHOK/CKD  76-year-old  female  patient with a past medical history significant for chronic kidney  disease and the baseline creatinine range between 1.3 and 1.5 mg/dL. The patient presented to [facility] [place] complaining of bilateral  flank and back pain. Upon presentation, she was noted to have an  acute-on-chronic kidney injury with a serum creatinine of 3.4 mg/dL in  the setting of hypotension with a blood pressure of 96/56 mmHg. The  patient underwent a CT scan of the abdomen, which revealed the signs of  bilateral pyelonephritis. SUBJECTIVE:  Feels well No flank pain fever chills Wants to go home  BP elevated Afebrile   UOP not recorded   OBJECTIVE:     PHYSICAL:    TEMPERATURE:  Current - Temp: 98.9 °F (37.2 °C);  Max - Temp  Av.6 °F (37 °C)  Min: 98 °F (36.7 °C)  Max: 98.9 °F (37.2 °C)  RESPIRATIONS RANGE: Resp  Avg: 15.4  Min: 5  Max: 20  PULSE RANGE: Pulse  Av.4  Min: 92  Max: 118  BLOOD PRESSURE RANGE:  Systolic (49PUT), UHA:999 , Min:169 , AQU:352   ; Diastolic (54SNE), CAR:82, Min:75, Max:109    PULSE OXIMETRY RANGE: SpO2  Av.6 %  Min: 9 %  Max: 98 %  24HR INTAKE/OUTPUT:    Intake/Output Summary (Last 24 hours) at 2023 1302  Last data filed at 2023 1054  Gross per 24 hour   Intake 250 ml   Output --   Net 250 ml       CONSTITUTIONAL:  awake, anxious wants to go home  HEENT:  Lids and lashes normal, pupils equal, round and reactive to light, extra ocular muscles intact, sclera clear, conjunctiva normal  NECK:  Supple, symmetrical, trachea midline, no adenopathy, thyroid symmetric, not enlarged and no tenderness, skin normal  LUNGS:  No increased work of breathing, good air exchange, clear to auscultation bilaterally, no crackles or wheezing  CARDIOVASCULAR:  Normal apical impulse, regular rate and rhythm, normal S1 and S2, no S3 or S4, and no murmur noted  ABDOMEN:  No scars, normal bowel sounds, soft, non-distended, non-tender, no masses palpated, no CVA tenderness  NEUROLOGIC:  alert, oriented, normal speech, no focal findings or movement disorder noted  SKIN: no bruising or bleeding  EXTREMITIES: no edema    Medications     IV infusion builder      sodium chloride      dextrose          Data      CBC:   Recent Labs     02/10/23  2357 02/12/23  0559 02/13/23  0808   WBC 8.9 7.4 8.7   RBC 4.05 3.94* 4.55   HGB 11.7* 11.6* 13.0   HCT 36.8 35.0* 40.4    216 246     BMP:    Recent Labs     02/10/23  2357 02/12/23  0559 02/13/23  0609   * 145 134*   K 3.9 3.9 5.6*    111* 98*   CO2 19* 20* 20*   BUN 42* 34* 22*   CREATININE 3.4* 1.8* 1.1   CALCIUM 9.4 8.8 9.3   GLUCOSE 66* 88 270*     Phosphorus:    Recent Labs     02/12/23  0559 02/13/23  0609   PHOS 2.6 2.5     Magnesium:    Recent Labs     02/12/23  0559 02/13/23  0609   MG 1.40* 1.00*         ASSESSMENT     Patient Active Problem List   Diagnosis    Diabetic gastroparesis (HCC)    Allergic rhinitis    Insomnia    Essential hypertension    Headaches due to old head injury    Type 2 diabetes mellitus with microalbuminuria, with long-term current use of insulin (HCC)    Migraine    Depression, major    Right knee DJD    Calcific Achilles tendonitis, right    Lumbar spinal stenosis    Urge incontinence of urine    Personal history of breast cancer    Hyperlipidemia LDL goal <133    Metallic taste    Severe obesity (BMI 35.0-39. 9) with comorbidity ([address]    Anxiety    History of colon polyps    Irritable bowel syndrome with both constipation and diarrhea    Trigger finger, acquired    Bilateral edema of lower extremity    Achilles tendinitis of both lower extremities    Arthritis of left hip    Lumbar facet arthropathy    DDD (degenerative disc disease), lumbar    CKD stage 3 due to type 2 diabetes mellitus ([address]    Fibromyalgia    Acute kidney injury superimposed on CKD ([address] Pyelonephritis    AMS (altered mental status)       PLAN    ASHOK/CKD baseline Cr 1.3-1.5 mg likely prerenal in nature Cr improving to 1.1 mg today Continue IVF's adjust HCO3 in IVF's    Hyperkalemia K= 5.6 meq place on low K= diet Lokelma X 1 today    Acidosis IVF's adjusted with Nahco3     HTN BP elevated Add Hydralazine Keep off of ACE-I    Pyelonephritis ON CEFTRIAXONE     Hyomagnesemia replace      Maxwell Issa MD, Flash Escobedo

## 2023-02-13 NOTE — PROGRESS NOTES
Patient anxious, labile, and tearful towards end of shift. Was talking of leaving AMA and stating she did not want to be here any longer. Patient stating she is anxious. On phone with  multiple times to possibly come . Patient educated on risks of leaving with current condition. Dr. Kenji Berkowitz and also explained risks of leaving to patient. Orders received for medication to help calm patient, but explained by Dr. Ryan Dutton that since it is a sedative patient would not be able to leave AMA until cleared from her system and no longer sedated in order to make decisions clearly. Patient given Valium per order and now resting quietly in bed.

## 2023-02-13 NOTE — PROGRESS NOTES
[facility]  Diabetes Education   Progress Note       NAME:  Sendy Weir  MEDICAL RECORD NUMBER:  [id number]  AGE: 58 y.o. GENDER: female  : 1960  TODAY'S DATE:  2023    Subjective   Reason for Diabetic Education Evaluation and Assessment: glucose variability     Fabio Burden and her  agree to meet with me for diabetes support. Mr. Rakesh Byrne notes a big drop in her blood sugar from bedtime to breakfast.      Tried CGM but do not trust the results due to variations with fingerstick results.       Visit Type: evaluation      Sendy Weir is a 58 y.o. 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 ([facility]     GERD (gastroesophageal reflux disease)     History of migraine headaches     HTN (hypertension) 05/15/2014    Hyperlipidemia     Irritable bowel syndrome (IBS)     Obesity     Wears glasses     reading       PAST SURGICAL HISTORY    Past Surgical History:   Procedure Laterality Date    ACHILLES TENDON SURGERY Right     BACK SURGERY  2013    lumbar    BREAST LUMPECTOMY Left     CARPAL TUNNEL RELEASE Bilateral      SECTION      x 1    CHOLECYSTECTOMY      COLONOSCOPY  10/16/2017    polypectomy- Dr Laura Bland, COLON, DIAGNOSTIC      ESOPHAGEAL DILATATION N/A 5/15/2019    ESOPHAGEAL DILATION Arleene Cleaves performed by Suad Baig MD at [address] [place] [address] 2020    LEFT INDEX FINGER TRIGGER FINGER RELEASE performed by William Saldaña MD at [place] [address] INTRACAPSULAR CATARACT EXTRACTION Right 2/5/2021    PHACOEMULSIFICATION WITH INTRAOCULAR LENS IMPLANT - RIGHT EYE performed by Yimi Doty MD at [address] EXTRACTION Left 3/12/2021    PHACOEMULSIFICATION WITH INTRAOCULAR LENS IMPLANT - LEFT EYE performed by Yimi Doty MD at [address] ENDOSCOPY N/A 4/23/2019    EGD BIOPSY performed by Willy Stoner MD at [address] [place] [address] N/A 5/15/2019    EGD BIOPSY performed by Willy Stoner MD at [address] [place] [address] N/A 10/11/2021    EGD BIOPSY performed by Willy Stoner MD at [address] [facility]    Family History   Problem Relation Age of Onset    Cancer Mother         forehead soft tissue    Diabetes Mother     Arthritis Father     High Blood Pressure Father     High Cholesterol Father     Alzheimer's Disease Father     Depression Sister     Diabetes Brother     Colon Cancer Paternal Grandmother        SOCIAL HISTORY    Social History     Tobacco Use    Smoking status: Never    Smokeless tobacco: Never    Tobacco comments:     congrats   Vaping Use    Vaping Use: Never used   Substance Use Topics    Alcohol use: No    Drug use: Never       ALLERGIES    Allergies   Allergen Reactions    Trazodone And Nefazodone Other (See Comments)     headache    Cipro [Ciprofloxacin Hcl] Other (See Comments)     Makes her jittery.     Ambien [Zolpidem Tartrate] Other (See Comments)     Vivid dreams    Diamox [Acetazolamide] Rash    Lorazepam Other (See Comments)     confusion    Naproxen Rash    Reglan [Metoclopramide] Anxiety    Sulfa Antibiotics Rash     Also causes shaking       MEDICATIONS     insulin lispro  5 Units SubCUTAneous TID WC    insulin glargine  20 Units SubCUTAneous Nightly    hydrALAZINE  50 mg Oral 3 times per day    amLODIPine  10 mg Oral Daily    cefTRIAXone (ROCEPHIN) IV  2,000 mg IntraVENous Q24H    atorvastatin  80 mg Oral Daily    gabapentin  400 mg Oral TID    metoprolol succinate  50 mg Oral Daily    pantoprazole  40 mg Oral QAM AC    QUEtiapine  25 mg Oral Nightly    venlafaxine  75 mg Oral Daily with breakfast    sodium chloride flush  5-40 mL IntraVENous 2 times per day    enoxaparin  30 mg SubCUTAneous Daily    insulin lispro  0-8 Units SubCUTAneous TID WC    insulin lispro  0-4 Units SubCUTAneous Nightly       Objective        Patient Active Problem List   Diagnosis Code    Diabetic gastroparesis ([facility]) E11.43, K31.84    Allergic rhinitis J30.9    Insomnia G47.00    Essential hypertension I10    Headaches due to old head injury G44.309, S09.90XS    Type 2 diabetes mellitus with microalbuminuria, with long-term current use of insulin ([facility]) E11.29, R80.9, Z79.4    Migraine G43.909    Depression, major F32.9    Right knee DJD M17.11    Calcific Achilles tendonitis, right M65.28    Lumbar spinal stenosis M48.061    Urge incontinence of urine N39.41    Personal history of breast cancer Z85.3    Hyperlipidemia LDL goal <691 S85.4    Metallic taste O52.8    Severe obesity (BMI 35.0-39. 9) with comorbidity ([facility]) E66.01    Anxiety F41.9    History of colon polyps Z86.010    Irritable bowel syndrome with both constipation and diarrhea K58.2    Trigger finger, acquired M65.30    Bilateral edema of lower extremity R60.0    Achilles tendinitis of both lower extremities M76.61, M76.62    Arthritis of left hip M16.12    Lumbar facet arthropathy M47.816    DDD (degenerative disc disease), lumbar M51.36    CKD stage 3 due to type 2 diabetes mellitus ([facility]) E11.22, N18.30    Fibromyalgia M79.7    Acute kidney injury superimposed on CKD ([facility]) N17.9, N18.9    Pyelonephritis N12    AMS (altered mental status) R41.82        BP (!) 169/101   Pulse (!) 104   Temp 98.9 °F (37.2 °C) (Oral)   Resp 16   Ht 5' 3\" (1.6 m)   Wt 233 lb 14.5 oz (106.1 kg)   SpO2 95%   BMI 41.43 kg/m²     HgBA1c:    Lab Results Component Value Date/Time    LABA1C 7.8 11/29/2022 04:46 PM       Recent Labs     02/12/23  1748 02/12/23  1954 02/13/23  0736 02/13/23  1208   POCGLU 223* 212* 254* 244*       BUN/Creatinine:    Lab Results   Component Value Date/Time    BUN 22 02/13/2023 06:09 AM    CREATININE 1.1 02/13/2023 06:09 AM       Assessment        Diabetes Management and Education    Does the patient have a Primary Care Physician? Yes, Doris Sanders MD       Does the patient require new medication instruction? TBD - Using correction Novolog and Lantus at home with Semaglutide. Having some issues with nausea. Prefers insulin pens. Reviewed basal, correction and meal time insulin concepts. Level of patient/caregiver understanding able to:       [x] Verbalized Understanding   [] Demonstrated Understanding       [] Teach Back       [] Needs Reinforcement     []  Other:        Does the patient/caregiver monitor Blood Glucoses? Yes  Reviewed glycemic control targets, testing frequency and when to call PCP. Level of patient/caregiver understanding able to:        [x] Verbalized Understanding   [] Demonstrated Understanding       [] Teach Back       [] Needs Reinforcement     []  Other:         Does the patient/caregiver understand S/S of Hypoglycemia? Yes  Reviewed symptoms, prevention and treatment. Level of patient/caregiver understanding able to:       [x] Verbalized Understanding   [] Demonstrated Understanding       [] Teach Back       [] Needs Reinforcement     []  Other:                    Does the patient/caregiver understand S/S of Hyperglycemia? No:     Reviewed symptoms, prevention and treatment. Level of patient/caregiver understanding able to:        [x] Verbalized Understanding   [] Demonstrated Understanding       [] Teach Back       [] Needs Reinforcement     []  Other:           Plan        Ongoing diabetes education and blood glucose monitoring. Recommend follow [facility] with [facility]. Recommend discharge on lower Lantus dose ( 25 - 30 units at bedtime).                                            Discharge Plan:  Discharge needs: no prescription needs identified       Teaching Time Diabetes Education:  30 minutes     Electronically signed by Jose Liz on 2/13/2023 at 3:52 PM

## 2023-02-13 NOTE — CARE COORDINATION
INITIAL ASSESSMENT:    Met w/pt to address barriers to dc. HOME: Pt reported that she resides in a two bedroom rented home with her spouse and adult son. [address].     DME/O2: Pt reported that she has a standard walker shower/tub chair at home. No other DME and no other DME noted at this time. ACTIVE SERVICES: Pt reported that she was independent with all self care PTA. Pt stated that she plans to return home upon d/c and denied then need for assistance. TRANSPORTATION: Pt drives [employer], but stated that her spouse does most of the driving and will transport her home upon d/c. PHARMACY: denies difficulty obtaining/taking meds. Pt has all medication filled at [facility] in [facility]. PCP: Dr. Hieu Trujillo MD     DEMOGRAPHICS: verified address/phone number as correct     INSURANCE: Medicare ([employer])     HD/PD: No     THERAPY RECS Not ordered-Pt is ambulatory. Discharge planning team will remain available for needs. Please consult for any specifics not addressed in this note.     Electronically signed by Demi Flaherty RN on 2/13/2023 at 3:04 PM

## 2023-02-14 NOTE — PROGRESS NOTES
Pt discharged this afternoon. IV removed. Went over D/C paperwork with patient and  including new medications, medication changes and appointments, including to reschedule coloscopy. No questions from patient or family at this time.

## 2023-02-15 NOTE — DISCHARGE SUMMARY
Hospital Medicine Discharge Summary      Patient ID: Ann Boyle [id number]     Patient's PCP: Suzanne Baldwin MD    Admit Date: 2/10/2023     Discharge Date: 2/13/2023      Admitting Physician: Radha Yuan,     Discharge Physician: Princess Slade MD     Discharge Diagnoses: Active Hospital Problems    Diagnosis Date Noted    Acute kidney injury superimposed on CKD ([address] [N17.9, N18.9] 02/11/2023     Priority: Medium    Pyelonephritis [N12] 02/11/2023     Priority: Medium    AMS (altered mental status) [R41.82] 02/11/2023     Priority: Medium    Type 2 diabetes mellitus with microalbuminuria, with long-term current use of insulin ([address] [E11.29, R80.9, Z79.4] 05/20/2014    Essential hypertension [I10] 05/15/2014         The patient was seen and examined on the day of discharge and this discharge summary is in conjunction with any daily progress note from day of discharge. HOSPITAL COURSE    Patient demographics:  The patient  Jaime Quinones is a 58 y.o. female      Significant past medical history:       Patient Active Problem List   Diagnosis    Diabetic gastroparesis ([address]    Allergic rhinitis    Insomnia    Essential hypertension    Headaches due to old head injury    Type 2 diabetes mellitus with microalbuminuria, with long-term current use of insulin (HCC)    Migraine    Depression, major    Right knee DJD    Calcific Achilles tendonitis, right    Lumbar spinal stenosis    Urge incontinence of urine    Personal history of breast cancer    Hyperlipidemia LDL goal <220    Metallic taste    Severe obesity (BMI 35.0-39. 9) with comorbidity ([address]    Anxiety    History of colon polyps    Irritable bowel syndrome with both constipation and diarrhea    Trigger finger, acquired    Bilateral edema of lower extremity    Achilles tendinitis of both lower extremities    Arthritis of left hip    Lumbar facet arthropathy    DDD (degenerative disc disease), lumbar    CKD stage 3 due to type 2 diabetes mellitus (HCC)    Fibromyalgia    Acute kidney injury superimposed on CKD (HCC)    Pyelonephritis    AMS (altered mental status)            Presenting symptoms:   Back pain and flank pain, altered mental status     Diagnostic workup:   XR ELBOW RIGHT  XR WRIST RIGHT  CT HEAD WO CONTRAST  CT CERVICAL SPINE WO CONTRAST  CT CHEST ABDOMEN PELVIS WO CONTRAST  CT ABDOMEN PELVIS WO CONTRAST       CONSULTS DURING ADMISSION :   IP CONSULT TO NEUROLOGY  IP CONSULT TO NEPHROLOGY  IP CONSULT TO DIABETES EDUCATOR        Patient was diagnosed with:  Altered mental status  Pyelonephritis  ASHOK on CKD  Polypharmacy  Type 2 diabetes  Hypertension           Treatment while inpatient:  58years old female  With past medical history significant for diabetic gastroparesis diabetes mellitus hypertension, lumbar spinal stenosis, history of breast cancer morbid obesity, CKD                          Patient presented to the emergency room with altered mental status, patient was suspected for polypharmacy and was also diagnosed with with acute on chronic renal failure that could have contributed to possible hypoglycemia. Discharge Condition:  stable     Discharged to:  Home     Activity:   as tolerated: Follow Up: Follow-up with PCP in 1-2 weeks         Labs:  For convenience and continuity at follow-up the following most recent labs are provided:      CBC:   Lab Results   Component Value Date/Time    WBC 8.7 02/13/2023 08:08 AM    HGB 13.0 02/13/2023 08:08 AM    HCT 40.4 02/13/2023 08:08 AM     02/13/2023 08:08 AM       RENAL:   Lab Results   Component Value Date/Time     02/13/2023 06:09 AM    K 5.6 02/13/2023 06:09 AM    K 3.9 02/10/2023 11:57 PM    CL 98 02/13/2023 06:09 AM    CO2 20 02/13/2023 06:09 AM    BUN 22 02/13/2023 06:09 AM    CREATININE 1.1 02/13/2023 06:09 AM           Discharge Medications:      Medication List        START taking these medications      gabapentin 400 MG capsule  Commonly known as: NEURONTIN  Take 1 capsule by mouth 3 times daily for 30 days.   Replaces: gabapentin 800 MG tablet     hydrALAZINE 50 MG tablet  Commonly known as: APRESOLINE  Take 1 tablet by mouth every 8 hours     insulin glargine 100 UNIT/ML injection vial  Commonly known as: LANTUS  Inject 20 Units into the skin nightly  Replaces: Basaglar KwikPen 100 UNIT/ML injection pen            CHANGE how you take these medications      QUEtiapine 25 MG tablet  Commonly known as: SEROQUEL  1 tablet in am 2 tabs q pm  What changed: additional instructions            CONTINUE taking these medications      amLODIPine 5 MG tablet  Commonly known as: NORVASC  TAKE 1 TABLET BY MOUTH DAILY     atorvastatin 80 MG tablet  Commonly known as: LIPITOR  TAKE 1 TABLET BY MOUTH EVERY DAY     B-D UF III MINI PEN NEEDLES 31G X 5 MM Misc  Generic drug: Insulin Pen Needle  USE ON PEN THREE TIMES DAILY     Insulin Aspart FlexPen 100 UNIT/ML Sopn  INJECT INTO SKIN WITH DINNER AS DIRECTED.<100=NONE, 100-180=10 UNITS, 181-230=15U, 231-280-20U,>281=25U     metoclopramide 10 MG tablet  Commonly known as: Reglan  Take 1 tablet by mouth 3 times daily (before meals)     metoprolol succinate 50 MG extended release tablet  Commonly known as: TOPROL XL  TAKE 1 TABLET BY MOUTH EVERY DAY     nitrofurantoin (macrocrystal-monohydrate) 100 MG capsule  Commonly known as: MACROBID     omeprazole 40 MG delayed release capsule  Commonly known as: PRILOSEC     ondansetron 4 MG disintegrating tablet  Commonly known as: ZOFRAN-ODT  Take 1 tablet by mouth 3 times daily as needed for Nausea or Vomiting     Semaglutide (1 MG/DOSE) 4 MG/3ML Sopn  Inject 1 mg into the skin once a week     tiZANidine 4 MG tablet  Commonly known as: ZANAFLEX  TAKE 1 TABLET BY MOUTH EVERY 8 HOURS AS NEEDED FOR MUSCLE SPASM     tolterodine 4 MG extended release capsule  Commonly known as: DETROL LA  TAKE 1 CAPSULE BY MOUTH EVERY DAY     True Metrix Blood Glucose Test strip  Generic drug: blood glucose test strips  USE THREE TIMES DAILY     venlafaxine 75 MG extended release capsule  Commonly known as: EFFEXOR XR  TAKE 3 CAPSULES BY MOUTH DAILY            STOP taking these medications      Basaglar KwikPen 100 UNIT/ML injection pen  Generic drug: insulin glargine  Replaced by: insulin glargine 100 UNIT/ML injection vial     gabapentin 800 MG tablet  Commonly known as: Neurontin  Replaced by: gabapentin 400 MG capsule     lisinopril-hydroCHLOROthiazide 20-12.5 MG per tablet  Commonly known as: PRINZIDE;ZESTORETIC               Where to Get Your Medications        These medications were sent to [address] [place] [id number]      Phone: [phone]   gabapentin 400 MG capsule  hydrALAZINE 50 MG tablet  insulin glargine 100 UNIT/ML injection vial  QUEtiapine 25 MG tablet            Time Spent on discharge is more than 30 min in the examination, evaluation, counseling and review of medications and discharge plan. Signed:  Chaka Francois MD   2/15/2023      Thank you Maxwell Avila MD for the opportunity to be involved in this patient's care. If you have any questions or concerns please feel free to contact me at [phone]. This note was transcribed using [id number] [employer]. Please disregard any translational errors.

## 2023-02-21 ENCOUNTER — OFFICE VISIT (OUTPATIENT)
Dept: FAMILY MEDICINE CLINIC | Age: 63
End: 2023-02-21
Payer: COMMERCIAL

## 2023-02-21 ENCOUNTER — TELEPHONE (OUTPATIENT)
Dept: PHARMACY | Age: 63
End: 2023-02-21

## 2023-02-21 VITALS
DIASTOLIC BLOOD PRESSURE: 88 MMHG | HEART RATE: 86 BPM | WEIGHT: 226.2 LBS | BODY MASS INDEX: 40.08 KG/M2 | TEMPERATURE: 99.7 F | SYSTOLIC BLOOD PRESSURE: 174 MMHG | OXYGEN SATURATION: 95 % | RESPIRATION RATE: 16 BRPM | HEIGHT: 63 IN

## 2023-02-21 DIAGNOSIS — E11.29 TYPE 2 DIABETES MELLITUS WITH MICROALBUMINURIA, WITH LONG-TERM CURRENT USE OF INSULIN (HCC): ICD-10-CM

## 2023-02-21 DIAGNOSIS — F51.04 PSYCHOPHYSIOLOGICAL INSOMNIA: ICD-10-CM

## 2023-02-21 DIAGNOSIS — E11.22 CKD STAGE 3 DUE TO TYPE 2 DIABETES MELLITUS (HCC): ICD-10-CM

## 2023-02-21 DIAGNOSIS — Z79.4 TYPE 2 DIABETES MELLITUS WITH MICROALBUMINURIA, WITH LONG-TERM CURRENT USE OF INSULIN (HCC): ICD-10-CM

## 2023-02-21 DIAGNOSIS — I10 ESSENTIAL HYPERTENSION: ICD-10-CM

## 2023-02-21 DIAGNOSIS — M79.7 FIBROMYALGIA: ICD-10-CM

## 2023-02-21 DIAGNOSIS — Z09 HOSPITAL DISCHARGE FOLLOW-UP: Primary | ICD-10-CM

## 2023-02-21 DIAGNOSIS — R41.0 CONFUSION: ICD-10-CM

## 2023-02-21 DIAGNOSIS — R80.9 TYPE 2 DIABETES MELLITUS WITH MICROALBUMINURIA, WITH LONG-TERM CURRENT USE OF INSULIN (HCC): ICD-10-CM

## 2023-02-21 DIAGNOSIS — N18.30 CKD STAGE 3 DUE TO TYPE 2 DIABETES MELLITUS (HCC): ICD-10-CM

## 2023-02-21 PROCEDURE — 3046F HEMOGLOBIN A1C LEVEL >9.0%: CPT | Performed by: FAMILY MEDICINE

## 2023-02-21 PROCEDURE — 3078F DIAST BP <80 MM HG: CPT | Performed by: FAMILY MEDICINE

## 2023-02-21 PROCEDURE — 3074F SYST BP LT 130 MM HG: CPT | Performed by: FAMILY MEDICINE

## 2023-02-21 PROCEDURE — G8417 CALC BMI ABV UP PARAM F/U: HCPCS | Performed by: FAMILY MEDICINE

## 2023-02-21 PROCEDURE — 1111F DSCHRG MED/CURRENT MED MERGE: CPT | Performed by: FAMILY MEDICINE

## 2023-02-21 PROCEDURE — 1036F TOBACCO NON-USER: CPT | Performed by: FAMILY MEDICINE

## 2023-02-21 PROCEDURE — 99215 OFFICE O/P EST HI 40 MIN: CPT | Performed by: FAMILY MEDICINE

## 2023-02-21 PROCEDURE — G8427 DOCREV CUR MEDS BY ELIG CLIN: HCPCS | Performed by: FAMILY MEDICINE

## 2023-02-21 PROCEDURE — G8482 FLU IMMUNIZE ORDER/ADMIN: HCPCS | Performed by: FAMILY MEDICINE

## 2023-02-21 PROCEDURE — 3017F COLORECTAL CA SCREEN DOC REV: CPT | Performed by: FAMILY MEDICINE

## 2023-02-21 PROCEDURE — 2022F DILAT RTA XM EVC RTNOPTHY: CPT | Performed by: FAMILY MEDICINE

## 2023-02-21 RX ORDER — GABAPENTIN 600 MG/1
600 TABLET ORAL DAILY
Qty: 90 TABLET | Refills: 1 | Status: SHIPPED | OUTPATIENT
Start: 2023-02-21 | End: 2023-02-22

## 2023-02-21 RX ORDER — INSULIN GLARGINE 100 [IU]/ML
30 INJECTION, SOLUTION SUBCUTANEOUS NIGHTLY
Qty: 15 ADJUSTABLE DOSE PRE-FILLED PEN SYRINGE | Refills: 1 | Status: SHIPPED | OUTPATIENT
Start: 2023-02-21

## 2023-02-21 RX ORDER — INSULIN ASPART 100 [IU]/ML
INJECTION, SOLUTION INTRAVENOUS; SUBCUTANEOUS
Qty: 15 ML | Refills: 5 | Status: SHIPPED | OUTPATIENT
Start: 2023-02-21

## 2023-02-21 SDOH — ECONOMIC STABILITY: FOOD INSECURITY: WITHIN THE PAST 12 MONTHS, YOU WORRIED THAT YOUR FOOD WOULD RUN OUT BEFORE YOU GOT MONEY TO BUY MORE.: NEVER TRUE

## 2023-02-21 SDOH — ECONOMIC STABILITY: INCOME INSECURITY: HOW HARD IS IT FOR YOU TO PAY FOR THE VERY BASICS LIKE FOOD, HOUSING, MEDICAL CARE, AND HEATING?: SOMEWHAT HARD

## 2023-02-21 SDOH — ECONOMIC STABILITY: HOUSING INSECURITY
IN THE LAST 12 MONTHS, WAS THERE A TIME WHEN YOU DID NOT HAVE A STEADY PLACE TO SLEEP OR SLEPT IN A SHELTER (INCLUDING NOW)?: NO

## 2023-02-21 SDOH — ECONOMIC STABILITY: FOOD INSECURITY: WITHIN THE PAST 12 MONTHS, THE FOOD YOU BOUGHT JUST DIDN'T LAST AND YOU DIDN'T HAVE MONEY TO GET MORE.: NEVER TRUE

## 2023-02-21 ASSESSMENT — PATIENT HEALTH QUESTIONNAIRE - PHQ9
8. MOVING OR SPEAKING SO SLOWLY THAT OTHER PEOPLE COULD HAVE NOTICED. OR THE OPPOSITE, BEING SO FIGETY OR RESTLESS THAT YOU HAVE BEEN MOVING AROUND A LOT MORE THAN USUAL: 0
SUM OF ALL RESPONSES TO PHQ QUESTIONS 1-9: 8
4. FEELING TIRED OR HAVING LITTLE ENERGY: 1
SUM OF ALL RESPONSES TO PHQ QUESTIONS 1-9: 8
9. THOUGHTS THAT YOU WOULD BE BETTER OFF DEAD, OR OF HURTING YOURSELF: 0
3. TROUBLE FALLING OR STAYING ASLEEP: 3
SUM OF ALL RESPONSES TO PHQ QUESTIONS 1-9: 8
2. FEELING DOWN, DEPRESSED OR HOPELESS: 3
1. LITTLE INTEREST OR PLEASURE IN DOING THINGS: 0
SUM OF ALL RESPONSES TO PHQ9 QUESTIONS 1 & 2: 3
5. POOR APPETITE OR OVEREATING: 1
10. IF YOU CHECKED OFF ANY PROBLEMS, HOW DIFFICULT HAVE THESE PROBLEMS MADE IT FOR YOU TO DO YOUR WORK, TAKE CARE OF THINGS AT HOME, OR GET ALONG WITH OTHER PEOPLE: 0
7. TROUBLE CONCENTRATING ON THINGS, SUCH AS READING THE NEWSPAPER OR WATCHING TELEVISION: 0
6. FEELING BAD ABOUT YOURSELF - OR THAT YOU ARE A FAILURE OR HAVE LET YOURSELF OR YOUR FAMILY DOWN: 0
SUM OF ALL RESPONSES TO PHQ QUESTIONS 1-9: 8

## 2023-02-21 NOTE — PROGRESS NOTES
Post-Discharge Transitional Care Follow Up      Ernesto Saunders   YOB: 1960    Date of Office Visit:  2/21/2023  Date of Hospital Admission: 2/10/23  Date of Hospital Discharge: 2/13/23  Readmission Risk Score (high >=14%. Medium >=10%):Readmission Risk Score: 12.8      Care management risk score Rising risk (score 2-5) and Complex Care (Scores >=6): No Risk Score On File     Non face to face  following discharge, date last encounter closed (first attempt may have been earlier): *No documented post hospital discharge outreach found in the last 14 days     Call initiated 2 business days of discharge: *No response recorded in the last 14 days     1. Hospital discharge follow-up  - stable without signs of deterioration or new medical problems. Still with the same concern of medication confusion and the self-adjusting of meds without permission or consultation.  - AL DISCHARGE MEDS RECONCILED W/ CURRENT OUTPATIENT MED LIST    2. CKD stage 3 due to type 2 diabetes mellitus ([facility] [address]  - renal fxn back to baseline with hydration and kidney infection treatment. She is to continue Norvasc, toprol and start hydralazine as recommended during her hospitalization. Hold ACE due to hyperkalemica. Needs recheck BMP. 3. Type 2 diabetes mellitus with microalbuminuria, with long-term current use of insulin ([facility])  - we discussed the dangers of self - adjusting medicines (especially her insulin, meds that are sedating)  - based on FBG's in 140-150 range, will continue her on a basal insulin dose of 30 units. Stop Lantus in vials- reordered [place] Joanne in pen as this is what they are used to and comfortable with. Keep ozempic dose at 0.5 mg due to nausea (likely due to gastroparesis.)    4. Fibromyalgia  - discussed the hazards of self- adjusting gabapenting dose.   Current dose is 400mg TID (upon d/c from hospital), but is in significant pain and feels this dose is causing diarrhea (though I have not heard of gabapentin capsules causing diarrhea before). I rx'd 600mg tabs for TID use. She is to not change this dose. Continue Effexor 225 mg/d and prn zanaflex also. 5. Psychophysiological insomnia  - she self adjusted dose of Seroquel and this resulted in altered mental status and subsequent admission. We will stop use of this medicine. 6. Confusion  - this is likely chronic mild cog dysfunction. Developmental?  Should have neuropsych eval to better understand the limits of her cognitive ability. Currently  is overseeing her medicines, but he is pulled in many directions and has not seen that she is accidentally mis-using medicines. 7. Essential hypertension  - bp very high. They will try to get hydralazine asap to restart this. Off ACE due to hyperkalemia. Return in about 4 weeks (around 3/21/2023) for follow up diabetes, pain, . Total time spent on this encounter: 46 min       Subjective:   HPI here with  for hospital f/u. Went to ER in [place] on 2/10 when she fell out of bed and hit head, hurt neck/chest.  D/c home after eval.   says it was because she was taking 6 Seroquel at bedtime (instead of prescribed dose) along with a muscle relaxer. Then her  called ER at home on the evening of the 10th, after dinner, when she became disoriented, slurring speech, imbalanced and fell. Altered mental status  Called 911,  transported to ER at [facility]. Work up showed ASHOK with creatinine of 3.4.  baseline 1.3-1.5. CT scans showed bilateral kidney infections    Treated with IV ABX and fluids    Last renal function test: near normal renal function. Lab Results   Component Value Date/Time     02/13/2023 06:09 AM    K 5.6 02/13/2023 06:09 AM    K 3.9 02/10/2023 11:57 PM    BUN 22 02/13/2023 06:09 AM    CREATININE 1.1 02/13/2023 06:09 AM     Estimated Creatinine Clearance: 59 mL/min (based on SCr of 1.1 mg/dL). Neuro consult due to AMS.  Discovered she was over taking meds, including gabapentin (taking differently than prescribed). All for fibromyalgia. Discharged on 400mg dose TID. She does not feel this dose helps her pain at all.  has reviewed all of her meds and thinks he has organized and corrected med errors. They did not bring her meds with them. Cleave Feeling admits that she is easily confused about how to use meds correctly.  is caring for several persons simultaneously and is often overwhelmed. They think her confusion is longstanding. Can repeat self. Unsure if getting worse. Her basal insulin dose was reduced to 20 units. But was ordered vials, not pens.  does not know how to draw up and give insulin in a syringe. They are used to using a pen delivery system. They have old quick pens, so they have been using that. But they have been using 30 units, not 20. Her morning sugars have been 140-150's. No hypos throughout the week. She has been using 10-15 U of aspart insulin with meals, based on size of meal.  Ozempic dose is to be 1 mg- but this worsened nausea (gastroparesis) so they lowered dose to 0.5 mg  Lab Results   Component Value Date/Time    LABA1C 7.8 11/29/2022 04:46 PM    LABA1C 8.5 08/18/2022 12:45 PM    LABA1C 8.0 02/09/2022 03:57 PM      BP was high during her hospitalization. Stopped ACE due to hyperkalemia. Added hydralazine. Is to see nephrology, DR Alexandria Storm. Currently taking amlodipine 5 mg, hydralazine 50 mg TID (but they are not sure if she is actually taking it in this way). Could not afford to get it from the pharmacy. Is still taking metroprol 50mg,they think. Still taking venlafaxine 75 mg 3 caps daily.       Patient Active Problem List   Diagnosis    Diabetic gastroparesis (HCC)    Allergic rhinitis    Insomnia    Essential hypertension    Headaches due to old head injury    Type 2 diabetes mellitus with microalbuminuria, with long-term current use of insulin (HCC)    Migraine    Depression, major    Right knee DJD    Calcific Achilles tendonitis, right    Lumbar spinal stenosis    Urge incontinence of urine    Personal history of breast cancer    Hyperlipidemia LDL goal <589    Metallic taste    Severe obesity (BMI 35.0-39. 9) with comorbidity ([facility] [address]    Anxiety    History of colon polyps    Irritable bowel syndrome with both constipation and diarrhea    Trigger finger, acquired    Bilateral edema of lower extremity    Achilles tendinitis of both lower extremities    Arthritis of left hip    Lumbar facet arthropathy    DDD (degenerative disc disease), lumbar    CKD stage 3 due to type 2 diabetes mellitus ([address]    Fibromyalgia    Acute kidney injury superimposed on CKD ([address]    Pyelonephritis    AMS (altered mental status)       Medications listed as ordered at the time of discharge from hospital     Medication List            Accurate as of February 21, 2023  3:52 PM. If you have any questions, ask your nurse or doctor. CONTINUE taking these medications      amLODIPine 5 MG tablet  Commonly known as: NORVASC  TAKE 1 TABLET BY MOUTH DAILY     atorvastatin 80 MG tablet  Commonly known as: LIPITOR  TAKE 1 TABLET BY MOUTH EVERY DAY     B-D UF III MINI PEN NEEDLES 31G X 5 MM Misc  Generic drug: Insulin Pen Needle  USE ON PEN THREE TIMES DAILY     gabapentin 400 MG capsule  Commonly known as: NEURONTIN  Take 1 capsule by mouth 3 times daily for 30 days.      hydrALAZINE 50 MG tablet  Commonly known as: APRESOLINE  Take 1 tablet by mouth every 8 hours     Insulin Aspart FlexPen 100 UNIT/ML Sopn  INJECT INTO SKIN WITH DINNER AS DIRECTED.<100=NONE, 100-180=10 UNITS, 181-230=15U, 231-280-20U,>281=25U     insulin glargine 100 UNIT/ML injection vial  Commonly known as: LANTUS  Inject 20 Units into the skin nightly     metoclopramide 10 MG tablet  Commonly known as: Reglan  Take 1 tablet by mouth 3 times daily (before meals)     metoprolol succinate 50 MG extended release tablet  Commonly known as: TOPROL XL  TAKE 1 TABLET BY MOUTH EVERY DAY     nitrofurantoin (macrocrystal-monohydrate) 100 MG capsule  Commonly known as: MACROBID     omeprazole 40 MG delayed release capsule  Commonly known as: PRILOSEC     ondansetron 4 MG disintegrating tablet  Commonly known as: ZOFRAN-ODT  Take 1 tablet by mouth 3 times daily as needed for Nausea or Vomiting     QUEtiapine 25 MG tablet  Commonly known as: SEROQUEL  1 tablet in am 2 tabs q pm     Semaglutide (1 MG/DOSE) 4 MG/3ML Sopn  Inject 1 mg into the skin once a week     tiZANidine 4 MG tablet  Commonly known as: ZANAFLEX  TAKE 1 TABLET BY MOUTH EVERY 8 HOURS AS NEEDED FOR MUSCLE SPASM     tolterodine 4 MG extended release capsule  Commonly known as: DETROL LA  TAKE 1 CAPSULE BY MOUTH EVERY DAY     True Metrix Blood Glucose Test strip  Generic drug: blood glucose test strips  USE THREE TIMES DAILY     venlafaxine 75 MG extended release capsule  Commonly known as: EFFEXOR XR  TAKE 3 CAPSULES BY MOUTH DAILY               Medications marked \"taking\" at this time  Outpatient Medications Marked as Taking for the 2/21/23 encounter (Office Visit) with Kevin Ley MD   Medication Sig Dispense Refill    gabapentin (NEURONTIN) 400 MG capsule Take 1 capsule by mouth 3 times daily for 30 days.  90 capsule 3    insulin glargine (LANTUS) 100 UNIT/ML injection vial Inject 20 Units into the skin nightly 10 mL 3    hydrALAZINE (APRESOLINE) 50 MG tablet Take 1 tablet by mouth every 8 hours 90 tablet 3    QUEtiapine (SEROQUEL) 25 MG tablet 1 tablet in am 2 tabs q pm 60 tablet 3    tiZANidine (ZANAFLEX) 4 MG tablet TAKE 1 TABLET BY MOUTH EVERY 8 HOURS AS NEEDED FOR MUSCLE SPASM 30 tablet 1    tolterodine (DETROL LA) 4 MG extended release capsule TAKE 1 CAPSULE BY MOUTH EVERY DAY 90 capsule 1    Semaglutide, 1 MG/DOSE, 4 MG/3ML SOPN Inject 1 mg into the skin once a week 3 mL 5    metoclopramide (REGLAN) 10 MG tablet Take 1 tablet by mouth 3 times daily (before meals) 90 tablet 3    ondansetron (ZOFRAN-ODT) 4 MG disintegrating tablet Take 1 tablet by mouth 3 times daily as needed for Nausea or Vomiting 30 tablet 3    atorvastatin (LIPITOR) 80 MG tablet TAKE 1 TABLET BY MOUTH EVERY DAY 90 tablet 1    metoprolol succinate (TOPROL XL) 50 MG extended release tablet TAKE 1 TABLET BY MOUTH EVERY DAY 90 tablet 0    amLODIPine (NORVASC) 5 MG tablet TAKE 1 TABLET BY MOUTH DAILY 90 tablet 1    Insulin Aspart FlexPen 100 UNIT/ML SOPN INJECT INTO SKIN WITH DINNER AS DIRECTED.<100=NONE, 100-180=10 UNITS, 181-230=15U, 231-280-20U,>281=25U 15 mL 5    venlafaxine (EFFEXOR XR) 75 MG extended release capsule TAKE 3 CAPSULES BY MOUTH DAILY 270 capsule 1    blood glucose test strips (TRUE METRIX BLOOD GLUCOSE TEST) strip USE THREE TIMES DAILY 300 strip 3    nitrofurantoin, macrocrystal-monohydrate, (MACROBID) 100 MG capsule Take 100 mg by mouth daily      B-D UF III MINI PEN NEEDLES 31G X 5 MM MISC USE ON PEN THREE TIMES DAILY 200 each 3    omeprazole (PRILOSEC) 40 MG delayed release capsule Take 40 mg by mouth daily           Medications patient taking as of now reconciled against medications ordered at time of hospital discharge: Yes    Review of Systems As above     Objective:    BP (!) 174/88 (Site: Right Upper Arm, Position: Sitting, Cuff Size: Medium Adult)   Pulse 86   Temp 99.7 °F (37.6 °C) (Oral)   Resp 16   Ht 5' 3\" (1.6 m)   Wt 226 lb 3.2 oz (102.6 kg)   SpO2 95%   BMI 40.07 kg/m²   Physical Exam  Vitals and nursing note reviewed. Constitutional:       Appearance: Normal appearance. She is well-developed. She is obese. Neck:      Thyroid: No thyromegaly. Cardiovascular:      Rate and Rhythm: Normal rate and regular rhythm. Pulses: Normal pulses. Heart sounds: Normal heart sounds. No murmur heard. Pulmonary:      Effort: Pulmonary effort is normal. No respiratory distress. Breath sounds: Normal breath sounds. No wheezing or rales.    Musculoskeletal:         General: Normal range of motion. Cervical back: Normal range of motion. Skin:     General: Skin is warm and dry. Neurological:      General: No focal deficit present. Mental Status: She is alert and oriented to person, place, and time. Mental status is at baseline. Psychiatric:         Mood and Affect: Mood normal.         Behavior: Behavior normal.      Comments: Pleasant, but easily confused. An electronic signature was used to authenticate this note.   --Steven Valiente MD

## 2023-02-21 NOTE — PROGRESS NOTES
2023    not currently breastfeeding. Thelma Irvin (:  1960) is a 58 y.o. female, here for evaluation of the following medical concerns:    Chief Complaint   Patient presents with    Follow-Up from [facility] to er 2/10/23 for low sugar, confusion. Dx with pyelonephritis and zaki. Was admitted and discharged 23. Doing much better. Would like to discuss meds. Patient Active Problem List   Diagnosis    Diabetic gastroparesis (HCC)    Allergic rhinitis    Insomnia    Essential hypertension    Headaches due to old head injury    Type 2 diabetes mellitus with microalbuminuria, with long-term current use of insulin (HCC)    Migraine    Depression, major    Right knee DJD    Calcific Achilles tendonitis, right    Lumbar spinal stenosis    Urge incontinence of urine    Personal history of breast cancer    Hyperlipidemia LDL goal <130    Metallic taste    Severe obesity (BMI 35.0-39. 9) with comorbidity ([address]    Anxiety    History of colon polyps    Irritable bowel syndrome with both constipation and diarrhea    Trigger finger, acquired    Bilateral edema of lower extremity    Achilles tendinitis of both lower extremities    Arthritis of left hip    Lumbar facet arthropathy    DDD (degenerative disc disease), lumbar    CKD stage 3 due to type 2 diabetes mellitus (HCC)    Fibromyalgia    Acute kidney injury superimposed on CKD ([address]    Pyelonephritis    AMS (altered mental status)        There is no height or weight on file to calculate BMI. Wt Readings from Last 3 Encounters:   23 233 lb 14.5 oz (106.1 kg)   02/10/23 238 lb 8.6 oz (108.2 kg)   23 230 lb (104.3 kg)       BP Readings from Last 3 Encounters:   23 (!) 192/93   02/10/23 130/70   22 130/80       Allergies   Allergen Reactions    Trazodone And Nefazodone Other (See Comments)     headache    Cipro [Ciprofloxacin Hcl] Other (See Comments)     Makes her jittery.     Ambien [Zolpidem Tartrate] Other (See Comments)     Vivid dreams    Diamox [Acetazolamide] Rash    Lorazepam Other (See Comments)     confusion    Naproxen Rash    Reglan [Metoclopramide] Anxiety    Sulfa Antibiotics Rash     Also causes shaking       Prior to Visit Medications    Medication Sig Taking? Authorizing Provider   gabapentin (NEURONTIN) 400 MG capsule Take 1 capsule by mouth 3 times daily for 30 days.   Mohsen Gupta MD   insulin glargine (LANTUS) 100 UNIT/ML injection vial Inject 20 Units into the skin nightly  Mohsen Gupta MD   hydrALAZINE (APRESOLINE) 50 MG tablet Take 1 tablet by mouth every 8 hours  Mohsen Gupta MD   QUEtiapine (SEROQUEL) 25 MG tablet 1 tablet in am 2 tabs q pm  Mohsen Gupta MD   tiZANidine (ZANAFLEX) 4 MG tablet TAKE 1 TABLET BY MOUTH EVERY 8 HOURS AS NEEDED FOR MUSCLE SPASM  Joao Noe MD   tolterodine (DETROL LA) 4 MG extended release capsule TAKE 1 CAPSULE BY MOUTH EVERY DAY  Joao Noe MD   Semaglutide, 1 MG/DOSE, 4 MG/3ML SOPN Inject 1 mg into the skin once a week  Joao Noe MD   metoclopramide (REGLAN) 10 MG tablet Take 1 tablet by mouth 3 times daily (before meals)  Joao Noe MD   ondansetron (ZOFRAN-ODT) 4 MG disintegrating tablet Take 1 tablet by mouth 3 times daily as needed for Nausea or Vomiting  Joao Noe MD   atorvastatin (LIPITOR) 80 MG tablet TAKE 1 TABLET BY MOUTH EVERY DAY  Joao Noe MD   metoprolol succinate (TOPROL XL) 50 MG extended release tablet TAKE 1 TABLET BY MOUTH EVERY DAY  Joao Noe MD   amLODIPine (NORVASC) 5 MG tablet TAKE 1 TABLET BY MOUTH DAILY  Joao Noe MD   Insulin Aspart FlexPen 100 UNIT/ML SOPN INJECT INTO SKIN WITH DINNER AS DIRECTED.<100=NONE, 100-180=10 UNITS, 181-230=15U, 231-280-20U,>281=25U  Joao Noe MD   venlafaxine (EFFEXOR XR) 75 MG extended release capsule TAKE 3 CAPSULES BY MOUTH DAILY  Joao Noe MD   blood glucose test strips (TRUE METRIX BLOOD GLUCOSE TEST) strip USE THREE TIMES DAILY  Fayrene Boards, APRN - CNP   nitrofurantoin, macrocrystal-monohydrate, (MACROBID) 100 MG capsule Take 100 mg by mouth daily  Historical Provider, MD BRYANT UF III MINI PEN NEEDLES 31G X 5 MM MISC USE ON PEN THREE TIMES DAILY  Luisito Mock MD   omeprazole (PRILOSEC) 40 MG delayed release capsule Take 40 mg by mouth daily   Historical Provider, MD        Social History     Tobacco Use    Smoking status: Never    Smokeless tobacco: Never    Tobacco comments:     congrats   Vaping Use    Vaping Use: Never used   Substance Use Topics    Alcohol use: No    Drug use: Never       Review of Systems    Physical Exam    ASSESSMENT/PLAN:    There are no diagnoses linked to this encounter. No follow-ups on file. An  [employer] was used to authenticate this note.     --Ro Paz on 2/21/2023 at 3:36 PM

## 2023-02-21 NOTE — TELEPHONE ENCOUNTER
New Diabetes referral from the hospitalist.  Recommended by the Diabetes Educator. Reached out to schedule initial appointment. Left message to please return my call    Blanche Gray, PharmD  [address] [facility]  Diabetes Service  [phone]    For Pharmacy Admin Tracking Only    Program: Medication Management  CPA in place:  Yes  Recommendation Provided To:    Intervention Detail:   Intervention Accepted By:   Darling Cardona Closed?:    Time Spent (min): 5

## 2023-02-22 ENCOUNTER — TELEPHONE (OUTPATIENT)
Dept: FAMILY MEDICINE CLINIC | Age: 63
End: 2023-02-22

## 2023-02-22 RX ORDER — GABAPENTIN 600 MG/1
600 TABLET ORAL 3 TIMES DAILY
Qty: 90 TABLET | Refills: 1 | Status: SHIPPED | OUTPATIENT
Start: 2023-02-22 | End: 2023-04-23

## 2023-02-22 NOTE — TELEPHONE ENCOUNTER
We discussed yesterday that her dose of gabapentin would be increased to 600mg 3x/d. It looks like the Rx was written incorrectly- I will fix it and resend. Please let them know.   Thanks!!

## 2023-02-22 NOTE — TELEPHONE ENCOUNTER
[facility] called in wanting to clarify on the gabapentin   Pt told pharmacy she takes 3 a day if so need a new prescription sent

## 2023-02-22 NOTE — TELEPHONE ENCOUNTER
The patient needs the Gabapentin 400mg 3 times daily sent into the pharmacy. The med sent yesterday was Gabapentin 600mg. Can a new RX be sent in?  I can call and cancel the 600mg

## 2023-02-23 ENCOUNTER — TELEPHONE (OUTPATIENT)
Dept: FAMILY MEDICINE CLINIC | Age: 63
End: 2023-02-23

## 2023-02-23 DIAGNOSIS — E11.22 CKD STAGE 3 DUE TO TYPE 2 DIABETES MELLITUS (HCC): Primary | ICD-10-CM

## 2023-02-23 DIAGNOSIS — E11.22 CKD STAGE 3 DUE TO TYPE 2 DIABETES MELLITUS (HCC): ICD-10-CM

## 2023-02-23 DIAGNOSIS — N18.30 CKD STAGE 3 DUE TO TYPE 2 DIABETES MELLITUS (HCC): ICD-10-CM

## 2023-02-23 DIAGNOSIS — N18.30 CKD STAGE 3 DUE TO TYPE 2 DIABETES MELLITUS (HCC): Primary | ICD-10-CM

## 2023-02-23 LAB
ANION GAP SERPL CALCULATED.3IONS-SCNC: 15 MMOL/L (ref 3–16)
BUN BLDV-MCNC: 12 MG/DL (ref 7–20)
CALCIUM SERPL-MCNC: 8.9 MG/DL (ref 8.3–10.6)
CHLORIDE BLD-SCNC: 103 MMOL/L (ref 99–110)
CO2: 24 MMOL/L (ref 21–32)
CREAT SERPL-MCNC: 1.2 MG/DL (ref 0.6–1.2)
GFR SERPL CREATININE-BSD FRML MDRD: 51 ML/MIN/{1.73_M2}
GLUCOSE BLD-MCNC: 186 MG/DL (ref 70–99)
MAGNESIUM: 1.3 MG/DL (ref 1.8–2.4)
POTASSIUM SERPL-SCNC: 3.3 MMOL/L (ref 3.5–5.1)
SODIUM BLD-SCNC: 142 MMOL/L (ref 136–145)

## 2023-02-23 RX ORDER — SEMAGLUTIDE 1.34 MG/ML
0.5 INJECTION, SOLUTION SUBCUTANEOUS WEEKLY
Qty: 1.5 ML | Refills: 5 | Status: SHIPPED | OUTPATIENT
Start: 2023-02-23

## 2023-02-23 NOTE — TELEPHONE ENCOUNTER
Spoke with patient notified that Dr. Laurie Antoine  ordered a repeat BMP. He would like her to go to the lab to have another BMP. Patient stated that she would go to the lab.

## 2023-02-28 ENCOUNTER — TELEPHONE (OUTPATIENT)
Dept: FAMILY MEDICINE CLINIC | Age: 63
End: 2023-02-28

## 2023-02-28 RX ORDER — CALCIUM CARBONATE 300MG(750)
1 TABLET,CHEWABLE ORAL 2 TIMES DAILY
Qty: 60 TABLET | Refills: 1 | Status: SHIPPED | OUTPATIENT
Start: 2023-02-28

## 2023-03-04 DIAGNOSIS — F33.1 MODERATE EPISODE OF RECURRENT MAJOR DEPRESSIVE DISORDER (HCC): ICD-10-CM

## 2023-03-06 RX ORDER — PEN NEEDLE, DIABETIC 31 GX5/16"
NEEDLE, DISPOSABLE MISCELLANEOUS
Qty: 200 EACH | Refills: 3 | Status: SHIPPED | OUTPATIENT
Start: 2023-03-06

## 2023-03-06 RX ORDER — VENLAFAXINE HYDROCHLORIDE 75 MG/1
CAPSULE, EXTENDED RELEASE ORAL
Qty: 270 CAPSULE | Refills: 1 | Status: SHIPPED | OUTPATIENT
Start: 2023-03-06

## 2023-03-06 RX ORDER — METOPROLOL SUCCINATE 50 MG/1
TABLET, EXTENDED RELEASE ORAL
Qty: 90 TABLET | Refills: 1 | Status: SHIPPED | OUTPATIENT
Start: 2023-03-06

## 2023-03-06 RX ORDER — FLURBIPROFEN SODIUM 0.3 MG/ML
SOLUTION/ DROPS OPHTHALMIC
Qty: 200 EACH | Refills: 3 | Status: SHIPPED | OUTPATIENT
Start: 2023-03-06

## 2023-03-07 ENCOUNTER — TELEPHONE (OUTPATIENT)
Dept: PHARMACY | Age: 63
End: 2023-03-07

## 2023-03-07 NOTE — TELEPHONE ENCOUNTER
New Diabetes referral from the hospitalist.  Recommended by the Diabetes Educator. Reached out to schedule initial appointment. Left message to please return my call    Darrian Dominguez, PharmD  [facility]  Diabetes Service  [phone]    For Pharmacy Admin Tracking Only    Program: Medication Management  CPA in place:  Yes  Recommendation Provided To:    Intervention Detail:   Intervention Accepted By:   Yuridia Cooper Closed?:    Time Spent (min): 5

## 2023-03-08 ENCOUNTER — TELEPHONE (OUTPATIENT)
Dept: PHARMACY | Age: 63
End: 2023-03-08

## 2023-03-08 NOTE — TELEPHONE ENCOUNTER
New Diabetes referral from the hospitalist.  Recommended by the Diabetes Educator. Reached out to schedule initial appointment. Spoke with Nubia Peraza. Scheduled for 3/24/23. BG running 140-200's    Maricel Reina, PharmD  [facility]  Diabetes Service  [phone]    For Pharmacy Admin Tracking Only    Program: Medication Management  CPA in place:  Yes  Recommendation Provided To:    Intervention Detail:   Intervention Accepted By:   Misa Sanches Closed?:    Time Spent (min): 10

## 2023-03-16 ENCOUNTER — OFFICE VISIT (OUTPATIENT)
Dept: ORTHOPEDIC SURGERY | Age: 63
End: 2023-03-16
Payer: COMMERCIAL

## 2023-03-16 VITALS — HEIGHT: 63 IN | BODY MASS INDEX: 40.75 KG/M2 | WEIGHT: 230 LBS

## 2023-03-16 DIAGNOSIS — M54.2 NECK PAIN: Primary | ICD-10-CM

## 2023-03-16 DIAGNOSIS — M50.30 DDD (DEGENERATIVE DISC DISEASE), CERVICAL: ICD-10-CM

## 2023-03-16 DIAGNOSIS — M79.18 CERVICAL MYOFASCIAL PAIN SYNDROME: ICD-10-CM

## 2023-03-16 PROCEDURE — G8427 DOCREV CUR MEDS BY ELIG CLIN: HCPCS | Performed by: PHYSICIAN ASSISTANT

## 2023-03-16 PROCEDURE — 3017F COLORECTAL CA SCREEN DOC REV: CPT | Performed by: PHYSICIAN ASSISTANT

## 2023-03-16 PROCEDURE — G8417 CALC BMI ABV UP PARAM F/U: HCPCS | Performed by: PHYSICIAN ASSISTANT

## 2023-03-16 PROCEDURE — 1036F TOBACCO NON-USER: CPT | Performed by: PHYSICIAN ASSISTANT

## 2023-03-16 PROCEDURE — G8482 FLU IMMUNIZE ORDER/ADMIN: HCPCS | Performed by: PHYSICIAN ASSISTANT

## 2023-03-16 PROCEDURE — 99214 OFFICE O/P EST MOD 30 MIN: CPT | Performed by: PHYSICIAN ASSISTANT
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
## 2023-03-23 ENCOUNTER — TELEPHONE (OUTPATIENT)
Dept: ORTHOPEDIC SURGERY | Age: 63
End: 2023-03-23

## 2023-03-23 ENCOUNTER — TELEPHONE (OUTPATIENT)
Dept: PHARMACY | Age: 63
End: 2023-03-23

## 2023-04-03 RX ORDER — QUETIAPINE FUMARATE 25 MG/1
TABLET, FILM COATED ORAL
Qty: 180 TABLET | Refills: 0 | Status: SHIPPED | OUTPATIENT
Start: 2023-04-03

## 2023-04-05 ENCOUNTER — TELEPHONE (OUTPATIENT)
Dept: PHARMACY | Age: 63
End: 2023-04-05

## 2023-04-05 NOTE — TELEPHONE ENCOUNTER
Reached out to reschedule canceled appointment for 3-24-23 for diabetes management. No answer, left message    Laurie Calloway, PharmD  [facility] [place]  [phone]    For Pharmacy Admin Tracking Only    Program: Medication Management  CPA in place:  Yes  Recommendation Provided To:    Intervention Detail:   Intervention Accepted By:   Nikhil Mesa Closed?:    Time Spent (min): 5

## 2023-04-06 DIAGNOSIS — E11.22 CKD STAGE 3 DUE TO TYPE 2 DIABETES MELLITUS (HCC): ICD-10-CM

## 2023-04-06 DIAGNOSIS — E11.29 TYPE 2 DIABETES MELLITUS WITH MICROALBUMINURIA, WITH LONG-TERM CURRENT USE OF INSULIN (HCC): ICD-10-CM

## 2023-04-06 DIAGNOSIS — Z79.4 TYPE 2 DIABETES MELLITUS WITH MICROALBUMINURIA, WITH LONG-TERM CURRENT USE OF INSULIN (HCC): ICD-10-CM

## 2023-04-06 DIAGNOSIS — N18.30 CKD STAGE 3 DUE TO TYPE 2 DIABETES MELLITUS (HCC): ICD-10-CM

## 2023-04-06 DIAGNOSIS — R80.9 TYPE 2 DIABETES MELLITUS WITH MICROALBUMINURIA, WITH LONG-TERM CURRENT USE OF INSULIN (HCC): ICD-10-CM

## 2023-04-06 LAB
ANION GAP SERPL CALCULATED.3IONS-SCNC: 15 MMOL/L (ref 3–16)
BUN SERPL-MCNC: 19 MG/DL (ref 7–20)
CALCIUM SERPL-MCNC: 10 MG/DL (ref 8.3–10.6)
CHLORIDE SERPL-SCNC: 101 MMOL/L (ref 99–110)
CO2 SERPL-SCNC: 24 MMOL/L (ref 21–32)
CREAT SERPL-MCNC: 1.3 MG/DL (ref 0.6–1.2)
GFR SERPLBLD CREATININE-BSD FMLA CKD-EPI: 46 ML/MIN/{1.73_M2}
GLUCOSE SERPL-MCNC: 225 MG/DL (ref 70–99)
POTASSIUM SERPL-SCNC: 5 MMOL/L (ref 3.5–5.1)
SODIUM SERPL-SCNC: 140 MMOL/L (ref 136–145)

## 2023-04-07 LAB
EST. AVERAGE GLUCOSE BLD GHB EST-MCNC: 197.3 MG/DL
HBA1C MFR BLD: 8.5 %

## 2023-04-15 DIAGNOSIS — S16.1XXD STRAIN OF NECK MUSCLE, SUBSEQUENT ENCOUNTER: ICD-10-CM

## 2023-04-17 RX ORDER — TIZANIDINE 4 MG/1
TABLET ORAL
Qty: 30 TABLET | Refills: 1 | Status: SHIPPED | OUTPATIENT
Start: 2023-04-17

## 2023-04-18 ENCOUNTER — ANESTHESIA EVENT (OUTPATIENT)
Dept: ENDOSCOPY | Age: 63
End: 2023-04-18
Payer: COMMERCIAL

## 2023-04-19 ENCOUNTER — HOSPITAL ENCOUNTER (OUTPATIENT)
Age: 63
Setting detail: OUTPATIENT SURGERY
Discharge: HOME | End: 2023-04-19
Attending: INTERNAL MEDICINE | Admitting: INTERNAL MEDICINE
Payer: COMMERCIAL

## 2023-04-19 ENCOUNTER — ANESTHESIA (OUTPATIENT)
Dept: ENDOSCOPY | Age: 63
End: 2023-04-19
Payer: COMMERCIAL

## 2023-04-19 VITALS
RESPIRATION RATE: 20 BRPM | SYSTOLIC BLOOD PRESSURE: 151 MMHG | WEIGHT: 220 LBS | HEIGHT: 63 IN | TEMPERATURE: 97.3 F | BODY MASS INDEX: 38.98 KG/M2 | DIASTOLIC BLOOD PRESSURE: 80 MMHG | HEART RATE: 79 BPM | OXYGEN SATURATION: 93 %

## 2023-04-19 DIAGNOSIS — K21.9 GASTROESOPHAGEAL REFLUX DISEASE, UNSPECIFIED WHETHER ESOPHAGITIS PRESENT: ICD-10-CM

## 2023-04-19 LAB
GLUCOSE BLD-MCNC: 138 MG/DL (ref 70–99)
GLUCOSE BLD-MCNC: 146 MG/DL (ref 70–99)
PERFORMED ON: ABNORMAL
PERFORMED ON: ABNORMAL

## 2023-04-19 PROCEDURE — 7100000000 HC PACU RECOVERY - FIRST 15 MIN: Performed by: INTERNAL MEDICINE

## 2023-04-19 PROCEDURE — 7100000001 HC PACU RECOVERY - ADDTL 15 MIN: Performed by: INTERNAL MEDICINE

## 2023-04-19 PROCEDURE — 88305 TISSUE EXAM BY PATHOLOGIST: CPT

## 2023-04-19 PROCEDURE — 7100000011 HC PHASE II RECOVERY - ADDTL 15 MIN: Performed by: INTERNAL MEDICINE

## 2023-04-19 PROCEDURE — 6360000002 HC RX W HCPCS: Performed by: ANESTHESIOLOGY

## 2023-04-19 PROCEDURE — 3700000000 HC ANESTHESIA ATTENDED CARE: Performed by: INTERNAL MEDICINE

## 2023-04-19 PROCEDURE — 2580000003 HC RX 258: Performed by: ANESTHESIOLOGY

## 2023-04-19 PROCEDURE — 6370000000 HC RX 637 (ALT 250 FOR IP): Performed by: ANESTHESIOLOGY

## 2023-04-19 PROCEDURE — 88342 IMHCHEM/IMCYTCHM 1ST ANTB: CPT

## 2023-04-19 PROCEDURE — 2500000003 HC RX 250 WO HCPCS: Performed by: ANESTHESIOLOGY

## 2023-04-19 PROCEDURE — 3609012400 HC EGD TRANSORAL BIOPSY SINGLE/MULTIPLE: Performed by: INTERNAL MEDICINE

## 2023-04-19 PROCEDURE — 2709999900 HC NON-CHARGEABLE SUPPLY: Performed by: INTERNAL MEDICINE

## 2023-04-19 PROCEDURE — 3700000001 HC ADD 15 MINUTES (ANESTHESIA): Performed by: INTERNAL MEDICINE

## 2023-04-19 PROCEDURE — 7100000010 HC PHASE II RECOVERY - FIRST 15 MIN: Performed by: INTERNAL MEDICINE

## 2023-04-19 RX ORDER — PROPOFOL 10 MG/ML
INJECTION, EMULSION INTRAVENOUS PRN
Status: DISCONTINUED | OUTPATIENT
Start: 2023-04-19 | End: 2023-04-19 | Stop reason: SDUPTHER

## 2023-04-19 RX ORDER — SODIUM CHLORIDE 0.9 % (FLUSH) 0.9 %
5-40 SYRINGE (ML) INJECTION PRN
Status: DISCONTINUED | OUTPATIENT
Start: 2023-04-19 | End: 2023-04-19 | Stop reason: HOSPADM

## 2023-04-19 RX ORDER — SODIUM CHLORIDE 0.9 % (FLUSH) 0.9 %
5-40 SYRINGE (ML) INJECTION EVERY 12 HOURS SCHEDULED
Status: DISCONTINUED | OUTPATIENT
Start: 2023-04-19 | End: 2023-04-19 | Stop reason: HOSPADM

## 2023-04-19 RX ORDER — SUCCINYLCHOLINE/SOD CL,ISO/PF 200MG/10ML
SYRINGE (ML) INTRAVENOUS PRN
Status: DISCONTINUED | OUTPATIENT
Start: 2023-04-19 | End: 2023-04-19 | Stop reason: SDUPTHER

## 2023-04-19 RX ORDER — ONDANSETRON 2 MG/ML
4 INJECTION INTRAMUSCULAR; INTRAVENOUS
Status: DISCONTINUED | OUTPATIENT
Start: 2023-04-19 | End: 2023-04-19 | Stop reason: HOSPADM

## 2023-04-19 RX ORDER — SODIUM CHLORIDE 9 MG/ML
INJECTION, SOLUTION INTRAVENOUS PRN
Status: DISCONTINUED | OUTPATIENT
Start: 2023-04-19 | End: 2023-04-19 | Stop reason: HOSPADM

## 2023-04-19 RX ORDER — ROCURONIUM BROMIDE 10 MG/ML
INJECTION, SOLUTION INTRAVENOUS PRN
Status: DISCONTINUED | OUTPATIENT
Start: 2023-04-19 | End: 2023-04-19 | Stop reason: SDUPTHER

## 2023-04-19 RX ORDER — LIDOCAINE HYDROCHLORIDE 20 MG/ML
INJECTION, SOLUTION EPIDURAL; INFILTRATION; INTRACAUDAL; PERINEURAL PRN
Status: DISCONTINUED | OUTPATIENT
Start: 2023-04-19 | End: 2023-04-19 | Stop reason: SDUPTHER

## 2023-04-19 RX ADMIN — Medication 120 MG: at 11:07

## 2023-04-19 RX ADMIN — SODIUM CHLORIDE: 9 INJECTION, SOLUTION INTRAVENOUS at 10:27

## 2023-04-19 RX ADMIN — ROCURONIUM BROMIDE 5 MG: 10 INJECTION INTRAVENOUS at 11:07

## 2023-04-19 RX ADMIN — PROPOFOL 180 MG: 10 INJECTION, EMULSION INTRAVENOUS at 11:07

## 2023-04-19 RX ADMIN — LIDOCAINE HYDROCHLORIDE 80 MG: 20 INJECTION, SOLUTION EPIDURAL; INFILTRATION; INTRACAUDAL; PERINEURAL at 11:07

## 2023-04-19 RX ADMIN — Medication 2 LOZENGE: at 12:54

## 2023-04-19 ASSESSMENT — PAIN DESCRIPTION - FREQUENCY
FREQUENCY: CONTINUOUS
FREQUENCY: CONTINUOUS

## 2023-04-19 ASSESSMENT — PAIN DESCRIPTION - DESCRIPTORS
DESCRIPTORS: SORE
DESCRIPTORS: SORE

## 2023-04-19 ASSESSMENT — PAIN - FUNCTIONAL ASSESSMENT
PAIN_FUNCTIONAL_ASSESSMENT: ACTIVITIES ARE NOT PREVENTED
PAIN_FUNCTIONAL_ASSESSMENT: 0-10

## 2023-04-19 ASSESSMENT — PAIN SCALES - GENERAL
PAINLEVEL_OUTOF10: 6
PAINLEVEL_OUTOF10: 0
PAINLEVEL_OUTOF10: 6

## 2023-04-19 ASSESSMENT — PAIN DESCRIPTION - ORIENTATION
ORIENTATION: INNER
ORIENTATION: INNER

## 2023-04-19 ASSESSMENT — PAIN DESCRIPTION - LOCATION
LOCATION: THROAT
LOCATION: THROAT

## 2023-04-19 ASSESSMENT — PAIN DESCRIPTION - PAIN TYPE
TYPE: ACUTE PAIN
TYPE: SURGICAL PAIN

## 2023-04-19 ASSESSMENT — PAIN DESCRIPTION - ONSET: ONSET: ON-GOING

## 2023-04-19 NOTE — DISCHARGE INSTRUCTIONS
[facility] Endoscopy Discharge Instructions   EGD (Esophagogastroduodenoscopy)    NAME:  Damian Roberts  YOB: 1960  MEDICAL RECORD NUMBER:  [id number]  DATE:  4/19/2023      After receiving Propofol (Diprivan) for Moderate Sedation:    Do not drive or operate any machinery until tomorrow  Do not sign any legal documents or make any critical decisions  Do not drink alcoholic beverages for 24 hours  Plan to spend a few hours resting before resuming your normal routine  Possible side effects are light headedness and sedation    You may resume your usual diet at home    Resume all your daily medications    Call your physician if any of the following occur: If you have any difficulty breathing: CALL 911  Neck swelling  Difficulty swallowing  Excessive pain or abdominal distention  Fever, chills, nausea or vomiting    If you are unable to reach your physician or symptoms worsen, proceed to the nearest Emergency Room    You may use warm salt water gargles, lozenges or Chloraseptic spray as needed for your sore throat. Biopsy Obtained: YES. If you have not heard from your physician in one week please call your physician's office for biopsy results, [phone]. Recommendations: Continue current medication    For questions or concerns please contact your GI physician's 24 hour call center at [phone].

## 2023-04-19 NOTE — H&P
will: no,   additional information provided     Social Determinants of Health     Financial Resource Strain: Medium Risk    Difficulty of Paying Living Expenses: Somewhat hard   Food Insecurity: No Food Insecurity    Worried About Running Out of Food in the Last Year: Never true    Ran Out of Food in the Last Year: Never true   Transportation Needs: Unknown    Lack of Transportation (Medical): Not on file    Lack of Transportation (Non-Medical): No   Physical Activity: Not on file   Stress: Not on file   Social Connections: Not on file   Intimate Partner Violence: Not on file   Housing Stability: Unknown    Unable to Pay for Housing in the Last Year: Not on file    Number of Places Lived in the Last Year: Not on file    Unstable Housing in the Last Year: No     Family History   Problem Relation Age of Onset    Cancer Mother         forehead soft tissue    Diabetes Mother     Arthritis Father     High Blood Pressure Father     High Cholesterol Father     Alzheimer's Disease Father     Depression Sister     Diabetes Brother     Colon Cancer Paternal Grandmother          PHYSICAL EXAM:      /76   Pulse 86   Temp 98 °F (36.7 °C) (Temporal)   Resp 18   Ht 5' 3\" (1.6 m)   Wt 220 lb (99.8 kg)   SpO2 98%   BMI 38.97 kg/m²  I        Heart:normal    Lungs: normal    Abdomen: normal      ASA Grade:  See anesthesia note      ASSESSMENT AND PLAN:    1. Procedure options, risks and benefits reviewed with patient and expresses understanding.

## 2023-04-19 NOTE — PROGRESS NOTES
Patient arrived to phase two at 1302. Patient is alert and oriented X4. Complaining of a sore throat, ice chips and lozenges supplied. Patient satisfied. Tolerating PO intake.  at bedside. Will continue to monitor.

## 2023-04-19 NOTE — PROCEDURES
[facility]  Endoscopy Note    Patient: Miguelangel Waller  : 1960  Acct#: [de-identified]    Procedure: Esophagogastroduodenoscopy with biopsy    Date:  2023     Surgeon:  Avi Felder MD, MD    Referring Physician:  Danitza Leigh    Preoperative Diagnosis:  dyspepsia    Postoperative Diagnosis:  Gastritis    Anesthesia: see anesthesia note. Indications: This is a 58y.o. year old female who presents today with New-onset dyspepsia. Description of Procedure:  Informed consent was obtained from the patient after explanation of indications, benefits and possible risks and complications of the procedure. The patient was then taken to the endoscopy suite, placed in the left lateral decubitus position and the above IV sedation was administrered. The Olympus videoendoscope was placed in the patient's mouth and under direct visualization passed into the esophagus. Visualization of the esophagus demonstrated normal..     The scope was then advanced into the stomach. Visualization of the gastric body and antrum demonstrated gastritis. The antrum was biopsied. .  A retroflexed exam of the gastric cardia and fundus demonstrated normal..  The pylorus was patent and the scope was advanced into the duodenum. Visualization of the duodenal bulb demonstrated normal..  The second portion of the duodenum demonstrated normal..    The scope was then withdrawn back into the stomach, it was decompressed, and the scope was completely withdrawn. The patient tolerated the procedure well and was taken to the post anesthesia care unit in good condition. Estimated Blood loss:  minimal.    Impression: Gastritis. Recommendations:Await pathology.     Avi Felder MD, MD  [facility]

## 2023-04-19 NOTE — PROGRESS NOTES
Spoke with patient a couple times. They are on their way. They were expected at 930.  Still not here

## 2023-04-19 NOTE — ANESTHESIA PRE PROCEDURE
pt      Anesthesia Evaluation  Patient summary reviewed no history of anesthetic complications:   Airway: Mallampati: III  TM distance: >3 FB   Neck ROM: full  Mouth opening: > = 3 FB   Dental:    (+) poor dentition      Pulmonary:Negative Pulmonary ROS and normal exam                               Cardiovascular:  Exercise tolerance: good (>4 METS),   (+) hypertension:, dysrhythmias (ef 55): PAC, hyperlipidemia      ECG reviewed  Rhythm: regular  Rate: normal  Echocardiogram reviewed         Beta Blocker:  Dose within 24 Hrs         Neuro/Psych:   (+) neuromuscular disease:, headaches:, psychiatric history:depression/anxiety             GI/Hepatic/Renal:   (+) GERD (Hx gastroparesis; patient npo for solid food over 12 hours): well controlled, renal disease: CRI,          ROS comment: IBS. Endo/Other:    (+) DiabetesType II DM, using insulin, malignancy/cancer (Hx brst ca). (-) blood dyscrasia               Abdominal:   (+) obese,           Vascular: negative vascular ROS. Other Findings:           Anesthesia Plan      MAC     ASA 3       Induction: intravenous. Anesthetic plan and risks discussed with patient and spouse. Plan discussed with CRNA. This pre-anesthesia assessment may be used as a history and physical.    DOS STAFF ADDENDUM:    Pt seen and examined, chart reviewed (including anesthesia, drug and allergy history). No interval changes to history and physical examination. Anesthetic plan, risks, benefits, alternatives, and personnel involved discussed with patient. Questions and concerns addressed. Patient(family) verbalized an understanding and agrees to proceed.       Baljinder Ortega MD  April 19, 2023  10:31 AM

## 2023-04-28 ENCOUNTER — TELEPHONE (OUTPATIENT)
Dept: PHARMACY | Age: 63
End: 2023-04-28

## 2023-04-28 NOTE — TELEPHONE ENCOUNTER
Diabetes referral from the hospitalist from 2/13/2023. Reached out to reschedule canceled appointment from 3-24-23 for diabetes management. No answer, left message    Manuel Modi, PharmD  [address] [place] [address]  Diabetes Service  [phone]    For Pharmacy Admin Tracking Only    Program: Medication Management  CPA in place:  Yes  Recommendation Provided To:    Intervention Detail:   Intervention Accepted By:   Nicanor Etienne Closed?:    Time Spent (min): 5

## 2023-05-01 ENCOUNTER — OFFICE VISIT (OUTPATIENT)
Dept: ORTHOPEDIC SURGERY | Age: 63
End: 2023-05-01
Payer: COMMERCIAL

## 2023-05-01 VITALS — BODY MASS INDEX: 38.98 KG/M2 | RESPIRATION RATE: 16 BRPM | WEIGHT: 220 LBS | HEIGHT: 63 IN

## 2023-05-01 DIAGNOSIS — M51.36 DDD (DEGENERATIVE DISC DISEASE), LUMBAR: Primary | ICD-10-CM

## 2023-05-01 DIAGNOSIS — M16.12 ARTHRITIS OF LEFT HIP: ICD-10-CM

## 2023-05-01 PROCEDURE — G8417 CALC BMI ABV UP PARAM F/U: HCPCS | Performed by: ORTHOPAEDIC SURGERY

## 2023-05-01 PROCEDURE — 3017F COLORECTAL CA SCREEN DOC REV: CPT | Performed by: ORTHOPAEDIC SURGERY

## 2023-05-01 PROCEDURE — 99213 OFFICE O/P EST LOW 20 MIN: CPT | Performed by: ORTHOPAEDIC SURGERY

## 2023-05-01 PROCEDURE — G8427 DOCREV CUR MEDS BY ELIG CLIN: HCPCS | Performed by: ORTHOPAEDIC SURGERY

## 2023-05-01 PROCEDURE — 1036F TOBACCO NON-USER: CPT | Performed by: ORTHOPAEDIC SURGERY

## 2023-05-01 NOTE — PROGRESS NOTES
[facility]  Office Visit    Chief Complaint: Left hip pain    HPI:  Estevan Jo is a 58 y.o. who is here in follow-up of left hip pain. She underwent left hip steroid injection on September 8, 2022. She reports a 1 to 2-day duration of relief of symptoms. The pain is generally in her lateral and anterior hip. She has a history of low back pain and has seen a specialist in the past for this. She reports the pain is worse with walking. She does not have issues getting into or out of cars or putting on socks, shoes, pants. She rates the pain as 7/10. She was unable to see Dr. Fran Aly due to insurance issues. She never did follow-up with Dr. Mino Ross, who is no longer with [facility]. Patient Active Problem List   Diagnosis    Diabetic gastroparesis ([facility])    Allergic rhinitis    Insomnia    Essential hypertension    Headaches due to old head injury    Type 2 diabetes mellitus with microalbuminuria, with long-term current use of insulin ([facility])    Migraine    Depression, major    Right knee DJD    Calcific Achilles tendonitis, right    Lumbar spinal stenosis    Urge incontinence of urine    Personal history of breast cancer    Hyperlipidemia LDL goal <854    Metallic taste    Severe obesity (BMI 35.0-39. 9) with comorbidity (HCC)    Anxiety    History of colon polyps    Irritable bowel syndrome with both constipation and diarrhea    Trigger finger, acquired    Bilateral edema of lower extremity    Achilles tendinitis of both lower extremities    Arthritis of left hip    Lumbar facet arthropathy    DDD (degenerative disc disease), lumbar    CKD stage 3 due to type 2 diabetes mellitus (HCC)    Fibromyalgia    Acute kidney injury superimposed on CKD (HCC)    Pyelonephritis    AMS (altered mental status)    Cervical myofascial pain syndrome    DDD (degenerative disc disease), cervical       ROS:  Constitutional: denies fever, chills, weight loss  MSK: denies pain in other joints, muscle

## 2023-05-04 DIAGNOSIS — S16.1XXD STRAIN OF NECK MUSCLE, SUBSEQUENT ENCOUNTER: ICD-10-CM

## 2023-05-04 RX ORDER — TIZANIDINE 4 MG/1
TABLET ORAL
Qty: 30 TABLET | Refills: 1 | Status: SHIPPED | OUTPATIENT
Start: 2023-05-04

## 2023-05-05 ENCOUNTER — OFFICE VISIT (OUTPATIENT)
Dept: FAMILY MEDICINE CLINIC | Age: 63
End: 2023-05-05
Payer: COMMERCIAL

## 2023-05-05 VITALS
SYSTOLIC BLOOD PRESSURE: 118 MMHG | BODY MASS INDEX: 39.16 KG/M2 | OXYGEN SATURATION: 94 % | TEMPERATURE: 98.7 F | RESPIRATION RATE: 14 BRPM | HEART RATE: 80 BPM | DIASTOLIC BLOOD PRESSURE: 60 MMHG | HEIGHT: 63 IN | WEIGHT: 221 LBS

## 2023-05-05 DIAGNOSIS — M85.89 OSTEOPENIA OF MULTIPLE SITES: ICD-10-CM

## 2023-05-05 DIAGNOSIS — Z79.4 TYPE 2 DIABETES MELLITUS WITH MICROALBUMINURIA, WITH LONG-TERM CURRENT USE OF INSULIN (HCC): ICD-10-CM

## 2023-05-05 DIAGNOSIS — R80.9 TYPE 2 DIABETES MELLITUS WITH MICROALBUMINURIA, WITH LONG-TERM CURRENT USE OF INSULIN (HCC): ICD-10-CM

## 2023-05-05 DIAGNOSIS — E11.29 TYPE 2 DIABETES MELLITUS WITH MICROALBUMINURIA, WITH LONG-TERM CURRENT USE OF INSULIN (HCC): ICD-10-CM

## 2023-05-05 DIAGNOSIS — F33.1 MODERATE EPISODE OF RECURRENT MAJOR DEPRESSIVE DISORDER (HCC): ICD-10-CM

## 2023-05-05 DIAGNOSIS — E78.5 HYPERLIPIDEMIA LDL GOAL <100: ICD-10-CM

## 2023-05-05 DIAGNOSIS — Z00.00 MEDICARE ANNUAL WELLNESS VISIT, SUBSEQUENT: Primary | ICD-10-CM

## 2023-05-05 DIAGNOSIS — M79.7 FIBROMYALGIA: ICD-10-CM

## 2023-05-05 DIAGNOSIS — R41.3 MEMORY LOSS: ICD-10-CM

## 2023-05-05 PROBLEM — N18.9 ACUTE KIDNEY INJURY SUPERIMPOSED ON CKD (HCC): Status: RESOLVED | Noted: 2023-02-11 | Resolved: 2023-05-05

## 2023-05-05 PROBLEM — N17.9 ACUTE KIDNEY INJURY SUPERIMPOSED ON CKD (HCC): Status: RESOLVED | Noted: 2023-02-11 | Resolved: 2023-05-05

## 2023-05-05 PROCEDURE — 3052F HG A1C>EQUAL 8.0%<EQUAL 9.0%: CPT | Performed by: FAMILY MEDICINE

## 2023-05-05 PROCEDURE — 3074F SYST BP LT 130 MM HG: CPT | Performed by: FAMILY MEDICINE

## 2023-05-05 PROCEDURE — 3078F DIAST BP <80 MM HG: CPT | Performed by: FAMILY MEDICINE

## 2023-05-05 PROCEDURE — G0439 PPPS, SUBSEQ VISIT: HCPCS | Performed by: FAMILY MEDICINE

## 2023-05-05 PROCEDURE — 3017F COLORECTAL CA SCREEN DOC REV: CPT | Performed by: FAMILY MEDICINE

## 2023-05-05 RX ORDER — GABAPENTIN 600 MG/1
600 TABLET ORAL 3 TIMES DAILY
Qty: 270 TABLET | Refills: 0 | Status: SHIPPED | OUTPATIENT
Start: 2023-05-05 | End: 2023-08-03

## 2023-05-05 ASSESSMENT — PATIENT HEALTH QUESTIONNAIRE - PHQ9
SUM OF ALL RESPONSES TO PHQ9 QUESTIONS 1 & 2: 6
SUM OF ALL RESPONSES TO PHQ QUESTIONS 1-9: 10
SUM OF ALL RESPONSES TO PHQ QUESTIONS 1-9: 10
1. LITTLE INTEREST OR PLEASURE IN DOING THINGS: 3
9. THOUGHTS THAT YOU WOULD BE BETTER OFF DEAD, OR OF HURTING YOURSELF: 0
10. IF YOU CHECKED OFF ANY PROBLEMS, HOW DIFFICULT HAVE THESE PROBLEMS MADE IT FOR YOU TO DO YOUR WORK, TAKE CARE OF THINGS AT HOME, OR GET ALONG WITH OTHER PEOPLE: 2
7. TROUBLE CONCENTRATING ON THINGS, SUCH AS READING THE NEWSPAPER OR WATCHING TELEVISION: 0
3. TROUBLE FALLING OR STAYING ASLEEP: 0
8. MOVING OR SPEAKING SO SLOWLY THAT OTHER PEOPLE COULD HAVE NOTICED. OR THE OPPOSITE, BEING SO FIGETY OR RESTLESS THAT YOU HAVE BEEN MOVING AROUND A LOT MORE THAN USUAL: 2
2. FEELING DOWN, DEPRESSED OR HOPELESS: 3
6. FEELING BAD ABOUT YOURSELF - OR THAT YOU ARE A FAILURE OR HAVE LET YOURSELF OR YOUR FAMILY DOWN: 0
5. POOR APPETITE OR OVEREATING: 0
4. FEELING TIRED OR HAVING LITTLE ENERGY: 2
SUM OF ALL RESPONSES TO PHQ QUESTIONS 1-9: 10
SUM OF ALL RESPONSES TO PHQ QUESTIONS 1-9: 10

## 2023-05-05 ASSESSMENT — LIFESTYLE VARIABLES
HOW OFTEN DO YOU HAVE A DRINK CONTAINING ALCOHOL: MONTHLY OR LESS
HOW MANY STANDARD DRINKS CONTAINING ALCOHOL DO YOU HAVE ON A TYPICAL DAY: 1 OR 2

## 2023-05-05 NOTE — PROGRESS NOTES
Shingles vaccine (2 of 3) 11/02/2016    Diabetic retinal exam  01/11/2022    COVID-19 Vaccine (5 - Booster) 01/11/2022    Cervical cancer screen  02/18/2022    Annual Wellness Visit (AWV)  09/28/2022    Colorectal Cancer Screen  10/16/2022    Lipids  02/21/2023    Breast cancer screen  04/13/2023    Diabetic foot exam  08/18/2023    Diabetic Alb to Cr ratio (uACR) test  01/27/2024    Depression Monitoring  02/21/2024    A1C test (Diabetic or Prediabetic)  04/06/2024    GFR test (Diabetes, CKD 3-4, OR last GFR 15-59)  04/06/2024    DTaP/Tdap/Td vaccine (2 - Td or Tdap) 10/08/2032    Flu vaccine  Completed    Pneumococcal 0-64 years Vaccine  Completed    Hepatitis C screen  Completed    HIV screen  Completed    Hepatitis A vaccine  Aged Out    Hib vaccine  Aged Out    Meningococcal (ACWY) vaccine  Aged Out     Recommendations for [employer] Due: see orders and patient instructions/AVS.    Recommended screening schedule for the next 5-10 years is provided to the patient in written form: see Patient Instructions/AVS.    Assessment/Plan:    1. Medicare annual wellness visit, subsequent  - Reviewed medical and social history together. Discussed wide range of preventive recommendations for Medicare population, including fall prevention, exercise, recommendations for healthy produce-based diet, vaccines and routine screening tests, addressing all care gaps. Vaccines recommended:  shingrix, COVID. Last pap 2019. Should not need another. No hx dysplasia. 2. Moderate episode of recurrent major depressive disorder ([facility]  - discussed non-pharm ways to improve mood (in effort to limit polypharmacy if possible). She is challenged to get out and walk for 5 min after each meal, which can improve postprandial glycemic control as well. Continue current doses of Effexor and Seroquel.  - if vit D is low, replacement might help mood. 3. Memory loss  - this has been of subacute onset and gradually progressive.

## 2023-05-08 ENCOUNTER — TELEPHONE (OUTPATIENT)
Dept: PHARMACY | Age: 63
End: 2023-05-08

## 2023-05-08 NOTE — FLOWSHEET NOTE
[facility] Pre-Admission Testing Electronic Communication Worksheet for OR/ENDO Procedures        Patient: Damian Roberts    DOS: 5/10/23    Arrival Time: 0930    Surgery Time:1100    Meds to Bed:  [] YES    [x]  NO    Transportation Confirmed: [x] YES    []  NO    History and Physical:  [] YES    [x]  NO  [] N/A  If yes, please list doctor or Urgent Care and date of H&P: Dr. Clarissa Yip to do DOS    Additional Clearance(Cardiac, Pulmonary, etc):  [] YES    [x]  NO    Pre-Admission Testing Visit:  [] YES    [x]  NO If no, do labs/testing need to be done DOS?   [] YES    [x]  NO    Medication Reconciliation Complete:  [x] YES    []  NO        Additional Notes:    Diabetes  IBS            Interview Complete: [x] YES    []  NO          Cheryl Sullivan RN  11:59 AM

## 2023-05-08 NOTE — FLOWSHEET NOTE
Preoperative Screening for Elective Surgery/Invasive Procedures While COVID-19 present in the community     Have you tested positive or have been told to self-isolate for COVID-19 like symptoms within the past 28 days? Do you currently have any of the following symptoms? Fever >100.0 F or 99.9 F in immunocompromised patients? New onset cough, shortness of breath or difficulty breathing? New onset sore throat, myalgia (muscle aches and pains), headache, loss of taste/smell or diarrhea? Have you had a potential exposure to COVID-19 within the past 14 days by:  Close contact with a confirmed case? Close contact with a healthcare worker,  or essential infrastructure worker (grocery store, [employer], gas station, public utilities or transportation)? Do you reside in a congregate setting such as; skilled nursing facility, adult home, correctional facility, homeless shelter or other institutional setting? Have you had recent travel to a known COVID-19 hotspot? No to all above       * Admitted with diarrhea? [] YES    [x]  NO     *Prior history of C-Diff. In last 3 months? [] YES    [x]  NO     *Antibiotic use in the past 6-8 weeks? [x]  NO    []  YES      If yes, which: REASON_________________     *Prior hospitalization or nursing home in the last month? []  YES    [x]  NO     SAFETY FIRST. .call before you fall    [facility] time__5/10/23 0930__________        Surgery time_1100___________    Do not eat  anything after 12:00 midnight prior to your surgery. nothing to drink within 5 hours of procedure. This includes water chewing gum, mints and ice chips- the Day of Surgery. You may brush your teeth and gargle the morning of your surgery, but do not swallow the water     Please see your family doctor/pediatrician for a history and physical and/or questions concerning medications.    Bring any test results/reports from

## 2023-05-09 ENCOUNTER — ANESTHESIA EVENT (OUTPATIENT)
Dept: ENDOSCOPY | Age: 63
End: 2023-05-09
Payer: COMMERCIAL

## 2023-05-10 ENCOUNTER — ANESTHESIA (OUTPATIENT)
Dept: ENDOSCOPY | Age: 63
End: 2023-05-10
Payer: COMMERCIAL

## 2023-05-10 ENCOUNTER — HOSPITAL ENCOUNTER (OUTPATIENT)
Age: 63
Setting detail: OUTPATIENT SURGERY
Discharge: HOME | End: 2023-05-10
Attending: INTERNAL MEDICINE | Admitting: INTERNAL MEDICINE
Payer: COMMERCIAL

## 2023-05-10 VITALS
TEMPERATURE: 97 F | SYSTOLIC BLOOD PRESSURE: 156 MMHG | OXYGEN SATURATION: 98 % | RESPIRATION RATE: 15 BRPM | BODY MASS INDEX: 39.16 KG/M2 | DIASTOLIC BLOOD PRESSURE: 73 MMHG | HEART RATE: 77 BPM | WEIGHT: 221 LBS | HEIGHT: 63 IN

## 2023-05-10 DIAGNOSIS — Z86.010 HISTORY OF COLON POLYPS: ICD-10-CM

## 2023-05-10 LAB
GLUCOSE BLD-MCNC: 167 MG/DL (ref 70–99)
GLUCOSE BLD-MCNC: 172 MG/DL (ref 70–99)
PERFORMED ON: ABNORMAL
PERFORMED ON: ABNORMAL

## 2023-05-10 PROCEDURE — 88305 TISSUE EXAM BY PATHOLOGIST: CPT

## 2023-05-10 PROCEDURE — 2580000003 HC RX 258: Performed by: ANESTHESIOLOGY

## 2023-05-10 PROCEDURE — 7100000001 HC PACU RECOVERY - ADDTL 15 MIN: Performed by: INTERNAL MEDICINE

## 2023-05-10 PROCEDURE — 7100000010 HC PHASE II RECOVERY - FIRST 15 MIN: Performed by: INTERNAL MEDICINE

## 2023-05-10 PROCEDURE — 7100000011 HC PHASE II RECOVERY - ADDTL 15 MIN: Performed by: INTERNAL MEDICINE

## 2023-05-10 PROCEDURE — 3609010600 HC COLONOSCOPY POLYPECTOMY SNARE/COLD BIOPSY: Performed by: INTERNAL MEDICINE

## 2023-05-10 PROCEDURE — 3700000000 HC ANESTHESIA ATTENDED CARE: Performed by: INTERNAL MEDICINE

## 2023-05-10 PROCEDURE — 6360000002 HC RX W HCPCS

## 2023-05-10 PROCEDURE — 3700000001 HC ADD 15 MINUTES (ANESTHESIA): Performed by: INTERNAL MEDICINE

## 2023-05-10 PROCEDURE — 2500000003 HC RX 250 WO HCPCS

## 2023-05-10 PROCEDURE — 7100000000 HC PACU RECOVERY - FIRST 15 MIN: Performed by: INTERNAL MEDICINE

## 2023-05-10 PROCEDURE — 2709999900 HC NON-CHARGEABLE SUPPLY: Performed by: INTERNAL MEDICINE

## 2023-05-10 RX ORDER — SODIUM CHLORIDE 0.9 % (FLUSH) 0.9 %
5-40 SYRINGE (ML) INJECTION PRN
Status: DISCONTINUED | OUTPATIENT
Start: 2023-05-10 | End: 2023-05-10 | Stop reason: HOSPADM

## 2023-05-10 RX ORDER — SODIUM CHLORIDE 0.9 % (FLUSH) 0.9 %
5-40 SYRINGE (ML) INJECTION EVERY 12 HOURS SCHEDULED
Status: DISCONTINUED | OUTPATIENT
Start: 2023-05-10 | End: 2023-05-10 | Stop reason: HOSPADM

## 2023-05-10 RX ORDER — PROPOFOL 10 MG/ML
INJECTION, EMULSION INTRAVENOUS CONTINUOUS PRN
Status: DISCONTINUED | OUTPATIENT
Start: 2023-05-10 | End: 2023-05-10 | Stop reason: SDUPTHER

## 2023-05-10 RX ORDER — SODIUM CHLORIDE 9 MG/ML
INJECTION, SOLUTION INTRAVENOUS PRN
Status: DISCONTINUED | OUTPATIENT
Start: 2023-05-10 | End: 2023-05-10 | Stop reason: HOSPADM

## 2023-05-10 RX ORDER — PROPOFOL 10 MG/ML
INJECTION, EMULSION INTRAVENOUS PRN
Status: DISCONTINUED | OUTPATIENT
Start: 2023-05-10 | End: 2023-05-10 | Stop reason: SDUPTHER

## 2023-05-10 RX ORDER — LIDOCAINE HYDROCHLORIDE 20 MG/ML
INJECTION, SOLUTION EPIDURAL; INFILTRATION; INTRACAUDAL; PERINEURAL PRN
Status: DISCONTINUED | OUTPATIENT
Start: 2023-05-10 | End: 2023-05-10 | Stop reason: SDUPTHER

## 2023-05-10 RX ADMIN — SODIUM CHLORIDE: 9 INJECTION, SOLUTION INTRAVENOUS at 10:03

## 2023-05-10 RX ADMIN — PROPOFOL 160 MCG/KG/MIN: 10 INJECTION, EMULSION INTRAVENOUS at 10:48

## 2023-05-10 RX ADMIN — LIDOCAINE HYDROCHLORIDE 80 MG: 20 INJECTION, SOLUTION EPIDURAL; INFILTRATION; INTRACAUDAL; PERINEURAL at 10:48

## 2023-05-10 RX ADMIN — PROPOFOL 80 MG: 10 INJECTION, EMULSION INTRAVENOUS at 10:48

## 2023-05-10 ASSESSMENT — PAIN SCALES - GENERAL: PAINLEVEL_OUTOF10: 0

## 2023-05-10 ASSESSMENT — PAIN - FUNCTIONAL ASSESSMENT
PAIN_FUNCTIONAL_ASSESSMENT: 0-10
PAIN_FUNCTIONAL_ASSESSMENT: 0-10

## 2023-05-10 NOTE — PROCEDURES
[facility]  Endoscopy Note    Patient: Katherine Gomez  : 1960  Acct#: [de-identified]    Procedure: Colonoscopy with biopsy    Date:  5/10/2023    Surgeon:  Jag Mina MD, MD    Referring Physician:  Kandace Arias    Previous Colonoscopy: Yes  Date: 10/16/17  Greater than 3 years? Yes    Preoperative Diagnosis:  surveillance    Postoperative Diagnosis:  Colon polyps    Anesthesia:  See anesthesia note    Indications: This is a 58y.o. year old female who presents today with previous adenomatous polyp. Procedure: An informed consent was obtained from the patient after explanation of indications, benefits, possible risks and complications of the procedure. The patient was then taken to the endoscopy suite, placed in the left lateral decubitus position, and the above IV anesthesia was administered. A digital rectal examination was performed and revealed negative without mass, lesions or tenderness. The Olympus CFQ-180-AL video colonoscope was placed in the patient's rectum under digital direction and advanced to the cecum. The cecum was identified by characteristic anatomy and ballottment. The ileocecal valve was identified. The preparation was fair. The scope was then withdrawn back through the cecum, ascending, transverse, descending and sigmoid colons. Carefull circumferential examination of the mucosa in these areas demonstrated two 3 mm polyps in the cecum that were biopsied and removed. The scope was then withdrawn into the rectum and retroflexed. The retroflexed view of the anal verge and rectum demonstrates no abnormalities. The scope was straightened, the colon was decompressed and the scope was withdrawn from the patient. The patient tolerated the procedure well and was taken to the PACU in good condition. Estimated Blood Loss:  minimal.    Impression:  Colon polyps    Recommendations:  Await pathology. Repeat colonoscopy in 5 years.     Jag Mina MD, MD

## 2023-05-10 NOTE — H&P
[facility]   Pre-operative History and Physical    Patient: Flores Sanchez  : 1960  Acct#: [de-identified]    History Obtained From: electronic medical record    HISTORY OF PRESENT ILLNESS  Procedure:Colonoscopy  Indications:surveillance  Past Medical History:        Diagnosis Date    Allergic rhinitis 05/15/2014    Anxiety and depression     Arthritis     Breast cancer ([facility] [address] 2010    left breast    Cataract     bilateral    Chronic back pain     DM (diabetes mellitus), type 2, uncontrolled with complications     ESBL (extended spectrum beta-lactamase) producing bacteria infection 10/06/2018    urine    Gastroparalysis due to secondary diabetes ([facility] [address]     GERD (gastroesophageal reflux disease)     History of migraine headaches     HTN (hypertension) 05/15/2014    Hyperlipidemia     Irritable bowel syndrome (IBS)     Obesity     Wears glasses     reading     Past Surgical History:        Procedure Laterality Date    ACHILLES TENDON SURGERY Right     BACK SURGERY  2013    lumbar    BREAST LUMPECTOMY Left 2010    CARPAL TUNNEL RELEASE Bilateral      SECTION      x 1    CHOLECYSTECTOMY      COLONOSCOPY  10/16/2017    polypectomy- Dr Analilia Mcdonough, COLON, DIAGNOSTIC      ESOPHAGEAL DILATATION N/A 5/15/2019    ESOPHAGEAL DILATION Ceil Diones performed by Ling Sanchez MD at [address] [facility] Left 2020    LEFT INDEX FINGER TRIGGER FINGER RELEASE performed by Arland Barthel, MD at [facility] Right 2021    PHACOEMULSIFICATION WITH INTRAOCULAR LENS IMPLANT - RIGHT EYE performed by Ken Garsia MD at [address] Left 3/12/2021    PHACOEMULSIFICATION WITH INTRAOCULAR LENS IMPLANT - LEFT EYE performed by Ken Garsia MD at [address] N/A 2019    EGD BIOPSY performed by Ling Sanchez MD at [place] [address]

## 2023-05-10 NOTE — PROGRESS NOTES
Patient opens eyes to name. Resp easy unlabored on room air O2 with SaO2 92%. Abdomen rounded soft. VSS. IV patent. VSS. Patient stable to transfer to ACU for phase II.

## 2023-05-10 NOTE — ANESTHESIA POSTPROCEDURE EVALUATION
Department of Anesthesiology  Postprocedure Note    Patient: Katheryn Menezes  MRN: [id number]  YOB: 1960  Date of evaluation: 5/10/2023      Procedure Summary     Date: 05/10/23 Room / Location: [address] [place] [address]    Anesthesia Start: 0042 Anesthesia Stop: 8610    Procedure: COLONOSCOPY POLYPECTOMY SNARE/COLD BIOPSY Diagnosis:       History of colon polyps      (HISTORY OF POLYPS)    Surgeons: Carin Anderson MD Responsible Provider: Dannie Patel MD    Anesthesia Type: MAC ASA Status: 3          Anesthesia Type: No value filed.     Eva Phase I: Eva Score: 10    Eva Phase II: Eva Score: 10      Anesthesia Post Evaluation    Patient location during evaluation: bedside  Patient participation: complete - patient participated  Level of consciousness: awake and alert  Pain score: 2  Airway patency: patent  Nausea & Vomiting: no vomiting  Complications: no  Cardiovascular status: blood pressure returned to baseline  Respiratory status: acceptable  Hydration status: euvolemic

## 2023-05-10 NOTE — ANESTHESIA PRE PROCEDURE
attempt MAC with head of bed elevated. If needs deepening of anesthesia or any evidence of coughing, will consider intubation.)  Induction: intravenous. Anesthetic plan and risks discussed with patient and spouse. Plan discussed with CRNA. Attending anesthesiologist reviewed and agrees with Preprocedure content            This pre-anesthesia assessment may be used as a history and physical.    DOS STAFF ADDENDUM:    Pt seen and examined, chart reviewed (including anesthesia, drug and allergy history). No interval changes to history and physical examination. Anesthetic plan, risks, benefits, alternatives, and personnel involved discussed with patient. Questions and concerns addressed. Patient(family) verbalized an understanding and agrees to proceed.       Joyce Whyte MD  May 10, 2023  10:10 AM

## 2023-05-10 NOTE — PROGRESS NOTES
Patient admitted to PACU from [facility]. Patient opens eyes to name, oriented. Resp easy unlabored on room air O2 with SaO2 91%. Abdomen rounded soft. IV patent to right hand. VSS. Patient denies C/O pain or nausea.

## 2023-05-10 NOTE — DISCHARGE INSTRUCTIONS
[facility] Endoscopy Discharge Instructions  Colonoscopy    NAME:  Jhon Hoover  YOB: 1960  MEDICAL RECORD NUMBER:  [id number]  DATE:  5/10/2023      After receiving Propofol (Diprivan) for Moderate Sedation:    Do not drive or operate any machinery until tomorrow  Do not sign any legal documents or make any critical decisions  Do not drink alcoholic beverages for 24 hours  Plan to spend a few hours resting before resuming your normal routine  Possible side effects are light headedness and sedation    You may resume your usual diet at home    Resume all your daily medications    Call your physician if any of the following occur:    Severe abdominal distention and/or pain. (Mild distention or cramping is normal after this procedure; this should improve within an hour or two with passage of air)  Fever, chills, nausea or vomiting  You may notice a small amount of blood in your next few bowel movements    If excessive bleeding occurs:  Call your physician immediately or proceed to the nearest Emergency Room    Biopsy Obtained: YES. If you have not heard from your physician in one week please call your physician's office for biopsy results, [phone]. Recommendations: Repeat colonoscopy in 5 years         For questions or concerns please contact your GI physician's 24 hour call center at [phone].

## 2023-05-10 NOTE — PROGRESS NOTES
Pt is alert and oriented with stable vital signs on room air, discharge instructions given to Pt and spouse, all questions answered. Pt discharged to home with spouse to transport.

## 2023-05-11 RX ORDER — GABAPENTIN 600 MG/1
TABLET ORAL
Qty: 90 TABLET | Refills: 2 | Status: SHIPPED | OUTPATIENT
Start: 2023-05-11 | End: 2023-08-09

## 2023-05-12 RX ORDER — HYDRALAZINE HYDROCHLORIDE 50 MG/1
50 TABLET, FILM COATED ORAL EVERY 8 HOURS SCHEDULED
Qty: 90 TABLET | Refills: 3 | Status: SHIPPED | OUTPATIENT
Start: 2023-05-12

## 2023-05-26 ENCOUNTER — TELEPHONE (OUTPATIENT)
Dept: PHARMACY | Age: 63
End: 2023-05-26

## 2023-05-26 NOTE — TELEPHONE ENCOUNTER
Diabetes referral from the hospitalist from 2/13/2023. Reached out to spouse per Dr. Kathleen Mt request.      I will open the referral at this time. Left message for Lei Otoole to please call to schedule an appointment. Manuel Akhtar, PharmD  [address] [place] [address]  Diabetes Service  [phone]    For Pharmacy Admin Tracking Only    Program: Medication Management  CPA in place:  Yes  Recommendation Provided To:    Intervention Detail:   Intervention Accepted By:   Page Deem Closed?:    Time Spent (min): 5

## 2023-05-30 DIAGNOSIS — E78.5 HYPERLIPIDEMIA LDL GOAL <100: ICD-10-CM

## 2023-05-30 RX ORDER — ATORVASTATIN CALCIUM 80 MG/1
TABLET, FILM COATED ORAL
Qty: 90 TABLET | Refills: 1 | Status: SHIPPED | OUTPATIENT
Start: 2023-05-30

## 2023-06-05 RX ORDER — DULAGLUTIDE 0.75 MG/.5ML
INJECTION, SOLUTION SUBCUTANEOUS
Qty: 2 ML | Refills: 3 | Status: SHIPPED | OUTPATIENT
Start: 2023-06-05

## 2023-06-06 ENCOUNTER — HOSPITAL ENCOUNTER (OUTPATIENT)
Dept: WOMENS IMAGING | Age: 63
Discharge: HOME | End: 2023-06-06
Payer: COMMERCIAL

## 2023-06-06 DIAGNOSIS — Z12.31 BREAST CANCER SCREENING BY MAMMOGRAM: ICD-10-CM

## 2023-06-06 PROCEDURE — 77063 BREAST TOMOSYNTHESIS BI: CPT

## 2023-06-07 DIAGNOSIS — M85.89 OSTEOPENIA OF MULTIPLE SITES: ICD-10-CM

## 2023-06-07 DIAGNOSIS — E78.5 HYPERLIPIDEMIA LDL GOAL <100: ICD-10-CM

## 2023-06-07 LAB
25(OH)D3 SERPL-MCNC: 11.9 NG/ML
CHOLEST SERPL-MCNC: 194 MG/DL (ref 0–199)
HDLC SERPL-MCNC: 34 MG/DL (ref 40–60)
LDLC SERPL CALC-MCNC: 104 MG/DL
TRIGL SERPL-MCNC: 278 MG/DL (ref 0–150)
VLDLC SERPL CALC-MCNC: 56 MG/DL

## 2023-06-08 PROBLEM — E55.9 VITAMIN D DEFICIENCY: Status: ACTIVE | Noted: 2023-06-08

## 2023-06-26 DIAGNOSIS — S16.1XXD STRAIN OF NECK MUSCLE, SUBSEQUENT ENCOUNTER: ICD-10-CM

## 2023-06-27 RX ORDER — QUETIAPINE FUMARATE 25 MG/1
TABLET, FILM COATED ORAL
Qty: 180 TABLET | Refills: 0 | Status: SHIPPED | OUTPATIENT
Start: 2023-06-27

## 2023-06-27 RX ORDER — TIZANIDINE 4 MG/1
TABLET ORAL
Qty: 30 TABLET | Refills: 1 | Status: SHIPPED | OUTPATIENT
Start: 2023-06-27

## 2023-06-29 ENCOUNTER — TELEPHONE (OUTPATIENT)
Dept: ORTHOPEDIC SURGERY | Age: 63
End: 2023-06-29

## 2023-06-29 DIAGNOSIS — I10 ESSENTIAL HYPERTENSION: ICD-10-CM

## 2023-06-29 RX ORDER — AMLODIPINE BESYLATE 5 MG/1
TABLET ORAL
Qty: 90 TABLET | Refills: 1 | Status: SHIPPED | OUTPATIENT
Start: 2023-06-29

## 2023-06-29 RX ORDER — TOLTERODINE 4 MG/1
CAPSULE, EXTENDED RELEASE ORAL
Qty: 90 CAPSULE | Refills: 1 | Status: SHIPPED | OUTPATIENT
Start: 2023-06-29

## 2023-07-03 RX ORDER — INSULIN GLARGINE 100 [IU]/ML
30 INJECTION, SOLUTION SUBCUTANEOUS NIGHTLY
Qty: 15 ADJUSTABLE DOSE PRE-FILLED PEN SYRINGE | Refills: 1 | Status: SHIPPED | OUTPATIENT
Start: 2023-07-03 | End: 2023-07-07 | Stop reason: DRUGHIGH

## 2023-07-07 ENCOUNTER — OFFICE VISIT (OUTPATIENT)
Dept: FAMILY MEDICINE CLINIC | Age: 63
End: 2023-07-07
Payer: COMMERCIAL

## 2023-07-07 VITALS
DIASTOLIC BLOOD PRESSURE: 72 MMHG | SYSTOLIC BLOOD PRESSURE: 140 MMHG | HEIGHT: 63 IN | BODY MASS INDEX: 39.16 KG/M2 | OXYGEN SATURATION: 94 % | RESPIRATION RATE: 16 BRPM | HEART RATE: 84 BPM | WEIGHT: 221 LBS

## 2023-07-07 DIAGNOSIS — E11.22 CKD STAGE 3 DUE TO TYPE 2 DIABETES MELLITUS (HCC): ICD-10-CM

## 2023-07-07 DIAGNOSIS — M65.30 TRIGGER FINGER OF RIGHT HAND, UNSPECIFIED FINGER: ICD-10-CM

## 2023-07-07 DIAGNOSIS — E11.29 TYPE 2 DIABETES MELLITUS WITH MICROALBUMINURIA, WITH LONG-TERM CURRENT USE OF INSULIN (HCC): Primary | ICD-10-CM

## 2023-07-07 DIAGNOSIS — N18.30 CKD STAGE 3 DUE TO TYPE 2 DIABETES MELLITUS (HCC): ICD-10-CM

## 2023-07-07 DIAGNOSIS — Z79.4 TYPE 2 DIABETES MELLITUS WITH MICROALBUMINURIA, WITH LONG-TERM CURRENT USE OF INSULIN (HCC): Primary | ICD-10-CM

## 2023-07-07 DIAGNOSIS — R80.9 TYPE 2 DIABETES MELLITUS WITH MICROALBUMINURIA, WITH LONG-TERM CURRENT USE OF INSULIN (HCC): Primary | ICD-10-CM

## 2023-07-07 DIAGNOSIS — I10 ESSENTIAL HYPERTENSION: ICD-10-CM

## 2023-07-07 LAB — HBA1C MFR BLD: 9.5 %

## 2023-07-07 PROCEDURE — 3017F COLORECTAL CA SCREEN DOC REV: CPT | Performed by: FAMILY MEDICINE

## 2023-07-07 PROCEDURE — 3077F SYST BP >= 140 MM HG: CPT | Performed by: FAMILY MEDICINE

## 2023-07-07 PROCEDURE — 2022F DILAT RTA XM EVC RTNOPTHY: CPT | Performed by: FAMILY MEDICINE

## 2023-07-07 PROCEDURE — 3078F DIAST BP <80 MM HG: CPT | Performed by: FAMILY MEDICINE

## 2023-07-07 PROCEDURE — G8417 CALC BMI ABV UP PARAM F/U: HCPCS | Performed by: FAMILY MEDICINE

## 2023-07-07 PROCEDURE — 83037 HB GLYCOSYLATED A1C HOME DEV: CPT | Performed by: FAMILY MEDICINE

## 2023-07-07 PROCEDURE — 1036F TOBACCO NON-USER: CPT | Performed by: FAMILY MEDICINE

## 2023-07-07 PROCEDURE — 3046F HEMOGLOBIN A1C LEVEL >9.0%: CPT | Performed by: FAMILY MEDICINE

## 2023-07-07 PROCEDURE — G8427 DOCREV CUR MEDS BY ELIG CLIN: HCPCS | Performed by: FAMILY MEDICINE

## 2023-07-07 PROCEDURE — 99215 OFFICE O/P EST HI 40 MIN: CPT | Performed by: FAMILY MEDICINE

## 2023-07-07 RX ORDER — BLOOD-GLUCOSE SENSOR
EACH MISCELLANEOUS
Qty: 2 EACH | Refills: 5 | Status: SHIPPED | OUTPATIENT
Start: 2023-07-07

## 2023-07-07 RX ORDER — INSULIN GLARGINE 100 [IU]/ML
32 INJECTION, SOLUTION SUBCUTANEOUS NIGHTLY
Qty: 15 ADJUSTABLE DOSE PRE-FILLED PEN SYRINGE | Refills: 1 | Status: SHIPPED
Start: 2023-07-07

## 2023-07-07 NOTE — PROGRESS NOTES
2023    Blood pressure (!) 140/72, pulse 84, resp. rate 16, height 5' 3\" (1.6 m), weight 221 lb (100.2 kg), SpO2 94 %, not currently breastfeeding. Padmini Dunbar (:  1960) is a 58 y.o. female, here for evaluation of the following medical concerns:    Chief Complaint   Patient presents with    Pre-op Exam     Patient is here for a pre-op she is having trigger finger surgery 23 @ [facility] with Dr. Angelica Pillai is here with her - was to have preop today, but her a1c is 9.5%, unexpectedly. Her a1c was lower 3 months ago and we had started her on trulicity 5.06JS weekly. This has not worsened her nausea. Ozempic caused GI side effects- nausea (likely worsening gastroparesis). Also using basaglar 30  Novolog \"15-20\" units with meals based on premeal glucose. She misses doses of novolog maybe once a week. She tests sugars inconsistently, but believes her fasting sugars are 140-160's usually. Lab Results   Component Value Date/Time    LABA1C 8.5 2023 04:52 PM    LABA1C 7.8 2022 04:46 PM    LABA1C 8.5 2022 12:45 PM      She has been on Shubham CGM in the past - stopped using due to insurance formulary. For BP she uses toprol 50, hydralazine 50 TID, Norvasc 5. Managed by nephrology, Dr Kenyatta Vasquez after diagnosis of CKD-3  Has a small amount of leg edema. Last renal function test:   Lab Results   Component Value Date/Time     2023 12:01 PM    K 3.9 2023 12:01 PM    K 3.9 02/10/2023 11:57 PM     2023 12:01 PM    CO2 21 2023 12:01 PM    BUN 22 2023 12:01 PM    CREATININE 1.4 2023 12:01 PM    GLUCOSE 220 2023 12:01 PM    GLUCOSE 380 2015 03:28 PM    CALCIUM 9.9 2023 12:01 PM    LABGLOM 42 2023 12:01 PM        Repeat bp 160/82  She says she has not used any of her bp meds today.     Patient Active Problem List   Diagnosis    Diabetic gastroparesis (HCC)    Allergic rhinitis    Insomnia

## 2023-07-11 ENCOUNTER — TELEPHONE (OUTPATIENT)
Dept: ORTHOPEDIC SURGERY | Age: 63
End: 2023-07-11

## 2023-07-11 ENCOUNTER — TELEPHONE (OUTPATIENT)
Dept: FAMILY MEDICINE CLINIC | Age: 63
End: 2023-07-11

## 2023-07-11 DIAGNOSIS — R80.9 TYPE 2 DIABETES MELLITUS WITH MICROALBUMINURIA, WITH LONG-TERM CURRENT USE OF INSULIN (HCC): Primary | ICD-10-CM

## 2023-07-11 DIAGNOSIS — E11.29 TYPE 2 DIABETES MELLITUS WITH MICROALBUMINURIA, WITH LONG-TERM CURRENT USE OF INSULIN (HCC): Primary | ICD-10-CM

## 2023-07-11 DIAGNOSIS — Z79.4 TYPE 2 DIABETES MELLITUS WITH MICROALBUMINURIA, WITH LONG-TERM CURRENT USE OF INSULIN (HCC): Primary | ICD-10-CM

## 2023-07-11 NOTE — TELEPHONE ENCOUNTER
Surgery and/or Procedure Scheduling     Contact Name: Rae Hill  Surgical/Procedure Request: Sx on 07/27/23  on her rt hand   Patient Contact Number: [phone]    Patient call and she would like to cancel her sx due to her blood pressure and A1c being to high she go back to see her PCP back in 8 wks and then she will call back to get that reschedule.

## 2023-07-11 NOTE — TELEPHONE ENCOUNTER
Patient is calling in stating that she has been trying to get her [address]    She said Azael [facility]ley does not have this but it was sent over on July 7    Please Advise

## 2023-07-12 RX ORDER — FLASH GLUCOSE SCANNING READER
1 EACH MISCELLANEOUS DAILY
Qty: 1 EACH | Refills: 0 | Status: CANCELLED | OUTPATIENT
Start: 2023-07-12

## 2023-07-12 NOTE — TELEPHONE ENCOUNTER
The [employer] does not use a reader- only Shubham 2. [employer] only communicates to the phone marco. I showed her  how to download the mPortal marco to his phone- her phone did not seem to be able to find or download this marco, perhaps it does not have the electronics to run the marco. But she should be able to get plenty of glucose data when she is near her  if his phone runs the marco.

## 2023-07-13 DIAGNOSIS — S16.1XXD STRAIN OF NECK MUSCLE, SUBSEQUENT ENCOUNTER: ICD-10-CM

## 2023-07-13 RX ORDER — TIZANIDINE 4 MG/1
TABLET ORAL
Qty: 30 TABLET | Refills: 3 | Status: SHIPPED | OUTPATIENT
Start: 2023-07-13 | End: 2023-09-14

## 2023-07-14 ENCOUNTER — TELEPHONE (OUTPATIENT)
Dept: FAMILY MEDICINE CLINIC | Age: 63
End: 2023-07-14

## 2023-07-14 RX ORDER — INSULIN GLARGINE 100 [IU]/ML
INJECTION, SOLUTION SUBCUTANEOUS
Qty: 15 ML | OUTPATIENT
Start: 2023-07-14

## 2023-07-14 NOTE — TELEPHONE ENCOUNTER
Pt calling in, stated that she was told by her pharmacy that she is needing a PA for the freestyle andi sensor. Please advise if PA can be completed.   Pt can be reached at [phone]

## 2023-07-17 ENCOUNTER — TELEPHONE (OUTPATIENT)
Dept: ADMINISTRATIVE | Age: 63
End: 2023-07-17

## 2023-07-17 RX ORDER — INSULIN GLARGINE 100 [IU]/ML
INJECTION, SOLUTION SUBCUTANEOUS
Qty: 15 ML | OUTPATIENT
Start: 2023-07-17

## 2023-07-17 NOTE — TELEPHONE ENCOUNTER
Submitted PA for [employer] Sensor  [employer] Key: [id number]   STATUS: PENDING. Follow up done daily; if no response in three days we will refax for status check. If another three days goes by with no response we will call the insurance for status.

## 2023-07-18 ENCOUNTER — TELEPHONE (OUTPATIENT)
Dept: FAMILY MEDICINE CLINIC | Age: 63
End: 2023-07-18

## 2023-07-18 DIAGNOSIS — Z79.4 TYPE 2 DIABETES MELLITUS WITH MICROALBUMINURIA, WITH LONG-TERM CURRENT USE OF INSULIN (HCC): Primary | ICD-10-CM

## 2023-07-18 DIAGNOSIS — R80.9 TYPE 2 DIABETES MELLITUS WITH MICROALBUMINURIA, WITH LONG-TERM CURRENT USE OF INSULIN (HCC): Primary | ICD-10-CM

## 2023-07-18 DIAGNOSIS — E11.29 TYPE 2 DIABETES MELLITUS WITH MICROALBUMINURIA, WITH LONG-TERM CURRENT USE OF INSULIN (HCC): Primary | ICD-10-CM

## 2023-07-18 RX ORDER — INSULIN GLARGINE 100 [IU]/ML
32 INJECTION, SOLUTION SUBCUTANEOUS NIGHTLY
Qty: 15 ADJUSTABLE DOSE PRE-FILLED PEN SYRINGE | Refills: 1 | Status: SHIPPED | OUTPATIENT
Start: 2023-07-18

## 2023-07-18 NOTE — TELEPHONE ENCOUNTER
Pt calling in, stated that pharm has not heard from PCP to fill Jeana Poole. Looked and multiple requests sent from pharm, last time it was sent in was 7/7 with one refill     Pt stated that she picked up the rx from 7/7 and pharm told her she already used the refill. She only has enough medication left for tonight. Would like another script [place] [facility] [place]     Please advise  Pt can be reached at [phone]

## 2023-07-18 NOTE — TELEPHONE ENCOUNTER
Looks like the one that was sent on 07/07 didn't get sent and didn't have a confirmation from the pharmacy

## 2023-07-18 NOTE — TELEPHONE ENCOUNTER
APPROVED. LETTER ATTACHED. If this requires a response please respond to the pool. [facility]). Please advise patient thank you.

## 2023-08-08 RX ORDER — GABAPENTIN 600 MG/1
TABLET ORAL
Qty: 270 TABLET | Refills: 0 | Status: SHIPPED | OUTPATIENT
Start: 2023-08-08 | End: 2023-10-03

## 2023-09-14 DIAGNOSIS — S16.1XXD STRAIN OF NECK MUSCLE, SUBSEQUENT ENCOUNTER: ICD-10-CM

## 2023-09-14 RX ORDER — TIZANIDINE 4 MG/1
TABLET ORAL
Qty: 30 TABLET | Refills: 3 | Status: SHIPPED | OUTPATIENT
Start: 2023-09-14

## 2023-09-21 RX ORDER — QUETIAPINE FUMARATE 25 MG/1
TABLET, FILM COATED ORAL
Qty: 180 TABLET | Refills: 0 | Status: SHIPPED | OUTPATIENT
Start: 2023-09-21

## 2023-09-27 RX ORDER — METOPROLOL SUCCINATE 50 MG/1
TABLET, EXTENDED RELEASE ORAL
Qty: 90 TABLET | Refills: 1 | Status: SHIPPED | OUTPATIENT
Start: 2023-09-27

## 2023-10-03 ENCOUNTER — OFFICE VISIT (OUTPATIENT)
Dept: FAMILY MEDICINE CLINIC | Age: 63
End: 2023-10-03
Payer: COMMERCIAL

## 2023-10-03 VITALS
HEART RATE: 78 BPM | OXYGEN SATURATION: 95 % | TEMPERATURE: 99 F | RESPIRATION RATE: 18 BRPM | HEIGHT: 63 IN | SYSTOLIC BLOOD PRESSURE: 140 MMHG | BODY MASS INDEX: 37.6 KG/M2 | DIASTOLIC BLOOD PRESSURE: 70 MMHG | WEIGHT: 212.2 LBS

## 2023-10-03 DIAGNOSIS — Z79.4 TYPE 2 DIABETES MELLITUS WITH MICROALBUMINURIA, WITH LONG-TERM CURRENT USE OF INSULIN (HCC): Primary | ICD-10-CM

## 2023-10-03 DIAGNOSIS — E11.29 TYPE 2 DIABETES MELLITUS WITH MICROALBUMINURIA, WITH LONG-TERM CURRENT USE OF INSULIN (HCC): Primary | ICD-10-CM

## 2023-10-03 DIAGNOSIS — R80.9 TYPE 2 DIABETES MELLITUS WITH MICROALBUMINURIA, WITH LONG-TERM CURRENT USE OF INSULIN (HCC): Primary | ICD-10-CM

## 2023-10-03 DIAGNOSIS — R35.0 URINE FREQUENCY: ICD-10-CM

## 2023-10-03 DIAGNOSIS — R45.86 MOOD CHANGES: ICD-10-CM

## 2023-10-03 LAB
BILIRUBIN, POC: NORMAL
BLOOD URINE, POC: NORMAL
CLARITY, POC: CLEAR
COLOR, POC: YELLOW
CREAT UR-MCNC: 196.1 MG/DL (ref 28–259)
GLUCOSE URINE, POC: 100
HBA1C MFR BLD: 10.1 %
KETONES, POC: NORMAL
LEUKOCYTE EST, POC: NORMAL
MICROALBUMIN UR DL<=1MG/L-MCNC: 319.5 MG/DL
MICROALBUMIN/CREAT UR: 1629.3 MG/G (ref 0–30)
NITRITE, POC: NORMAL
PH, POC: 5.5
PROTEIN, POC: >300
SPECIFIC GRAVITY, POC: 1.03
UROBILINOGEN, POC: 0.2

## 2023-10-03 PROCEDURE — G8427 DOCREV CUR MEDS BY ELIG CLIN: HCPCS | Performed by: FAMILY MEDICINE

## 2023-10-03 PROCEDURE — 2022F DILAT RTA XM EVC RTNOPTHY: CPT | Performed by: FAMILY MEDICINE

## 2023-10-03 PROCEDURE — G8482 FLU IMMUNIZE ORDER/ADMIN: HCPCS | Performed by: FAMILY MEDICINE

## 2023-10-03 PROCEDURE — 81002 URINALYSIS NONAUTO W/O SCOPE: CPT | Performed by: FAMILY MEDICINE

## 2023-10-03 PROCEDURE — 1036F TOBACCO NON-USER: CPT | Performed by: FAMILY MEDICINE

## 2023-10-03 PROCEDURE — 3017F COLORECTAL CA SCREEN DOC REV: CPT | Performed by: FAMILY MEDICINE

## 2023-10-03 PROCEDURE — 99214 OFFICE O/P EST MOD 30 MIN: CPT | Performed by: FAMILY MEDICINE

## 2023-10-03 PROCEDURE — 3078F DIAST BP <80 MM HG: CPT | Performed by: FAMILY MEDICINE

## 2023-10-03 PROCEDURE — 90674 CCIIV4 VAC NO PRSV 0.5 ML IM: CPT | Performed by: FAMILY MEDICINE

## 2023-10-03 PROCEDURE — 83036 HEMOGLOBIN GLYCOSYLATED A1C: CPT | Performed by: FAMILY MEDICINE

## 2023-10-03 PROCEDURE — 3046F HEMOGLOBIN A1C LEVEL >9.0%: CPT | Performed by: FAMILY MEDICINE

## 2023-10-03 PROCEDURE — 3074F SYST BP LT 130 MM HG: CPT | Performed by: FAMILY MEDICINE

## 2023-10-03 PROCEDURE — G8417 CALC BMI ABV UP PARAM F/U: HCPCS | Performed by: FAMILY MEDICINE

## 2023-10-03 PROCEDURE — G0008 ADMIN INFLUENZA VIRUS VAC: HCPCS | Performed by: FAMILY MEDICINE

## 2023-10-03 RX ORDER — FLUCONAZOLE 150 MG/1
150 TABLET ORAL ONCE
Qty: 2 TABLET | Refills: 0 | Status: SHIPPED | OUTPATIENT
Start: 2023-10-03 | End: 2023-10-03

## 2023-10-03 RX ORDER — GABAPENTIN 600 MG/1
600 TABLET ORAL NIGHTLY
Qty: 270 TABLET | Refills: 0 | COMMUNITY
Start: 2023-10-03

## 2023-10-03 RX ORDER — NITROFURANTOIN 25; 75 MG/1; MG/1
100 CAPSULE ORAL 2 TIMES DAILY
Qty: 20 CAPSULE | Refills: 0 | Status: SHIPPED | OUTPATIENT
Start: 2023-10-03 | End: 2023-10-13

## 2023-10-03 NOTE — PATIENT INSTRUCTIONS
Bring all medicines with you to your next visit (that you are still taking- not the ones we have stopped)

## 2023-10-06 LAB
BACTERIA UR CULT: ABNORMAL
ORGANISM: ABNORMAL

## 2023-10-06 RX ORDER — CEPHALEXIN 500 MG/1
500 CAPSULE ORAL 3 TIMES DAILY
Qty: 21 CAPSULE | Refills: 0 | Status: SHIPPED | OUTPATIENT
Start: 2023-10-06 | End: 2023-10-13

## 2023-10-07 DIAGNOSIS — E11.29 TYPE 2 DIABETES MELLITUS WITH MICROALBUMINURIA, WITH LONG-TERM CURRENT USE OF INSULIN (HCC): ICD-10-CM

## 2023-10-07 DIAGNOSIS — Z79.4 TYPE 2 DIABETES MELLITUS WITH MICROALBUMINURIA, WITH LONG-TERM CURRENT USE OF INSULIN (HCC): ICD-10-CM

## 2023-10-07 DIAGNOSIS — R80.9 TYPE 2 DIABETES MELLITUS WITH MICROALBUMINURIA, WITH LONG-TERM CURRENT USE OF INSULIN (HCC): ICD-10-CM

## 2023-10-09 RX ORDER — INSULIN GLARGINE 100 [IU]/ML
INJECTION, SOLUTION SUBCUTANEOUS
Qty: 15 ML | Refills: 2 | Status: SHIPPED | OUTPATIENT
Start: 2023-10-09

## 2023-10-21 DIAGNOSIS — F33.1 MODERATE EPISODE OF RECURRENT MAJOR DEPRESSIVE DISORDER (HCC): ICD-10-CM

## 2023-10-23 RX ORDER — VENLAFAXINE HYDROCHLORIDE 75 MG/1
CAPSULE, EXTENDED RELEASE ORAL
Qty: 270 CAPSULE | Refills: 1 | Status: SHIPPED | OUTPATIENT
Start: 2023-10-23

## 2023-10-24 ENCOUNTER — OFFICE VISIT (OUTPATIENT)
Dept: FAMILY MEDICINE CLINIC | Age: 63
End: 2023-10-24
Payer: COMMERCIAL

## 2023-10-24 VITALS
SYSTOLIC BLOOD PRESSURE: 152 MMHG | TEMPERATURE: 98.7 F | DIASTOLIC BLOOD PRESSURE: 76 MMHG | WEIGHT: 212.4 LBS | HEIGHT: 63 IN | RESPIRATION RATE: 18 BRPM | OXYGEN SATURATION: 96 % | BODY MASS INDEX: 37.63 KG/M2 | HEART RATE: 88 BPM

## 2023-10-24 DIAGNOSIS — N18.30 CKD STAGE 3 DUE TO TYPE 2 DIABETES MELLITUS (HCC): ICD-10-CM

## 2023-10-24 DIAGNOSIS — E11.29 TYPE 2 DIABETES MELLITUS WITH MICROALBUMINURIA, WITH LONG-TERM CURRENT USE OF INSULIN (HCC): ICD-10-CM

## 2023-10-24 DIAGNOSIS — K31.84 DIABETIC GASTROPARESIS (HCC): ICD-10-CM

## 2023-10-24 DIAGNOSIS — Z79.4 TYPE 2 DIABETES MELLITUS WITH MICROALBUMINURIA, WITH LONG-TERM CURRENT USE OF INSULIN (HCC): ICD-10-CM

## 2023-10-24 DIAGNOSIS — Z79.4 DIABETES MELLITUS DUE TO UNDERLYING CONDITION WITH HYPERGLYCEMIA, WITH LONG-TERM CURRENT USE OF INSULIN (HCC): ICD-10-CM

## 2023-10-24 DIAGNOSIS — I10 ESSENTIAL HYPERTENSION: Primary | ICD-10-CM

## 2023-10-24 DIAGNOSIS — R80.9 TYPE 2 DIABETES MELLITUS WITH MICROALBUMINURIA, WITH LONG-TERM CURRENT USE OF INSULIN (HCC): ICD-10-CM

## 2023-10-24 DIAGNOSIS — E08.65 DIABETES MELLITUS DUE TO UNDERLYING CONDITION WITH HYPERGLYCEMIA, WITH LONG-TERM CURRENT USE OF INSULIN (HCC): ICD-10-CM

## 2023-10-24 DIAGNOSIS — E11.22 CKD STAGE 3 DUE TO TYPE 2 DIABETES MELLITUS (HCC): ICD-10-CM

## 2023-10-24 DIAGNOSIS — E11.43 DIABETIC GASTROPARESIS (HCC): ICD-10-CM

## 2023-10-24 PROCEDURE — 2022F DILAT RTA XM EVC RTNOPTHY: CPT | Performed by: FAMILY MEDICINE

## 2023-10-24 PROCEDURE — G8427 DOCREV CUR MEDS BY ELIG CLIN: HCPCS | Performed by: FAMILY MEDICINE

## 2023-10-24 PROCEDURE — 3078F DIAST BP <80 MM HG: CPT | Performed by: FAMILY MEDICINE

## 2023-10-24 PROCEDURE — 1036F TOBACCO NON-USER: CPT | Performed by: FAMILY MEDICINE

## 2023-10-24 PROCEDURE — 3017F COLORECTAL CA SCREEN DOC REV: CPT | Performed by: FAMILY MEDICINE

## 2023-10-24 PROCEDURE — G8482 FLU IMMUNIZE ORDER/ADMIN: HCPCS | Performed by: FAMILY MEDICINE

## 2023-10-24 PROCEDURE — 99214 OFFICE O/P EST MOD 30 MIN: CPT | Performed by: FAMILY MEDICINE

## 2023-10-24 PROCEDURE — 3046F HEMOGLOBIN A1C LEVEL >9.0%: CPT | Performed by: FAMILY MEDICINE

## 2023-10-24 PROCEDURE — G8417 CALC BMI ABV UP PARAM F/U: HCPCS | Performed by: FAMILY MEDICINE

## 2023-10-24 PROCEDURE — 3074F SYST BP LT 130 MM HG: CPT | Performed by: FAMILY MEDICINE

## 2023-10-24 RX ORDER — METOPROLOL SUCCINATE 100 MG/1
100 TABLET, EXTENDED RELEASE ORAL DAILY
Qty: 90 TABLET | Refills: 1 | Status: SHIPPED | OUTPATIENT
Start: 2023-10-24

## 2023-10-24 RX ORDER — NITROFURANTOIN MACROCRYSTALS 100 MG/1
100 CAPSULE ORAL 4 TIMES DAILY
COMMUNITY

## 2023-10-24 RX ORDER — PIOGLITAZONEHYDROCHLORIDE 15 MG/1
15 TABLET ORAL DAILY
Qty: 30 TABLET | Refills: 3 | Status: SHIPPED | OUTPATIENT
Start: 2023-10-24

## 2023-11-02 DIAGNOSIS — S16.1XXD STRAIN OF NECK MUSCLE, SUBSEQUENT ENCOUNTER: ICD-10-CM

## 2023-11-02 RX ORDER — CALCIUM CITRATE/VITAMIN D3 200MG-6.25
TABLET ORAL
Qty: 300 STRIP | Refills: 3 | Status: SHIPPED | OUTPATIENT
Start: 2023-11-02

## 2023-11-02 RX ORDER — TIZANIDINE 4 MG/1
TABLET ORAL
Qty: 30 TABLET | Refills: 3 | Status: SHIPPED | OUTPATIENT
Start: 2023-11-02

## 2023-11-03 ENCOUNTER — TELEPHONE (OUTPATIENT)
Dept: PHARMACY | Age: 63
End: 2023-11-03

## 2023-11-03 RX ORDER — GABAPENTIN 600 MG/1
600 TABLET ORAL 3 TIMES DAILY
Qty: 90 TABLET | Refills: 2 | Status: SHIPPED | OUTPATIENT
Start: 2023-11-03 | End: 2024-02-01

## 2023-11-03 NOTE — TELEPHONE ENCOUNTER
Arturo Delgado called to reschedule her appointment as she is not feeling well. Rescheduled to 11/14/2023. When asked how she is doing with regards to her diabetes she said \"so so. \"  \"It's hard. \"  No low BG readings, but some high readings up to 250. I advised to check her BG more often during her illness and drink plenty of fluid. Advised to call Dr. Kaley Lares if she gets BG readings greater than 250. She reports it is hard for her to remember to check her BG before she eats. She'll go in and eat and then remember. We talked about some strategies to help with this. Also, advised we can likely help her when she comes to see us. Maliha Lopez, SawyerD  [facility]  Diabetes Service  [phone]    For Pharmacy Admin Tracking Only    Program: Medication Management  CPA in place:  Yes  Recommendation Provided To:    Intervention Detail:   Intervention Accepted By:   Mayela Samuel?:    Time Spent (min): 10

## 2023-11-14 ENCOUNTER — TELEPHONE (OUTPATIENT)
Dept: PHARMACY | Age: 63
End: 2023-11-14

## 2023-11-14 NOTE — TELEPHONE ENCOUNTER
[facility] Note:    Patient called to let us know she is going out of town unexpectedly and will need to reschedule her appointment that was planned for today at 3 pm.  She will most likely be gone until after Thanksgiving, so she will call us when she returns to reschedule her appointment.     Sayra Bruner, PharmD, BCPS  11/14/2023  12:53 PM

## 2023-11-26 DIAGNOSIS — E78.5 HYPERLIPIDEMIA LDL GOAL <100: ICD-10-CM

## 2023-11-27 RX ORDER — ATORVASTATIN CALCIUM 80 MG/1
TABLET, FILM COATED ORAL
Qty: 90 TABLET | Refills: 1 | Status: SHIPPED | OUTPATIENT
Start: 2023-11-27

## 2023-12-11 RX ORDER — QUETIAPINE FUMARATE 25 MG/1
TABLET, FILM COATED ORAL
Qty: 180 TABLET | Refills: 0 | Status: SHIPPED | OUTPATIENT
Start: 2023-12-11

## 2023-12-28 DIAGNOSIS — I10 ESSENTIAL HYPERTENSION: ICD-10-CM

## 2023-12-28 RX ORDER — TOLTERODINE 4 MG/1
CAPSULE, EXTENDED RELEASE ORAL
Qty: 90 CAPSULE | Refills: 1 | Status: SHIPPED | OUTPATIENT
Start: 2023-12-28

## 2023-12-28 RX ORDER — AMLODIPINE BESYLATE 5 MG/1
TABLET ORAL
Qty: 90 TABLET | Refills: 1 | Status: SHIPPED | OUTPATIENT
Start: 2023-12-28

## 2024-01-16 RX ORDER — PIOGLITAZONEHYDROCHLORIDE 15 MG/1
15 TABLET ORAL DAILY
Qty: 90 TABLET | Refills: 0 | Status: SHIPPED | OUTPATIENT
Start: 2024-01-16

## 2024-01-25 DIAGNOSIS — S16.1XXD STRAIN OF NECK MUSCLE, SUBSEQUENT ENCOUNTER: ICD-10-CM

## 2024-01-25 RX ORDER — TIZANIDINE 4 MG/1
TABLET ORAL
Qty: 30 TABLET | Refills: 3 | Status: SHIPPED | OUTPATIENT
Start: 2024-01-25

## 2024-01-25 NOTE — TELEPHONE ENCOUNTER
Due for follow up with Dr Bonilla- please call them to schedule at their earliest convenience. (meds have been refilled this time)

## 2024-01-26 ENCOUNTER — OFFICE VISIT (OUTPATIENT)
Dept: FAMILY MEDICINE CLINIC | Age: 64
End: 2024-01-26
Payer: COMMERCIAL

## 2024-01-26 VITALS
BODY MASS INDEX: 39.18 KG/M2 | DIASTOLIC BLOOD PRESSURE: 84 MMHG | TEMPERATURE: 98.9 F | OXYGEN SATURATION: 98 % | RESPIRATION RATE: 18 BRPM | WEIGHT: 221.2 LBS | HEART RATE: 71 BPM | SYSTOLIC BLOOD PRESSURE: 170 MMHG

## 2024-01-26 DIAGNOSIS — E66.01 SEVERE OBESITY (BMI 35.0-39.9) WITH COMORBIDITY (HCC): ICD-10-CM

## 2024-01-26 DIAGNOSIS — R80.9 TYPE 2 DIABETES MELLITUS WITH MICROALBUMINURIA, WITH LONG-TERM CURRENT USE OF INSULIN (HCC): Primary | ICD-10-CM

## 2024-01-26 DIAGNOSIS — F33.1 MODERATE EPISODE OF RECURRENT MAJOR DEPRESSIVE DISORDER (HCC): ICD-10-CM

## 2024-01-26 DIAGNOSIS — I10 ESSENTIAL HYPERTENSION: ICD-10-CM

## 2024-01-26 DIAGNOSIS — Z79.4 TYPE 2 DIABETES MELLITUS WITH MICROALBUMINURIA, WITH LONG-TERM CURRENT USE OF INSULIN (HCC): Primary | ICD-10-CM

## 2024-01-26 DIAGNOSIS — N18.30 CKD STAGE 3 DUE TO TYPE 2 DIABETES MELLITUS (HCC): ICD-10-CM

## 2024-01-26 DIAGNOSIS — E11.22 CKD STAGE 3 DUE TO TYPE 2 DIABETES MELLITUS (HCC): ICD-10-CM

## 2024-01-26 DIAGNOSIS — E11.29 TYPE 2 DIABETES MELLITUS WITH MICROALBUMINURIA, WITH LONG-TERM CURRENT USE OF INSULIN (HCC): Primary | ICD-10-CM

## 2024-01-26 LAB — HBA1C MFR BLD: 11.2 %

## 2024-01-26 PROCEDURE — 83036 HEMOGLOBIN GLYCOSYLATED A1C: CPT | Performed by: FAMILY MEDICINE

## 2024-01-26 PROCEDURE — G8417 CALC BMI ABV UP PARAM F/U: HCPCS | Performed by: FAMILY MEDICINE

## 2024-01-26 PROCEDURE — G8482 FLU IMMUNIZE ORDER/ADMIN: HCPCS | Performed by: FAMILY MEDICINE

## 2024-01-26 PROCEDURE — 3046F HEMOGLOBIN A1C LEVEL >9.0%: CPT | Performed by: FAMILY MEDICINE

## 2024-01-26 PROCEDURE — 3077F SYST BP >= 140 MM HG: CPT | Performed by: FAMILY MEDICINE

## 2024-01-26 PROCEDURE — 1090F PRES/ABSN URINE INCON ASSESS: CPT | Performed by: FAMILY MEDICINE

## 2024-01-26 PROCEDURE — 3079F DIAST BP 80-89 MM HG: CPT | Performed by: FAMILY MEDICINE

## 2024-01-26 PROCEDURE — 99214 OFFICE O/P EST MOD 30 MIN: CPT | Performed by: FAMILY MEDICINE

## 2024-01-26 PROCEDURE — 1036F TOBACCO NON-USER: CPT | Performed by: FAMILY MEDICINE

## 2024-01-26 PROCEDURE — G8427 DOCREV CUR MEDS BY ELIG CLIN: HCPCS | Performed by: FAMILY MEDICINE

## 2024-01-26 PROCEDURE — 2022F DILAT RTA XM EVC RTNOPTHY: CPT | Performed by: FAMILY MEDICINE

## 2024-01-26 PROCEDURE — G8399 PT W/DXA RESULTS DOCUMENT: HCPCS | Performed by: FAMILY MEDICINE

## 2024-01-26 PROCEDURE — 3017F COLORECTAL CA SCREEN DOC REV: CPT | Performed by: FAMILY MEDICINE

## 2024-01-26 PROCEDURE — 1124F ACP DISCUSS-NO DSCNMKR DOCD: CPT | Performed by: FAMILY MEDICINE

## 2024-01-26 RX ORDER — PIOGLITAZONEHYDROCHLORIDE 30 MG/1
30 TABLET ORAL DAILY
Qty: 90 TABLET | Refills: 1 | Status: SHIPPED | OUTPATIENT
Start: 2024-01-26

## 2024-01-26 RX ORDER — BUPROPION HYDROCHLORIDE 150 MG/1
150 TABLET ORAL EVERY MORNING
Qty: 30 TABLET | Refills: 3 | Status: SHIPPED | OUTPATIENT
Start: 2024-01-26

## 2024-01-26 RX ORDER — PIOGLITAZONEHYDROCHLORIDE 30 MG/1
30 TABLET ORAL DAILY
Qty: 30 TABLET | Refills: 3 | Status: SHIPPED | OUTPATIENT
Start: 2024-01-26 | End: 2024-01-26

## 2024-01-26 ASSESSMENT — PATIENT HEALTH QUESTIONNAIRE - PHQ9
3. TROUBLE FALLING OR STAYING ASLEEP: 2
7. TROUBLE CONCENTRATING ON THINGS, SUCH AS READING THE NEWSPAPER OR WATCHING TELEVISION: 2
SUM OF ALL RESPONSES TO PHQ QUESTIONS 1-9: 13
10. IF YOU CHECKED OFF ANY PROBLEMS, HOW DIFFICULT HAVE THESE PROBLEMS MADE IT FOR YOU TO DO YOUR WORK, TAKE CARE OF THINGS AT HOME, OR GET ALONG WITH OTHER PEOPLE: 2
4. FEELING TIRED OR HAVING LITTLE ENERGY: 2
5. POOR APPETITE OR OVEREATING: 2
2. FEELING DOWN, DEPRESSED OR HOPELESS: 2
8. MOVING OR SPEAKING SO SLOWLY THAT OTHER PEOPLE COULD HAVE NOTICED. OR THE OPPOSITE, BEING SO FIGETY OR RESTLESS THAT YOU HAVE BEEN MOVING AROUND A LOT MORE THAN USUAL: 0
SUM OF ALL RESPONSES TO PHQ QUESTIONS 1-9: 13
SUM OF ALL RESPONSES TO PHQ9 QUESTIONS 1 & 2: 4
1. LITTLE INTEREST OR PLEASURE IN DOING THINGS: 2
SUM OF ALL RESPONSES TO PHQ QUESTIONS 1-9: 13
SUM OF ALL RESPONSES TO PHQ QUESTIONS 1-9: 13
6. FEELING BAD ABOUT YOURSELF - OR THAT YOU ARE A FAILURE OR HAVE LET YOURSELF OR YOUR FAMILY DOWN: 1

## 2024-01-26 NOTE — PROGRESS NOTES
2024    Blood pressure (!) 170/84, pulse 71, temperature 98.9 °F (37.2 °C), temperature source Oral, resp. rate 18, weight 100.3 kg (221 lb 3.2 oz), SpO2 98 %, not currently breastfeeding.    Kyra Castro (:  1960) is a 64 y.o. female, here for evaluation of the following medical concerns:    Chief Complaint   Patient presents with    Follow-up    Diabetes     Would like surgery again on hand.      Here for routine follow up of DM, hx of microalbuminuria.  Last visit 10/23    Current meds:  Basaglar: 36 units nightly.  Novolog with meals: sliding scale pre-meal.   Uses scale at home, but cannot recall how it is used.  My records show this is her regimen: <100=NONE, 100-180=10 UNITS, 181-230=15U, 231-280-20U,>281=25U    A1c today is 11.3    They report that she is getting insulin with each meal.  Not using CGM.  Did bring log or meter.  'it runs high'    We also started Actos 15 last visit. No SE's.  She did not tolerate STLT-2s (uti's) or GLP1's (gastroparesis)    Referred to diabetes education- they did not make an appointment.    Lab Results   Component Value Date/Time    LABA1C 10.1 10/03/2023 04:31 PM    LABA1C 9.5 2023 04:46 PM    LABA1C 8.5 2023 04:52 PM        BP is high today.  She took her BP meds immediately before this visit.  Rx: Norvasc 5, toprol 100 (dose increased last visit to this level).  Intolerant of ace/arb due to hyperkalemia.  She sees Dr Nguyễn, but not sure last visit. She missed 2 appts there this past month.  No hx of CAD, TIA, CVA or PVD.   Last renal function test:  reduced GFR.  Lab Results   Component Value Date/Time     2023 12:01 PM    K 3.9 2023 12:01 PM    K 3.9 02/10/2023 11:57 PM     2023 12:01 PM    CO2 21 2023 12:01 PM    BUN 22 2023 12:01 PM    CREATININE 1.4 2023 12:01 PM    GLUCOSE 220 2023 12:01 PM    GLUCOSE 380 2015 03:28 PM    CALCIUM 9.9 2023 12:01 PM    LABGLOM 42 2023

## 2024-01-29 DIAGNOSIS — N18.30 CKD STAGE 3 DUE TO TYPE 2 DIABETES MELLITUS (HCC): ICD-10-CM

## 2024-01-29 DIAGNOSIS — I10 ESSENTIAL HYPERTENSION: ICD-10-CM

## 2024-01-29 DIAGNOSIS — E11.22 CKD STAGE 3 DUE TO TYPE 2 DIABETES MELLITUS (HCC): ICD-10-CM

## 2024-01-29 DIAGNOSIS — N18.31 STAGE 3A CHRONIC KIDNEY DISEASE (HCC): ICD-10-CM

## 2024-01-29 DIAGNOSIS — R80.9 PROTEINURIA, UNSPECIFIED TYPE: ICD-10-CM

## 2024-01-30 LAB
ALBUMIN SERPL-MCNC: 3.9 G/DL (ref 3.4–5)
ALBUMIN/GLOB SERPL: 1.1 {RATIO} (ref 1.1–2.2)
ALP SERPL-CCNC: 127 U/L (ref 40–129)
ALT SERPL-CCNC: 12 U/L (ref 10–40)
ANION GAP SERPL CALCULATED.3IONS-SCNC: 13 MMOL/L (ref 3–16)
AST SERPL-CCNC: 21 U/L (ref 15–37)
BILIRUB SERPL-MCNC: <0.2 MG/DL (ref 0–1)
BUN SERPL-MCNC: 22 MG/DL (ref 7–20)
CALCIUM SERPL-MCNC: 9.2 MG/DL (ref 8.3–10.6)
CHLORIDE SERPL-SCNC: 101 MMOL/L (ref 99–110)
CO2 SERPL-SCNC: 24 MMOL/L (ref 21–32)
CREAT SERPL-MCNC: 1.3 MG/DL (ref 0.6–1.2)
CREAT UR-MCNC: 109.3 MG/DL (ref 28–259)
GFR SERPLBLD CREATININE-BSD FMLA CKD-EPI: 46 ML/MIN/{1.73_M2}
GLUCOSE SERPL-MCNC: 99 MG/DL (ref 70–99)
PHOSPHATE SERPL-MCNC: 3.8 MG/DL (ref 2.5–4.9)
POTASSIUM SERPL-SCNC: 4.2 MMOL/L (ref 3.5–5.1)
PROT SERPL-MCNC: 7.5 G/DL (ref 6.4–8.2)
PROT UR-MCNC: 630 MG/DL
PROT/CREAT UR-RTO: 5.8 MG/DL
SODIUM SERPL-SCNC: 138 MMOL/L (ref 136–145)

## 2024-01-30 RX ORDER — INSULIN GLARGINE 100 [IU]/ML
40 INJECTION, SOLUTION SUBCUTANEOUS NIGHTLY
Qty: 15 ML | Refills: 2 | COMMUNITY
Start: 2024-01-30

## 2024-02-05 RX ORDER — GABAPENTIN 600 MG/1
600 TABLET ORAL 3 TIMES DAILY
Qty: 270 TABLET | Refills: 0 | Status: SHIPPED | OUTPATIENT
Start: 2024-02-05 | End: 2024-05-05

## 2024-02-07 ENCOUNTER — TELEPHONE (OUTPATIENT)
Dept: FAMILY MEDICINE CLINIC | Age: 64
End: 2024-02-07

## 2024-02-07 DIAGNOSIS — F33.1 MODERATE EPISODE OF RECURRENT MAJOR DEPRESSIVE DISORDER (HCC): ICD-10-CM

## 2024-02-07 RX ORDER — VENLAFAXINE HYDROCHLORIDE 75 MG/1
225 CAPSULE, EXTENDED RELEASE ORAL DAILY
Qty: 270 CAPSULE | Refills: 1 | Status: SHIPPED | OUTPATIENT
Start: 2024-02-07

## 2024-02-07 NOTE — TELEPHONE ENCOUNTER
Called pt and relayed message  She has already resumed Effexor and will start taking the Wellbutrin again.  Explained concerns and let her know that someone may be reaching out to her to offer some support.  She is fine with this.  Will keep appt on 03/12

## 2024-02-07 NOTE — TELEPHONE ENCOUNTER
----- Message from Cindy Devine sent at 2/7/2024  3:10 PM EST -----  Subject: Medication Problem     Medication: buPROPion (WELLBUTRIN XL) 150 MG extended release tablet  Dosage: 150mg once per day   Ordering Provider:     Question/Problem: patient stopped taking this new medication and felt like   she was having wiithdrawal and went back to her old depression medication   - Zenlafaxine ER 75mg takes three per day       Pharmacy: [id number] - [place] [id number] [address] [phone] - F [phone]    ---------------------------------------------------------------------------  --------------  CALL BACK INFO  [phone]; OK to leave message on voicemail  ---------------------------------------------------------------------------  --------------    SCRIPT ANSWERS  Relationship to Patient: Self

## 2024-02-07 NOTE — TELEPHONE ENCOUNTER
Please notify Kyra Castro that she was not supposed to stop Effexor when we added Wellbutrin.    So I am not sure if she was just having Effexor withdrawal or side effects to Wellbutrin.    She should be continuing Effexor 75 mg 3 caps daily (225 mg) AND taking Wellbutrin 150mg for her depression symptoms if she would do that.  I have refilled her Effexor at the same dose.      I am concerned that medicine errors are frequent and may be related to high BP and glucose problems with Kyra. Is there a way for more supervision on correct medicince use with Kyra?  I have been trying unsuccessfully to figure out a way. Will see if our Care Manager has any ideas.

## 2024-02-08 ENCOUNTER — CARE COORDINATION (OUTPATIENT)
Dept: CARE COORDINATION | Age: 64
End: 2024-02-08

## 2024-02-08 NOTE — CARE COORDINATION
ACM outreach completed r/t CC Outreach pcp referral.  1st outreach attempt.  HIPAA compliant message left.  ACM will continue to follow and will attempt to reach patient at a later time.

## 2024-02-16 ENCOUNTER — CARE COORDINATION (OUTPATIENT)
Dept: CARE COORDINATION | Age: 64
End: 2024-02-16

## 2024-02-16 DIAGNOSIS — E11.29 TYPE 2 DIABETES MELLITUS WITH MICROALBUMINURIA, WITH LONG-TERM CURRENT USE OF INSULIN (HCC): ICD-10-CM

## 2024-02-16 DIAGNOSIS — Z79.4 TYPE 2 DIABETES MELLITUS WITH MICROALBUMINURIA, WITH LONG-TERM CURRENT USE OF INSULIN (HCC): ICD-10-CM

## 2024-02-16 DIAGNOSIS — R80.9 TYPE 2 DIABETES MELLITUS WITH MICROALBUMINURIA, WITH LONG-TERM CURRENT USE OF INSULIN (HCC): ICD-10-CM

## 2024-02-16 RX ORDER — INSULIN GLARGINE 100 [IU]/ML
INJECTION, SOLUTION SUBCUTANEOUS
Qty: 15 ML | Refills: 2 | Status: SHIPPED | OUTPATIENT
Start: 2024-02-16

## 2024-02-16 NOTE — CARE COORDINATION
ACM outreach completed r/t [facility] Outreach PCP ref.  2nd outreach attempt.  HIPAA compliant message left.  ACM will continue to follow and will attempt to reach patient at a later time.

## 2024-02-19 ENCOUNTER — CARE COORDINATION (OUTPATIENT)
Dept: CARE COORDINATION | Age: 64
End: 2024-02-19

## 2024-02-19 NOTE — CARE COORDINATION
ACM outreach completed r/t CC Outreach PCP ref.  3rd and final outreach attempt.  HIPAA compliant message left.

## 2024-03-12 ENCOUNTER — OFFICE VISIT (OUTPATIENT)
Dept: FAMILY MEDICINE CLINIC | Age: 64
End: 2024-03-12
Payer: MEDICARE

## 2024-03-12 VITALS
RESPIRATION RATE: 18 BRPM | HEART RATE: 75 BPM | SYSTOLIC BLOOD PRESSURE: 164 MMHG | DIASTOLIC BLOOD PRESSURE: 82 MMHG | TEMPERATURE: 98.7 F | WEIGHT: 224 LBS | BODY MASS INDEX: 39.69 KG/M2 | HEIGHT: 63 IN | OXYGEN SATURATION: 97 %

## 2024-03-12 DIAGNOSIS — E11.29 TYPE 2 DIABETES MELLITUS WITH MICROALBUMINURIA, WITH LONG-TERM CURRENT USE OF INSULIN (HCC): ICD-10-CM

## 2024-03-12 DIAGNOSIS — Z79.4 TYPE 2 DIABETES MELLITUS WITH MICROALBUMINURIA, WITH LONG-TERM CURRENT USE OF INSULIN (HCC): ICD-10-CM

## 2024-03-12 DIAGNOSIS — E11.65 TYPE 2 DIABETES MELLITUS WITH HYPERGLYCEMIA, WITH LONG-TERM CURRENT USE OF INSULIN (HCC): Primary | ICD-10-CM

## 2024-03-12 DIAGNOSIS — R80.9 TYPE 2 DIABETES MELLITUS WITH MICROALBUMINURIA, WITH LONG-TERM CURRENT USE OF INSULIN (HCC): ICD-10-CM

## 2024-03-12 DIAGNOSIS — Z79.4 TYPE 2 DIABETES MELLITUS WITH HYPERGLYCEMIA, WITH LONG-TERM CURRENT USE OF INSULIN (HCC): Primary | ICD-10-CM

## 2024-03-12 DIAGNOSIS — I10 ESSENTIAL HYPERTENSION: ICD-10-CM

## 2024-03-12 PROCEDURE — 3077F SYST BP >= 140 MM HG: CPT | Performed by: FAMILY MEDICINE

## 2024-03-12 PROCEDURE — 3017F COLORECTAL CA SCREEN DOC REV: CPT | Performed by: FAMILY MEDICINE

## 2024-03-12 PROCEDURE — 3079F DIAST BP 80-89 MM HG: CPT | Performed by: FAMILY MEDICINE

## 2024-03-12 PROCEDURE — 1090F PRES/ABSN URINE INCON ASSESS: CPT | Performed by: FAMILY MEDICINE

## 2024-03-12 PROCEDURE — 1036F TOBACCO NON-USER: CPT | Performed by: FAMILY MEDICINE

## 2024-03-12 PROCEDURE — G8482 FLU IMMUNIZE ORDER/ADMIN: HCPCS | Performed by: FAMILY MEDICINE

## 2024-03-12 PROCEDURE — 1124F ACP DISCUSS-NO DSCNMKR DOCD: CPT | Performed by: FAMILY MEDICINE

## 2024-03-12 PROCEDURE — G8427 DOCREV CUR MEDS BY ELIG CLIN: HCPCS | Performed by: FAMILY MEDICINE

## 2024-03-12 PROCEDURE — G8417 CALC BMI ABV UP PARAM F/U: HCPCS | Performed by: FAMILY MEDICINE

## 2024-03-12 PROCEDURE — 99214 OFFICE O/P EST MOD 30 MIN: CPT | Performed by: FAMILY MEDICINE

## 2024-03-12 PROCEDURE — G8399 PT W/DXA RESULTS DOCUMENT: HCPCS | Performed by: FAMILY MEDICINE

## 2024-03-12 PROCEDURE — 2022F DILAT RTA XM EVC RTNOPTHY: CPT | Performed by: FAMILY MEDICINE

## 2024-03-12 PROCEDURE — 3046F HEMOGLOBIN A1C LEVEL >9.0%: CPT | Performed by: FAMILY MEDICINE

## 2024-03-12 RX ORDER — TIZANIDINE 4 MG/1
TABLET ORAL
Qty: 30 TABLET | Refills: 3 | Status: SHIPPED | OUTPATIENT
Start: 2024-03-12

## 2024-03-12 RX ORDER — QUETIAPINE FUMARATE 25 MG/1
50 TABLET, FILM COATED ORAL EVERY EVENING
Qty: 180 TABLET | Refills: 0 | Status: SHIPPED | OUTPATIENT
Start: 2024-03-12

## 2024-03-12 SDOH — ECONOMIC STABILITY: INCOME INSECURITY: HOW HARD IS IT FOR YOU TO PAY FOR THE VERY BASICS LIKE FOOD, HOUSING, MEDICAL CARE, AND HEATING?: NOT HARD AT ALL

## 2024-03-12 SDOH — ECONOMIC STABILITY: FOOD INSECURITY: WITHIN THE PAST 12 MONTHS, YOU WORRIED THAT YOUR FOOD WOULD RUN OUT BEFORE YOU GOT MONEY TO BUY MORE.: NEVER TRUE

## 2024-03-12 SDOH — ECONOMIC STABILITY: FOOD INSECURITY: WITHIN THE PAST 12 MONTHS, THE FOOD YOU BOUGHT JUST DIDN'T LAST AND YOU DIDN'T HAVE MONEY TO GET MORE.: NEVER TRUE

## 2024-03-12 NOTE — PROGRESS NOTES
3/12/2024    Blood pressure (!) 164/82, pulse 75, temperature 98.7 °F (37.1 °C), temperature source Oral, resp. rate 18, height 1.6 m (5' 2.99\"), weight 101.6 kg (224 lb), SpO2 97 %, not currently breastfeeding.    Kyra Castro (:  1960) is a 64 y.o. female, here for evaluation of the following medical concerns:    Chief Complaint   Patient presents with    Diabetes     Sugar has been up and down.     Other     Concerned about her weight.      Here for f/u diabetes.    Poor control and worsening as of last visit.   needing surgery for trigger finger on RH but has too poor glycemic control.  She reports that she is monitoring sugars more frequently.  Did not bring log sheet.    She reports that she eats irregularly and sporadically, 1-2x a day.  Using 40 units Basaglar.  Dose of Novolog is based on SS:  <100=NONE, 100-180=10 UNITS, 181-230=15U, 231-280-20U,>281=25U  Also uses Actos 30.    Not using CGM.  She is OK with using Shubham 2.    Lab Results   Component Value Date/Time    LABA1C 11.2 2024 01:07 PM    LABA1C 10.1 10/03/2023 04:31 PM    LABA1C 9.5 2023 04:46 PM      She wishes she could lose weight.  She does not tolerate GLP-1 or SGLT-2 agents.      Patient Active Problem List   Diagnosis    Diabetic gastroparesis (HCC)    Allergic rhinitis    Insomnia    Essential hypertension    Headaches due to old head injury    Type 2 diabetes mellitus with microalbuminuria, with long-term current use of insulin (HCC)    Migraine    Right knee DJD    Calcific Achilles tendonitis, right    Lumbar spinal stenosis    Urge incontinence of urine    Personal history of breast cancer    Hyperlipidemia LDL goal <100    Severe obesity (BMI 35.0-39.9) with comorbidity (HCC)    Anxiety    History of colon polyps    Irritable bowel syndrome with both constipation and diarrhea    Trigger finger, acquired    Bilateral edema of lower extremity    Achilles tendinitis of both lower extremities    Arthritis of left

## 2024-03-13 ENCOUNTER — TELEPHONE (OUTPATIENT)
Dept: FAMILY MEDICINE CLINIC | Age: 64
End: 2024-03-13

## 2024-03-13 NOTE — TELEPHONE ENCOUNTER
Patient needs a prior auth on her Victoza  DX: Type 2 diabetes mellitus with microalbuminum E11.29, R80.9, Z79.4

## 2024-03-14 NOTE — TELEPHONE ENCOUNTER
Submitted PA for Victoza 18MG/3ML pen-injectors   [employer]ia [employer] Key: [id number] STATUS: PENDING.    Follow up done daily; if no decision with in three days we will refax.  If another three days goes by with no decision will call the insurance for status.

## 2024-03-15 NOTE — TELEPHONE ENCOUNTER
Called and let the patient know we are waiting on approval with her insurance.  No further questions.

## 2024-03-15 NOTE — TELEPHONE ENCOUNTER
Pt called in went to pharmacy to get Victoza shot and it was not there       Please advise and update pt

## 2024-03-28 ENCOUNTER — APPOINTMENT (OUTPATIENT)
Dept: GENERAL RADIOLOGY | Age: 64
End: 2024-03-28
Payer: MEDICARE

## 2024-03-28 ENCOUNTER — HOSPITAL ENCOUNTER (EMERGENCY)
Age: 64
Discharge: HOME | End: 2024-03-28
Attending: EMERGENCY MEDICINE
Payer: MEDICARE

## 2024-03-28 VITALS
OXYGEN SATURATION: 97 % | HEART RATE: 65 BPM | DIASTOLIC BLOOD PRESSURE: 85 MMHG | HEIGHT: 63 IN | RESPIRATION RATE: 16 BRPM | SYSTOLIC BLOOD PRESSURE: 160 MMHG | WEIGHT: 223.77 LBS | BODY MASS INDEX: 39.65 KG/M2 | TEMPERATURE: 98.2 F

## 2024-03-28 DIAGNOSIS — E16.2 HYPOGLYCEMIA: Primary | ICD-10-CM

## 2024-03-28 LAB
ANION GAP SERPL CALCULATED.3IONS-SCNC: 12 MMOL/L (ref 3–16)
BASOPHILS # BLD: 0.1 K/UL (ref 0–0.2)
BASOPHILS NFR BLD: 0.6 %
BUN SERPL-MCNC: 30 MG/DL (ref 7–20)
CALCIUM SERPL-MCNC: 10.1 MG/DL (ref 8.3–10.6)
CHLORIDE SERPL-SCNC: 101 MMOL/L (ref 99–110)
CO2 SERPL-SCNC: 28 MMOL/L (ref 21–32)
CREAT SERPL-MCNC: 1.5 MG/DL (ref 0.6–1.2)
DEPRECATED RDW RBC AUTO: 13.9 % (ref 12.4–15.4)
EOSINOPHIL # BLD: 0.2 K/UL (ref 0–0.6)
EOSINOPHIL NFR BLD: 1.9 %
GFR SERPLBLD CREATININE-BSD FMLA CKD-EPI: 38 ML/MIN/{1.73_M2}
GLUCOSE BLD-MCNC: 101 MG/DL (ref 70–99)
GLUCOSE BLD-MCNC: 81 MG/DL (ref 70–99)
GLUCOSE SERPL-MCNC: 88 MG/DL (ref 70–99)
HCT VFR BLD AUTO: 45.2 % (ref 36–48)
HGB BLD-MCNC: 14.6 G/DL (ref 12–16)
IMM GRANULOCYTES # BLD: 0 K/UL (ref 0–0.2)
IMM GRANULOCYTES NFR BLD: 0.2 %
LYMPHOCYTES # BLD: 2.4 K/UL (ref 1–5.1)
LYMPHOCYTES NFR BLD: 30.1 %
MCH RBC QN AUTO: 28.6 PG (ref 26–34)
MCHC RBC AUTO-ENTMCNC: 32.3 G/DL (ref 32–36.4)
MCV RBC AUTO: 88.5 FL (ref 80–100)
MONOCYTES # BLD: 0.6 K/UL (ref 0–1.3)
MONOCYTES NFR BLD: 6.8 %
NEUTROPHILS # BLD: 4.9 K/UL (ref 1.7–7.7)
NEUTROPHILS NFR BLD: 60.4 %
PERFORMED ON: ABNORMAL
PERFORMED ON: NORMAL
PLATELET # BLD AUTO: 269 K/UL (ref 135–450)
PMV BLD AUTO: 10.1 FL (ref 5–10.5)
POTASSIUM SERPL-SCNC: 3.6 MMOL/L (ref 3.5–5.1)
RBC # BLD AUTO: 5.11 M/UL (ref 4–5.2)
SODIUM SERPL-SCNC: 141 MMOL/L (ref 136–145)
WBC # BLD AUTO: 8.1 K/UL (ref 4–11)

## 2024-03-28 PROCEDURE — 71045 X-RAY EXAM CHEST 1 VIEW: CPT

## 2024-03-28 PROCEDURE — 82947 ASSAY GLUCOSE BLOOD QUANT: CPT

## 2024-03-28 PROCEDURE — 85025 COMPLETE CBC W/AUTO DIFF WBC: CPT

## 2024-03-28 PROCEDURE — 36415 COLL VENOUS BLD VENIPUNCTURE: CPT

## 2024-03-28 PROCEDURE — 99284 EMERGENCY DEPT VISIT MOD MDM: CPT

## 2024-03-28 PROCEDURE — 80048 BASIC METABOLIC PNL TOTAL CA: CPT

## 2024-03-28 ASSESSMENT — PAIN - FUNCTIONAL ASSESSMENT
PAIN_FUNCTIONAL_ASSESSMENT: NONE - DENIES PAIN
PAIN_FUNCTIONAL_ASSESSMENT: NONE - DENIES PAIN

## 2024-03-28 ASSESSMENT — LIFESTYLE VARIABLES
HOW MANY STANDARD DRINKS CONTAINING ALCOHOL DO YOU HAVE ON A TYPICAL DAY: 1 OR 2
HOW OFTEN DO YOU HAVE A DRINK CONTAINING ALCOHOL: MONTHLY OR LESS

## 2024-03-29 NOTE — ED PROVIDER NOTES
(hypertension) 05/15/2014    Hyperlipidemia     Irritable bowel syndrome (IBS)     Obesity     Wears glasses     reading       SURGICAL HISTORY       Past Surgical History:   Procedure Laterality Date    ACHILLES TENDON SURGERY Right     BACK SURGERY  2013    lumbar    BREAST LUMPECTOMY Left 2010    CARPAL TUNNEL RELEASE Bilateral      SECTION      x 1    CHOLECYSTECTOMY      COLONOSCOPY  10/16/2017    polypectomy- Dr Wood    COLONOSCOPY N/A 05/10/2023    COLONOSCOPY POLYPECTOMY SNARE/COLD BIOPSY performed by Steffen Wood MD at [facility] ENDOSCOPY    ENDOSCOPY, COLON, DIAGNOSTIC      ESOPHAGEAL DILATATION N/A 05/15/2019    ESOPHAGEAL DILATION LYNN performed by Steffen Wood MD at [facility] ENDOSCOPY    FINGER TRIGGER RELEASE Left 2020    LEFT INDEX FINGER TRIGGER FINGER RELEASE performed by Juan Lindquist MD at [facility] OR    HAND SURGERY      INTRACAPSULAR CATARACT EXTRACTION Right 2021    PHACOEMULSIFICATION WITH INTRAOCULAR LENS IMPLANT - RIGHT EYE performed by Gallo Gallego MD at [facility] SURG CTR    INTRACAPSULAR CATARACT EXTRACTION Left 2021    PHACOEMULSIFICATION WITH INTRAOCULAR LENS IMPLANT - LEFT EYE performed by Gallo Gallego MD at [facility] SURG CTR    TONSILLECTOMY      UPPER GASTROINTESTINAL ENDOSCOPY N/A 2019    EGD BIOPSY performed by Steffen Wood MD at [facility] ENDOSCOPY    UPPER GASTROINTESTINAL ENDOSCOPY N/A 05/15/2019    EGD BIOPSY performed by Steffen Wood MD at [facility] ENDOSCOPY    UPPER GASTROINTESTINAL ENDOSCOPY N/A 10/11/2021    EGD BIOPSY performed by Steffen Wood MD at [facility] ENDOSCOPY    UPPER GASTROINTESTINAL ENDOSCOPY N/A 2023    EGD BIOPSY performed by Steffen Wood MD at [facility] ENDOSCOPY       CURRENT MEDICATIONS       Previous Medications    AMLODIPINE (NORVASC) 5 MG TABLET    TAKE 1 TABLET BY MOUTH DAILY    ATORVASTATIN (LIPITOR) 80 MG TABLET    TAKE 1 TABLET BY MOUTH EVERY DAY    B-D UF III MINI PEN NEEDLES 31G X 5 MM MISC    USE 1

## 2024-03-29 NOTE — ED TRIAGE NOTES
Patient arrived by [facility] with c/o low blood sugar. EMS reports patient's initial blood sugar upon arrival to her home was 32. They were called to home for lethargy and confusion of patient. Reported she received some glucagon at home and drank part of a pepsi. By arrival to ED, reported her blood sugar was 81. Patient awake, alert, oriented upon arrival. She reports she took 10 unit of her fast acting insulin in preparation to eat dinner, but did not eat right away. Patient's  found her and called EMS. Respirations easy & regular, skin w/d, cap refill brisk. Patient assisted into gown and placed on cardiac monitor and is sinus rhythm. Dr. Vincent in room to examine patient.

## 2024-03-29 NOTE — ED NOTES
Discharge instructions reviewed with patient and verbalized understanding, denied further questions and successful teach back occurred. Offered wheelchair for discharge and declined. Discharged ambulatory with steady gait to ED lobby. Written discharge instructions provided to patient.

## 2024-03-29 NOTE — ED NOTES
Dr. Vincent in room to discuss test results, diagnosis and discharge plan of care with patient and her .

## 2024-04-01 RX ORDER — METOPROLOL SUCCINATE 50 MG/1
TABLET, EXTENDED RELEASE ORAL
Qty: 90 TABLET | Refills: 1 | Status: SHIPPED | OUTPATIENT
Start: 2024-04-01

## 2024-04-03 RX ORDER — FLURBIPROFEN SODIUM 0.3 MG/ML
SOLUTION/ DROPS OPHTHALMIC
Qty: 200 EACH | Refills: 3 | Status: SHIPPED | OUTPATIENT
Start: 2024-04-03

## 2024-04-04 ENCOUNTER — TELEPHONE (OUTPATIENT)
Dept: FAMILY MEDICINE CLINIC | Age: 64
End: 2024-04-04

## 2024-04-04 RX ORDER — INSULIN GLARGINE 300 U/ML
40 INJECTION, SOLUTION SUBCUTANEOUS DAILY
Qty: 15 ML | Refills: 3 | Status: SHIPPED | OUTPATIENT
Start: 2024-04-04

## 2024-04-04 NOTE — TELEPHONE ENCOUNTER
Basaglar changed to Toujeo to keep with formulary change and sent to [facility]. Please let Kyra know.  Thanks.

## 2024-04-04 NOTE — TELEPHONE ENCOUNTER
Pharm calling in, [facility] is needing a PA or alternatives are lantus, traceba, or toujeo.    Please advise  Pharm can be reached at [phone]

## 2024-04-18 RX ORDER — METOPROLOL SUCCINATE 100 MG/1
100 TABLET, EXTENDED RELEASE ORAL DAILY
Qty: 90 TABLET | Refills: 1 | Status: SHIPPED | OUTPATIENT
Start: 2024-04-18

## 2024-05-06 ENCOUNTER — TELEPHONE (OUTPATIENT)
Dept: FAMILY MEDICINE CLINIC | Age: 64
End: 2024-05-06

## 2024-05-06 RX ORDER — GABAPENTIN 600 MG/1
600 TABLET ORAL 3 TIMES DAILY
Qty: 270 TABLET | Refills: 0 | Status: SHIPPED | OUTPATIENT
Start: 2024-05-06 | End: 2024-08-04

## 2024-05-06 RX ORDER — BLOOD-GLUCOSE METER
EACH MISCELLANEOUS
Qty: 1 KIT | Refills: 0 | Status: SHIPPED | OUTPATIENT
Start: 2024-05-06

## 2024-05-06 RX ORDER — BLOOD SUGAR DIAGNOSTIC
1 STRIP MISCELLANEOUS 4 TIMES DAILY
Qty: 400 EACH | Refills: 3 | Status: SHIPPED | OUTPATIENT
Start: 2024-05-06

## 2024-05-06 NOTE — TELEPHONE ENCOUNTER
Patient needs a prior auth on her Gabapentin 600 mg.  DX: Cervical myofascial pain syndrome M79.18  Fibromyalgia  M79.7

## 2024-05-06 NOTE — TELEPHONE ENCOUNTER
Patient's insurance no longer covers True Metrix Blood Glucose Test.  Patient can have:  One Touch Verio  Accu Chek Guide  Please send new rx to [facility] in [place], [place]

## 2024-05-07 ENCOUNTER — OFFICE VISIT (OUTPATIENT)
Dept: FAMILY MEDICINE CLINIC | Age: 64
End: 2024-05-07
Payer: MEDICARE

## 2024-05-07 VITALS
SYSTOLIC BLOOD PRESSURE: 120 MMHG | TEMPERATURE: 98.7 F | BODY MASS INDEX: 39.25 KG/M2 | OXYGEN SATURATION: 96 % | WEIGHT: 221.6 LBS | DIASTOLIC BLOOD PRESSURE: 70 MMHG | RESPIRATION RATE: 18 BRPM | HEART RATE: 66 BPM

## 2024-05-07 DIAGNOSIS — E11.29 TYPE 2 DIABETES MELLITUS WITH MICROALBUMINURIA, WITH LONG-TERM CURRENT USE OF INSULIN (HCC): Primary | ICD-10-CM

## 2024-05-07 DIAGNOSIS — Z79.4 TYPE 2 DIABETES MELLITUS WITH MICROALBUMINURIA, WITH LONG-TERM CURRENT USE OF INSULIN (HCC): Primary | ICD-10-CM

## 2024-05-07 DIAGNOSIS — F51.04 PSYCHOPHYSIOLOGICAL INSOMNIA: ICD-10-CM

## 2024-05-07 DIAGNOSIS — E11.22 CKD STAGE 3 DUE TO TYPE 2 DIABETES MELLITUS (HCC): ICD-10-CM

## 2024-05-07 DIAGNOSIS — N18.30 CKD STAGE 3 DUE TO TYPE 2 DIABETES MELLITUS (HCC): ICD-10-CM

## 2024-05-07 DIAGNOSIS — R80.9 TYPE 2 DIABETES MELLITUS WITH MICROALBUMINURIA, WITH LONG-TERM CURRENT USE OF INSULIN (HCC): Primary | ICD-10-CM

## 2024-05-07 DIAGNOSIS — F33.1 MODERATE EPISODE OF RECURRENT MAJOR DEPRESSIVE DISORDER (HCC): ICD-10-CM

## 2024-05-07 LAB — HBA1C MFR BLD: 7.9 %

## 2024-05-07 PROCEDURE — 83036 HEMOGLOBIN GLYCOSYLATED A1C: CPT | Performed by: FAMILY MEDICINE

## 2024-05-07 PROCEDURE — 1036F TOBACCO NON-USER: CPT | Performed by: FAMILY MEDICINE

## 2024-05-07 PROCEDURE — 3074F SYST BP LT 130 MM HG: CPT | Performed by: FAMILY MEDICINE

## 2024-05-07 PROCEDURE — 3051F HG A1C>EQUAL 7.0%<8.0%: CPT | Performed by: FAMILY MEDICINE

## 2024-05-07 PROCEDURE — 3017F COLORECTAL CA SCREEN DOC REV: CPT | Performed by: FAMILY MEDICINE

## 2024-05-07 PROCEDURE — G8417 CALC BMI ABV UP PARAM F/U: HCPCS | Performed by: FAMILY MEDICINE

## 2024-05-07 PROCEDURE — 99214 OFFICE O/P EST MOD 30 MIN: CPT | Performed by: FAMILY MEDICINE

## 2024-05-07 PROCEDURE — 1124F ACP DISCUSS-NO DSCNMKR DOCD: CPT | Performed by: FAMILY MEDICINE

## 2024-05-07 PROCEDURE — 1090F PRES/ABSN URINE INCON ASSESS: CPT | Performed by: FAMILY MEDICINE

## 2024-05-07 PROCEDURE — G8427 DOCREV CUR MEDS BY ELIG CLIN: HCPCS | Performed by: FAMILY MEDICINE

## 2024-05-07 PROCEDURE — 2022F DILAT RTA XM EVC RTNOPTHY: CPT | Performed by: FAMILY MEDICINE

## 2024-05-07 PROCEDURE — 3078F DIAST BP <80 MM HG: CPT | Performed by: FAMILY MEDICINE

## 2024-05-07 PROCEDURE — G8399 PT W/DXA RESULTS DOCUMENT: HCPCS | Performed by: FAMILY MEDICINE

## 2024-05-07 RX ORDER — BUPROPION HYDROCHLORIDE 300 MG/1
300 TABLET ORAL EVERY MORNING
Qty: 30 TABLET | Refills: 3 | Status: SHIPPED | OUTPATIENT
Start: 2024-05-07

## 2024-05-07 ASSESSMENT — PATIENT HEALTH QUESTIONNAIRE - PHQ9
SUM OF ALL RESPONSES TO PHQ QUESTIONS 1-9: 12
9. THOUGHTS THAT YOU WOULD BE BETTER OFF DEAD, OR OF HURTING YOURSELF: SEVERAL DAYS
3. TROUBLE FALLING OR STAYING ASLEEP: NEARLY EVERY DAY
SUM OF ALL RESPONSES TO PHQ QUESTIONS 1-9: 13
SUM OF ALL RESPONSES TO PHQ QUESTIONS 1-9: 13
2. FEELING DOWN, DEPRESSED OR HOPELESS: MORE THAN HALF THE DAYS
5. POOR APPETITE OR OVEREATING: NOT AT ALL
SUM OF ALL RESPONSES TO PHQ QUESTIONS 1-9: 13
SUM OF ALL RESPONSES TO PHQ9 QUESTIONS 1 & 2: 5
8. MOVING OR SPEAKING SO SLOWLY THAT OTHER PEOPLE COULD HAVE NOTICED. OR THE OPPOSITE, BEING SO FIGETY OR RESTLESS THAT YOU HAVE BEEN MOVING AROUND A LOT MORE THAN USUAL: NOT AT ALL
1. LITTLE INTEREST OR PLEASURE IN DOING THINGS: NEARLY EVERY DAY
4. FEELING TIRED OR HAVING LITTLE ENERGY: MORE THAN HALF THE DAYS
7. TROUBLE CONCENTRATING ON THINGS, SUCH AS READING THE NEWSPAPER OR WATCHING TELEVISION: SEVERAL DAYS
6. FEELING BAD ABOUT YOURSELF - OR THAT YOU ARE A FAILURE OR HAVE LET YOURSELF OR YOUR FAMILY DOWN: SEVERAL DAYS
10. IF YOU CHECKED OFF ANY PROBLEMS, HOW DIFFICULT HAVE THESE PROBLEMS MADE IT FOR YOU TO DO YOUR WORK, TAKE CARE OF THINGS AT HOME, OR GET ALONG WITH OTHER PEOPLE: SOMEWHAT DIFFICULT

## 2024-05-07 ASSESSMENT — COLUMBIA-SUICIDE SEVERITY RATING SCALE - C-SSRS
2. HAVE YOU ACTUALLY HAD ANY THOUGHTS OF KILLING YOURSELF?: NO
6. HAVE YOU EVER DONE ANYTHING, STARTED TO DO ANYTHING, OR PREPARED TO DO ANYTHING TO END YOUR LIFE?: NO
1. WITHIN THE PAST MONTH, HAVE YOU WISHED YOU WERE DEAD OR WISHED YOU COULD GO TO SLEEP AND NOT WAKE UP?: NO

## 2024-05-07 NOTE — TELEPHONE ENCOUNTER
Submitted PA for Gabapentin 600MG tablets   Via CMM Key: [id number] STATUS:  APPROVAL    This medication or product is on your plan's list of covered drugs. Prior authorization is not required at this time.     If this requires a response please respond to the pool. (P [facility] MEDICINE Pre-Auth).    Please advise patient thank you.

## 2024-05-07 NOTE — PROGRESS NOTES
2024    Blood pressure 120/70, pulse 66, temperature 98.7 °F (37.1 °C), temperature source Oral, resp. rate 18, weight 100.5 kg (221 lb 9.6 oz), SpO2 96 %, not currently breastfeeding.    Kyra Castro (:  1960) is a 64 y.o. female, here for evaluation of the following medical concerns:    Chief Complaint   Patient presents with    Follow-up    Diabetes     L hip pain.  Worried about medications. Patient taking too much at night and HOP is worrying.     Here with  for f/u DM.  Did not bring meds today for review.    She has had an ER visit for hypoglycemia 3/28. And EMS run for hypoglycemia in April where she was given glucose but not transported to ER.    Current med use:  Actos 30  Basaglar dose: 'I get so mixed up'  but later says 40 units nightly  Novolog: dose based on pre-meal sugar level. <100=NONE, 100-180=10 UNITS, 181-230=15U, 231-280-20U,>281=25U  Victoza 0.6 mg (is tolerating this OK, did not tolerate weekly GLP-1 meds)    Shubham 2 CGM shows high (glucose over 200) from 12am-6 am.  (9eats a high carb bedtime snack nightly ice cream or cookies)  Lowest sugars are 12 pm -6 pm (122 average).    The hypoglycemic episodes were when she used novolog and then did not eat.    Weight stable.       Lab Results   Component Value Date/Time    LABA1C 11.2 2024 01:07 PM    LABA1C 10.1 10/03/2023 04:31 PM    LABA1C 9.5 2023 04:46 PM           is concerned that her bedtime medicines cause residual drowsiness the next day.  Gabapentin 600 only at bedtime (not TID)  Seroquel 25 mg 2 tabs  Tizanidine 4 mg 2 tabs      Tells nurse that she occ feels sad when she thinks about her  parents, family who lives far apart. Can feel emotional and overwhelmed.  No prior suicide attempts. But some thoughts of death.  Also uses Effexor 225. Wellbutrin.     Last renal function test:   Lab Results   Component Value Date/Time     2024 08:00 PM    K 3.6 2024 08:00 PM    K

## 2024-05-08 RX ORDER — INSULIN GLULISINE 100 [IU]/ML
INJECTION, SOLUTION SUBCUTANEOUS
Qty: 5 ADJUSTABLE DOSE PRE-FILLED PEN SYRINGE | Refills: 5 | Status: SHIPPED | OUTPATIENT
Start: 2024-05-08

## 2024-05-09 ENCOUNTER — TELEPHONE (OUTPATIENT)
Dept: FAMILY MEDICINE CLINIC | Age: 64
End: 2024-05-09

## 2024-05-09 NOTE — TELEPHONE ENCOUNTER
Patient needs a prior auth on Apidra Solostar pen inj 3 ML  DX:Type 2 diabetes E11.29, R80.9, Z79.4

## 2024-05-10 NOTE — TELEPHONE ENCOUNTER
Submitted PA for Apidra SoloStar 100UNIT/ML pen-injectors   Via CMM Key: [id number] STATUS: PENDING.    Follow up done daily; if no decision with in three days we will refax.  If another three days goes by with no decision will call the insurance for status.

## 2024-05-13 NOTE — TELEPHONE ENCOUNTER
The medication is APPROVED.    Request Reference Number: [id number]. APIDRA INJ SOLOSTAR is approved through 12/31/2024.     If this requires a response please respond to the pool ( P [facility] MEDICATION PRE-AUTH).      Thank you please advise patient.

## 2024-05-14 ENCOUNTER — TELEPHONE (OUTPATIENT)
Dept: FAMILY MEDICINE CLINIC | Age: 64
End: 2024-05-14

## 2024-05-14 NOTE — TELEPHONE ENCOUNTER
Patient would like to know is she should increase her dose of Victoza.  She stated at visit it was discussed to increase.  What dose would you like her to be on?

## 2024-05-14 NOTE — TELEPHONE ENCOUNTER
I was very happy to see how much her A1c came down and also happy she is tolerating the victoza at all (when she did not tolerate other GLP-1 meds.)    I wanted her to remain at the 0.6 mg dose of Victoza for a bit longer. I don't want her to have side effects.     I don't know the right time to raise the dose of Victoza, but I don't recommend doing it yet.    I sent in the 0.6 mg dose just now.

## 2024-05-14 NOTE — TELEPHONE ENCOUNTER
Patient is calling about her VICTOZA     She said when she was here on 5/7/2024 they talked about increasing the dosage but it wasn't sent over to the pharmacy    Please Advise

## 2024-05-20 RX ORDER — PIOGLITAZONEHYDROCHLORIDE 30 MG/1
30 TABLET ORAL DAILY
Qty: 90 TABLET | Refills: 1 | Status: SHIPPED | OUTPATIENT
Start: 2024-05-20

## 2024-05-23 RX ORDER — QUETIAPINE FUMARATE 25 MG/1
50 TABLET, FILM COATED ORAL EVERY EVENING
Qty: 180 TABLET | Refills: 0 | Status: SHIPPED | OUTPATIENT
Start: 2024-05-23

## 2024-05-24 ENCOUNTER — OFFICE VISIT (OUTPATIENT)
Dept: ORTHOPEDIC SURGERY | Age: 64
End: 2024-05-24
Payer: MEDICARE

## 2024-05-24 VITALS — WEIGHT: 221 LBS | RESPIRATION RATE: 16 BRPM | HEIGHT: 63 IN | BODY MASS INDEX: 39.16 KG/M2

## 2024-05-24 DIAGNOSIS — M65.30 TRIGGER FINGER, ACQUIRED: Primary | ICD-10-CM

## 2024-05-24 PROCEDURE — 3017F COLORECTAL CA SCREEN DOC REV: CPT | Performed by: PHYSICIAN ASSISTANT

## 2024-05-24 PROCEDURE — 1090F PRES/ABSN URINE INCON ASSESS: CPT | Performed by: PHYSICIAN ASSISTANT

## 2024-05-24 PROCEDURE — G8427 DOCREV CUR MEDS BY ELIG CLIN: HCPCS | Performed by: PHYSICIAN ASSISTANT

## 2024-05-24 PROCEDURE — G8399 PT W/DXA RESULTS DOCUMENT: HCPCS | Performed by: PHYSICIAN ASSISTANT

## 2024-05-24 PROCEDURE — G8417 CALC BMI ABV UP PARAM F/U: HCPCS | Performed by: PHYSICIAN ASSISTANT

## 2024-05-24 PROCEDURE — 99214 OFFICE O/P EST MOD 30 MIN: CPT | Performed by: PHYSICIAN ASSISTANT

## 2024-05-24 PROCEDURE — 1036F TOBACCO NON-USER: CPT | Performed by: PHYSICIAN ASSISTANT

## 2024-05-24 PROCEDURE — 1124F ACP DISCUSS-NO DSCNMKR DOCD: CPT | Performed by: PHYSICIAN ASSISTANT

## 2024-05-24 NOTE — PROGRESS NOTES
Ms. Kyra Castro returns today in follow-up of her previously treated  right Index Finger, Middle Finger, and Ring Finger stenosing tenosynovitis.  She was last seen by me in June, 2023 at which time she was treated with  scheduling surgery but had to cancel due to her A1C .  She experienced no relief of her initial symptoms.  She  has noticed symptom worsening over the last several months.  She returns today with worsened symptoms of right Index Finger, Middle Finger, and Ring Finger pain, swelling, catching, and locking, requesting further treatment.  Due to the dynamic and progressive nature of Stenosing Tenosynovitis, It is clinically necessary to carefully re-evaluate Ms. Kyra Castro today, prior to proceeding with any further treatment or intervention, to assure the clinical appropriateness of any previously considered treatment, at this time.      I have today reviewed with Kyra Castro the clinically relevant, past medical history, medications, allergies,  family history, social history, and Review Of Systems & I have documented any details relevant to today's presenting complaints in my history above.  Ms. Kyra Castro's self-reported past medical history, medications, allergies,  family history, social history, and Review Of Systems have been scanned into the chart under the \"Media\" tab.    Physical Exam:  Vitals  Respirations: 16  Height: 160 cm (5' 3\")  Weight - Scale: 100.2 kg (221 lb)  Ms. Kyra Castro appears well, she is in no apparent distress, she demonstrates appropriate mood & affect.      Skin: Normal in appearance, Normal Color, and Free of Lesions Bilaterally   Digital range of motion is limited by pain on the Right, normal on the Left.  Inducible triggering is noted upon flexion in the right Index Finger, Middle Finger, and Ring Finger.  There is an associated flexion contracture of the PIP joint.  No other digit demonstrates evidence for stenosing tenosynovitis bilaterally.  Wrist

## 2024-05-27 DIAGNOSIS — E78.5 HYPERLIPIDEMIA LDL GOAL <100: ICD-10-CM

## 2024-05-28 RX ORDER — ATORVASTATIN CALCIUM 80 MG/1
TABLET, FILM COATED ORAL
Qty: 90 TABLET | Refills: 1 | Status: SHIPPED | OUTPATIENT
Start: 2024-05-28

## 2024-05-31 ENCOUNTER — PREP FOR PROCEDURE (OUTPATIENT)
Dept: ORTHOPEDIC SURGERY | Age: 64
End: 2024-05-31

## 2024-05-31 PROBLEM — M65.30 TRIGGER FINGER: Status: ACTIVE | Noted: 2024-05-31

## 2024-06-10 NOTE — PROGRESS NOTES
Kyra HARPER Matthew    Age 64 y.o.    female    1960    N [id number]    6/18/2024  Arrival Time_____________  OR Time____________20 Min     Procedure(s):  RIGHT INDEX, MIDDLE, AND RING FINGER TRIGGER FINGER RELEASE                      Monitor Anesthesia Care    Surgeon(s):  Juan Lindquist, MD       Phone [phone] (home)     In\[facility]\""  Date  Info Source  Home  Cell         Work  _____________________________________________________________________  _____________________________________________________________________  _____________________________________________________________________  _____________________________________________________________________  _____________________________________________________________________    PCP _____________________________ Phone_________________     H&P  ________________  Bringing      Chart              Epic      DOS      Called________  EKG ________________   Bringing      Chart              Epic      DOS      Called________  LABS________________   Bringing     Chart              Epic      DOS      Called________  Cardiac Clearance ______ Bringing      Chart              Epic      DOS      Called________  Pulmonary Clearance____ Bringing      Chart              Epic      DOS      Called________    Cardiologist________________________ Phone___________________________  Pulmonologist_______________________Phone___________________________    ? Advance Directives   ? Buddhism concerns / Waiver on Chart            PAT Communications________________  ? Pre-op Instructions Given /Understood          _________________________________  ? Directions to Surgery Center                          _________________________________  ? Transportation Home_______________      __________________________________  ? Crutches/Walker__________________        __________________________________    Orders: Hard copy/ EPIC                 Transcribed/ EPIC

## 2024-06-13 NOTE — PROGRESS NOTES
Date and time of surgery :  6/18/24 at 1230            Arrival Time:  1030     Bring Picture ID and insurance card.  Please wear simple, loose fitting clothing to the hospital.   Do not bring valuables (money, credit cards, checkbooks, etc.)   Do not wear any makeup (including  eye makeup) and no nail polish or artificial nails on your fingers or toes.  DO NOT wear any jewelry or piercings on day of surgery.  All body piercing jewelry must be removed.  If you have dentures, they will be removed before going to the OR; we will provide you a container.  If you wear contact lenses or glasses, they will be removed; please bring a case for them.  Shower the evening before or morning of surgery with antibacterial soap.  Nothing to eat or drink after midnight the day before surgery.   You may brush your teeth and gargle the morning of surgery.  DO NOT SWALLOW WATER.   The morning of your surgery take only Metoprolol, Amlodipine and Omeprazole with a sip of water   Aspirin, Ibuprofen, Advil, Naproxen, Vitamin E and other Anti-inflammatory products and supplements should be stopped for 5 -7days before surgery or as directed by your physician.  Decrease Toujeo the evening prior to surgery by half.  Do not smoke or drink any alcoholic beverages 24 hours prior to surgery.  This includes NA Beer. Refrain from the usage of any recreational drugs, including non-prescribed prescription drugs.   You MUST plan for a responsible adult to stay on site while you are here and take you home after your surgery. You will not be allowed to leave alone or drive yourself home. It is strongly suggested someone stay with you the first 24 hrs. Your surgery will be cancelled if you do not have a ride home.  To help prevent infection, change your sheets the night before surgery.   If you  have a Living Will and Durable Power of  for Healthcare, please bring in a copy.  Notify your Surgeon if you develop any illness between now and time of

## 2024-06-14 ENCOUNTER — OFFICE VISIT (OUTPATIENT)
Dept: FAMILY MEDICINE CLINIC | Age: 64
End: 2024-06-14

## 2024-06-14 VITALS
HEART RATE: 77 BPM | OXYGEN SATURATION: 96 % | RESPIRATION RATE: 14 BRPM | HEIGHT: 63 IN | WEIGHT: 217.8 LBS | SYSTOLIC BLOOD PRESSURE: 126 MMHG | BODY MASS INDEX: 38.59 KG/M2 | DIASTOLIC BLOOD PRESSURE: 74 MMHG

## 2024-06-14 DIAGNOSIS — I10 ESSENTIAL HYPERTENSION: ICD-10-CM

## 2024-06-14 DIAGNOSIS — Z79.4 TYPE 2 DIABETES MELLITUS WITH MICROALBUMINURIA, WITH LONG-TERM CURRENT USE OF INSULIN (HCC): ICD-10-CM

## 2024-06-14 DIAGNOSIS — M65.841 STENOSING TENOSYNOVITIS OF FINGER OF RIGHT HAND: ICD-10-CM

## 2024-06-14 DIAGNOSIS — Z01.818 PREOP EXAMINATION: Primary | ICD-10-CM

## 2024-06-14 DIAGNOSIS — E11.22 CKD STAGE 3 DUE TO TYPE 2 DIABETES MELLITUS (HCC): ICD-10-CM

## 2024-06-14 DIAGNOSIS — N18.30 CKD STAGE 3 DUE TO TYPE 2 DIABETES MELLITUS (HCC): ICD-10-CM

## 2024-06-14 DIAGNOSIS — E11.29 TYPE 2 DIABETES MELLITUS WITH MICROALBUMINURIA, WITH LONG-TERM CURRENT USE OF INSULIN (HCC): ICD-10-CM

## 2024-06-14 DIAGNOSIS — R80.9 TYPE 2 DIABETES MELLITUS WITH MICROALBUMINURIA, WITH LONG-TERM CURRENT USE OF INSULIN (HCC): ICD-10-CM

## 2024-06-14 PROBLEM — R41.82 AMS (ALTERED MENTAL STATUS): Status: RESOLVED | Noted: 2023-02-11 | Resolved: 2024-06-14

## 2024-06-14 PROBLEM — Z87.448 HISTORY OF ACUTE PYELONEPHRITIS: Status: ACTIVE | Noted: 2023-02-11

## 2024-06-14 PROBLEM — M79.18 CERVICAL MYOFASCIAL PAIN SYNDROME: Status: RESOLVED | Noted: 2023-03-16 | Resolved: 2024-06-14

## 2024-06-14 LAB
ANION GAP SERPL CALCULATED.3IONS-SCNC: 10 MMOL/L (ref 3–16)
BUN SERPL-MCNC: 23 MG/DL (ref 7–20)
CALCIUM SERPL-MCNC: 10 MG/DL (ref 8.3–10.6)
CHLORIDE SERPL-SCNC: 104 MMOL/L (ref 99–110)
CO2 SERPL-SCNC: 27 MMOL/L (ref 21–32)
CREAT SERPL-MCNC: 1.7 MG/DL (ref 0.6–1.2)
GFR SERPLBLD CREATININE-BSD FMLA CKD-EPI: 33 ML/MIN/{1.73_M2}
GLUCOSE SERPL-MCNC: 151 MG/DL (ref 70–99)
POTASSIUM SERPL-SCNC: 4.5 MMOL/L (ref 3.5–5.1)
SODIUM SERPL-SCNC: 141 MMOL/L (ref 136–145)

## 2024-06-14 RX ORDER — GABAPENTIN 600 MG/1
600 TABLET ORAL
COMMUNITY
Start: 2024-06-14

## 2024-06-14 NOTE — PATIENT INSTRUCTIONS
Meds to take the morning of your surgery:   Metoprolol 100 mg  Tolteradine 4 mg  Omeprazole 20 mg    Hold ALL OTHER MEDS the morning of surgery.  Resume after surgery.    Reduce your dose of Toujeo to 20 units the night before surgery.    Do not use any OTC pain medicines until after surgery- you may only take tylenol.

## 2024-06-14 NOTE — PROGRESS NOTES
Subjective:   PREOPERATIVE EVALUATION     Kyra Castro is a 64 y.o.  female who presents to the office today for a preoperative consultation at the request of surgeon Dr Juan Lindquist who plans on performing trigger finger release R 2nd, 3rd and 4th fingers on June 18.      Duration of problem: >1 yr  Assoc sx are: pain, locking, stiffness   Alleviating factors are: none    This consultation is requested for the specific conditions prompting preoperative evaluation (i.e. because of potential affect on operative risk):    Diabetes: yes.    DM meds: Actos 30, Victoza 0.6 mg/d  basaglar 40, novolog (SS).    <100=NONE, 100-180=10 UNITS, 181-230=15U, 231-280-20U,>281=25U  Glycemic control has improved of late. A1c under 8 last month.  Adding Victoza has likely helped the most by helping curtail snacking, which keeps more calories consumed rojas she uses the novolog.    Hypoglycemia? Sometimes.    Shubham 2 data shows her glucose is highest between midnight and 6 am- 30 day average is 208 12-6 am.  Still snacks during this time.  Average is 150 6 am - 12 pm (usually asleep during this time)  111 12 pm - 6 pm, 150 6 pm - 12 am     Lab Results   Component Value Date/Time    LABA1C 7.9 05/07/2024 04:14 PM    LABA1C 11.2 01/26/2024 01:07 PM    LABA1C 10.1 10/03/2023 04:31 PM      Coronary Artery Disease: no  COPD: no  Tobacco use? no  Recent illness? no    Other notable conditions:  HTN: uses metoprolol xl 100 mg, Norvasc 5.  BP stable today.    CKD-3.  Last renal function test: stable in stage 3b range.  Has followed with Dr Nguyễn.  Lab Results   Component Value Date/Time     03/28/2024 08:00 PM    K 3.6 03/28/2024 08:00 PM    K 3.9 02/10/2023 11:57 PM     03/28/2024 08:00 PM    CO2 28 03/28/2024 08:00 PM    BUN 30 03/28/2024 08:00 PM    CREATININE 1.5 03/28/2024 08:00 PM    GLUCOSE 88 03/28/2024 08:00 PM    GLUCOSE 380 12/04/2015 03:28 PM    CALCIUM 10.1 03/28/2024 08:00 PM    LABGLOM 38 03/28/2024 08:00

## 2024-06-17 ENCOUNTER — TELEPHONE (OUTPATIENT)
Dept: ORTHOPEDIC SURGERY | Age: 64
End: 2024-06-17

## 2024-06-17 NOTE — TELEPHONE ENCOUNTER
General Question     Subject: INQUIRING ABOUT ARRIVAL TIME.  Patient and /or Facility Request: Kyra Castro   Contact Number: [phone]

## 2024-06-18 ENCOUNTER — ANESTHESIA EVENT (OUTPATIENT)
Dept: OPERATING ROOM | Age: 64
End: 2024-06-18
Payer: MEDICARE

## 2024-06-18 ENCOUNTER — HOSPITAL ENCOUNTER (OUTPATIENT)
Age: 64
Setting detail: OUTPATIENT SURGERY
Discharge: HOME | End: 2024-06-18
Attending: ORTHOPAEDIC SURGERY | Admitting: ORTHOPAEDIC SURGERY
Payer: MEDICARE

## 2024-06-18 ENCOUNTER — ANESTHESIA (OUTPATIENT)
Dept: OPERATING ROOM | Age: 64
End: 2024-06-18
Payer: MEDICARE

## 2024-06-18 VITALS
SYSTOLIC BLOOD PRESSURE: 140 MMHG | RESPIRATION RATE: 20 BRPM | HEIGHT: 63 IN | HEART RATE: 82 BPM | TEMPERATURE: 97.1 F | OXYGEN SATURATION: 89 % | BODY MASS INDEX: 38.45 KG/M2 | DIASTOLIC BLOOD PRESSURE: 62 MMHG | WEIGHT: 217 LBS

## 2024-06-18 LAB
GLUCOSE BLD-MCNC: 135 MG/DL (ref 70–99)
GLUCOSE BLD-MCNC: 154 MG/DL (ref 70–99)
PERFORMED ON: ABNORMAL
PERFORMED ON: ABNORMAL

## 2024-06-18 PROCEDURE — 2580000003 HC RX 258: Performed by: ORTHOPAEDIC SURGERY

## 2024-06-18 PROCEDURE — 7100000010 HC PHASE II RECOVERY - FIRST 15 MIN: Performed by: ORTHOPAEDIC SURGERY

## 2024-06-18 PROCEDURE — 7100000000 HC PACU RECOVERY - FIRST 15 MIN: Performed by: ORTHOPAEDIC SURGERY

## 2024-06-18 PROCEDURE — 3700000001 HC ADD 15 MINUTES (ANESTHESIA): Performed by: ORTHOPAEDIC SURGERY

## 2024-06-18 PROCEDURE — 2500000003 HC RX 250 WO HCPCS: Performed by: NURSE ANESTHETIST, CERTIFIED REGISTERED

## 2024-06-18 PROCEDURE — 2580000003 HC RX 258: Performed by: ANESTHESIOLOGY

## 2024-06-18 PROCEDURE — 6360000002 HC RX W HCPCS: Performed by: ORTHOPAEDIC SURGERY

## 2024-06-18 PROCEDURE — A4217 STERILE WATER/SALINE, 500 ML: HCPCS | Performed by: ORTHOPAEDIC SURGERY

## 2024-06-18 PROCEDURE — 7100000011 HC PHASE II RECOVERY - ADDTL 15 MIN: Performed by: ORTHOPAEDIC SURGERY

## 2024-06-18 PROCEDURE — 6360000002 HC RX W HCPCS: Performed by: NURSE ANESTHETIST, CERTIFIED REGISTERED

## 2024-06-18 PROCEDURE — 3600000005 HC SURGERY LEVEL 5 BASE: Performed by: ORTHOPAEDIC SURGERY

## 2024-06-18 PROCEDURE — 2709999900 HC NON-CHARGEABLE SUPPLY: Performed by: ORTHOPAEDIC SURGERY

## 2024-06-18 PROCEDURE — 2500000003 HC RX 250 WO HCPCS: Performed by: ORTHOPAEDIC SURGERY

## 2024-06-18 PROCEDURE — 7100000001 HC PACU RECOVERY - ADDTL 15 MIN: Performed by: ORTHOPAEDIC SURGERY

## 2024-06-18 PROCEDURE — 3600000015 HC SURGERY LEVEL 5 ADDTL 15MIN: Performed by: ORTHOPAEDIC SURGERY

## 2024-06-18 PROCEDURE — 3700000000 HC ANESTHESIA ATTENDED CARE: Performed by: ORTHOPAEDIC SURGERY

## 2024-06-18 RX ORDER — NALOXONE HYDROCHLORIDE 0.4 MG/ML
INJECTION, SOLUTION INTRAMUSCULAR; INTRAVENOUS; SUBCUTANEOUS PRN
Status: CANCELLED | OUTPATIENT
Start: 2024-06-18

## 2024-06-18 RX ORDER — LIDOCAINE HYDROCHLORIDE 20 MG/ML
INJECTION, SOLUTION INFILTRATION; PERINEURAL PRN
Status: DISCONTINUED | OUTPATIENT
Start: 2024-06-18 | End: 2024-06-18 | Stop reason: SDUPTHER

## 2024-06-18 RX ORDER — LABETALOL HYDROCHLORIDE 5 MG/ML
10 INJECTION, SOLUTION INTRAVENOUS EVERY 10 MIN PRN
Status: DISCONTINUED | OUTPATIENT
Start: 2024-06-18 | End: 2024-06-18 | Stop reason: HOSPADM

## 2024-06-18 RX ORDER — ONDANSETRON 2 MG/ML
INJECTION INTRAMUSCULAR; INTRAVENOUS PRN
Status: DISCONTINUED | OUTPATIENT
Start: 2024-06-18 | End: 2024-06-18 | Stop reason: SDUPTHER

## 2024-06-18 RX ORDER — SODIUM CHLORIDE, SODIUM LACTATE, POTASSIUM CHLORIDE, CALCIUM CHLORIDE 600; 310; 30; 20 MG/100ML; MG/100ML; MG/100ML; MG/100ML
INJECTION, SOLUTION INTRAVENOUS CONTINUOUS
Status: DISCONTINUED | OUTPATIENT
Start: 2024-06-18 | End: 2024-06-18 | Stop reason: HOSPADM

## 2024-06-18 RX ORDER — PROPOFOL 10 MG/ML
INJECTION, EMULSION INTRAVENOUS PRN
Status: DISCONTINUED | OUTPATIENT
Start: 2024-06-18 | End: 2024-06-18 | Stop reason: SDUPTHER

## 2024-06-18 RX ORDER — MIDAZOLAM HYDROCHLORIDE 1 MG/ML
2 INJECTION INTRAMUSCULAR; INTRAVENOUS
Status: DISCONTINUED | OUTPATIENT
Start: 2024-06-18 | End: 2024-06-18 | Stop reason: HOSPADM

## 2024-06-18 RX ORDER — LABETALOL HYDROCHLORIDE 5 MG/ML
INJECTION, SOLUTION INTRAVENOUS PRN
Status: DISCONTINUED | OUTPATIENT
Start: 2024-06-18 | End: 2024-06-18 | Stop reason: SDUPTHER

## 2024-06-18 RX ORDER — DEXAMETHASONE SODIUM PHOSPHATE 10 MG/ML
INJECTION INTRAMUSCULAR; INTRAVENOUS PRN
Status: DISCONTINUED | OUTPATIENT
Start: 2024-06-18 | End: 2024-06-18 | Stop reason: SDUPTHER

## 2024-06-18 RX ORDER — ONDANSETRON 2 MG/ML
4 INJECTION INTRAMUSCULAR; INTRAVENOUS
Status: CANCELLED | OUTPATIENT
Start: 2024-06-18 | End: 2024-06-19

## 2024-06-18 RX ORDER — SODIUM CHLORIDE 0.9 % (FLUSH) 0.9 %
5-40 SYRINGE (ML) INJECTION PRN
Status: CANCELLED | OUTPATIENT
Start: 2024-06-18

## 2024-06-18 RX ORDER — SODIUM CHLORIDE 9 MG/ML
INJECTION, SOLUTION INTRAVENOUS PRN
Status: CANCELLED | OUTPATIENT
Start: 2024-06-18

## 2024-06-18 RX ORDER — PROCHLORPERAZINE EDISYLATE 5 MG/ML
5 INJECTION INTRAMUSCULAR; INTRAVENOUS
Status: CANCELLED | OUTPATIENT
Start: 2024-06-18 | End: 2024-06-19

## 2024-06-18 RX ORDER — SUCCINYLCHOLINE/SOD CL,ISO/PF 100 MG/5ML
SYRINGE (ML) INTRAVENOUS PRN
Status: DISCONTINUED | OUTPATIENT
Start: 2024-06-18 | End: 2024-06-18 | Stop reason: SDUPTHER

## 2024-06-18 RX ORDER — SODIUM CHLORIDE 9 MG/ML
INJECTION, SOLUTION INTRAVENOUS PRN
Status: DISCONTINUED | OUTPATIENT
Start: 2024-06-18 | End: 2024-06-18 | Stop reason: HOSPADM

## 2024-06-18 RX ORDER — SODIUM CHLORIDE 0.9 % (FLUSH) 0.9 %
5-40 SYRINGE (ML) INJECTION EVERY 12 HOURS SCHEDULED
Status: CANCELLED | OUTPATIENT
Start: 2024-06-18

## 2024-06-18 RX ORDER — OXYCODONE HYDROCHLORIDE 5 MG/1
5 TABLET ORAL
Status: CANCELLED | OUTPATIENT
Start: 2024-06-18 | End: 2024-06-19

## 2024-06-18 RX ORDER — MAGNESIUM HYDROXIDE 1200 MG/15ML
LIQUID ORAL CONTINUOUS PRN
Status: COMPLETED | OUTPATIENT
Start: 2024-06-18 | End: 2024-06-18

## 2024-06-18 RX ORDER — FENTANYL CITRATE 50 UG/ML
INJECTION, SOLUTION INTRAMUSCULAR; INTRAVENOUS PRN
Status: DISCONTINUED | OUTPATIENT
Start: 2024-06-18 | End: 2024-06-18 | Stop reason: SDUPTHER

## 2024-06-18 RX ORDER — DIPHENHYDRAMINE HYDROCHLORIDE 50 MG/ML
12.5 INJECTION INTRAMUSCULAR; INTRAVENOUS
Status: CANCELLED | OUTPATIENT
Start: 2024-06-18 | End: 2024-06-19

## 2024-06-18 RX ORDER — LORAZEPAM 2 MG/ML
0.5 INJECTION INTRAMUSCULAR
Status: CANCELLED | OUTPATIENT
Start: 2024-06-18 | End: 2024-06-19

## 2024-06-18 RX ORDER — ROCURONIUM BROMIDE 10 MG/ML
INJECTION, SOLUTION INTRAVENOUS PRN
Status: DISCONTINUED | OUTPATIENT
Start: 2024-06-18 | End: 2024-06-18 | Stop reason: SDUPTHER

## 2024-06-18 RX ORDER — MEPERIDINE HYDROCHLORIDE 50 MG/ML
12.5 INJECTION INTRAMUSCULAR; INTRAVENOUS; SUBCUTANEOUS EVERY 5 MIN PRN
Status: CANCELLED | OUTPATIENT
Start: 2024-06-18

## 2024-06-18 RX ORDER — SODIUM CHLORIDE 0.9 % (FLUSH) 0.9 %
5-40 SYRINGE (ML) INJECTION EVERY 12 HOURS SCHEDULED
Status: DISCONTINUED | OUTPATIENT
Start: 2024-06-18 | End: 2024-06-18 | Stop reason: HOSPADM

## 2024-06-18 RX ORDER — LABETALOL HYDROCHLORIDE 5 MG/ML
10 INJECTION, SOLUTION INTRAVENOUS
Status: CANCELLED | OUTPATIENT
Start: 2024-06-18

## 2024-06-18 RX ORDER — SODIUM CHLORIDE 0.9 % (FLUSH) 0.9 %
5-40 SYRINGE (ML) INJECTION PRN
Status: DISCONTINUED | OUTPATIENT
Start: 2024-06-18 | End: 2024-06-18 | Stop reason: HOSPADM

## 2024-06-18 RX ADMIN — ROCURONIUM BROMIDE 40 MG: 50 INJECTION, SOLUTION INTRAVENOUS at 12:45

## 2024-06-18 RX ADMIN — FENTANYL CITRATE 50 MCG: 50 INJECTION, SOLUTION INTRAMUSCULAR; INTRAVENOUS at 12:44

## 2024-06-18 RX ADMIN — SODIUM CHLORIDE, POTASSIUM CHLORIDE, SODIUM LACTATE AND CALCIUM CHLORIDE: 600; 310; 30; 20 INJECTION, SOLUTION INTRAVENOUS at 12:02

## 2024-06-18 RX ADMIN — ONDANSETRON 4 MG: 2 INJECTION INTRAMUSCULAR; INTRAVENOUS at 12:40

## 2024-06-18 RX ADMIN — ROCURONIUM BROMIDE 10 MG: 50 INJECTION, SOLUTION INTRAVENOUS at 12:40

## 2024-06-18 RX ADMIN — LABETALOL HYDROCHLORIDE 10 MG: 5 INJECTION, SOLUTION INTRAVENOUS at 12:46

## 2024-06-18 RX ADMIN — LIDOCAINE HYDROCHLORIDE 80 MG: 20 INJECTION, SOLUTION INFILTRATION; PERINEURAL at 12:40

## 2024-06-18 RX ADMIN — LABETALOL HYDROCHLORIDE 10 MG: 5 INJECTION, SOLUTION INTRAVENOUS at 12:53

## 2024-06-18 RX ADMIN — Medication 120 MG: at 12:40

## 2024-06-18 RX ADMIN — SUGAMMADEX 200 MG: 100 INJECTION, SOLUTION INTRAVENOUS at 12:55

## 2024-06-18 RX ADMIN — PROPOFOL 200 MG: 10 INJECTION, EMULSION INTRAVENOUS at 12:40

## 2024-06-18 RX ADMIN — DEXAMETHASONE SODIUM PHOSPHATE 10 MG: 10 INJECTION INTRAMUSCULAR; INTRAVENOUS at 12:40

## 2024-06-18 ASSESSMENT — PAIN - FUNCTIONAL ASSESSMENT: PAIN_FUNCTIONAL_ASSESSMENT: 0-10

## 2024-06-18 NOTE — ANESTHESIA POSTPROCEDURE EVALUATION
Department of Anesthesiology  Postprocedure Note    Patient: Kyra Csatro  MRN: [id number]  YOB: 1960  Date of evaluation: 6/18/2024    Procedure Summary       Date: 06/18/24 Room / Location: [address]    Anesthesia Start: 1235 Anesthesia Stop: 1303    Procedure: RIGHT INDEX, MIDDLE, AND RING FINGER TRIGGER FINGER RELEASE (Right: Fingers) Diagnosis:       Trigger finger      (Trigger finger [M65.30])    Surgeons: Juan Lindquist MD Responsible Provider: Boyd Rader MD    Anesthesia Type: MAC ASA Status: 3            Anesthesia Type: No value filed.    Eva Phase I: Eva Score: 10    Eva Phase II:      Anesthesia Post Evaluation    Patient location during evaluation: PACU  Patient participation: complete - patient participated  Level of consciousness: awake and alert  Airway patency: patent  Nausea & Vomiting: no vomiting and no nausea  Cardiovascular status: hemodynamically stable, blood pressure returned to baseline and hypertensive  Respiratory status: acceptable  Hydration status: euvolemic  Comments: --------------------            06/18/24               1315     --------------------   BP:     (!) 140/62   Pulse:      82       Resp:       20       Temp:                SpO2:    (!) 89%    --------------------    Pain management: adequate    No notable events documented.

## 2024-06-18 NOTE — OP NOTE
OPERATIVE REPORT          Patient:  Kyra Castro    YOB: 1960  Date of Service:  6/18/2024   Location:  [facility]        Preoperative Diagnosis:  Right Index Finger, Middle Finger, and Ring Finger trigger finger.    Postoperative Diagnosis: Same.    Procedure: Right Index Finger, Middle Finger, and Ring Finger trigger finger release (A1 pulley release).    Surgeon:    Juan Lindquist MD    Surgical Assistant:    Hospital Supplied Assistant    Anesthesia:  Local with sedation.    Blood Loss:  Minimal.     Complications:  None.      Tourniquet Time:  4 minutes.    Indications:  Ms. Kyra Castro  is a 64 y.o.  year old female with a Right Index Finger, Middle Finger, and Ring Finger trigger finger. I have discussed preoperatively with her the complications, limitations, expectations, alternatives and risks of the planned surgical care.  She has voiced an understanding of our discussion.  All of her questions have been fully answered to her satisfaction, and she has provided written informed consent to proceed.    Procedure:  After written consent was obtained and the proper operative site was identified and marked, Ms. Kyra Castro  was brought to the operating room, placed in the supine position on the operating room table with the Right arm extended upon a hand table. Under an appropriate level of sedation, local anesthetic (1% Lidocaine and 1/2% Marcaine both without Epinephrine) was instilled in the planned surgical field. Her Right upper extremity was prepped and draped in the usual sterile fashion.    After Eshmarch exsanguination the pneumo-tourniquet was inflated to 250 milimeters of mercury.      A 1 centimeter oblique incision was fashioned over the base of the flexor tendon sheath of the Right Index Finger.  Dissection was carried carefully through the subcutaneous tissues, taking great care to identify and protect the neurovascular structures.  The flexor tendon sheath was

## 2024-06-18 NOTE — H&P
Pre-operative Update of H&P:    I  have seen & examined Ms. Kyra HARPER Matthew related solely to her hand and upper extremity conditions, prior to the scheduled procedure on the date of her surgery.  The indications for the planned surgical procedure & and her upper-extremity condition are unchanged.

## 2024-06-18 NOTE — ANESTHESIA PRE PROCEDURE
\"POCGLU\", \"POCNA\", \"POCK\", \"POCCL\", \"POCBUN\", \"POCHEMO\", \"POCHCT\" in the last 72 hours.    Coags:   Lab Results   Component Value Date/Time    PROTIME 13.0 08/20/2017 02:20 PM    INR 1.15 08/20/2017 02:20 PM    APTT 30.4 04/06/2014 02:55 PM       HCG (If Applicable):   Lab Results   Component Value Date    PREGTESTUR Negative 04/06/2014    PREGSERUM Neg 05/24/2010        ABGs: No results found for: \"PHART\", \"PO2ART\", \"IGL0DGP\", \"KDA9YGH\", \"BEART\", \"A5VNBVDF\"     Type & Screen (If Applicable):  No results found for: \"LABABO\"    Drug/Infectious Status (If Applicable):  Lab Results   Component Value Date/Time    HIV Non-Reactive 03/06/2018 10:26 AM       COVID-19 Screening (If Applicable):   Lab Results   Component Value Date/Time    COVID19 NOT DETECTED 01/06/2021 12:00 PM           Anesthesia Evaluation    Airway: Mallampati: II          Dental:    (+) poor dentition      Pulmonary: breath sounds clear to auscultation                             Cardiovascular:    (+) hypertension:        Rhythm: regular  Rate: normal                    Neuro/Psych:   (+) neuromuscular disease:, headaches:, psychiatric history:            GI/Hepatic/Renal:   (+) GERD:          Endo/Other:    (+) DiabetesType II DM.                 Abdominal:             Vascular:          Other Findings:       Anesthesia Plan      MAC     ASA 3       Induction: intravenous.      Anesthetic plan and risks discussed with patient.                    Boyd Rader MD   6/18/2024

## 2024-06-18 NOTE — DISCHARGE INSTRUCTIONS
discarding them can be found at:  [email or web address]    Hospital or office staff may NOT accept any medications to drop off in the cabinet for you. What is a Surgical Site Infection or  (SSI)?        A surgical site infection (SSI) is an infection that occurs after surgery in the part of the body where the surgery took place. Most patients who have surgery do not develop an infection. However, infections can develop in about 1-3 cases for every 100 patients who have had surgery.  Our goal is for you to NOT experience any complications and be completely satisfied with your care!    However, some signs or symptoms to look for and report immediately to your doctor are:   1. Fever above 101 degrees    2. Redness and increasing pain around the area  where you had surgery   3. Drainage of cloudy fluid or pus coming from the surgical area    Some of the things we/ you can do to prevent SSI's are:   1. Clean hands with soap and water or an alcohol-based hand rub before and after caring for the operative area. This occurs the day of surgery and for the next 2 weeks.   2.Sometimes you receive an appropriate antibiotic within 60 minutes before your surgery or take one for several days after surgery depending on your surgeon's instructions and/or the type of surgery you are having.    3. Family and/or friends who visit you should NOT touch the surgical wound or dressings until advised by your surgeon.    4. Be sure to elevate and decrease the swelling after your surgery to help prevent infection.   5. If you are a diabetic, you need to closely monitor your blood sugar levels and report any significant increases or changes to your surgeon to help promote the healing process.What is a Surgical Site Infection or  (SSI)?        A surgical site infection (SSI) is an infection that occurs after surgery in the part of the body where the surgery took place. Most patients who have surgery do not develop an infection. However,

## 2024-06-24 ENCOUNTER — OFFICE VISIT (OUTPATIENT)
Dept: ORTHOPEDIC SURGERY | Age: 64
End: 2024-06-24

## 2024-06-24 VITALS — HEIGHT: 63 IN | BODY MASS INDEX: 38.45 KG/M2 | WEIGHT: 217 LBS | RESPIRATION RATE: 16 BRPM

## 2024-06-24 DIAGNOSIS — Z98.890 STATUS POST TRIGGER FINGER RELEASE: Primary | ICD-10-CM

## 2024-06-24 PROCEDURE — 99024 POSTOP FOLLOW-UP VISIT: CPT | Performed by: ORTHOPAEDIC SURGERY

## 2024-06-24 NOTE — PROGRESS NOTES
Ms. Kyra Castro returns today in follow-up of her recent right Index Finger, Middle Finger, and Ring Finger A1 Pulley (Trigger Finger) Release done approximately 6 days ago.  She has done well noting mild discomfort and no other reported complications.    She notes pre-operative symptoms to be improved at this time.    Physical Exam:  Bandage intact, pt did reinforce this bandage with more coban resulting in a very tight bandage.  Skin incision is no significant drainage, no significant erythema, dehiscence present at the index finger, two of three sutures have released.  Digital range of motion is limited by pain and fingers flex to palm in the Index Finger, Middle Finger, and Ring Finger, normal in all other digits.  Wrist range of motion is full and equal bilateral.  Sensation is normal in the Index Finger, Middle Finger, and Ring Finger.  Vascular examination reveals normal, good capillary refill, and good color.  Swelling is mild.  Her preoperative triggering is significantly improved.    Impression:  Ms. Kyra Castro is doing fairly well after recent right Index Finger, Middle Finger, and Ring Finger Trigger Finger Release.    Plan:  Ms. Kyra Castro is instructed in work on Active & Passive range of motion of the digits, wrist, & elbow.  These modalities were specifically demonstrated to her today.  We discussed the appropriateness of gradual resumption of use of the operated hand and the return to normal use as comfort allows.  She is given instructions regarding management of the fresh surgical incision and progressive use of desensitization and tissue massage techniques.  We discussed the appropriate expectations and timeline for symptom improvement.    MD did meet with patient to see dehiscence and to go over post op instruction with patient.     She is provided a written patient instruction sheet titled: Postoperative Instructions After Trigger Finger Release.    I have asked Ms. Kyra Castro to

## 2024-06-24 NOTE — PATIENT INSTRUCTIONS
Postoperative Instructions After Trigger Finger Release    Dr. Juan Lindquist          After bandages are removed one week from surgery, you may chose to wear a small bandage over the incision if you wish, though you do not need to.  Keep incision dry until sutures have fully dissolved  or it has been 14 days since your surgery. Thereafter, you may wash with mild soap and water and shower normally.   Once your stiches have fully disappeared & skin appears normal, you should begin gently massaging the incision with Vitamin E (may use Vitamin E lotion or contents of Vitamin E capsule).   Work hard on motion of the fingers and wrist, straightening each finger fully and bending each finger fully, bending wrist forward and bending wrist backwards. Do not be concerned if you experience discomfort.  This will not damage the surgery.  You may begin using the hand as it feels comfortable beginning 12-14 days from the day of surgery. You may not feel entirely comfortable gripping or lifting heavy objects for several weeks.  You may expect to see some skin “peel” off around the incision.  You may be left with a small area of “pink baby skin”. This is quite normal.    Thank you for choosing [facility] for your Hand and Upper Extremity needs.  If we can be of any further assistance to you, please do not hesitate to contact us.    Office Phone Number:  [phone]  or  [phone]

## 2024-06-29 DIAGNOSIS — I10 ESSENTIAL HYPERTENSION: ICD-10-CM

## 2024-07-01 DIAGNOSIS — I10 ESSENTIAL HYPERTENSION: ICD-10-CM

## 2024-07-01 RX ORDER — TOLTERODINE 4 MG/1
CAPSULE, EXTENDED RELEASE ORAL DAILY
OUTPATIENT
Start: 2024-07-01

## 2024-07-01 RX ORDER — TOLTERODINE 4 MG/1
CAPSULE, EXTENDED RELEASE ORAL
Qty: 90 CAPSULE | Refills: 1 | Status: SHIPPED | OUTPATIENT
Start: 2024-07-01

## 2024-07-01 RX ORDER — AMLODIPINE BESYLATE 5 MG/1
TABLET ORAL
Qty: 90 TABLET | Refills: 1 | Status: SHIPPED | OUTPATIENT
Start: 2024-07-01

## 2024-07-01 RX ORDER — AMLODIPINE BESYLATE 5 MG/1
5 TABLET ORAL DAILY
Qty: 90 TABLET | Refills: 1 | OUTPATIENT
Start: 2024-07-01

## 2024-07-16 ENCOUNTER — TELEPHONE (OUTPATIENT)
Dept: FAMILY MEDICINE CLINIC | Age: 64
End: 2024-07-16

## 2024-07-16 NOTE — TELEPHONE ENCOUNTER
Pt called in wants a letter stating she cannot do jury duty   Pt can  once completed       Please advise

## 2024-08-03 DIAGNOSIS — F33.1 MODERATE EPISODE OF RECURRENT MAJOR DEPRESSIVE DISORDER (HCC): ICD-10-CM

## 2024-08-05 RX ORDER — GABAPENTIN 600 MG/1
600 TABLET ORAL 3 TIMES DAILY
Qty: 270 TABLET | Refills: 0 | Status: SHIPPED | OUTPATIENT
Start: 2024-08-05 | End: 2024-11-03

## 2024-08-05 RX ORDER — VENLAFAXINE HYDROCHLORIDE 75 MG/1
225 CAPSULE, EXTENDED RELEASE ORAL DAILY
Qty: 270 CAPSULE | Refills: 1 | Status: SHIPPED | OUTPATIENT
Start: 2024-08-05

## 2024-08-08 ENCOUNTER — OFFICE VISIT (OUTPATIENT)
Dept: FAMILY MEDICINE CLINIC | Age: 64
End: 2024-08-08

## 2024-08-08 VITALS
HEIGHT: 61 IN | RESPIRATION RATE: 18 BRPM | OXYGEN SATURATION: 96 % | DIASTOLIC BLOOD PRESSURE: 66 MMHG | WEIGHT: 211.6 LBS | HEART RATE: 72 BPM | BODY MASS INDEX: 39.95 KG/M2 | SYSTOLIC BLOOD PRESSURE: 130 MMHG

## 2024-08-08 DIAGNOSIS — E11.43 DIABETIC GASTROPARESIS (HCC): ICD-10-CM

## 2024-08-08 DIAGNOSIS — N18.30 CKD STAGE 3 DUE TO TYPE 2 DIABETES MELLITUS (HCC): ICD-10-CM

## 2024-08-08 DIAGNOSIS — F33.1 MODERATE EPISODE OF RECURRENT MAJOR DEPRESSIVE DISORDER (HCC): ICD-10-CM

## 2024-08-08 DIAGNOSIS — E11.29 TYPE 2 DIABETES MELLITUS WITH MICROALBUMINURIA, WITH LONG-TERM CURRENT USE OF INSULIN (HCC): ICD-10-CM

## 2024-08-08 DIAGNOSIS — G47.00 INSOMNIA, UNSPECIFIED TYPE: ICD-10-CM

## 2024-08-08 DIAGNOSIS — R80.9 TYPE 2 DIABETES MELLITUS WITH MICROALBUMINURIA, WITH LONG-TERM CURRENT USE OF INSULIN (HCC): ICD-10-CM

## 2024-08-08 DIAGNOSIS — K31.84 DIABETIC GASTROPARESIS (HCC): ICD-10-CM

## 2024-08-08 DIAGNOSIS — I10 ESSENTIAL HYPERTENSION: ICD-10-CM

## 2024-08-08 DIAGNOSIS — Z79.4 TYPE 2 DIABETES MELLITUS WITH MICROALBUMINURIA, WITH LONG-TERM CURRENT USE OF INSULIN (HCC): ICD-10-CM

## 2024-08-08 DIAGNOSIS — E11.22 CKD STAGE 3 DUE TO TYPE 2 DIABETES MELLITUS (HCC): ICD-10-CM

## 2024-08-08 DIAGNOSIS — Z00.00 MEDICARE ANNUAL WELLNESS VISIT, SUBSEQUENT: Primary | ICD-10-CM

## 2024-08-08 DIAGNOSIS — M65.30 TRIGGER FINGER, ACQUIRED: ICD-10-CM

## 2024-08-08 DIAGNOSIS — E66.01 SEVERE OBESITY (BMI 35.0-39.9) WITH COMORBIDITY (HCC): ICD-10-CM

## 2024-08-08 LAB — HBA1C MFR BLD: 8 %

## 2024-08-08 ASSESSMENT — COLUMBIA-SUICIDE SEVERITY RATING SCALE - C-SSRS
6. HAVE YOU EVER DONE ANYTHING, STARTED TO DO ANYTHING, OR PREPARED TO DO ANYTHING TO END YOUR LIFE?: NO
1. WITHIN THE PAST MONTH, HAVE YOU WISHED YOU WERE DEAD OR WISHED YOU COULD GO TO SLEEP AND NOT WAKE UP?: YES
2. HAVE YOU ACTUALLY HAD ANY THOUGHTS OF KILLING YOURSELF?: NO

## 2024-08-08 ASSESSMENT — PATIENT HEALTH QUESTIONNAIRE - PHQ9
10. IF YOU CHECKED OFF ANY PROBLEMS, HOW DIFFICULT HAVE THESE PROBLEMS MADE IT FOR YOU TO DO YOUR WORK, TAKE CARE OF THINGS AT HOME, OR GET ALONG WITH OTHER PEOPLE: NOT DIFFICULT AT ALL
SUM OF ALL RESPONSES TO PHQ QUESTIONS 1-9: 6
SUM OF ALL RESPONSES TO PHQ9 QUESTIONS 1 & 2: 2
SUM OF ALL RESPONSES TO PHQ QUESTIONS 1-9: 6
6. FEELING BAD ABOUT YOURSELF - OR THAT YOU ARE A FAILURE OR HAVE LET YOURSELF OR YOUR FAMILY DOWN: SEVERAL DAYS
3. TROUBLE FALLING OR STAYING ASLEEP: NOT AT ALL
4. FEELING TIRED OR HAVING LITTLE ENERGY: SEVERAL DAYS
8. MOVING OR SPEAKING SO SLOWLY THAT OTHER PEOPLE COULD HAVE NOTICED. OR THE OPPOSITE, BEING SO FIGETY OR RESTLESS THAT YOU HAVE BEEN MOVING AROUND A LOT MORE THAN USUAL: NOT AT ALL
5. POOR APPETITE OR OVEREATING: SEVERAL DAYS
2. FEELING DOWN, DEPRESSED OR HOPELESS: MORE THAN HALF THE DAYS
SUM OF ALL RESPONSES TO PHQ QUESTIONS 1-9: 6
7. TROUBLE CONCENTRATING ON THINGS, SUCH AS READING THE NEWSPAPER OR WATCHING TELEVISION: NOT AT ALL
SUM OF ALL RESPONSES TO PHQ QUESTIONS 1-9: 5
9. THOUGHTS THAT YOU WOULD BE BETTER OFF DEAD, OR OF HURTING YOURSELF: SEVERAL DAYS
1. LITTLE INTEREST OR PLEASURE IN DOING THINGS: NOT AT ALL

## 2024-08-08 ASSESSMENT — LIFESTYLE VARIABLES
HOW OFTEN DO YOU HAVE A DRINK CONTAINING ALCOHOL: NEVER
HOW MANY STANDARD DRINKS CONTAINING ALCOHOL DO YOU HAVE ON A TYPICAL DAY: PATIENT DOES NOT DRINK

## 2024-08-08 NOTE — PROGRESS NOTES
sleep regulation.  She is interested in increasing meds, but we are decreasing sedative meds first (tizanidine and Seroquel) to improve sleep patterns          General HRA Questions:  Select all that apply: (!) New or Increased Pain, Stress, New or Increased Fatigue    Interventions - Pain:  Patient advised to follow up in the office for further evaluation and treatment  Interventions Fatigue:  Suspect over-medicated. It is clear today that she is in bed for longer than is beneficial and is likely disrupting her next night's sleep.  Reducing sedative meds.  Interventions - Stress:  Financial mainly; reducing med burden may help.      Inactivity:  On average, how many days per week do you engage in moderate to strenuous exercise (like a brisk walk)?: 2 days (!) Abnormal  On average, how many minutes do you engage in exercise at this level?: 20 min    Interventions:  One of the goals of reducing sedative meds is to increase energy for engaging in activity, such as sliver sneakers with , which may also benefit mood and energy levels     Abnormal BMI (obese):  Body mass index is 39.49 kg/m². (!) Abnormal    Interventions:  Weight has been stable. Intolerant of SGLT-2 and GLP-1 therapies.          Vision Screen:  Do you have difficulty driving, watching TV, or doing any of your daily activities because of your eyesight?: No  Have you had an eye exam within the past year?: (!) No    Interventions:   Patient encouraged to make appointment with their eye specialist      Advanced Directives:  Do you have a Living Will?: (!) No    Intervention:  has NO advanced directive - not interested in additional information                     Objective   Vitals:    08/08/24 1519   BP: 130/66   Site: Right Upper Arm   Position: Sitting   Cuff Size: Medium Adult   Pulse: 72   Resp: 18   SpO2: 96%   Weight: 96 kg (211 lb 9.6 oz)   Height: 1.559 m (5' 1.38\")      Body mass index is 39.49 kg/m².                    Allergies

## 2024-08-13 RX ORDER — TIZANIDINE 4 MG/1
4 TABLET ORAL NIGHTLY
Qty: 30 TABLET | Refills: 3 | Status: SHIPPED | OUTPATIENT
Start: 2024-08-13

## 2024-08-19 RX ORDER — QUETIAPINE FUMARATE 25 MG/1
50 TABLET, FILM COATED ORAL EVERY EVENING
Qty: 180 TABLET | Refills: 0 | Status: SHIPPED | OUTPATIENT
Start: 2024-08-19

## 2024-08-22 ENCOUNTER — TELEPHONE (OUTPATIENT)
Dept: PHARMACY | Age: 64
End: 2024-08-22

## 2024-08-22 NOTE — TELEPHONE ENCOUNTER
Diabetes referral.    Reached out to spouse to reschedule appointment that previously had to be canceled.      Left message for Antony to please call to schedule an appointment.     Aditi Lan, PharmD  [facility]   [facility]  Diabetes Service  [phone]    For Pharmacy Admin Tracking Only    Program: Medication Management  CPA in place:  Yes  Recommendation Provided To:   Intervention Detail:   Intervention Accepted By:   Gap Closed?:    Time Spent (min): 5

## 2024-09-03 RX ORDER — BUPROPION HYDROCHLORIDE 300 MG/1
300 TABLET ORAL EVERY MORNING
Qty: 30 TABLET | Refills: 3 | Status: SHIPPED | OUTPATIENT
Start: 2024-09-03

## 2024-09-16 ENCOUNTER — TELEPHONE (OUTPATIENT)
Dept: PHARMACY | Age: 64
End: 2024-09-16

## 2024-09-19 ENCOUNTER — OFFICE VISIT (OUTPATIENT)
Dept: FAMILY MEDICINE CLINIC | Age: 64
End: 2024-09-19
Payer: MEDICARE

## 2024-09-19 VITALS
SYSTOLIC BLOOD PRESSURE: 120 MMHG | BODY MASS INDEX: 40.58 KG/M2 | OXYGEN SATURATION: 98 % | DIASTOLIC BLOOD PRESSURE: 84 MMHG | HEART RATE: 70 BPM | RESPIRATION RATE: 18 BRPM | WEIGHT: 217.4 LBS

## 2024-09-19 DIAGNOSIS — F33.1 MODERATE EPISODE OF RECURRENT MAJOR DEPRESSIVE DISORDER (HCC): Primary | ICD-10-CM

## 2024-09-19 DIAGNOSIS — F43.29 PROLONGED GRIEF REACTION: ICD-10-CM

## 2024-09-19 DIAGNOSIS — F41.9 ANXIETY: ICD-10-CM

## 2024-09-19 PROCEDURE — 99214 OFFICE O/P EST MOD 30 MIN: CPT | Performed by: FAMILY MEDICINE

## 2024-09-19 PROCEDURE — G8427 DOCREV CUR MEDS BY ELIG CLIN: HCPCS | Performed by: FAMILY MEDICINE

## 2024-09-19 PROCEDURE — G8417 CALC BMI ABV UP PARAM F/U: HCPCS | Performed by: FAMILY MEDICINE

## 2024-09-19 PROCEDURE — G0008 ADMIN INFLUENZA VIRUS VAC: HCPCS | Performed by: FAMILY MEDICINE

## 2024-09-19 PROCEDURE — 1124F ACP DISCUSS-NO DSCNMKR DOCD: CPT | Performed by: FAMILY MEDICINE

## 2024-09-19 PROCEDURE — 1036F TOBACCO NON-USER: CPT | Performed by: FAMILY MEDICINE

## 2024-09-19 PROCEDURE — 3074F SYST BP LT 130 MM HG: CPT | Performed by: FAMILY MEDICINE

## 2024-09-19 PROCEDURE — G8399 PT W/DXA RESULTS DOCUMENT: HCPCS | Performed by: FAMILY MEDICINE

## 2024-09-19 PROCEDURE — 3017F COLORECTAL CA SCREEN DOC REV: CPT | Performed by: FAMILY MEDICINE

## 2024-09-19 PROCEDURE — 1090F PRES/ABSN URINE INCON ASSESS: CPT | Performed by: FAMILY MEDICINE

## 2024-09-19 PROCEDURE — 90653 IIV ADJUVANT VACCINE IM: CPT | Performed by: FAMILY MEDICINE

## 2024-09-19 PROCEDURE — 3079F DIAST BP 80-89 MM HG: CPT | Performed by: FAMILY MEDICINE

## 2024-09-19 RX ORDER — GABAPENTIN 600 MG/1
600 TABLET ORAL DAILY
COMMUNITY
Start: 2024-09-19

## 2024-09-19 RX ORDER — PENICILLIN V POTASSIUM 500 MG/1
500 TABLET, FILM COATED ORAL 4 TIMES DAILY
Qty: 40 TABLET | Refills: 0 | Status: SHIPPED | OUTPATIENT
Start: 2024-09-19 | End: 2024-09-29

## 2024-09-27 RX ORDER — METOPROLOL SUCCINATE 50 MG/1
TABLET, EXTENDED RELEASE ORAL
Qty: 90 TABLET | Refills: 1 | Status: SHIPPED | OUTPATIENT
Start: 2024-09-27

## 2024-10-09 ENCOUNTER — TELEPHONE (OUTPATIENT)
Dept: FAMILY MEDICINE CLINIC | Age: 64
End: 2024-10-09

## 2024-10-09 NOTE — TELEPHONE ENCOUNTER
Pt  called in  wanted to speak to Kyra the pt is not eating or drinking much she is always cold pt did get a flu shot a week ago so not sure if that is the issue or is she has a virus   Pt always wants to lay down , feels nausea all the pt will eat is [employer]  vegetable soup   Pt wants to throw up nothing coming up   Pt  said could be the flu should not last this long   [phone]    Pt is scheduled tomorrow with Dr Bonilla   Pt  wants to know should they go to ER ? Wait for appointment tomorrow ?

## 2024-10-10 ENCOUNTER — OFFICE VISIT (OUTPATIENT)
Dept: FAMILY MEDICINE CLINIC | Age: 64
End: 2024-10-10
Payer: MEDICARE

## 2024-10-10 VITALS
SYSTOLIC BLOOD PRESSURE: 150 MMHG | HEART RATE: 94 BPM | WEIGHT: 204.4 LBS | OXYGEN SATURATION: 97 % | RESPIRATION RATE: 18 BRPM | DIASTOLIC BLOOD PRESSURE: 80 MMHG | BODY MASS INDEX: 38.15 KG/M2

## 2024-10-10 DIAGNOSIS — R11.2 NAUSEA AND VOMITING, UNSPECIFIED VOMITING TYPE: ICD-10-CM

## 2024-10-10 DIAGNOSIS — R10.12 LUQ ABDOMINAL PAIN: Primary | ICD-10-CM

## 2024-10-10 DIAGNOSIS — R10.12 LUQ ABDOMINAL PAIN: ICD-10-CM

## 2024-10-10 LAB
ALBUMIN SERPL-MCNC: 3.9 G/DL (ref 3.4–5)
ALBUMIN/GLOB SERPL: 1.1 {RATIO} (ref 1.1–2.2)
ALP SERPL-CCNC: 111 U/L (ref 40–129)
ALT SERPL-CCNC: 17 U/L (ref 10–40)
ANION GAP SERPL CALCULATED.3IONS-SCNC: 15 MMOL/L (ref 3–16)
AST SERPL-CCNC: 29 U/L (ref 15–37)
BASOPHILS # BLD: 0.1 K/UL (ref 0–0.2)
BASOPHILS NFR BLD: 0.8 %
BILIRUB SERPL-MCNC: 0.5 MG/DL (ref 0–1)
BUN SERPL-MCNC: 20 MG/DL (ref 7–20)
CALCIUM SERPL-MCNC: 10 MG/DL (ref 8.3–10.6)
CHLORIDE SERPL-SCNC: 103 MMOL/L (ref 99–110)
CO2 SERPL-SCNC: 23 MMOL/L (ref 21–32)
CREAT SERPL-MCNC: 1.5 MG/DL (ref 0.6–1.2)
DEPRECATED RDW RBC AUTO: 14.8 % (ref 12.4–15.4)
EOSINOPHIL # BLD: 0.1 K/UL (ref 0–0.6)
EOSINOPHIL NFR BLD: 1.1 %
GFR SERPLBLD CREATININE-BSD FMLA CKD-EPI: 38 ML/MIN/{1.73_M2}
GLUCOSE SERPL-MCNC: 238 MG/DL (ref 70–99)
HCT VFR BLD AUTO: 42.8 % (ref 36–48)
HGB BLD-MCNC: 14.3 G/DL (ref 12–16)
LIPASE SERPL-CCNC: 37 U/L (ref 13–60)
LYMPHOCYTES # BLD: 2.5 K/UL (ref 1–5.1)
LYMPHOCYTES NFR BLD: 25 %
MCH RBC QN AUTO: 29.5 PG (ref 26–34)
MCHC RBC AUTO-ENTMCNC: 33.4 G/DL (ref 31–36)
MCV RBC AUTO: 88.3 FL (ref 80–100)
MONOCYTES # BLD: 0.6 K/UL (ref 0–1.3)
MONOCYTES NFR BLD: 6.5 %
NEUTROPHILS # BLD: 6.6 K/UL (ref 1.7–7.7)
NEUTROPHILS NFR BLD: 66.6 %
PLATELET # BLD AUTO: 263 K/UL (ref 135–450)
PMV BLD AUTO: 10.8 FL (ref 5–10.5)
POTASSIUM SERPL-SCNC: 3.9 MMOL/L (ref 3.5–5.1)
PROT SERPL-MCNC: 7.3 G/DL (ref 6.4–8.2)
RBC # BLD AUTO: 4.84 M/UL (ref 4–5.2)
SODIUM SERPL-SCNC: 141 MMOL/L (ref 136–145)
WBC # BLD AUTO: 9.9 K/UL (ref 4–11)

## 2024-10-10 PROCEDURE — 1124F ACP DISCUSS-NO DSCNMKR DOCD: CPT | Performed by: FAMILY MEDICINE

## 2024-10-10 PROCEDURE — 3077F SYST BP >= 140 MM HG: CPT | Performed by: FAMILY MEDICINE

## 2024-10-10 PROCEDURE — G8427 DOCREV CUR MEDS BY ELIG CLIN: HCPCS | Performed by: FAMILY MEDICINE

## 2024-10-10 PROCEDURE — 1036F TOBACCO NON-USER: CPT | Performed by: FAMILY MEDICINE

## 2024-10-10 PROCEDURE — G8417 CALC BMI ABV UP PARAM F/U: HCPCS | Performed by: FAMILY MEDICINE

## 2024-10-10 PROCEDURE — G8399 PT W/DXA RESULTS DOCUMENT: HCPCS | Performed by: FAMILY MEDICINE

## 2024-10-10 PROCEDURE — 3017F COLORECTAL CA SCREEN DOC REV: CPT | Performed by: FAMILY MEDICINE

## 2024-10-10 PROCEDURE — 99214 OFFICE O/P EST MOD 30 MIN: CPT | Performed by: FAMILY MEDICINE

## 2024-10-10 PROCEDURE — G8482 FLU IMMUNIZE ORDER/ADMIN: HCPCS | Performed by: FAMILY MEDICINE

## 2024-10-10 PROCEDURE — 1090F PRES/ABSN URINE INCON ASSESS: CPT | Performed by: FAMILY MEDICINE

## 2024-10-10 PROCEDURE — 3079F DIAST BP 80-89 MM HG: CPT | Performed by: FAMILY MEDICINE

## 2024-10-10 NOTE — PROGRESS NOTES
10/10/2024    Blood pressure (!) 150/80, pulse 94, resp. rate 18, weight 92.7 kg (204 lb 6.4 oz), SpO2 97%, not currently breastfeeding.    Kyra Castro (:  1960) is a 64 y.o. female, here for evaluation of the following medical concerns:    Chief Complaint   Patient presents with    Other     Patient has been having upper stomach pain.  Vomiting stomach acid.  Patient has no appetite  Patient have loose stools.  Patient feels cold all the time.     Here with  for illness. He helps with history.   Onset 7-10 days ago with epigastric pains.  Radiate to left upper and lateral abd  + nausea  Occ emesis- yellow  Does think pain increases with eating.    Tries to treat with heating pad.  Hurts more to lean forward.  No hx pancreatitis.    Has been on Victoza- stopped it 5-6 days ago.    No fever, but reels chilled.    + diarrhea sporadically    No ill contacts.    Is trying to stay hydrated- not well, though.  Last urination 2x today.  No urine sx      Patient Active Problem List   Diagnosis    Diabetic gastroparesis (HCC)    Allergic rhinitis    Insomnia    Essential hypertension    Headaches due to old head injury    Type 2 diabetes mellitus with microalbuminuria, with long-term current use of insulin (HCC)    Migraine    Right knee DJD    Calcific Achilles tendonitis, right    Lumbar spinal stenosis    Urge incontinence of urine    Personal history of breast cancer    Hyperlipidemia LDL goal <100    Severe obesity (BMI 35.0-39.9) with comorbidity    Anxiety    History of colon polyps    Irritable bowel syndrome with both constipation and diarrhea    Trigger finger, acquired    Bilateral edema of lower extremity    Achilles tendinitis of both lower extremities    Arthritis of left hip    Lumbar facet arthropathy    DDD (degenerative disc disease), lumbar    CKD stage 3 due to type 2 diabetes mellitus (HCC)    Fibromyalgia    History of acute pyelonephritis- while using SGLT-2    DDD (degenerative

## 2024-10-11 ENCOUNTER — TELEPHONE (OUTPATIENT)
Dept: FAMILY MEDICINE CLINIC | Age: 64
End: 2024-10-11

## 2024-10-11 LAB
BACTERIA URNS QL MICRO: ABNORMAL /HPF
BILIRUB UR QL STRIP.AUTO: NEGATIVE
CLARITY UR: ABNORMAL
COLOR UR: YELLOW
EPI CELLS #/AREA URNS AUTO: 3 /HPF (ref 0–5)
GLUCOSE UR STRIP.AUTO-MCNC: 100 MG/DL
HGB UR QL STRIP.AUTO: ABNORMAL
HYALINE CASTS #/AREA URNS AUTO: 2 /LPF (ref 0–8)
KETONES UR STRIP.AUTO-MCNC: NEGATIVE MG/DL
LEUKOCYTE ESTERASE UR QL STRIP.AUTO: NEGATIVE
NITRITE UR QL STRIP.AUTO: NEGATIVE
PH UR STRIP.AUTO: 6 [PH] (ref 5–8)
PROCALCITONIN SERPL IA-MCNC: 0.07 NG/ML (ref 0–0.15)
PROT UR STRIP.AUTO-MCNC: >=300 MG/DL
RBC CLUMPS #/AREA URNS AUTO: 1 /HPF (ref 0–4)
SP GR UR STRIP.AUTO: >=1.03 (ref 1–1.03)
UA COMPLETE W REFLEX CULTURE PNL UR: YES
UA DIPSTICK W REFLEX MICRO PNL UR: YES
URN SPEC COLLECT METH UR: ABNORMAL
UROBILINOGEN UR STRIP-ACNC: 0.2 E.U./DL
WBC #/AREA URNS AUTO: 22 /HPF (ref 0–5)

## 2024-10-12 LAB — BACTERIA UR CULT: NORMAL

## 2024-10-21 RX ORDER — METOPROLOL SUCCINATE 100 MG/1
100 TABLET, EXTENDED RELEASE ORAL DAILY
Qty: 90 TABLET | Refills: 1 | Status: SHIPPED | OUTPATIENT
Start: 2024-10-21

## 2024-11-11 RX ORDER — PIOGLITAZONEHYDROCHLORIDE 30 MG/1
30 TABLET ORAL DAILY
Qty: 90 TABLET | Refills: 1 | Status: SHIPPED | OUTPATIENT
Start: 2024-11-11

## 2024-11-12 RX ORDER — QUETIAPINE FUMARATE 25 MG/1
50 TABLET, FILM COATED ORAL EVERY EVENING
Qty: 180 TABLET | Refills: 1 | Status: SHIPPED | OUTPATIENT
Start: 2024-11-12

## 2024-11-21 ENCOUNTER — OFFICE VISIT (OUTPATIENT)
Dept: FAMILY MEDICINE CLINIC | Age: 64
End: 2024-11-21

## 2024-11-21 VITALS
OXYGEN SATURATION: 97 % | BODY MASS INDEX: 40.24 KG/M2 | SYSTOLIC BLOOD PRESSURE: 102 MMHG | WEIGHT: 215.6 LBS | DIASTOLIC BLOOD PRESSURE: 70 MMHG | HEART RATE: 64 BPM | RESPIRATION RATE: 18 BRPM

## 2024-11-21 DIAGNOSIS — E55.9 VITAMIN D DEFICIENCY: ICD-10-CM

## 2024-11-21 DIAGNOSIS — E66.01 SEVERE OBESITY (BMI 35.0-39.9) WITH COMORBIDITY: ICD-10-CM

## 2024-11-21 DIAGNOSIS — J30.9 ALLERGIC RHINITIS, UNSPECIFIED SEASONALITY, UNSPECIFIED TRIGGER: ICD-10-CM

## 2024-11-21 DIAGNOSIS — M79.7 FIBROMYALGIA: ICD-10-CM

## 2024-11-21 DIAGNOSIS — E11.29 TYPE 2 DIABETES MELLITUS WITH MICROALBUMINURIA, WITH LONG-TERM CURRENT USE OF INSULIN (HCC): Primary | ICD-10-CM

## 2024-11-21 DIAGNOSIS — Z77.120 MOLD EXPOSURE: ICD-10-CM

## 2024-11-21 DIAGNOSIS — Z79.4 TYPE 2 DIABETES MELLITUS WITH MICROALBUMINURIA, WITH LONG-TERM CURRENT USE OF INSULIN (HCC): Primary | ICD-10-CM

## 2024-11-21 DIAGNOSIS — R80.9 TYPE 2 DIABETES MELLITUS WITH MICROALBUMINURIA, WITH LONG-TERM CURRENT USE OF INSULIN (HCC): Primary | ICD-10-CM

## 2024-11-21 LAB — HBA1C MFR BLD: 7.8 %

## 2024-11-21 RX ORDER — MAGNESIUM 200 MG
200 TABLET ORAL DAILY
Qty: 30 TABLET | Refills: 5 | Status: SHIPPED | OUTPATIENT
Start: 2024-11-21

## 2024-11-21 RX ORDER — OMEPRAZOLE 40 MG/1
40 CAPSULE, DELAYED RELEASE ORAL 2 TIMES DAILY
Qty: 60 CAPSULE | Refills: 0 | Status: SHIPPED | OUTPATIENT
Start: 2024-11-21

## 2024-11-21 ASSESSMENT — ENCOUNTER SYMPTOMS
ALLERGIC/IMMUNOLOGIC NEGATIVE: 1
GASTROINTESTINAL NEGATIVE: 1
EYES NEGATIVE: 1
RESPIRATORY NEGATIVE: 1
SORE THROAT: 1

## 2024-11-21 NOTE — PROGRESS NOTES
or significant deformity.  Sensation intact to SW monofilament and vibratory sense bilaterally; intact pedal pulses and tissue perfusion.     No muscular tenderness @ quads/calves. Bilat knees with bony enlargement and medial JLT   Skin:     General: Skin is warm and dry.   Neurological:      General: No focal deficit present.      Mental Status: She is alert and oriented to person, place, and time.   Psychiatric:         Mood and Affect: Mood normal.         Behavior: Behavior normal.         Thought Content: Thought content normal.         ASSESSMENT/PLAN:    1. Type 2 diabetes mellitus with microalbuminuria, with long-term current use of insulin ([facility])  -Improving glycemic control on current regimen. A1C from 8-7.8. no changes made to insulin doses.  -Discussed eliminating snacks after bedtime. Her high sugars are related to to carb rich bedtime snacks. Educated to swap for a lower carb option or sugar free.   - POCT glycosylated hemoglobin (Hb A1C)  -  DIABETES FOOT EXAM    2. Fibromyalgia  Stable with gabapentin and tizanidine nightly.   Her leg pain is more likely to be related to knee OA; discussed increasing physical activity and especially quad strengh (finding a stationary bike to use 15 min daily would be beneficial)  Can also try Mg 200 mg for leg cramps.    3. Severe obesity (BMI 35.0-39.9) with comorbidity  -Educated on diet and exercise. Try to avoid late night snacking or at least choose lower sugar/carb options for snacking to control hyperglycemia (highest sugars are 12am-6 am  Unable to tolerate GLP-1's due to diabetic gastroparesis.     4. Mold exposure  - concerned for mold exposure in the house.   - Allergen, Respiratory, Region 5 Panel; Future    5. Vitamin D deficiency (11, 6/2023)  -Previous Vitamin D level 11.9 in 6/23  - Vitamin D 25 Hydroxy; Future    6. Allergic rhinitis, unspecified seasonality, unspecified trigger  - Allergen, Respiratory, Region 5 Panel; Future      Return in

## 2024-11-22 RX ORDER — OMEPRAZOLE 40 MG/1
40 CAPSULE, DELAYED RELEASE ORAL 2 TIMES DAILY
Qty: 180 CAPSULE | OUTPATIENT
Start: 2024-11-22

## 2024-11-27 DIAGNOSIS — E78.5 HYPERLIPIDEMIA LDL GOAL <100: ICD-10-CM

## 2024-11-27 RX ORDER — ATORVASTATIN CALCIUM 80 MG/1
TABLET, FILM COATED ORAL
Qty: 90 TABLET | Refills: 1 | Status: SHIPPED | OUTPATIENT
Start: 2024-11-27

## 2024-12-23 RX ORDER — OMEPRAZOLE 40 MG/1
40 CAPSULE, DELAYED RELEASE ORAL 2 TIMES DAILY
Qty: 180 CAPSULE | Refills: 1 | Status: SHIPPED | OUTPATIENT
Start: 2024-12-23

## 2024-12-26 DIAGNOSIS — I10 ESSENTIAL HYPERTENSION: ICD-10-CM

## 2024-12-26 RX ORDER — AMLODIPINE BESYLATE 5 MG/1
TABLET ORAL
Qty: 90 TABLET | Refills: 1 | Status: SHIPPED | OUTPATIENT
Start: 2024-12-26

## 2024-12-26 RX ORDER — TOLTERODINE 4 MG/1
CAPSULE, EXTENDED RELEASE ORAL
Qty: 90 CAPSULE | Refills: 1 | Status: SHIPPED | OUTPATIENT
Start: 2024-12-26

## 2025-01-17 RX ORDER — BUPROPION HYDROCHLORIDE 300 MG/1
300 TABLET ORAL EVERY MORNING
Qty: 30 TABLET | Refills: 3 | Status: SHIPPED | OUTPATIENT
Start: 2025-01-17

## 2025-02-08 DIAGNOSIS — F33.1 MODERATE EPISODE OF RECURRENT MAJOR DEPRESSIVE DISORDER (HCC): ICD-10-CM

## 2025-02-10 RX ORDER — VENLAFAXINE HYDROCHLORIDE 75 MG/1
225 CAPSULE, EXTENDED RELEASE ORAL DAILY
Qty: 270 CAPSULE | Refills: 1 | Status: SHIPPED | OUTPATIENT
Start: 2025-02-10

## 2025-02-21 ENCOUNTER — OFFICE VISIT (OUTPATIENT)
Dept: FAMILY MEDICINE CLINIC | Age: 65
End: 2025-02-21
Payer: MEDICARE

## 2025-02-21 VITALS
WEIGHT: 207.6 LBS | HEART RATE: 63 BPM | SYSTOLIC BLOOD PRESSURE: 150 MMHG | DIASTOLIC BLOOD PRESSURE: 98 MMHG | HEIGHT: 61 IN | BODY MASS INDEX: 39.2 KG/M2 | OXYGEN SATURATION: 97 %

## 2025-02-21 DIAGNOSIS — I10 ESSENTIAL HYPERTENSION: ICD-10-CM

## 2025-02-21 DIAGNOSIS — Z79.4 TYPE 2 DIABETES MELLITUS WITH MICROALBUMINURIA, WITH LONG-TERM CURRENT USE OF INSULIN (HCC): ICD-10-CM

## 2025-02-21 DIAGNOSIS — R80.9 TYPE 2 DIABETES MELLITUS WITH MICROALBUMINURIA, WITH LONG-TERM CURRENT USE OF INSULIN (HCC): ICD-10-CM

## 2025-02-21 DIAGNOSIS — E11.22 CKD STAGE 3 DUE TO TYPE 2 DIABETES MELLITUS (HCC): ICD-10-CM

## 2025-02-21 DIAGNOSIS — R10.32 LLQ ABDOMINAL PAIN: ICD-10-CM

## 2025-02-21 DIAGNOSIS — E11.29 TYPE 2 DIABETES MELLITUS WITH MICROALBUMINURIA, WITH LONG-TERM CURRENT USE OF INSULIN (HCC): ICD-10-CM

## 2025-02-21 DIAGNOSIS — E78.5 HYPERLIPIDEMIA LDL GOAL <100: ICD-10-CM

## 2025-02-21 DIAGNOSIS — J06.9 VIRAL URI: Primary | ICD-10-CM

## 2025-02-21 DIAGNOSIS — N18.30 CKD STAGE 3 DUE TO TYPE 2 DIABETES MELLITUS (HCC): ICD-10-CM

## 2025-02-21 LAB — HBA1C MFR BLD: 8.2 %

## 2025-02-21 PROCEDURE — 1160F RVW MEDS BY RX/DR IN RCRD: CPT | Performed by: FAMILY MEDICINE

## 2025-02-21 PROCEDURE — 3080F DIAST BP >= 90 MM HG: CPT | Performed by: FAMILY MEDICINE

## 2025-02-21 PROCEDURE — 1090F PRES/ABSN URINE INCON ASSESS: CPT | Performed by: FAMILY MEDICINE

## 2025-02-21 PROCEDURE — 99214 OFFICE O/P EST MOD 30 MIN: CPT | Performed by: FAMILY MEDICINE

## 2025-02-21 PROCEDURE — 2022F DILAT RTA XM EVC RTNOPTHY: CPT | Performed by: FAMILY MEDICINE

## 2025-02-21 PROCEDURE — G8427 DOCREV CUR MEDS BY ELIG CLIN: HCPCS | Performed by: FAMILY MEDICINE

## 2025-02-21 PROCEDURE — 3077F SYST BP >= 140 MM HG: CPT | Performed by: FAMILY MEDICINE

## 2025-02-21 PROCEDURE — 3017F COLORECTAL CA SCREEN DOC REV: CPT | Performed by: FAMILY MEDICINE

## 2025-02-21 PROCEDURE — 1159F MED LIST DOCD IN RCRD: CPT | Performed by: FAMILY MEDICINE

## 2025-02-21 PROCEDURE — G2211 COMPLEX E/M VISIT ADD ON: HCPCS | Performed by: FAMILY MEDICINE

## 2025-02-21 PROCEDURE — 3052F HG A1C>EQUAL 8.0%<EQUAL 9.0%: CPT | Performed by: FAMILY MEDICINE

## 2025-02-21 PROCEDURE — 1036F TOBACCO NON-USER: CPT | Performed by: FAMILY MEDICINE

## 2025-02-21 PROCEDURE — 1124F ACP DISCUSS-NO DSCNMKR DOCD: CPT | Performed by: FAMILY MEDICINE

## 2025-02-21 PROCEDURE — G8417 CALC BMI ABV UP PARAM F/U: HCPCS | Performed by: FAMILY MEDICINE

## 2025-02-21 PROCEDURE — G8399 PT W/DXA RESULTS DOCUMENT: HCPCS | Performed by: FAMILY MEDICINE

## 2025-02-21 PROCEDURE — 83036 HEMOGLOBIN GLYCOSYLATED A1C: CPT | Performed by: FAMILY MEDICINE

## 2025-02-21 SDOH — ECONOMIC STABILITY: FOOD INSECURITY: WITHIN THE PAST 12 MONTHS, THE FOOD YOU BOUGHT JUST DIDN'T LAST AND YOU DIDN'T HAVE MONEY TO GET MORE.: NEVER TRUE

## 2025-02-21 SDOH — ECONOMIC STABILITY: FOOD INSECURITY: WITHIN THE PAST 12 MONTHS, YOU WORRIED THAT YOUR FOOD WOULD RUN OUT BEFORE YOU GOT MONEY TO BUY MORE.: NEVER TRUE

## 2025-02-21 ASSESSMENT — PATIENT HEALTH QUESTIONNAIRE - PHQ9
SUM OF ALL RESPONSES TO PHQ QUESTIONS 1-9: 9
4. FEELING TIRED OR HAVING LITTLE ENERGY: NEARLY EVERY DAY
10. IF YOU CHECKED OFF ANY PROBLEMS, HOW DIFFICULT HAVE THESE PROBLEMS MADE IT FOR YOU TO DO YOUR WORK, TAKE CARE OF THINGS AT HOME, OR GET ALONG WITH OTHER PEOPLE: SOMEWHAT DIFFICULT
2. FEELING DOWN, DEPRESSED OR HOPELESS: NEARLY EVERY DAY
SUM OF ALL RESPONSES TO PHQ QUESTIONS 1-9: 9
7. TROUBLE CONCENTRATING ON THINGS, SUCH AS READING THE NEWSPAPER OR WATCHING TELEVISION: NOT AT ALL
3. TROUBLE FALLING OR STAYING ASLEEP: NOT AT ALL
1. LITTLE INTEREST OR PLEASURE IN DOING THINGS: NEARLY EVERY DAY
SUM OF ALL RESPONSES TO PHQ QUESTIONS 1-9: 9
SUM OF ALL RESPONSES TO PHQ QUESTIONS 1-9: 9
9. THOUGHTS THAT YOU WOULD BE BETTER OFF DEAD, OR OF HURTING YOURSELF: NOT AT ALL
5. POOR APPETITE OR OVEREATING: NOT AT ALL
8. MOVING OR SPEAKING SO SLOWLY THAT OTHER PEOPLE COULD HAVE NOTICED. OR THE OPPOSITE, BEING SO FIGETY OR RESTLESS THAT YOU HAVE BEEN MOVING AROUND A LOT MORE THAN USUAL: NOT AT ALL
SUM OF ALL RESPONSES TO PHQ9 QUESTIONS 1 & 2: 6

## 2025-02-21 NOTE — ASSESSMENT & PLAN NOTE
- glycemic control has declined; A1c >8  - insulin use error led to severe hypoglycemia (so her A1c goal is <8.0).   has taken steps to prevent future errors.   - f/u in a month with CGM reader to analyze data to make insulin dose corrections.  - unfortunately she does not tolerate SQ or PO GLP-1 inhibitors due to gastroparesis, nor SGLT-2 due to urine infections with hospitalization for pyelonephritis.  Will continue Actos 30

## 2025-02-21 NOTE — PROGRESS NOTES
Systems As above     Physical Exam  Vitals and nursing note reviewed.   Constitutional:       General: She is not in acute distress.     Appearance: Normal appearance. She is well-developed. She is not toxic-appearing or diaphoretic.   HENT:      Head: Normocephalic and atraumatic.      Right Ear: Tympanic membrane, ear canal and external ear normal. There is no impacted cerumen.      Left Ear: Tympanic membrane, ear canal and external ear normal. There is no impacted cerumen.      Nose: Nose normal. No congestion or rhinorrhea.      Mouth/Throat:      Mouth: Mucous membranes are moist.      Pharynx: Oropharynx is clear. Posterior oropharyngeal erythema (posterior o/p) present. No oropharyngeal exudate.   Eyes:      General:         Right eye: No discharge.         Left eye: No discharge.      Conjunctiva/sclera: Conjunctivae normal.      Pupils: Pupils are equal, round, and reactive to light.   Neck:      Thyroid: No thyromegaly.      Trachea: No tracheal deviation.   Cardiovascular:      Rate and Rhythm: Normal rate and regular rhythm.      Heart sounds: Normal heart sounds. No murmur heard.     No friction rub. No gallop.   Pulmonary:      Effort: Pulmonary effort is normal. No respiratory distress.      Breath sounds: Normal breath sounds. No wheezing or rales.   Chest:      Chest wall: No tenderness.   Abdominal:      General: Abdomen is flat. Bowel sounds are normal.      Palpations: Abdomen is soft.      Tenderness: There is abdominal tenderness (mild llq and left lateral tenderness.).   Musculoskeletal:         General: Normal range of motion.      Cervical back: Normal range of motion and neck supple.   Lymphadenopathy:      Cervical: No cervical adenopathy.      Upper Body:      Right upper body: No supraclavicular adenopathy.      Left upper body: No supraclavicular adenopathy.   Skin:     General: Skin is warm and dry.      Findings: No rash.   Neurological:      General: No focal deficit present.

## 2025-02-21 NOTE — ASSESSMENT & PLAN NOTE
- not on ace/arb due to hyperkalemia; SGLT-2 due to infections.  - follows with nephrology Dr Nguyễn. But is overdue for follow up there (encouraged to call)  - retest renal fxn now:    Orders:    Comprehensive Metabolic Panel; Future

## 2025-02-22 LAB
ALBUMIN SERPL-MCNC: 3.9 G/DL (ref 3.4–5)
ALBUMIN/GLOB SERPL: 1.3 {RATIO} (ref 1.1–2.2)
ALP SERPL-CCNC: 110 U/L (ref 40–129)
ALT SERPL-CCNC: 15 U/L (ref 10–40)
ANION GAP SERPL CALCULATED.3IONS-SCNC: 13 MMOL/L (ref 3–16)
AST SERPL-CCNC: 24 U/L (ref 15–37)
BASOPHILS # BLD: 0.1 K/UL (ref 0–0.2)
BASOPHILS NFR BLD: 1.3 %
BILIRUB SERPL-MCNC: <0.2 MG/DL (ref 0–1)
BUN SERPL-MCNC: 31 MG/DL (ref 7–20)
CALCIUM SERPL-MCNC: 9.6 MG/DL (ref 8.3–10.6)
CHLORIDE SERPL-SCNC: 100 MMOL/L (ref 99–110)
CHOLEST SERPL-MCNC: 195 MG/DL (ref 0–199)
CO2 SERPL-SCNC: 27 MMOL/L (ref 21–32)
CREAT SERPL-MCNC: 1.8 MG/DL (ref 0.6–1.2)
DEPRECATED RDW RBC AUTO: 14.9 % (ref 12.4–15.4)
EOSINOPHIL # BLD: 0.2 K/UL (ref 0–0.6)
EOSINOPHIL NFR BLD: 3.3 %
GFR SERPLBLD CREATININE-BSD FMLA CKD-EPI: 31 ML/MIN/{1.73_M2}
GLUCOSE SERPL-MCNC: 250 MG/DL (ref 70–99)
HCT VFR BLD AUTO: 37.6 % (ref 36–48)
HDLC SERPL-MCNC: 36 MG/DL (ref 40–60)
HGB BLD-MCNC: 12.5 G/DL (ref 12–16)
LDLC SERPL CALC-MCNC: 113 MG/DL
LYMPHOCYTES # BLD: 2.1 K/UL (ref 1–5.1)
LYMPHOCYTES NFR BLD: 39.4 %
MCH RBC QN AUTO: 29.7 PG (ref 26–34)
MCHC RBC AUTO-ENTMCNC: 33.3 G/DL (ref 31–36)
MCV RBC AUTO: 89.2 FL (ref 80–100)
MONOCYTES # BLD: 0.5 K/UL (ref 0–1.3)
MONOCYTES NFR BLD: 9.4 %
NEUTROPHILS # BLD: 2.4 K/UL (ref 1.7–7.7)
NEUTROPHILS NFR BLD: 46.6 %
PLATELET # BLD AUTO: 212 K/UL (ref 135–450)
PMV BLD AUTO: 10.2 FL (ref 5–10.5)
POTASSIUM SERPL-SCNC: 4.3 MMOL/L (ref 3.5–5.1)
PROT SERPL-MCNC: 7 G/DL (ref 6.4–8.2)
RBC # BLD AUTO: 4.22 M/UL (ref 4–5.2)
SODIUM SERPL-SCNC: 140 MMOL/L (ref 136–145)
TRIGL SERPL-MCNC: 228 MG/DL (ref 0–150)
VLDLC SERPL CALC-MCNC: 46 MG/DL
WBC # BLD AUTO: 5.3 K/UL (ref 4–11)

## 2025-02-28 ENCOUNTER — TELEPHONE (OUTPATIENT)
Dept: FAMILY MEDICINE CLINIC | Age: 65
End: 2025-02-28

## 2025-03-25 ENCOUNTER — OFFICE VISIT (OUTPATIENT)
Dept: FAMILY MEDICINE CLINIC | Age: 65
End: 2025-03-25
Payer: MEDICARE

## 2025-03-25 VITALS
RESPIRATION RATE: 18 BRPM | OXYGEN SATURATION: 97 % | HEART RATE: 73 BPM | WEIGHT: 210 LBS | DIASTOLIC BLOOD PRESSURE: 72 MMHG | HEIGHT: 61 IN | BODY MASS INDEX: 39.65 KG/M2 | SYSTOLIC BLOOD PRESSURE: 137 MMHG

## 2025-03-25 DIAGNOSIS — Z79.4 TYPE 2 DIABETES MELLITUS WITH MICROALBUMINURIA, WITH LONG-TERM CURRENT USE OF INSULIN (HCC): Primary | ICD-10-CM

## 2025-03-25 DIAGNOSIS — R80.9 TYPE 2 DIABETES MELLITUS WITH MICROALBUMINURIA, WITH LONG-TERM CURRENT USE OF INSULIN (HCC): ICD-10-CM

## 2025-03-25 DIAGNOSIS — N18.30 CKD STAGE 3 DUE TO TYPE 2 DIABETES MELLITUS (HCC): ICD-10-CM

## 2025-03-25 DIAGNOSIS — Z79.4 TYPE 2 DIABETES MELLITUS WITH MICROALBUMINURIA, WITH LONG-TERM CURRENT USE OF INSULIN (HCC): ICD-10-CM

## 2025-03-25 DIAGNOSIS — E11.22 CKD STAGE 3 DUE TO TYPE 2 DIABETES MELLITUS (HCC): ICD-10-CM

## 2025-03-25 DIAGNOSIS — E11.29 TYPE 2 DIABETES MELLITUS WITH MICROALBUMINURIA, WITH LONG-TERM CURRENT USE OF INSULIN (HCC): Primary | ICD-10-CM

## 2025-03-25 DIAGNOSIS — E11.29 TYPE 2 DIABETES MELLITUS WITH MICROALBUMINURIA, WITH LONG-TERM CURRENT USE OF INSULIN (HCC): ICD-10-CM

## 2025-03-25 DIAGNOSIS — F33.1 MODERATE EPISODE OF RECURRENT MAJOR DEPRESSIVE DISORDER (HCC): ICD-10-CM

## 2025-03-25 DIAGNOSIS — M50.30 DDD (DEGENERATIVE DISC DISEASE), CERVICAL: ICD-10-CM

## 2025-03-25 DIAGNOSIS — R80.9 TYPE 2 DIABETES MELLITUS WITH MICROALBUMINURIA, WITH LONG-TERM CURRENT USE OF INSULIN (HCC): Primary | ICD-10-CM

## 2025-03-25 PROCEDURE — G8417 CALC BMI ABV UP PARAM F/U: HCPCS | Performed by: FAMILY MEDICINE

## 2025-03-25 PROCEDURE — G2211 COMPLEX E/M VISIT ADD ON: HCPCS | Performed by: FAMILY MEDICINE

## 2025-03-25 PROCEDURE — G8427 DOCREV CUR MEDS BY ELIG CLIN: HCPCS | Performed by: FAMILY MEDICINE

## 2025-03-25 PROCEDURE — 2022F DILAT RTA XM EVC RTNOPTHY: CPT | Performed by: FAMILY MEDICINE

## 2025-03-25 PROCEDURE — 1036F TOBACCO NON-USER: CPT | Performed by: FAMILY MEDICINE

## 2025-03-25 PROCEDURE — 3075F SYST BP GE 130 - 139MM HG: CPT | Performed by: FAMILY MEDICINE

## 2025-03-25 PROCEDURE — 1124F ACP DISCUSS-NO DSCNMKR DOCD: CPT | Performed by: FAMILY MEDICINE

## 2025-03-25 PROCEDURE — 1090F PRES/ABSN URINE INCON ASSESS: CPT | Performed by: FAMILY MEDICINE

## 2025-03-25 PROCEDURE — 3052F HG A1C>EQUAL 8.0%<EQUAL 9.0%: CPT | Performed by: FAMILY MEDICINE

## 2025-03-25 PROCEDURE — G8399 PT W/DXA RESULTS DOCUMENT: HCPCS | Performed by: FAMILY MEDICINE

## 2025-03-25 PROCEDURE — 1159F MED LIST DOCD IN RCRD: CPT | Performed by: FAMILY MEDICINE

## 2025-03-25 PROCEDURE — 3078F DIAST BP <80 MM HG: CPT | Performed by: FAMILY MEDICINE

## 2025-03-25 PROCEDURE — 3017F COLORECTAL CA SCREEN DOC REV: CPT | Performed by: FAMILY MEDICINE

## 2025-03-25 PROCEDURE — 99214 OFFICE O/P EST MOD 30 MIN: CPT | Performed by: FAMILY MEDICINE

## 2025-03-25 RX ORDER — GABAPENTIN 600 MG/1
600 TABLET ORAL NIGHTLY
Qty: 90 TABLET | Refills: 1 | Status: SHIPPED | OUTPATIENT
Start: 2025-03-25 | End: 2025-09-21
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
## 2025-03-25 NOTE — ASSESSMENT & PLAN NOTE
- her present hyperglycemia is worst after her main meal late night.  It appears she is not using prandial insulin consistently.  - We reviewed the procedure for assessing her pre-meal glucose and selecting a dose of Apidra based on that number.  - But equally important to this is having her  eat with Kyra to remind her to do this.  Or at least START their meals together.  - her hypoglycemic episodes have lessened over the course of the month and none in the past week.  So will keep Lantus dose at 40 units for now.  - continue CGM use    Due for ACR testing:     Orders:    Albumin/Creatinine Ratio, Urine; Future

## 2025-03-25 NOTE — PATIENT INSTRUCTIONS
Plan for controlling your sugars after meals:  Eat together (to remind Kyra to use insulin)  Review current glucose and pick the apidra dose:  Apidra (SS).    <100=NONE, 100-180=10 UNITS, 181-230=15U, 231-280-20U,>281=25U

## 2025-03-25 NOTE — ASSESSMENT & PLAN NOTE
- advised to not use oral nsaids.  Try adding topical Voltaren gel up to 4x daily and encouraged to start physical therapy to increase muscular support.    Orders:    [facility]

## 2025-03-25 NOTE — ASSESSMENT & PLAN NOTE
- mood fluctuates.  She feels if pain could be improved she will feel better emotionally.  Continue cymbalta 60 and Wellbutrin 300 with seoquel 50 mg for sleep.

## 2025-03-26 ENCOUNTER — RESULTS FOLLOW-UP (OUTPATIENT)
Dept: FAMILY MEDICINE CLINIC | Age: 65
End: 2025-03-26

## 2025-03-26 LAB
CREAT UR-MCNC: 146 MG/DL (ref 28–259)
MICROALBUMIN UR DL<=1MG/L-MCNC: 337 MG/DL
MICROALBUMIN/CREAT UR: 2308.2 MG/G (ref 0–30)

## 2025-04-17 ENCOUNTER — HOSPITAL ENCOUNTER (OUTPATIENT)
Dept: GENERAL RADIOLOGY | Age: 65
Discharge: HOME | End: 2025-04-17
Attending: FAMILY MEDICINE
Payer: MEDICARE

## 2025-04-17 ENCOUNTER — HOSPITAL ENCOUNTER (OUTPATIENT)
Age: 65
Discharge: HOME | End: 2025-04-17
Payer: MEDICARE

## 2025-04-17 ENCOUNTER — RESULTS FOLLOW-UP (OUTPATIENT)
Dept: FAMILY MEDICINE CLINIC | Age: 65
End: 2025-04-17

## 2025-04-17 DIAGNOSIS — R10.32 LLQ ABDOMINAL PAIN: ICD-10-CM

## 2025-04-17 DIAGNOSIS — R10.32 LLQ ABDOMINAL PAIN: Primary | ICD-10-CM

## 2025-04-17 PROCEDURE — 74019 RADEX ABDOMEN 2 VIEWS: CPT

## 2025-04-22 RX ORDER — QUETIAPINE FUMARATE 25 MG/1
50 TABLET, FILM COATED ORAL EVERY EVENING
Qty: 180 TABLET | Refills: 1 | Status: SHIPPED | OUTPATIENT
Start: 2025-04-22

## 2025-04-28 RX ORDER — METOPROLOL SUCCINATE 100 MG/1
100 TABLET, EXTENDED RELEASE ORAL DAILY
Qty: 90 TABLET | Refills: 1 | Status: SHIPPED | OUTPATIENT
Start: 2025-04-28

## 2025-05-07 RX ORDER — PIOGLITAZONE 30 MG/1
30 TABLET ORAL DAILY
Qty: 90 TABLET | Refills: 1 | Status: SHIPPED | OUTPATIENT
Start: 2025-05-07

## 2025-05-27 ENCOUNTER — HOSPITAL ENCOUNTER (OUTPATIENT)
Dept: CT IMAGING | Age: 65
Discharge: HOME | End: 2025-05-27
Attending: FAMILY MEDICINE
Payer: MEDICARE

## 2025-05-27 DIAGNOSIS — R10.32 LLQ ABDOMINAL PAIN: ICD-10-CM

## 2025-05-27 PROCEDURE — 6360000004 HC RX CONTRAST MEDICATION: Performed by: FAMILY MEDICINE

## 2025-05-27 PROCEDURE — 74176 CT ABD & PELVIS W/O CONTRAST: CPT

## 2025-05-27 RX ORDER — DIATRIZOATE MEGLUMINE AND DIATRIZOATE SODIUM 660; 100 MG/ML; MG/ML
30 SOLUTION ORAL; RECTAL
Status: DISCONTINUED | OUTPATIENT
Start: 2025-05-27 | End: 2025-05-28 | Stop reason: HOSPADM

## 2025-05-27 RX ADMIN — DIATRIZOATE MEGLUMINE AND DIATRIZOATE SODIUM 30 ML: 660; 100 LIQUID ORAL; RECTAL at 15:00

## 2025-05-29 ENCOUNTER — RESULTS FOLLOW-UP (OUTPATIENT)
Dept: FAMILY MEDICINE CLINIC | Age: 65
End: 2025-05-29

## 2025-05-29 DIAGNOSIS — R10.32 LLQ ABDOMINAL PAIN: Primary | ICD-10-CM

## 2025-05-30 DIAGNOSIS — E78.5 HYPERLIPIDEMIA LDL GOAL <100: ICD-10-CM

## 2025-05-30 RX ORDER — ATORVASTATIN CALCIUM 80 MG/1
80 TABLET, FILM COATED ORAL DAILY
Qty: 90 TABLET | Refills: 1 | Status: SHIPPED | OUTPATIENT
Start: 2025-05-30

## 2025-05-30 NOTE — TELEPHONE ENCOUNTER
Pt called in wants a [facility] specialist .  Pt stated Dr Wood cannot see the pt until August 5th     Please advise

## 2025-06-12 ENCOUNTER — TELEPHONE (OUTPATIENT)
Dept: ORTHOPEDIC SURGERY | Age: 65
End: 2025-06-12

## 2025-06-12 NOTE — TELEPHONE ENCOUNTER
Spoke with the patient, I let her know that Dr. Lindquist is out on vacation right now.  Patient states that her hand is painful and swollen, hard to bend fingers.  I suggested that she call her PCP office to see if they can get her in to take a look at it.  Patient understood and will give them a call.

## 2025-06-12 NOTE — TELEPHONE ENCOUNTER
Appointment Request     Patient requesting earlier appointment: Yes  Appointment offered to patient: No  Patient Contact Number: [phone]    Patient's left hand is red and swollen and very painful. Patient is wanting to be seen asap.

## 2025-06-13 RX ORDER — INSULIN GLARGINE 300 U/ML
40 INJECTION, SOLUTION SUBCUTANEOUS DAILY
Qty: 15 ML | Refills: 3 | Status: SHIPPED | OUTPATIENT
Start: 2025-06-13

## 2025-06-20 ENCOUNTER — TELEPHONE (OUTPATIENT)
Dept: FAMILY MEDICINE CLINIC | Age: 65
End: 2025-06-20

## 2025-06-20 DIAGNOSIS — B37.31 YEAST VAGINITIS: ICD-10-CM

## 2025-06-20 RX ORDER — NYSTATIN 100000 U/G
CREAM TOPICAL
Qty: 100 G | Refills: 1 | Status: SHIPPED | OUTPATIENT
Start: 2025-06-20

## 2025-06-20 NOTE — TELEPHONE ENCOUNTER
Pt is calling she has a rash under her breast and normally uses this cream for that.  Is there any way you can send this in or the powder form in for the pt with the heat it is giving her a rash.  Please advise

## 2025-07-03 RX ORDER — TOLTERODINE 4 MG/1
CAPSULE, EXTENDED RELEASE ORAL DAILY
Qty: 90 CAPSULE | Refills: 1 | Status: SHIPPED | OUTPATIENT
Start: 2025-07-03

## 2025-07-09 DIAGNOSIS — I10 ESSENTIAL HYPERTENSION: ICD-10-CM

## 2025-07-09 RX ORDER — AMLODIPINE BESYLATE 5 MG/1
5 TABLET ORAL DAILY
Qty: 90 TABLET | Refills: 1 | Status: SHIPPED | OUTPATIENT
Start: 2025-07-09

## 2025-07-09 RX ORDER — OMEPRAZOLE 40 MG/1
40 CAPSULE, DELAYED RELEASE ORAL 2 TIMES DAILY
Qty: 180 CAPSULE | Refills: 1 | Status: SHIPPED | OUTPATIENT
Start: 2025-07-09

## 2025-07-11 ENCOUNTER — HOSPITAL ENCOUNTER (OUTPATIENT)
Age: 65
Discharge: HOME | End: 2025-07-11
Payer: MEDICARE

## 2025-07-11 ENCOUNTER — OFFICE VISIT (OUTPATIENT)
Dept: FAMILY MEDICINE CLINIC | Age: 65
End: 2025-07-11
Payer: MEDICARE

## 2025-07-11 ENCOUNTER — HOSPITAL ENCOUNTER (OUTPATIENT)
Dept: GENERAL RADIOLOGY | Age: 65
Discharge: HOME | End: 2025-07-11
Payer: MEDICARE

## 2025-07-11 VITALS
OXYGEN SATURATION: 97 % | DIASTOLIC BLOOD PRESSURE: 80 MMHG | HEIGHT: 61 IN | HEART RATE: 73 BPM | SYSTOLIC BLOOD PRESSURE: 168 MMHG | WEIGHT: 211 LBS | TEMPERATURE: 99 F | RESPIRATION RATE: 20 BRPM | BODY MASS INDEX: 39.84 KG/M2

## 2025-07-11 DIAGNOSIS — M77.8 TENDINITIS OF THUMB: ICD-10-CM

## 2025-07-11 DIAGNOSIS — M79.89 SWELLING OF LEFT HAND: Primary | ICD-10-CM

## 2025-07-11 DIAGNOSIS — I10 ESSENTIAL HYPERTENSION: ICD-10-CM

## 2025-07-11 DIAGNOSIS — M79.89 SWELLING OF LEFT HAND: ICD-10-CM

## 2025-07-11 PROCEDURE — 1160F RVW MEDS BY RX/DR IN RCRD: CPT | Performed by: NURSE PRACTITIONER

## 2025-07-11 PROCEDURE — 99214 OFFICE O/P EST MOD 30 MIN: CPT | Performed by: NURSE PRACTITIONER

## 2025-07-11 PROCEDURE — G8427 DOCREV CUR MEDS BY ELIG CLIN: HCPCS | Performed by: NURSE PRACTITIONER

## 2025-07-11 PROCEDURE — 3079F DIAST BP 80-89 MM HG: CPT | Performed by: NURSE PRACTITIONER

## 2025-07-11 PROCEDURE — 3017F COLORECTAL CA SCREEN DOC REV: CPT | Performed by: NURSE PRACTITIONER

## 2025-07-11 PROCEDURE — 73130 X-RAY EXAM OF HAND: CPT

## 2025-07-11 PROCEDURE — 1036F TOBACCO NON-USER: CPT | Performed by: NURSE PRACTITIONER

## 2025-07-11 PROCEDURE — 1090F PRES/ABSN URINE INCON ASSESS: CPT | Performed by: NURSE PRACTITIONER

## 2025-07-11 PROCEDURE — 1124F ACP DISCUSS-NO DSCNMKR DOCD: CPT | Performed by: NURSE PRACTITIONER

## 2025-07-11 PROCEDURE — 1159F MED LIST DOCD IN RCRD: CPT | Performed by: NURSE PRACTITIONER

## 2025-07-11 PROCEDURE — G8417 CALC BMI ABV UP PARAM F/U: HCPCS | Performed by: NURSE PRACTITIONER

## 2025-07-11 PROCEDURE — 3077F SYST BP >= 140 MM HG: CPT | Performed by: NURSE PRACTITIONER

## 2025-07-11 PROCEDURE — G8399 PT W/DXA RESULTS DOCUMENT: HCPCS | Performed by: NURSE PRACTITIONER

## 2025-07-11 NOTE — PROGRESS NOTES
7/11/2025    This is a 65 y.o. female   Chief Complaint   Patient presents with    Hand Pain     X2 weeks.  Lt hand pain and swelling.    .    HPI  History of Present Illness  The patient is a 65-year-old female who presents today with left hand pain for the past 2 weeks. She is accompanied by her .    She reports experiencing severe pain in her left hand, which she rates as a 10 on a scale of 0 to 10. The pain extends from the top of her middle finger to her elbow and is constant in nature. She describes the pain as so intense that it disrupts her sleep and limits her ability to perform daily activities such as fastening her seatbelt or lifting objects. She also reports difficulty moving her thumb due to the pain. She has been using Advil, taking two pills during the day and two at night, despite being advised against it due to her chronic kidney disease. She has also tried Tylenol Extra Strength, but it did not alleviate her pain.    She recalls a fall on ice approximately two years ago but does not believe it is related to her current symptoms. She has a history of carpal tunnel surgery and multiple trigger finger releases performed by Dr. Lindquist. She has not had a recent consultation with Dr. Lindquist.    She is currently on amlodipine 5 mg for blood pressure management and believes she took her medication today, although she is not certain. Her  does not think she took the medication. She is capable of monitoring her blood pressure at home, but has not currently been monitoring.             Patient Active Problem List   Diagnosis    Diabetic gastroparesis (HCC)    Allergic rhinitis    Insomnia    Essential hypertension    Headaches due to old head injury    Type 2 diabetes mellitus with microalbuminuria, with long-term current use of insulin (HCC)    Migraine    Right knee DJD    Calcific Achilles tendonitis, right    Lumbar spinal stenosis    Urge incontinence of urine    Personal history of breast

## 2025-07-24 ENCOUNTER — TELEPHONE (OUTPATIENT)
Dept: FAMILY MEDICINE CLINIC | Age: 65
End: 2025-07-24

## 2025-07-24 DIAGNOSIS — M79.89 SWELLING OF LEFT HAND: Primary | ICD-10-CM

## 2025-07-24 DIAGNOSIS — M77.8 TENDINITIS OF THUMB: ICD-10-CM

## 2025-07-24 RX ORDER — HYDROCODONE BITARTRATE AND ACETAMINOPHEN 5; 325 MG/1; MG/1
1 TABLET ORAL EVERY 6 HOURS PRN
Qty: 12 TABLET | Refills: 0 | Status: SHIPPED | OUTPATIENT
Start: 2025-07-24 | End: 2025-07-27

## 2025-07-24 NOTE — TELEPHONE ENCOUNTER
Please let Kyra know that I did order some pain pills for the next few days.  It is hydrocodone, so don't take it and drive.  Sent to [facility].

## 2025-07-24 NOTE — TELEPHONE ENCOUNTER
LOV 7/11/25 pt saw NEERU Lockhart for pain in  wrist   Pt state sit is going up her arm hurts bad cannot really move fingers   Pt wants to know can something be sent to pharmacy to help with pain ?  Pt has tried tylenol not helping at all   Pt will contact specialist      [facility]

## 2025-07-24 NOTE — TELEPHONE ENCOUNTER
Pt has appt with ortho tomorrow. Is there something you want the pt to take to help with the arm and finger pain?

## 2025-07-25 ENCOUNTER — OFFICE VISIT (OUTPATIENT)
Dept: ORTHOPEDIC SURGERY | Age: 65
End: 2025-07-25

## 2025-07-25 VITALS — RESPIRATION RATE: 16 BRPM | WEIGHT: 204 LBS | BODY MASS INDEX: 37.54 KG/M2 | HEIGHT: 62 IN

## 2025-07-25 DIAGNOSIS — M65.4 DE QUERVAIN'S TENOSYNOVITIS, LEFT: Primary | ICD-10-CM

## 2025-07-25 RX ORDER — METHYLPREDNISOLONE 4 MG/1
TABLET ORAL
Qty: 1 KIT | Refills: 0 | Status: SHIPPED | OUTPATIENT
Start: 2025-07-25 | End: 2025-07-31

## 2025-07-25 RX ORDER — HYDROCODONE BITARTRATE AND ACETAMINOPHEN 5; 325 MG/1; MG/1
1 TABLET ORAL EVERY 8 HOURS PRN
Qty: 15 TABLET | Refills: 0 | Status: SHIPPED | OUTPATIENT
Start: 2025-07-25 | End: 2025-08-01

## 2025-07-25 NOTE — PROGRESS NOTES
Kyra HARPER [facility]  [id number]  July 25, 2025    Chief Complaint   Patient presents with    Hand Pain     Left thumb       History: The patient 65-year-old female who is here for evaluation of her left thumb and hand.  The patient reportedly developed some radial sided wrist and thumb pain 3 weeks ago.  She has not had an injury.  Take Tylenol and ibuprofen without great relief.  She has been itching the area.  She has had bilateral carpal tunnel releases in the remote past.  She denies any numbness    The patient's  past medical history, medications, allergies,  family history, social history, and have been reviewed, and dated and are recorded in the chart.  Pertinent items are noted in HPI.  Review of systems reviewed from Pertinent History Form dated on 7/25 and available in the patient's chart under the Media tab.     Vitals:  Resp 16   Ht 1.575 m (5' 2\")   Wt 92.5 kg (204 lb)   BMI 37.31 kg/m²     Physical: On examination, the patient and oriented x 3.  The patient has mild to moderate left wrist and thumb swelling.  There is mild erythema over the radial aspect of the wrist.  She has severe tenderness to palpation over the first dorsal compartment of the wrist.  She has mild to moderate pain with resisted left thumb extension.  Finkelstein's is positive on the left.  She is nontender over the anatomic snuffbox.  She is nontender over the TFCC.  She has a well-healed carpal tunnel scar.      Impression: Left wrist De Quervains tenosynovitis    Plan: At this time, the patient was given a Medrol Dosepak.  She was instructed to not itch or apply any ointments over the wrist.  She has developed a small area of skin irritation.  Patient was given a thumb spica wrist splint.  She was encouraged to modify her activities.  She will follow-up with approximately 10 days and we will reassess her then.  She was given a one-time prescription for Norco    No orders of the defined types were placed in this encounter.

## 2025-08-05 ENCOUNTER — OFFICE VISIT (OUTPATIENT)
Dept: ORTHOPEDIC SURGERY | Age: 65
End: 2025-08-05
Payer: MEDICARE

## 2025-08-05 VITALS — HEIGHT: 62 IN | WEIGHT: 204 LBS | BODY MASS INDEX: 37.54 KG/M2

## 2025-08-05 DIAGNOSIS — M65.4 DE QUERVAIN'S TENOSYNOVITIS, LEFT: Primary | ICD-10-CM

## 2025-08-05 PROCEDURE — G8417 CALC BMI ABV UP PARAM F/U: HCPCS | Performed by: ORTHOPAEDIC SURGERY

## 2025-08-05 PROCEDURE — 1124F ACP DISCUSS-NO DSCNMKR DOCD: CPT | Performed by: ORTHOPAEDIC SURGERY

## 2025-08-05 PROCEDURE — 99213 OFFICE O/P EST LOW 20 MIN: CPT | Performed by: ORTHOPAEDIC SURGERY

## 2025-08-05 PROCEDURE — 1036F TOBACCO NON-USER: CPT | Performed by: ORTHOPAEDIC SURGERY

## 2025-08-05 PROCEDURE — 3017F COLORECTAL CA SCREEN DOC REV: CPT | Performed by: ORTHOPAEDIC SURGERY

## 2025-08-05 PROCEDURE — G8399 PT W/DXA RESULTS DOCUMENT: HCPCS | Performed by: ORTHOPAEDIC SURGERY

## 2025-08-05 PROCEDURE — 1159F MED LIST DOCD IN RCRD: CPT | Performed by: ORTHOPAEDIC SURGERY

## 2025-08-05 PROCEDURE — 1090F PRES/ABSN URINE INCON ASSESS: CPT | Performed by: ORTHOPAEDIC SURGERY

## 2025-08-05 PROCEDURE — 1125F AMNT PAIN NOTED PAIN PRSNT: CPT | Performed by: ORTHOPAEDIC SURGERY

## 2025-08-05 PROCEDURE — G8427 DOCREV CUR MEDS BY ELIG CLIN: HCPCS | Performed by: ORTHOPAEDIC SURGERY

## 2025-08-05 RX ORDER — CELECOXIB 200 MG/1
200 CAPSULE ORAL DAILY
Qty: 30 CAPSULE | Refills: 1 | Status: CANCELLED | OUTPATIENT
Start: 2025-08-05

## 2025-08-07 RX ORDER — CELECOXIB 200 MG/1
200 CAPSULE ORAL DAILY
Qty: 14 CAPSULE | Refills: 0 | OUTPATIENT
Start: 2025-08-07 | End: 2025-08-21

## 2025-08-18 DIAGNOSIS — F33.1 MODERATE EPISODE OF RECURRENT MAJOR DEPRESSIVE DISORDER (HCC): ICD-10-CM

## 2025-08-18 RX ORDER — VENLAFAXINE HYDROCHLORIDE 75 MG/1
225 CAPSULE, EXTENDED RELEASE ORAL DAILY
Qty: 270 CAPSULE | Refills: 1 | Status: SHIPPED | OUTPATIENT
Start: 2025-08-18

## 2025-08-21 ENCOUNTER — OFFICE VISIT (OUTPATIENT)
Dept: FAMILY MEDICINE CLINIC | Age: 65
End: 2025-08-21

## 2025-08-21 VITALS
HEART RATE: 70 BPM | WEIGHT: 207 LBS | RESPIRATION RATE: 12 BRPM | DIASTOLIC BLOOD PRESSURE: 72 MMHG | SYSTOLIC BLOOD PRESSURE: 158 MMHG | OXYGEN SATURATION: 97 % | BODY MASS INDEX: 37.86 KG/M2

## 2025-08-21 DIAGNOSIS — E11.43 DIABETIC GASTROPARESIS (HCC): ICD-10-CM

## 2025-08-21 DIAGNOSIS — N18.30 CKD STAGE 3 DUE TO TYPE 2 DIABETES MELLITUS (HCC): ICD-10-CM

## 2025-08-21 DIAGNOSIS — R80.9 TYPE 2 DIABETES MELLITUS WITH MICROALBUMINURIA, WITH LONG-TERM CURRENT USE OF INSULIN (HCC): ICD-10-CM

## 2025-08-21 DIAGNOSIS — Z91.81 AT HIGH RISK FOR FALLS: ICD-10-CM

## 2025-08-21 DIAGNOSIS — I10 ESSENTIAL HYPERTENSION: ICD-10-CM

## 2025-08-21 DIAGNOSIS — K31.84 DIABETIC GASTROPARESIS (HCC): ICD-10-CM

## 2025-08-21 DIAGNOSIS — Z79.4 TYPE 2 DIABETES MELLITUS WITH MICROALBUMINURIA, WITH LONG-TERM CURRENT USE OF INSULIN (HCC): ICD-10-CM

## 2025-08-21 DIAGNOSIS — E11.29 TYPE 2 DIABETES MELLITUS WITH MICROALBUMINURIA, WITH LONG-TERM CURRENT USE OF INSULIN (HCC): ICD-10-CM

## 2025-08-21 DIAGNOSIS — E11.22 CKD STAGE 3 DUE TO TYPE 2 DIABETES MELLITUS (HCC): ICD-10-CM

## 2025-08-21 DIAGNOSIS — F33.1 MODERATE EPISODE OF RECURRENT MAJOR DEPRESSIVE DISORDER (HCC): ICD-10-CM

## 2025-08-21 DIAGNOSIS — F51.04 PSYCHOPHYSIOLOGICAL INSOMNIA: Primary | ICD-10-CM

## 2025-08-21 LAB — HBA1C MFR BLD: 8.8 %

## 2025-08-21 RX ORDER — INSULIN GLARGINE 300 U/ML
38 INJECTION, SOLUTION SUBCUTANEOUS NIGHTLY
Qty: 15 ML | Refills: 3 | Status: SHIPPED | OUTPATIENT
Start: 2025-08-21

## 2025-08-21 RX ORDER — LEMBOREXANT 10 MG/1
10 TABLET, FILM COATED ORAL NIGHTLY
Qty: 30 TABLET | Refills: 2 | Status: SHIPPED | OUTPATIENT
Start: 2025-08-21 | End: 2025-11-19

## 2025-08-21 RX ORDER — PIOGLITAZONE 45 MG/1
45 TABLET ORAL DAILY
Qty: 90 TABLET | Refills: 1 | Status: SHIPPED | OUTPATIENT
Start: 2025-08-21

## 2025-08-21 ASSESSMENT — PATIENT HEALTH QUESTIONNAIRE - PHQ9
SUM OF ALL RESPONSES TO PHQ QUESTIONS 1-9: 12
2. FEELING DOWN, DEPRESSED OR HOPELESS: MORE THAN HALF THE DAYS
6. FEELING BAD ABOUT YOURSELF - OR THAT YOU ARE A FAILURE OR HAVE LET YOURSELF OR YOUR FAMILY DOWN: SEVERAL DAYS
SUM OF ALL RESPONSES TO PHQ QUESTIONS 1-9: 12
SUM OF ALL RESPONSES TO PHQ QUESTIONS 1-9: 12
8. MOVING OR SPEAKING SO SLOWLY THAT OTHER PEOPLE COULD HAVE NOTICED. OR THE OPPOSITE, BEING SO FIGETY OR RESTLESS THAT YOU HAVE BEEN MOVING AROUND A LOT MORE THAN USUAL: SEVERAL DAYS
9. THOUGHTS THAT YOU WOULD BE BETTER OFF DEAD, OR OF HURTING YOURSELF: NOT AT ALL
10. IF YOU CHECKED OFF ANY PROBLEMS, HOW DIFFICULT HAVE THESE PROBLEMS MADE IT FOR YOU TO DO YOUR WORK, TAKE CARE OF THINGS AT HOME, OR GET ALONG WITH OTHER PEOPLE: SOMEWHAT DIFFICULT
1. LITTLE INTEREST OR PLEASURE IN DOING THINGS: MORE THAN HALF THE DAYS
7. TROUBLE CONCENTRATING ON THINGS, SUCH AS READING THE NEWSPAPER OR WATCHING TELEVISION: SEVERAL DAYS
3. TROUBLE FALLING OR STAYING ASLEEP: NEARLY EVERY DAY
4. FEELING TIRED OR HAVING LITTLE ENERGY: SEVERAL DAYS
5. POOR APPETITE OR OVEREATING: SEVERAL DAYS
SUM OF ALL RESPONSES TO PHQ QUESTIONS 1-9: 12

## 2025-08-22 ENCOUNTER — RESULTS FOLLOW-UP (OUTPATIENT)
Dept: FAMILY MEDICINE CLINIC | Age: 65
End: 2025-08-22

## 2025-08-22 PROBLEM — M65.30 TRIGGER FINGER: Status: RESOLVED | Noted: 2024-05-31 | Resolved: 2025-08-22

## 2025-08-22 PROBLEM — N18.4 CKD STAGE 4 DUE TO TYPE 2 DIABETES MELLITUS (HCC): Status: ACTIVE | Noted: 2023-01-03

## 2025-08-22 PROBLEM — M76.62 ACHILLES TENDINITIS OF BOTH LOWER EXTREMITIES: Status: RESOLVED | Noted: 2022-03-16 | Resolved: 2025-08-22

## 2025-08-22 PROBLEM — M65.30 TRIGGER FINGER, ACQUIRED: Status: RESOLVED | Noted: 2021-06-28 | Resolved: 2025-08-22

## 2025-08-22 PROBLEM — M76.61 ACHILLES TENDINITIS OF BOTH LOWER EXTREMITIES: Status: RESOLVED | Noted: 2022-03-16 | Resolved: 2025-08-22

## 2025-08-22 LAB
ANION GAP SERPL CALCULATED.3IONS-SCNC: 13 MMOL/L (ref 3–16)
BUN SERPL-MCNC: 31 MG/DL (ref 7–20)
CALCIUM SERPL-MCNC: 9.1 MG/DL (ref 8.3–10.6)
CHLORIDE SERPL-SCNC: 106 MMOL/L (ref 99–110)
CO2 SERPL-SCNC: 24 MMOL/L (ref 21–32)
CREAT SERPL-MCNC: 2.2 MG/DL (ref 0.6–1.2)
GFR SERPLBLD CREATININE-BSD FMLA CKD-EPI: 24 ML/MIN/{1.73_M2}
GLUCOSE SERPL-MCNC: 140 MG/DL (ref 70–99)
POTASSIUM SERPL-SCNC: 4.7 MMOL/L (ref 3.5–5.1)
SODIUM SERPL-SCNC: 143 MMOL/L (ref 136–145)

## 2025-08-27 ENCOUNTER — TELEPHONE (OUTPATIENT)
Dept: FAMILY MEDICINE CLINIC | Age: 65
End: 2025-08-27

## 2025-08-27 DIAGNOSIS — F51.04 PSYCHOPHYSIOLOGICAL INSOMNIA: Primary | ICD-10-CM

## 2025-09-03 ENCOUNTER — TELEPHONE (OUTPATIENT)
Dept: FAMILY MEDICINE CLINIC | Age: 65
End: 2025-09-03

## 2025-09-04 ENCOUNTER — OFFICE VISIT (OUTPATIENT)
Dept: FAMILY MEDICINE CLINIC | Age: 65
End: 2025-09-04

## 2025-09-04 VITALS
DIASTOLIC BLOOD PRESSURE: 60 MMHG | BODY MASS INDEX: 36.51 KG/M2 | HEART RATE: 72 BPM | RESPIRATION RATE: 12 BRPM | OXYGEN SATURATION: 98 % | WEIGHT: 199.6 LBS | SYSTOLIC BLOOD PRESSURE: 138 MMHG

## 2025-09-04 DIAGNOSIS — E11.22 CKD STAGE 4 DUE TO TYPE 2 DIABETES MELLITUS (HCC): ICD-10-CM

## 2025-09-04 DIAGNOSIS — G47.00 INSOMNIA, UNSPECIFIED TYPE: Primary | ICD-10-CM

## 2025-09-04 DIAGNOSIS — N18.4 CKD STAGE 4 DUE TO TYPE 2 DIABETES MELLITUS (HCC): ICD-10-CM

## 2025-09-04 RX ORDER — QUETIAPINE FUMARATE 25 MG/1
50 TABLET, FILM COATED ORAL NIGHTLY
Qty: 60 TABLET | Refills: 5 | Status: SHIPPED | OUTPATIENT
Start: 2025-09-04

## (undated) DEVICE — GLOVE SURG SZ 75 CRM LTX FREE POLYISOPRENE POLYMER BEAD ANTI

## (undated) DEVICE — GLOVE,SURG,TRIUMPH MICRO,LTX,PF,7.5: Brand: MEDLINE

## (undated) DEVICE — FORCEPS BX 240CM 2.4MM L NDL RAD JAW 4 M00513334

## (undated) DEVICE — WILLIS PACK: Brand: MEDLINE INDUSTRIES, INC.

## (undated) DEVICE — SOLUTION IRRIG BSS ST 500ML

## (undated) DEVICE — GOWN,SIRUS,POLYRNF,BRTHSLV,XL,30/CS: Brand: MEDLINE

## (undated) DEVICE — NEEDLE HYPO 18GA L1.5IN PNK POLYPR HUB S STL REG BVL STR

## (undated) DEVICE — APPLICATOR MEDICATED 26 CC SOLUTION HI LT ORNG CHLORAPREP

## (undated) DEVICE — ESMARCH P/F TEXTURED 4" X 12' 10/BX

## (undated) DEVICE — SYRINGE MED 3ML CLR PLAS STD N CTRL LUERLOCK TIP DISP

## (undated) DEVICE — UNDERGLOVE SURG SZ 8 FNGR THK0.21MIL GRN LTX BEAD CUF

## (undated) DEVICE — TOWEL,OR,DSP,ST,BLUE,STD,4/PK,20PK/CS: Brand: MEDLINE

## (undated) DEVICE — ENDOSCOPY KIT: Brand: MEDLINE INDUSTRIES, INC.

## (undated) DEVICE — COTTON UNDERCAST PADDING,REGULAR FINISH: Brand: WEBRIL

## (undated) DEVICE — SYRINGE MED 10ML LUERLOCK TIP W/O SFTY DISP

## (undated) DEVICE — COVER LT HNDL BLU PLAS

## (undated) DEVICE — SURGICAL PROC PACK SHT WEST V4

## (undated) DEVICE — UNDERPAD HOSP W30XL36IN WHT SUP ABSRB POLYMER AIR PERM DISP

## (undated) DEVICE — FORMALIN CLEAR VIAL 20 ML 10%

## (undated) DEVICE — SYRINGE MED 30ML STD CLR PLAS LUERLOCK TIP N CTRL DISP

## (undated) DEVICE — SOLUTION IV IRRIG WATER 500ML POUR BRL ST 2F7113

## (undated) DEVICE — GLOVE SURG SZ 8 L12IN FNGR THK94MIL STD WHT LTX FREE

## (undated) DEVICE — Device

## (undated) DEVICE — GLOVE ORANGE PI 7   MSG9070

## (undated) DEVICE — SOLUTION IV IRRIG 250ML ST LF 0.9% SODIUM 2F7122

## (undated) DEVICE — BITE BLOCK ENDOSCP AD 60 FR W/ ADJ STRP PLAS GRN BLOX

## (undated) DEVICE — ZIMMER® STERILE DISPOSABLE TOURNIQUET CUFF WITH PLC, DUAL PORT, SINGLE BLADDER, 18 IN. (46 CM)

## (undated) DEVICE — NEEDLE HYPO 25GA L1.5IN BLU POLYPR HUB S STL REG BVL STR

## (undated) DEVICE — GOWN SIRUS NONREIN XL W/TWL: Brand: MEDLINE INDUSTRIES, INC.

## (undated) DEVICE — DRESSING PETRO W3XL3IN OIL EMUL N ADH GZ KNIT IMPREG CELOS

## (undated) DEVICE — GLOVE SURG SZ 65 CRM LTX FREE POLYISOPRENE POLYMER BEAD ANTI

## (undated) DEVICE — BANDAGE COMPR W4INXL12FT E DISP ESMARCH EVEN

## (undated) DEVICE — DRAPE HND W114XL142IN BLU POLYPR W O PCH FEN CRD AND TB HLDR

## (undated) DEVICE — Device: Brand: MEDEX

## (undated) DEVICE — WRAP COHESIVE W2INXL5YD TAN SELF ADH BNDG HND NON STERILE TEAR CARING

## (undated) DEVICE — SHEET,DRAPE,53X77,STERILE: Brand: MEDLINE

## (undated) DEVICE — Z DISCONTINUED USE 2275676 GLOVE SURG SZ 65 L12IN FNGR THK87MIL DK GRN LTX FREE ISOLEX

## (undated) DEVICE — SYRINGE TB 1ML NDL 25GA L0.625IN PLAS SLIP TIP CONVENTIONAL

## (undated) DEVICE — MINOR SET UP PK

## (undated) DEVICE — NEEDLE HYPO 25GA L1.5IN BVL ORIENTED ECLIPSE

## (undated) DEVICE — SPONGE GZ W4XL4IN COT 12 PLY TYP VII WVN C FLD DSGN

## (undated) DEVICE — NEEDLE HYPO 18GA L1.5IN THN WALL PIVOTING SHLD BVL ORIENTED

## (undated) DEVICE — SOLUTION IRRIG 500ML 0.9% SOD CHLO USP POUR PLAS BTL

## (undated) DEVICE — PADDING CAST W4INXL4YD NONSTERILE COT RAYON MICROPLEATED